# Patient Record
Sex: FEMALE | Race: WHITE | Employment: OTHER | URBAN - METROPOLITAN AREA
[De-identification: names, ages, dates, MRNs, and addresses within clinical notes are randomized per-mention and may not be internally consistent; named-entity substitution may affect disease eponyms.]

---

## 2018-04-25 ENCOUNTER — HOSPITAL ENCOUNTER (OUTPATIENT)
Dept: OTHER | Age: 63
Discharge: HOME | End: 2018-04-30
Attending: NURSE PRACTITIONER | Admitting: NURSE PRACTITIONER

## 2018-05-01 ENCOUNTER — HOSPITAL ENCOUNTER (OUTPATIENT)
Dept: PHYSICAL THERAPY | Age: 63
Discharge: HOME | End: 2018-05-01
Admitting: NURSE PRACTITIONER

## 2018-05-01 ENCOUNTER — HOSPITAL ENCOUNTER (OUTPATIENT)
Dept: OTHER | Age: 63
Discharge: HOME | End: 2018-05-31
Attending: NURSE PRACTITIONER | Admitting: NURSE PRACTITIONER

## 2018-05-03 ENCOUNTER — HOSPITAL ENCOUNTER (OUTPATIENT)
Dept: PHYSICAL THERAPY | Age: 63
Discharge: HOME | End: 2018-05-03
Admitting: NURSE PRACTITIONER

## 2018-05-07 ENCOUNTER — HOSPITAL ENCOUNTER (OUTPATIENT)
Dept: PHYSICAL THERAPY | Age: 63
Discharge: HOME | End: 2018-05-07
Admitting: NURSE PRACTITIONER

## 2018-05-09 ENCOUNTER — HOSPITAL ENCOUNTER (OUTPATIENT)
Dept: PHYSICAL THERAPY | Age: 63
Discharge: HOME | End: 2018-05-09
Admitting: NURSE PRACTITIONER

## 2018-05-16 ENCOUNTER — HOSPITAL ENCOUNTER (OUTPATIENT)
Dept: PHYSICAL THERAPY | Age: 63
Discharge: HOME | End: 2018-05-16
Admitting: NURSE PRACTITIONER

## 2018-05-18 ENCOUNTER — HOSPITAL ENCOUNTER (OUTPATIENT)
Dept: PHYSICAL THERAPY | Age: 63
Discharge: HOME | End: 2018-05-18
Admitting: NURSE PRACTITIONER

## 2018-05-23 ENCOUNTER — HOSPITAL ENCOUNTER (OUTPATIENT)
Dept: PHYSICAL THERAPY | Age: 63
Discharge: HOME | End: 2018-05-23
Admitting: NURSE PRACTITIONER

## 2018-06-01 ENCOUNTER — HOSPITAL ENCOUNTER (OUTPATIENT)
Dept: OTHER | Age: 63
Discharge: HOME | End: 2018-06-07
Attending: NURSE PRACTITIONER | Admitting: NURSE PRACTITIONER

## 2022-06-14 ENCOUNTER — INITIAL CONSULT (OUTPATIENT)
Dept: ONCOLOGY | Age: 67
End: 2022-06-14
Payer: MEDICARE

## 2022-06-14 ENCOUNTER — CLINICAL DOCUMENTATION (OUTPATIENT)
Dept: ONCOLOGY | Age: 67
End: 2022-06-14

## 2022-06-14 ENCOUNTER — HOSPITAL ENCOUNTER (OUTPATIENT)
Dept: INFUSION THERAPY | Age: 67
Discharge: HOME | End: 2022-06-14
Payer: MEDICARE

## 2022-06-14 VITALS
HEART RATE: 113 BPM | RESPIRATION RATE: 16 BRPM | HEIGHT: 64 IN | BODY MASS INDEX: 27.31 KG/M2 | TEMPERATURE: 98 F | WEIGHT: 160 LBS | SYSTOLIC BLOOD PRESSURE: 160 MMHG | DIASTOLIC BLOOD PRESSURE: 66 MMHG | OXYGEN SATURATION: 97 %

## 2022-06-14 DIAGNOSIS — N63.20 LEFT BREAST MASS: ICD-10-CM

## 2022-06-14 DIAGNOSIS — R16.0 LIVER MASS: Primary | ICD-10-CM

## 2022-06-14 DIAGNOSIS — R16.0 LIVER MASS: ICD-10-CM

## 2022-06-14 DIAGNOSIS — M89.9 LYTIC LESION OF BONE ON X-RAY: ICD-10-CM

## 2022-06-14 PROBLEM — M89.8X9 LYTIC LESION OF BONE ON X-RAY: Status: ACTIVE | Noted: 2022-06-14

## 2022-06-14 LAB
BASOPHILS ABSOLUTE: 0 K/CU MM
BASOPHILS RELATIVE PERCENT: 0.2 % (ref 0–1)
CEA: 54.5 NG/ML
DIFFERENTIAL TYPE: ABNORMAL
EOSINOPHILS ABSOLUTE: 0 K/CU MM
EOSINOPHILS RELATIVE PERCENT: 0.5 % (ref 0–3)
HCT VFR BLD CALC: 37.1 % (ref 37–47)
HEMOGLOBIN: 12.2 GM/DL (ref 12.5–16)
LACTATE DEHYDROGENASE: 419 IU/L (ref 120–246)
LYMPHOCYTES ABSOLUTE: 1.9 K/CU MM
LYMPHOCYTES RELATIVE PERCENT: 29.8 % (ref 24–44)
MCH RBC QN AUTO: 30 PG (ref 27–31)
MCHC RBC AUTO-ENTMCNC: 32.9 % (ref 32–36)
MCV RBC AUTO: 91.4 FL (ref 78–100)
MONOCYTES ABSOLUTE: 0.5 K/CU MM
MONOCYTES RELATIVE PERCENT: 7.2 % (ref 0–4)
PDW BLD-RTO: 13.1 % (ref 11.7–14.9)
PLATELET # BLD: 228 K/CU MM (ref 140–440)
PMV BLD AUTO: 8.7 FL (ref 7.5–11.1)
RBC # BLD: 4.06 M/CU MM (ref 4.2–5.4)
SEGMENTED NEUTROPHILS ABSOLUTE COUNT: 4 K/CU MM
SEGMENTED NEUTROPHILS RELATIVE PERCENT: 62.3 % (ref 36–66)
WBC # BLD: 6.4 K/CU MM (ref 4–10.5)

## 2022-06-14 PROCEDURE — 1123F ACP DISCUSS/DSCN MKR DOCD: CPT | Performed by: INTERNAL MEDICINE

## 2022-06-14 PROCEDURE — 85025 COMPLETE CBC W/AUTO DIFF WBC: CPT

## 2022-06-14 PROCEDURE — 36415 COLL VENOUS BLD VENIPUNCTURE: CPT

## 2022-06-14 PROCEDURE — 86301 IMMUNOASSAY TUMOR CA 19-9: CPT

## 2022-06-14 PROCEDURE — 99205 OFFICE O/P NEW HI 60 MIN: CPT | Performed by: INTERNAL MEDICINE

## 2022-06-14 PROCEDURE — 86320 SERUM IMMUNOELECTROPHORESIS: CPT

## 2022-06-14 PROCEDURE — 83615 LACTATE (LD) (LDH) ENZYME: CPT

## 2022-06-14 PROCEDURE — 84155 ASSAY OF PROTEIN SERUM: CPT

## 2022-06-14 PROCEDURE — 86300 IMMUNOASSAY TUMOR CA 15-3: CPT

## 2022-06-14 PROCEDURE — 82378 CARCINOEMBRYONIC ANTIGEN: CPT

## 2022-06-14 PROCEDURE — 86304 IMMUNOASSAY TUMOR CA 125: CPT

## 2022-06-14 PROCEDURE — 84165 PROTEIN E-PHORESIS SERUM: CPT

## 2022-06-14 PROCEDURE — 82105 ALPHA-FETOPROTEIN SERUM: CPT

## 2022-06-14 RX ORDER — PSEUDOEPHEDRINE HCL 30 MG
100 TABLET ORAL DAILY
COMMUNITY
Start: 2022-06-12 | End: 2022-06-22

## 2022-06-14 RX ORDER — SENNA PLUS 8.6 MG/1
1 TABLET ORAL 2 TIMES DAILY
Qty: 60 TABLET | Refills: 11 | Status: SHIPPED | OUTPATIENT
Start: 2022-06-14 | End: 2023-06-14

## 2022-06-14 RX ORDER — HYDROCODONE BITARTRATE AND ACETAMINOPHEN 10; 325 MG/1; MG/1
1 TABLET ORAL EVERY 4 HOURS PRN
Qty: 60 TABLET | Refills: 0 | Status: SHIPPED | OUTPATIENT
Start: 2022-06-14 | End: 2022-06-30 | Stop reason: SDUPTHER

## 2022-06-14 RX ORDER — ONDANSETRON 4 MG/1
4 TABLET, ORALLY DISINTEGRATING ORAL 4 TIMES DAILY PRN
Qty: 60 TABLET | Refills: 0 | Status: SHIPPED | OUTPATIENT
Start: 2022-06-14 | End: 2022-06-29

## 2022-06-14 RX ORDER — OXYCODONE HYDROCHLORIDE AND ACETAMINOPHEN 5; 325 MG/1; MG/1
TABLET ORAL
COMMUNITY
Start: 2022-06-12 | End: 2022-06-30 | Stop reason: ALTCHOICE

## 2022-06-14 ASSESSMENT — PATIENT HEALTH QUESTIONNAIRE - PHQ9
6. FEELING BAD ABOUT YOURSELF - OR THAT YOU ARE A FAILURE OR HAVE LET YOURSELF OR YOUR FAMILY DOWN: 0
5. POOR APPETITE OR OVEREATING: 3
3. TROUBLE FALLING OR STAYING ASLEEP: 3
2. FEELING DOWN, DEPRESSED OR HOPELESS: 0
SUM OF ALL RESPONSES TO PHQ QUESTIONS 1-9: 9
SUM OF ALL RESPONSES TO PHQ9 QUESTIONS 1 & 2: 0
SUM OF ALL RESPONSES TO PHQ QUESTIONS 1-9: 9
SUM OF ALL RESPONSES TO PHQ QUESTIONS 1-9: 9
10. IF YOU CHECKED OFF ANY PROBLEMS, HOW DIFFICULT HAVE THESE PROBLEMS MADE IT FOR YOU TO DO YOUR WORK, TAKE CARE OF THINGS AT HOME, OR GET ALONG WITH OTHER PEOPLE: 1
SUM OF ALL RESPONSES TO PHQ QUESTIONS 1-9: 9
7. TROUBLE CONCENTRATING ON THINGS, SUCH AS READING THE NEWSPAPER OR WATCHING TELEVISION: 0
4. FEELING TIRED OR HAVING LITTLE ENERGY: 3
9. THOUGHTS THAT YOU WOULD BE BETTER OFF DEAD, OR OF HURTING YOURSELF: 0
8. MOVING OR SPEAKING SO SLOWLY THAT OTHER PEOPLE COULD HAVE NOTICED. OR THE OPPOSITE, BEING SO FIGETY OR RESTLESS THAT YOU HAVE BEEN MOVING AROUND A LOT MORE THAN USUAL: 0
1. LITTLE INTEREST OR PLEASURE IN DOING THINGS: 0

## 2022-06-14 NOTE — PROGRESS NOTES
Patient Name:  Sammy Paul  Patient :  1955  Patient MRN:  [id number]     Primary Oncologist: Alem De Dios MD  Referring Provider: MAURICIO Dixon CNP     Date of Service: 2022      Reason for Consult: To evaluate the patient with liver lesion and multiple lytic bone lesions. Chief Complaint:    Chief Complaint   Patient presents with    New Patient     Patient's active problem list:       Liver mass       Lytic bone lesions       Left breast mass    HPI:   Sammy Paul is a 74year-old very pleasant female with medical history significant for osteoarthritis, referred to me on 22 for evaluation of her liver lesion and multiple lytic bone lesions. She initially presented to [facility] ER on 22 with severe lower back pain and constipation. Stated that she has been having back pain for about 4 - 6 weeks duration, which becomes severe pain started a week before presentation. CT scan of the abdomen and pelvis done on 2022 showed lesion (6.1 x 8.1 cm) in the hepatic segment 4, concerning for neoplasm. Further evaluation with MRI of the liver is recommended. Extensive lytic metastatic disease in lumbar and thoracic spine and pelvis with most dominant lesion seen at L5 vertebral body. Several small bowel loops segments in the left abdomen demonstrate mild wall thickening, nonspecific may indicate enteritis. Nonspecific partially visualized inflammatory stranding along the pericardium. 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 CT lumbar spine done on 22 showed extensive multifocal lytic lesions concerning for neoplastic/metastatic disease. Age indeterminant compression fractures at T12 and L4, with mild narrowing of the AP diameter of the canal at L4. She never had colonoscopy before. She had pap smear around . She didn't recall when was her last mammogram.     She denies weight loss. She hasn't been eating much for about 10 days since she has been having nausea. Since she is found to have multiple lytic bone lesions and liver lesion, she was referred to me for further evaluation. Past Medical History:     Significant for  1. Osteoarthritis    Past Surgery History:    Significant for   1. Left hip replacement    Social History:   She is a current smoker and she smokes 1 pack per day approximately since she was 24. She denies alcohol drinking or illicit drug abuse. Family History:    Significant for Alzheimer's disease in her parents. No family history of malignancy. No Known Allergies    Current Outpatient Medications on File Prior to Visit   Medication Sig Dispense Refill    docusate (COLACE, DULCOLAX) 100 MG CAPS Take 100 mg by mouth daily (Patient not taking: Reported on 6/14/2022)      oxyCODONE-acetaminophen (PERCOCET) 5-325 MG per tablet  (Patient not taking: Reported on 6/14/2022)       No current facility-administered medications on file prior to visit. Review of Systems:  Constitutional:  No weight loss, No fever, No chills, No night sweats. Energy level is okay. Eyes:  No diplopia, No transient or permanent loss of vision, No scotomata. ENT / Mouth:  No epistaxis, No dysphagia, No hoarseness, No oral ulcers, No gingival bleeding. No sore throat, No postnasal drip, No nasal drip, No mouth pain, No sinus pain, No tinnitus, Normal hearing. Cardiovascular:  No chest pain, No palpitations, No syncope, No upper extremity edema, No lower extremity edema, No calf discomfort. Respiratory:  No cough. No hemoptysis, No pleurisy, No wheezing, No dyspnea. Breast:  Left breast mass and pain +, No nipple discharge. Left breast was distorted by tumor and nipple was pushing close to lower crease area, Left axillary lymph node noted,  Gastrointestinal:  No abdominal pain, No abdominal cramping, No nausea, No vomiting, No constipation, No diarrhea, No hematochezia, No melena, No jaundice, No dyspepsia, No dysphagia.   Urinary:  No dysuria, No hematuria, No urinary incontinence. Gynecological:  No vaginal discharge, No suprapubic pain, No abnormal vaginal bleeding. Musculoskeletal:  No muscle pain, No swollen joints, No joint redness, No bone pain, No spine tenderness. Skin:  No rash, No nodules, No pruritus, No lesions. Neurologic:  No confusion, No seizures, No syncope, No tremor, No speech change, No headache, No hiccups, No abnormal gait, No sensory changes, No weakness. Psychiatric:  No depression, No anxiety, Concentration normal.  Endocrine:  No polyuria, No polydipsia, No hot flashes, No thyroid symptoms. Hematologic:  No epistaxis, No gingival bleeding, No petechiae, No ecchymosis. Lymphatic:  No lymphadenopathy, No lymphedema. Allergy / Immunologic:  No eczema, No frequent mucous infections, No frequent respiratory infections, No recurrent urticarial, No frequent skin infections. Vital Signs: There were no vitals taken for this visit. Physical Exam:  CONSTITUTIONAL: awake, alert, cooperative, no apparent distress   EYES: pupils equal, round and reactive to light, sclera clear, normal conjunctiva  ENT: Normocephalic, without obvious abnormality, atraumatic  NECK: supple, symmetrical, no jugular venous distension, no carotid bruits   HEMATOLOGIC/LYMPHATIC: no cervical, supraclavicular lymphadenopathy. Left axillary lymphadenopathy noted,   LUNGS: VBS, no wheezes, no increased work of breathing, no rhonchi, clear to auscultation, no crackles,    CARDIOVASCULAR: regular rate and rhythm, normal S1 and S2, no murmur noted  ABDOMEN: normal bowel sounds x 4, soft, non-distended, non-tender, no masses palpated, no hepatosplenomegaly   MUSCULOSKELETAL: full range of motion noted, tone is normal  NEUROLOGIC: awake, alert, oriented to name, place and time. Motor skills grossly intact. SKIN: appears intact, normal skin color, normal texture, normal turgor, no jaundice.    EXTREMITIES: no LE edema, no leg swelling, no cyanosis, no clubbing,  BREASTS: Left breast is distorted by tumor, nipple was displaced close to lower breast crease area, right breast nodularity noted,        Labs:  Hematology:  No results found for: WBC, RBC, HGB, HCT, MCV, MCH, MCHC, RDW, PLT, MPV, BANDSPCT, SEGSPCT, EOSRELPCT, BASOPCT, LYMPHOPCT, MONOPCT, BANDABS, SEGSABS, EOSABS, BASOSABS, LYMPHSABS, MONOSABS, DIFFTYPE, ANISOCYTOSIS, POLYCHROM, WBCMORP, PLTM  No results found for: ESR  Chemistry:  No results found for: NA, K, CL, CO2, BUN, CREATININE, GLUCOSE, CALCIUM, PROT, LABALBU, BILITOT, ALKPHOS, AST, ALT, LABGLOM, GFRAA, AGRATIO, GLOB, PHOS, MG, POCCA, POCGLU  No results found for: MMA, LDH, HOMOCYSTEINE  No components found for: LD  No results found for: TSHHS, T4FREE, FT3  Immunology:  No results found for: PROT, SPEP, ALBUMINELP, LABALPH, LABBETA, GAMGLOB  No results found for: Ardyth Esmer, LAMBDAUVOL, KLFLCR  No results found for: B2M  Coagulation Panel:  No results found for: PROTIME, INR, APTT, DDIMER  Anemia Panel:  No results found for: UMBIPAIR90, FOLATE  Tumor Markers:  No results found for: , CEA, , LABCA2, PSA     Observations:  No data recorded     Assessment   Left breast mass  Liver lesion and multiple bone lytic lesions - concerning for metastatic breast cancer    Plan:  Salinas Araya is a 74year-old very pleasant female who initially presented to [facility] ER on 6/12/22 with severe lower back pain and constipation. CT scan of the abdomen and pelvis done on 6/12/2022 showed lesion (6.1 x 8.1 cm) in the hepatic segment 4, concerning for neoplasm. Extensive lytic metastatic disease in lumbar and thoracic spine and pelvis with most dominant lesion seen at L5 vertebral body. CT lumbar spine done on 6/12/22 showed extensive multifocal lytic lesions concerning for neoplastic/metastatic disease. She never had colonoscopy before. She had pap smear around 2020.  She didn't recall when was her last mammogram.     Physical exam showed distorted left breast by tumor, nipple was displaced close to lower breast crease area. Right breast nodularity was also noted    I reviewed all her scans with her and findings on physical. Clinical findings are concerning for possible metastatic breast cancer. I will request diagnostic mammogram, followed by biopsy, CT chest, bone scan and MRI of abdomen as soon as possible. I will request all the tumor marker, SPEP and immunofixation today. Further management will be based on above findings Will plan to see her back on 6/22/22 and will have all her study done before that. Lower back pain - malignance related. Couldn't tolerate percocet. Will change it to norco 10/325 mg Q4 hourly prn. She was given dilaudid injection in ER with improvement. Narcotics related constipation - will start senokot to help with that. Nausea - will start zofran ODT today and will follow her symptoms. Dehydration - encouraged her to drink more water. If she still has nausea, will consider to give IV fluid therapy. I answered all her questions and concerns for today. I asked - to follow up with primary care physician on regular basis. I will continue to keep you updated on - progress. Thank you for allowing me to participate in the care of this very pleasant patient. Recent imaging and labs were reviewed and discussed with the patient.

## 2022-06-14 NOTE — PROGRESS NOTES
MA Rooming Questions  Patient: Romeo Romo  MRN: [id number]    Date: 6/14/2022        New patient        PHQ-9 Total Score: 9 (6/14/2022  8:40 AM)  Thoughts that you would be better off dead, or of hurting yourself in some way: 0 (6/14/2022  8:40 AM)       PHQ-9 Given to (if applicable):               PHQ-9 Score (if applicable):                     [] Positive     []  Negative              Does question #9 need addressed (if applicable)                     [] Yes    []  No               Peter Landau, ALETHA

## 2022-06-15 DIAGNOSIS — N63.20 LEFT BREAST MASS: Primary | ICD-10-CM

## 2022-06-15 LAB
CA 125: 377 U/ML
CA 19-9: 25 U/ML
MS ALPHA-FETOPROTEIN: 4 NG/ML (ref 0–9)

## 2022-06-16 ENCOUNTER — CLINICAL DOCUMENTATION (OUTPATIENT)
Dept: CASE MANAGEMENT | Age: 67
End: 2022-06-16

## 2022-06-16 LAB
ALBUMIN ELP: 3.7 GM/DL (ref 3.2–5.6)
ALBUMIN SERPL-MCNC: ABNORMAL G/DL
ALPHA-1-GLOBULIN: 0.4 GM/DL (ref 0.1–0.4)
ALPHA-1-GLOBULIN: ABNORMAL
ALPHA-2-GLOBULIN: 1.3 GM/DL (ref 0.4–1.2)
ALPHA-2-GLOBULIN: ABNORMAL
BETA GLOBULIN: 1.1 GM/DL (ref 0.5–1.3)
BETA GLOBULIN: ABNORMAL
CA 27.29: 110.5 U/ML
GAMMA GLOBULIN: 1.1 GM/DL (ref 0.5–1.6)
GAMMA: ABNORMAL
IMMUNOFIXATION REFLEX: ABNORMAL
PROTEIN ELECTROPHORESIS, SERUM: ABNORMAL
SPE/IFE INTERPRETATION: ABNORMAL
SPEP INTERPRETATION: ABNORMAL
SPEP INTERPRETATION: NORMAL
TOTAL PROTEIN: 7.6 GM/DL (ref 6.4–8.2)
TOTAL PROTEIN: ABNORMAL

## 2022-06-17 ENCOUNTER — HOSPITAL ENCOUNTER (OUTPATIENT)
Dept: ULTRASOUND IMAGING | Age: 67
Discharge: HOME | End: 2022-06-17
Payer: MEDICARE

## 2022-06-17 ENCOUNTER — HOSPITAL ENCOUNTER (OUTPATIENT)
Dept: WOMENS IMAGING | Age: 67
Discharge: HOME | End: 2022-06-17
Payer: MEDICARE

## 2022-06-17 DIAGNOSIS — N63.10 MASS OF RIGHT BREAST: ICD-10-CM

## 2022-06-17 DIAGNOSIS — N63.20 LEFT BREAST MASS: ICD-10-CM

## 2022-06-17 DIAGNOSIS — R92.8 ABNORMAL MAMMOGRAM: Primary | ICD-10-CM

## 2022-06-17 PROCEDURE — G0279 TOMOSYNTHESIS, MAMMO: HCPCS

## 2022-06-17 PROCEDURE — 76642 ULTRASOUND BREAST LIMITED: CPT

## 2022-06-17 PROCEDURE — 76641 ULTRASOUND BREAST COMPLETE: CPT

## 2022-06-17 NOTE — PROGRESS NOTES
Patient went for bilateral dx mammogram/US today. Results reveal the followin. Left breast mass, highly suspicious for malignancy.  Recommend  ultrasound-guided core biopsy. 2.  Right breast mass at 11 o'clock, 1.5 cm.  Mass is highly suspicious for  malignancy.  Recommend ultrasound-guided core biopsy. 3.  Left breast skin thickening.  If it would alter patient's management,  recommend punch biopsy to determine skin involvement. BIRADS:  5    Per Dr. Abilio Burden - orders placed for bilateral US-guided bxs - patient scheduled for next Wednesday, 22.

## 2022-06-20 ENCOUNTER — HOSPITAL ENCOUNTER (OUTPATIENT)
Dept: NUCLEAR MEDICINE | Age: 67
Discharge: HOME | End: 2022-06-20
Payer: MEDICARE

## 2022-06-20 ENCOUNTER — HOSPITAL ENCOUNTER (OUTPATIENT)
Dept: CT IMAGING | Age: 67
Discharge: HOME | End: 2022-06-20
Payer: MEDICARE

## 2022-06-20 DIAGNOSIS — R16.0 LIVER MASS: ICD-10-CM

## 2022-06-20 DIAGNOSIS — M89.9 LYTIC LESION OF BONE ON X-RAY: ICD-10-CM

## 2022-06-20 LAB
GFR AFRICAN AMERICAN: >60 ML/MIN/1.73M2
GFR NON-AFRICAN AMERICAN: >60 ML/MIN/1.73M2
POC CREATININE: 0.7 MG/DL (ref 0.6–1.1)

## 2022-06-20 PROCEDURE — 71260 CT THORAX DX C+: CPT

## 2022-06-20 PROCEDURE — 6360000004 HC RX CONTRAST MEDICATION: Performed by: INTERNAL MEDICINE

## 2022-06-20 PROCEDURE — 78306 BONE IMAGING WHOLE BODY: CPT

## 2022-06-20 PROCEDURE — 3430000000 HC RX DIAGNOSTIC RADIOPHARMACEUTICAL: Performed by: INTERNAL MEDICINE

## 2022-06-20 PROCEDURE — A9503 TC99M MEDRONATE: HCPCS | Performed by: INTERNAL MEDICINE

## 2022-06-20 RX ORDER — TC 99M MEDRONATE 20 MG/10ML
25 INJECTION, POWDER, LYOPHILIZED, FOR SOLUTION INTRAVENOUS
Status: COMPLETED | OUTPATIENT
Start: 2022-06-20 | End: 2022-06-20

## 2022-06-20 RX ADMIN — TC 99M MEDRONATE 25 MILLICURIE: 20 INJECTION, POWDER, LYOPHILIZED, FOR SOLUTION INTRAVENOUS at 09:30

## 2022-06-20 RX ADMIN — IOPAMIDOL 80 ML: 755 INJECTION, SOLUTION INTRAVENOUS at 10:18

## 2022-06-22 ENCOUNTER — HOSPITAL ENCOUNTER (OUTPATIENT)
Dept: WOMENS IMAGING | Age: 67
Discharge: HOME | End: 2022-06-22
Payer: MEDICARE

## 2022-06-22 ENCOUNTER — HOSPITAL ENCOUNTER (OUTPATIENT)
Dept: ULTRASOUND IMAGING | Age: 67
Discharge: HOME | End: 2022-06-22
Payer: MEDICARE

## 2022-06-22 DIAGNOSIS — R92.8 ABNORMAL MAMMOGRAM: ICD-10-CM

## 2022-06-22 PROCEDURE — 88342 IMHCHEM/IMCYTCHM 1ST ANTB: CPT | Performed by: PATHOLOGY

## 2022-06-22 PROCEDURE — 88305 TISSUE EXAM BY PATHOLOGIST: CPT | Performed by: PATHOLOGY

## 2022-06-22 PROCEDURE — 77066 DX MAMMO INCL CAD BI: CPT

## 2022-06-22 PROCEDURE — 2720000010 US BREAST BIOPSY W LOC DEVICE 1ST LESION LEFT

## 2022-06-22 PROCEDURE — 88341 IMHCHEM/IMCYTCHM EA ADD ANTB: CPT | Performed by: PATHOLOGY

## 2022-06-22 PROCEDURE — 88360 TUMOR IMMUNOHISTOCHEM/MANUAL: CPT | Performed by: PATHOLOGY

## 2022-06-22 PROCEDURE — 19083 BX BREAST 1ST LESION US IMAG: CPT

## 2022-06-25 ENCOUNTER — HOSPITAL ENCOUNTER (OUTPATIENT)
Dept: MRI IMAGING | Age: 67
Discharge: HOME | End: 2022-06-25
Payer: MEDICARE

## 2022-06-25 DIAGNOSIS — R16.0 LIVER MASS: ICD-10-CM

## 2022-06-25 DIAGNOSIS — M89.9 LYTIC LESION OF BONE ON X-RAY: ICD-10-CM

## 2022-06-25 PROCEDURE — A9577 INJ MULTIHANCE: HCPCS | Performed by: INTERNAL MEDICINE

## 2022-06-25 PROCEDURE — 6360000004 HC RX CONTRAST MEDICATION: Performed by: INTERNAL MEDICINE

## 2022-06-25 PROCEDURE — 74183 MRI ABD W/O CNTR FLWD CNTR: CPT

## 2022-06-25 RX ADMIN — GADOBENATE DIMEGLUMINE 15 ML: 529 INJECTION, SOLUTION INTRAVENOUS at 11:33

## 2022-06-30 ENCOUNTER — OFFICE VISIT (OUTPATIENT)
Dept: ONCOLOGY | Age: 67
End: 2022-06-30
Payer: MEDICARE

## 2022-06-30 ENCOUNTER — HOSPITAL ENCOUNTER (OUTPATIENT)
Dept: INFUSION THERAPY | Age: 67
Discharge: HOME | End: 2022-06-30

## 2022-06-30 VITALS
SYSTOLIC BLOOD PRESSURE: 133 MMHG | WEIGHT: 160.4 LBS | HEIGHT: 64 IN | DIASTOLIC BLOOD PRESSURE: 62 MMHG | OXYGEN SATURATION: 97 % | BODY MASS INDEX: 27.39 KG/M2 | TEMPERATURE: 98.2 F | HEART RATE: 97 BPM

## 2022-06-30 DIAGNOSIS — C79.51 BONE METASTASIS (HCC): ICD-10-CM

## 2022-06-30 DIAGNOSIS — C50.812 MALIGNANT NEOPLASM OF OVERLAPPING SITES OF BOTH BREASTS IN FEMALE, ESTROGEN RECEPTOR POSITIVE (HCC): Primary | ICD-10-CM

## 2022-06-30 DIAGNOSIS — M89.9 LYTIC LESION OF BONE ON X-RAY: ICD-10-CM

## 2022-06-30 DIAGNOSIS — Z17.0 MALIGNANT NEOPLASM OF OVERLAPPING SITES OF BOTH BREASTS IN FEMALE, ESTROGEN RECEPTOR POSITIVE (HCC): Primary | ICD-10-CM

## 2022-06-30 DIAGNOSIS — C50.811 MALIGNANT NEOPLASM OF OVERLAPPING SITES OF BOTH BREASTS IN FEMALE, ESTROGEN RECEPTOR POSITIVE (HCC): Primary | ICD-10-CM

## 2022-06-30 DIAGNOSIS — R16.0 LIVER MASS: ICD-10-CM

## 2022-06-30 PROCEDURE — 99215 OFFICE O/P EST HI 40 MIN: CPT | Performed by: INTERNAL MEDICINE

## 2022-06-30 PROCEDURE — 1123F ACP DISCUSS/DSCN MKR DOCD: CPT | Performed by: INTERNAL MEDICINE

## 2022-06-30 RX ORDER — FAMOTIDINE 10 MG/ML
20 INJECTION, SOLUTION INTRAVENOUS
Status: CANCELLED | OUTPATIENT
Start: 2022-07-20

## 2022-06-30 RX ORDER — ALBUTEROL SULFATE 90 UG/1
4 AEROSOL, METERED RESPIRATORY (INHALATION) PRN
Status: CANCELLED | OUTPATIENT
Start: 2022-07-20

## 2022-06-30 RX ORDER — DIPHENHYDRAMINE HYDROCHLORIDE 50 MG/ML
50 INJECTION INTRAMUSCULAR; INTRAVENOUS ONCE
Status: CANCELLED | OUTPATIENT
Start: 2022-07-27 | End: 2022-07-07

## 2022-06-30 RX ORDER — ACETAMINOPHEN 325 MG/1
650 TABLET ORAL
Status: CANCELLED | OUTPATIENT
Start: 2022-07-20

## 2022-06-30 RX ORDER — EPINEPHRINE 1 MG/ML
0.3 INJECTION, SOLUTION, CONCENTRATE INTRAVENOUS PRN
Status: CANCELLED | OUTPATIENT
Start: 2022-08-10

## 2022-06-30 RX ORDER — EPINEPHRINE 1 MG/ML
0.3 INJECTION, SOLUTION, CONCENTRATE INTRAVENOUS PRN
Status: CANCELLED | OUTPATIENT
Start: 2022-07-20

## 2022-06-30 RX ORDER — SODIUM CHLORIDE 9 MG/ML
5-40 INJECTION INTRAVENOUS PRN
Status: CANCELLED | OUTPATIENT
Start: 2022-07-20

## 2022-06-30 RX ORDER — DIPHENHYDRAMINE HYDROCHLORIDE 50 MG/ML
50 INJECTION INTRAMUSCULAR; INTRAVENOUS ONCE
Status: CANCELLED | OUTPATIENT
Start: 2022-07-20 | End: 2022-06-30

## 2022-06-30 RX ORDER — DIPHENHYDRAMINE HYDROCHLORIDE 50 MG/ML
50 INJECTION INTRAMUSCULAR; INTRAVENOUS
Status: CANCELLED | OUTPATIENT
Start: 2022-07-27

## 2022-06-30 RX ORDER — DIPHENHYDRAMINE HYDROCHLORIDE 50 MG/ML
50 INJECTION INTRAMUSCULAR; INTRAVENOUS ONCE
Status: CANCELLED | OUTPATIENT
Start: 2022-08-10 | End: 2022-07-14

## 2022-06-30 RX ORDER — SODIUM CHLORIDE 9 MG/ML
5-250 INJECTION, SOLUTION INTRAVENOUS PRN
Status: CANCELLED | OUTPATIENT
Start: 2022-07-20

## 2022-06-30 RX ORDER — ALBUTEROL SULFATE 90 UG/1
4 AEROSOL, METERED RESPIRATORY (INHALATION) PRN
Status: CANCELLED | OUTPATIENT
Start: 2022-08-10

## 2022-06-30 RX ORDER — SODIUM CHLORIDE 0.9 % (FLUSH) 0.9 %
5-40 SYRINGE (ML) INJECTION PRN
Status: CANCELLED | OUTPATIENT
Start: 2022-08-10

## 2022-06-30 RX ORDER — ONDANSETRON 2 MG/ML
8 INJECTION INTRAMUSCULAR; INTRAVENOUS
Status: CANCELLED | OUTPATIENT
Start: 2022-08-10

## 2022-06-30 RX ORDER — ACETAMINOPHEN 325 MG/1
650 TABLET ORAL
Status: CANCELLED | OUTPATIENT
Start: 2022-08-10

## 2022-06-30 RX ORDER — MEPERIDINE HYDROCHLORIDE 50 MG/ML
12.5 INJECTION INTRAMUSCULAR; INTRAVENOUS; SUBCUTANEOUS PRN
Status: CANCELLED | OUTPATIENT
Start: 2022-07-20

## 2022-06-30 RX ORDER — ONDANSETRON 2 MG/ML
8 INJECTION INTRAMUSCULAR; INTRAVENOUS
Status: CANCELLED | OUTPATIENT
Start: 2022-07-27

## 2022-06-30 RX ORDER — DIPHENHYDRAMINE HYDROCHLORIDE 50 MG/ML
50 INJECTION INTRAMUSCULAR; INTRAVENOUS
Status: CANCELLED | OUTPATIENT
Start: 2022-08-10

## 2022-06-30 RX ORDER — SODIUM CHLORIDE 9 MG/ML
5-40 INJECTION INTRAVENOUS PRN
Status: CANCELLED | OUTPATIENT
Start: 2022-08-10

## 2022-06-30 RX ORDER — SODIUM CHLORIDE 9 MG/ML
INJECTION, SOLUTION INTRAVENOUS CONTINUOUS
Status: CANCELLED | OUTPATIENT
Start: 2022-07-20

## 2022-06-30 RX ORDER — DIPHENHYDRAMINE HYDROCHLORIDE 50 MG/ML
50 INJECTION INTRAMUSCULAR; INTRAVENOUS
Status: CANCELLED | OUTPATIENT
Start: 2022-07-20

## 2022-06-30 RX ORDER — EPINEPHRINE 1 MG/ML
0.3 INJECTION, SOLUTION, CONCENTRATE INTRAVENOUS PRN
Status: CANCELLED | OUTPATIENT
Start: 2022-07-27

## 2022-06-30 RX ORDER — LETROZOLE 2.5 MG/1
2.5 TABLET, FILM COATED ORAL DAILY
COMMUNITY
End: 2022-09-21

## 2022-06-30 RX ORDER — SODIUM CHLORIDE 9 MG/ML
5-40 INJECTION INTRAVENOUS PRN
Status: CANCELLED | OUTPATIENT
Start: 2022-07-27

## 2022-06-30 RX ORDER — SODIUM CHLORIDE 9 MG/ML
INJECTION, SOLUTION INTRAVENOUS CONTINUOUS
Status: CANCELLED | OUTPATIENT
Start: 2022-08-10

## 2022-06-30 RX ORDER — MEPERIDINE HYDROCHLORIDE 50 MG/ML
12.5 INJECTION INTRAMUSCULAR; INTRAVENOUS; SUBCUTANEOUS PRN
Status: CANCELLED | OUTPATIENT
Start: 2022-08-10

## 2022-06-30 RX ORDER — SODIUM CHLORIDE 9 MG/ML
5-250 INJECTION, SOLUTION INTRAVENOUS PRN
Status: CANCELLED | OUTPATIENT
Start: 2022-07-27

## 2022-06-30 RX ORDER — ONDANSETRON 2 MG/ML
8 INJECTION INTRAMUSCULAR; INTRAVENOUS
Status: CANCELLED | OUTPATIENT
Start: 2022-07-20

## 2022-06-30 RX ORDER — SODIUM CHLORIDE 0.9 % (FLUSH) 0.9 %
5-40 SYRINGE (ML) INJECTION PRN
Status: CANCELLED | OUTPATIENT
Start: 2022-07-27

## 2022-06-30 RX ORDER — HEPARIN SODIUM (PORCINE) LOCK FLUSH IV SOLN 100 UNIT/ML 100 UNIT/ML
500 SOLUTION INTRAVENOUS PRN
Status: CANCELLED | OUTPATIENT
Start: 2022-07-20

## 2022-06-30 RX ORDER — SODIUM CHLORIDE 0.9 % (FLUSH) 0.9 %
5-40 SYRINGE (ML) INJECTION PRN
Status: CANCELLED | OUTPATIENT
Start: 2022-07-20

## 2022-06-30 RX ORDER — FAMOTIDINE 10 MG/ML
20 INJECTION, SOLUTION INTRAVENOUS
Status: CANCELLED | OUTPATIENT
Start: 2022-08-10

## 2022-06-30 RX ORDER — SODIUM CHLORIDE 9 MG/ML
INJECTION, SOLUTION INTRAVENOUS CONTINUOUS
Status: CANCELLED | OUTPATIENT
Start: 2022-07-27

## 2022-06-30 RX ORDER — FAMOTIDINE 10 MG/ML
20 INJECTION, SOLUTION INTRAVENOUS ONCE
Status: CANCELLED | OUTPATIENT
Start: 2022-08-10 | End: 2022-07-14

## 2022-06-30 RX ORDER — FAMOTIDINE 10 MG/ML
20 INJECTION, SOLUTION INTRAVENOUS ONCE
Status: CANCELLED | OUTPATIENT
Start: 2022-07-27 | End: 2022-07-07

## 2022-06-30 RX ORDER — FAMOTIDINE 10 MG/ML
20 INJECTION, SOLUTION INTRAVENOUS ONCE
Status: CANCELLED | OUTPATIENT
Start: 2022-07-20 | End: 2022-06-30

## 2022-06-30 RX ORDER — HEPARIN SODIUM (PORCINE) LOCK FLUSH IV SOLN 100 UNIT/ML 100 UNIT/ML
500 SOLUTION INTRAVENOUS PRN
Status: CANCELLED | OUTPATIENT
Start: 2022-08-10

## 2022-06-30 RX ORDER — MEPERIDINE HYDROCHLORIDE 50 MG/ML
12.5 INJECTION INTRAMUSCULAR; INTRAVENOUS; SUBCUTANEOUS PRN
Status: CANCELLED | OUTPATIENT
Start: 2022-07-27

## 2022-06-30 RX ORDER — HEPARIN SODIUM (PORCINE) LOCK FLUSH IV SOLN 100 UNIT/ML 100 UNIT/ML
500 SOLUTION INTRAVENOUS PRN
Status: CANCELLED | OUTPATIENT
Start: 2022-07-27

## 2022-06-30 RX ORDER — LORAZEPAM 0.5 MG/1
0.5 TABLET ORAL NIGHTLY PRN
Qty: 30 TABLET | Refills: 0 | Status: SHIPPED | OUTPATIENT
Start: 2022-06-30 | End: 2022-07-27 | Stop reason: SDUPTHER

## 2022-06-30 RX ORDER — ACETAMINOPHEN 325 MG/1
650 TABLET ORAL
Status: CANCELLED | OUTPATIENT
Start: 2022-07-27

## 2022-06-30 RX ORDER — ALBUTEROL SULFATE 90 UG/1
4 AEROSOL, METERED RESPIRATORY (INHALATION) PRN
Status: CANCELLED | OUTPATIENT
Start: 2022-07-27

## 2022-06-30 RX ORDER — FAMOTIDINE 10 MG/ML
20 INJECTION, SOLUTION INTRAVENOUS
Status: CANCELLED | OUTPATIENT
Start: 2022-07-27

## 2022-06-30 RX ORDER — SODIUM CHLORIDE 9 MG/ML
5-250 INJECTION, SOLUTION INTRAVENOUS PRN
Status: CANCELLED | OUTPATIENT
Start: 2022-08-10

## 2022-06-30 RX ORDER — HYDROCODONE BITARTRATE AND ACETAMINOPHEN 10; 325 MG/1; MG/1
1 TABLET ORAL EVERY 4 HOURS PRN
Qty: 90 TABLET | Refills: 0 | Status: ON HOLD
Start: 2022-06-30 | End: 2022-07-13 | Stop reason: HOSPADM

## 2022-06-30 NOTE — PROGRESS NOTES
MA Rooming Questions  Patient: Brooklynn Herrera  MRN: [id number]    Date: 6/30/2022        1. Do you have any new issues? yes - constant back pain and not being able to sleep          2. Do you need any refills on medications?    no    3. Have you had any imaging done since your last visit? yes - labs 6/14, lexi and us 6/17, ct chest and bone scan 6/20, jessica and breast biopsy 6/22, mri 6/25    4. Have you been hospitalized or seen in the emergency room since your last visit here?   no    5. Did the patient have a depression screening completed today?  No    No data recorded     PHQ-9 Given to (if applicable):               PHQ-9 Score (if applicable):                     [] Positive     []  Negative              Does question #9 need addressed (if applicable)                     [] Yes    []  No               Francine Tai CMA

## 2022-06-30 NOTE — PROGRESS NOTES
Patient Name:  Katherine Coronado  Patient :  1955  Patient MRN:  [id number]     Primary Oncologist: Laury Bowen MD  Referring Provider: MAURICIO Randle CNP     Date of Service: 2022      Chief Complaint:    Chief Complaint   Patient presents with    Follow-up     Patient's active problem list:       Liver mass       Lytic bone lesions       Left breast mass    HPI:   Katherine Coronado is a 74year-old very pleasant female with medical history significant for osteoarthritis, referred to me on 22 for evaluation of her liver lesion and multiple lytic bone lesions. She initially presented to [address] [place] [address] ER on 22 with severe lower back pain and constipation. Stated that she has been having back pain for about 4 - 6 weeks duration, which becomes severe pain started a week before presentation. CT scan of the abdomen and pelvis done on 2022 showed lesion (6.1 x 8.1 cm) in the hepatic segment 4, concerning for neoplasm. Further evaluation with MRI of the liver is recommended. Extensive lytic metastatic disease in lumbar and thoracic spine and pelvis with most dominant lesion seen at L5 vertebral body. Several small bowel loops segments in the left abdomen demonstrate mild wall thickening, nonspecific may indicate enteritis. Nonspecific partially visualized inflammatory stranding along the pericardium. 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 CT lumbar spine done on 22 showed extensive multifocal lytic lesions concerning for neoplastic/metastatic disease. Age indeterminant compression fractures at T12 and L4, with mild narrowing of the AP diameter of the canal at L4. She never had colonoscopy before. She had pap smear around . She didn't recall when was her last mammogram.     She denies weight loss. She hasn't been eating much for about 10 days since she has been having nausea.      Since she is found to have multiple lytic bone lesions and liver lesion, she was referred to me for further evaluation. Digital diagnostic bilateral mammogram and bilateral ultrasound done on 6/17/2022 showed left breast mass, highly suspicious for malignancy. Right breast mass at 11:00, 1.5 cm. Mass is highly suspicious for malignancy. Left breast skin thickening. CT chest on 6/20/2022 showed suspicious heterogeneous mass primarily centered in segment 4A of the liver measuring 8.8 cm. Left breast mass with left axillary and subpectoral metastatic disease, suspected bony metastases to thoracolumbar spine and sternum and possible left chest wall invasion. Bone scan on 6/20/2022 showed multifocal osseous metastatic disease. T12 compression fracture, concerning for pathologic fracture. MRI abdomen on 6/25/2022 showed biopsy-proven malignancy in the left breast with suspicion for inflammatory carcinoma given skin and trabecular thickening. There may be invasion of the underlying pectoralis musculature. At least 3 heavily meta stasis, the largest measuring up to 8.7 cm with the others 1 cm or less. Likely metastatic portal hepatic, retroperitoneal and right retrocrural lymph nodes. Trace left pleural effusion. Extensive skeletal metastatic disease. She underwent ultrasound-guided biopsy of the right breast mass at 11:00 and the pathology showed invasive carcinoma with ductal and lobular features. Estrogen receptor and progesterone receptor are positive. HER2 by IHC not overexpressed (score 1+). HER2 by FISH-pending. Left breast mass at 12:00 biopsy revealed grade 2 invasive lobular carcinoma. Estrogen receptor by IHC-positive [greater than 95%, average strong intensity], progesterone receptor-positive [approximately 80%, average moderate intensity], HER2 by IHC-not overexpressed [score 1+] and HER2 by FISH-pending. On June 30, 2022, she presented to me for follow-up. She was initially referred to me for evaluation of liver lesion and multiple bony lesion.     She was found to have left breast mass with a distorted left breast on physical examination. 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 Since she has significant visceral disease, I recommend her to initiate first-line chemotherapy with single agent paclitaxel. I reviewed with her all the potential side effects as well as benefits of first-line chemotherapy. After long discussion with her and her family, she is in agreement to start first-line chemotherapy. I will request Chemo-Port placement, chemo education and we will plan to start chemotherapy as soon as possible. Will also start zolendronic acid for metastatic bone disease. I will consider vertebroplasty of T12 vertebra fracture. We will request [employer] molecular panel to look for  mutation. I will also request genetic counseling and testing since she has bilateral metastatic breast cancer. Malignancy related pain - will continue with norco since it has been controlling her pain well. Anxiety and insomnia - will start ativan 0.5 mg nightly prn. She doesn't have any other significant symptoms at today visit. Past Medical History:     Significant for  1. Osteoarthritis    Past Surgery History:    Significant for   1. Left hip replacement    Social History:   She is a current smoker and she smokes 1 pack per day approximately since she was 24. She denies alcohol drinking or illicit drug abuse. Family History:    Significant for Alzheimer's disease in her parents. No family history of malignancy.     No Known Allergies    Current Outpatient Medications on File Prior to Visit   Medication Sig Dispense Refill    oxyCODONE-acetaminophen (PERCOCET) 5-325 MG per tablet  (Patient not taking: Reported on 6/14/2022)      senna (SENOKOT) 8.6 MG tablet Take 1 tablet by mouth 2 times daily 60 tablet 11     No current facility-administered medications on file prior to visit. Review of Systems:  Constitutional:  No weight loss, No fever, No chills, No night sweats. Energy level is okay. Eyes:  No diplopia, No transient or permanent loss of vision, No scotomata. ENT / Mouth:  No epistaxis, No dysphagia, No hoarseness, No oral ulcers, No gingival bleeding. No sore throat, No postnasal drip, No nasal drip, No mouth pain, No sinus pain, No tinnitus, Normal hearing. Cardiovascular:  No chest pain, No palpitations, No syncope, No upper extremity edema, No lower extremity edema, No calf discomfort. Respiratory:  No cough. No hemoptysis, No pleurisy, No wheezing, No dyspnea. Breast:  Left breast mass and pain +, No nipple discharge. Left breast was distorted by tumor and nipple was pushing close to lower crease area, Left axillary lymph node noted,  Gastrointestinal:  No abdominal pain, No abdominal cramping, No nausea, No vomiting, No constipation, No diarrhea, No hematochezia, No melena, No jaundice, No dyspepsia, No dysphagia. Urinary:  No dysuria, No hematuria, No urinary incontinence. Gynecological:  No vaginal discharge, No suprapubic pain, No abnormal vaginal bleeding. Musculoskeletal:  No muscle pain, No swollen joints, No joint redness, No bone pain, No spine tenderness. Skin:  No rash, No nodules, No pruritus, No lesions. Neurologic:  No confusion, No seizures, No syncope, No tremor, No speech change, No headache, No hiccups, No abnormal gait, No sensory changes, No weakness. Psychiatric:  No depression, No anxiety, Concentration normal.  Endocrine:  No polyuria, No polydipsia, No hot flashes, No thyroid symptoms. Hematologic:  No epistaxis, No gingival bleeding, No petechiae, No ecchymosis. Lymphatic:  No lymphadenopathy, No lymphedema.   Allergy / Immunologic:  No eczema, No frequent mucous infections, No frequent respiratory infections, No recurrent urticarial, No frequent skin infections. Vital Signs: /62 (Site: Left Upper Arm, Position: Sitting, Cuff Size: Large Adult)   Pulse 97   Temp 98.2 °F (36.8 °C) (Temporal)   Ht 5' 4\" (1.626 m)   Wt 160 lb 6.4 oz (72.8 kg)   SpO2 97%   BMI 27.53 kg/m²      Physical Exam:  CONSTITUTIONAL: awake, alert, cooperative, no apparent distress   EYES: pupils equal, round and reactive to light, sclera clear, normal conjunctiva  ENT: Normocephalic, without obvious abnormality, atraumatic  NECK: supple, symmetrical, no jugular venous distension, no carotid bruits   HEMATOLOGIC/LYMPHATIC: no cervical, supraclavicular lymphadenopathy. Left axillary lymphadenopathy noted,   LUNGS: VBS, no wheezes, no increased work of breathing, no rhonchi, clear to auscultation, no crackles,    CARDIOVASCULAR: regular rate and rhythm, normal S1 and S2, no murmur noted  ABDOMEN: normal bowel sounds x 4, soft, non-distended, non-tender, no masses palpated, no hepatosplenomegaly   MUSCULOSKELETAL: full range of motion noted, tone is normal  NEUROLOGIC: awake, alert, oriented to name, place and time. Motor skills grossly intact. SKIN: appears intact, normal skin color, normal texture, normal turgor, no jaundice.    EXTREMITIES: no LE edema, no leg swelling, no cyanosis, no clubbing,  BREASTS: Left breast is distorted by tumor, nipple was displaced close to lower breast crease area, right breast nodularity noted,        Labs:  Hematology:  Lab Results   Component Value Date    WBC 6.4 06/14/2022    RBC 4.06 (L) 06/14/2022    HGB 12.2 (L) 06/14/2022    HCT 37.1 06/14/2022    MCV 91.4 06/14/2022    MCH 30.0 06/14/2022    MCHC 32.9 06/14/2022    RDW 13.1 06/14/2022     06/14/2022    MPV 8.7 06/14/2022    SEGSPCT 62.3 06/14/2022    EOSRELPCT 0.5 06/14/2022    BASOPCT 0.2 06/14/2022    LYMPHOPCT 29.8 06/14/2022    MONOPCT 7.2 (H) 06/14/2022    SEGSABS 4.0 06/14/2022    EOSABS 0.0 06/14/2022    BASOSABS 0.0 06/14/2022    LYMPHSABS 1.9 06/14/2022    MONOSABS 0.5 06/14/2022    DIFFTYPE AUTOMATED DIFFERENTIAL 06/14/2022     No results found for: ESR  Chemistry:  Lab Results   Component Value Date    CREATININE 0.7 19/09/7144    PROT DUPLICATE ORDER 02/95/5731    PROT 7.6 66/01/6339    LABALBU DUPLICATE ORDER 41/77/4058    LABGLOM >60 06/20/2022    GFRAA >60 06/20/2022     Lab Results   Component Value Date     (H) 06/14/2022     No components found for: LD  No results found for: TSHHS, T4FREE, FT3  Immunology:  Lab Results   Component Value Date    PROT DUPLICATE ORDER 49/26/9965    PROT 7.6 06/14/2022    SPEP  06/14/2022     INTERPRETATION - No monoclonal proteins are detected. SAF    SPEP INTERPRETATION - Within normal limits. SAF 06/14/2022    ALBUMINELP 3.7 06/14/2022    LABALPH 0.4 06/14/2022    LABALPH 1.3 (H) 54/51/7916    LABALPH DUPLICATE ORDER 04/65/9959    LABALPH DUPLICATE ORDER 78/98/7414    LABBETA 1.1 72/96/4072    LABBETA DUPLICATE ORDER 36/73/8006    GAMGLOB 1.1 06/14/2022     No results found for: Zay Molina, [facility] / [facility]  No results found for: B2M  Coagulation Panel:  No results found for: PROTIME, INR, APTT, DDIMER  Anemia Panel:  No results found for: FGTAJGCW55, FOLATE  Tumor Markers:  Lab Results   Component Value Date     377 (H) 06/14/2022    CEA 54.5 06/14/2022     25 06/14/2022    LABCA2 110.5 (H) 06/14/2022        Observations:  No data recorded     Assessment   Left breast mass  Liver lesion and multiple bone lytic lesions - concerning for metastatic breast cancer    Plan:  Shabana Castro is a 74year-old very pleasant female who initially presented to [facility] ER on 6/12/22 with severe lower back pain and constipation. CT scan of the abdomen and pelvis done on 6/12/2022 showed lesion (6.1 x 8.1 cm) in the hepatic segment 4, concerning for neoplasm. Extensive lytic metastatic disease in lumbar and thoracic spine and pelvis with most dominant lesion seen at L5 vertebral body.       CT lumbar spine done on 6/12/22 showed extensive multifocal lytic lesions concerning for neoplastic/metastatic disease. She never had colonoscopy before. She had pap smear around 2020. She didn't recall when was her last mammogram.       Digital diagnostic bilateral mammogram and bilateral ultrasound done on 6/17/2022 showed left breast mass, highly suspicious for malignancy. Right breast mass at 11:00, 1.5 cm. Mass is highly suspicious for malignancy. Left breast skin thickening. CT chest on 6/20/2022 showed suspicious heterogeneous mass primarily centered in segment 4A of the liver measuring 8.8 cm. Left breast mass with left axillary and subpectoral metastatic disease, suspected bony metastases to thoracolumbar spine and sternum and possible left chest wall invasion. Bone scan on 6/20/2022 showed multifocal osseous metastatic disease. T12 compression fracture, concerning for pathologic fracture. MRI abdomen on 6/25/2022 showed biopsy-proven malignancy in the left breast with suspicion for inflammatory carcinoma given skin and trabecular thickening. There may be invasion of the underlying pectoralis musculature. At least 3 heavily meta stasis, the largest measuring up to 8.7 cm with the others 1 cm or less. Likely metastatic portal hepatic, retroperitoneal and right retrocrural lymph nodes. Trace left pleural effusion. Extensive skeletal metastatic disease. She underwent ultrasound-guided biopsy of the right breast mass at 11:00 and the pathology showed invasive carcinoma with ductal and lobular features. Estrogen receptor and progesterone receptor are positive. HER2 by IHC not overexpressed (score 1+). HER2 by FISH-pending. Left breast mass at 12:00 biopsy revealed grade 2 invasive lobular carcinoma.   Estrogen receptor by IHC-positive [greater than 95%, average strong intensity], progesterone receptor-positive [approximately 80%, average moderate intensity], HER2 by IHC-not overexpressed [score 1+] and HER2 by FISH-pending. On June 30, 2022, she presented to me for follow-up. She was initially referred to me for evaluation of liver lesion and multiple bony lesion. She was found to have left breast mass with a distorted left breast on physical examination. 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 Since she has significant visceral disease, I recommend her to initiate first-line chemotherapy with single agent paclitaxel. I reviewed with her all the potential side effects as well as benefits of first-line chemotherapy. After long discussion with her and her family, she is in agreement to start first-line chemotherapy. I will request Chemo-Port placement, chemo education and we will plan to start chemotherapy as soon as possible. Will also start zolendronic acid for metastatic bone disease. I will consider vertebroplasty of T12 vertebra fracture. We will request [employer] molecular panel to look for  mutation. I will also request genetic counseling and testing since she has bilateral metastatic breast cancer. Malignancy related pain - will continue with norco since it has been controlling her pain well. Anxiety and insomnia - will start ativan 0.5 mg nightly prn. I answered all her questions and concerns for today. Recent imaging and labs were reviewed and discussed with the patient.

## 2022-06-30 NOTE — PROGRESS NOTES
Patient here with  for test results with Dr. Abilio Burden.  Per Dr. Abilio Burden - appointment made with Dr. Aydee Serna for port placement for patient to start chemotherapy soon - consultation scheduled for next Wednesday, 7/6/22 at 1430. Still awaiting results of HER-2 by FISH. Patient's current regimen will be Taxol weekly x6 months with Zometa. Patient recently dx with bilateral breast cancer ([address] to left breast and invasive to right breast with ductal and lobular features - both strongly ER/NC positive) with mets to liver and bone. Per Dr. Abilio Burden, samples of Femara 2.5 mg given to patient to start taking this evening until she starts on systemic chemo treatments. Side effects reviewed and educational printout given to patient. Per Dr. Abilio Burden, referral placed for genetic counseling/testing scheduled for 7/15/22. Will also send pathology for CARIS testing once pathology is finalized.

## 2022-07-01 ENCOUNTER — CLINICAL DOCUMENTATION (OUTPATIENT)
Dept: CASE MANAGEMENT | Age: 67
End: 2022-07-01

## 2022-07-01 NOTE — PROGRESS NOTES
Per Dr. Lionel Mitchell, order and required medical records faxed to [facility] @ [phone] for further molecular testing to benefit and determine treatment planning for patient. Bilateral breasts pathology: Specimen #[id number] from [facility].

## 2022-07-06 ENCOUNTER — OFFICE VISIT (OUTPATIENT)
Dept: SURGERY | Age: 67
End: 2022-07-06
Payer: MEDICARE

## 2022-07-06 VITALS
SYSTOLIC BLOOD PRESSURE: 126 MMHG | HEIGHT: 64 IN | HEART RATE: 102 BPM | WEIGHT: 160.8 LBS | DIASTOLIC BLOOD PRESSURE: 74 MMHG | BODY MASS INDEX: 27.45 KG/M2 | OXYGEN SATURATION: 99 %

## 2022-07-06 DIAGNOSIS — C50.811 MALIGNANT NEOPLASM OF OVERLAPPING SITES OF BOTH BREASTS IN FEMALE, ESTROGEN RECEPTOR POSITIVE (HCC): Primary | ICD-10-CM

## 2022-07-06 DIAGNOSIS — C79.51 BONE METASTASIS (HCC): ICD-10-CM

## 2022-07-06 DIAGNOSIS — Z17.0 MALIGNANT NEOPLASM OF OVERLAPPING SITES OF BOTH BREASTS IN FEMALE, ESTROGEN RECEPTOR POSITIVE (HCC): Primary | ICD-10-CM

## 2022-07-06 DIAGNOSIS — C50.812 MALIGNANT NEOPLASM OF OVERLAPPING SITES OF BOTH BREASTS IN FEMALE, ESTROGEN RECEPTOR POSITIVE (HCC): Primary | ICD-10-CM

## 2022-07-06 PROCEDURE — 99203 OFFICE O/P NEW LOW 30 MIN: CPT | Performed by: SURGERY

## 2022-07-06 ASSESSMENT — PATIENT HEALTH QUESTIONNAIRE - PHQ9
SUM OF ALL RESPONSES TO PHQ QUESTIONS 1-9: 0
1. LITTLE INTEREST OR PLEASURE IN DOING THINGS: 0
SUM OF ALL RESPONSES TO PHQ QUESTIONS 1-9: 0
2. FEELING DOWN, DEPRESSED OR HOPELESS: 0
SUM OF ALL RESPONSES TO PHQ QUESTIONS 1-9: 0
SUM OF ALL RESPONSES TO PHQ QUESTIONS 1-9: 0
SUM OF ALL RESPONSES TO PHQ9 QUESTIONS 1 & 2: 0

## 2022-07-06 NOTE — PROGRESS NOTES
GENERAL SURGERY NOTE - Outpatient History & Physical and [address] [place] [address] Physicians    PATIENT: Marilee Becker 1955, 74 y.o., female   Date: 7/6/22 MRN: [id number]   Requesting Provider: Tiff Cameron MD History Obtained From:  patient, electronic medical record     Reason for Evaluation & Chief Complaint:    Chief Complaint   Patient presents with    New Patient     NP- Rosa Brantley Placement - Dr. Hector Matthew:    Jaison Peacock is a 74 y.o. female presenting with metastatic breast cancer for which she is planning on starting chemotherapy and needs mediport placement. She initially presented to the [facility] 6/14/22 with back pain and constipation. During her evaluation she was found to have multiple areas of bony metastasis as well as a liver lesion. The primary tumor was not known, but she was noted to have a large left breast mass with axillary and subpectoral metastatic disease. She had a mammogram/US 6/17/22 showing the left breast mass and a right breast mass 1.5cm in size. Biopsies showed:  Right breast mass: invasive carcinoma with ductal and lobular features  Left breast mass: invasive lobular carcinoma    She has seen Dr. Federico Obrien with Oncology and chemotherapy is planned for which a mediport is needed. Past Medical History:    No past medical history on file. Past Surgical History:    Past Surgical History:   Procedure Laterality Date    HIP SURGERY      US BREAST NEEDLE BIOPSY LEFT Left 6/22/2022    US BREAST NEEDLE BIOPSY LEFT 6/22/2022 Marti Viera MD [address] BREAST NEEDLE BIOPSY RIGHT Right 6/22/2022    US BREAST NEEDLE BIOPSY RIGHT 6/22/2022 Marti Viera MD [facility]       Current Medications:   Current Outpatient Medications   Medication Sig Dispense Refill    LORazepam (ATIVAN) 0.5 MG tablet Take 1 tablet by mouth nightly as needed for Anxiety for up to 30 days.  30 tablet 0    HYDROcodone-acetaminophen (NORCO)  MG per tablet Take 1 tablet by mouth every 4 hours as needed for Pain for up to 15 days. Intended supply: 30 days 90 tablet 0    letrozole (FEMARA) 2.5 MG tablet Take 2.5 mg by mouth daily Patient to take daily in evenings until she starts on systemic chemotherapy.  senna (SENOKOT) 8.6 MG tablet Take 1 tablet by mouth 2 times daily 60 tablet 11     No current facility-administered medications for this visit. Allergies:  Patient has no known allergies. Social History:   Social History     Socioeconomic History    Marital status:      Spouse name: None    Number of children: None    Years of education: None    Highest education level: None   Occupational History    None   Tobacco Use    Smoking status: Current Every Day Smoker     Packs/day: 1.00     Years: 47.00     Pack years: 47.00     Types: Cigarettes     Start date: 1975    Smokeless tobacco: Never Used   Substance and Sexual Activity    Alcohol use: Not Currently    Drug use: Never    Sexual activity: None   Other Topics Concern    None   Social History Narrative    None     Social Determinants of Health     Financial Resource Strain:     Difficulty of Paying Living Expenses: Not on file   Food Insecurity:     Worried About Running Out of Food in the Last Year: Not on file    Cristhian of Food in the Last Year: Not on file   Transportation Needs:     Lack of Transportation (Medical): Not on file    Lack of Transportation (Non-Medical):  Not on file   Physical Activity:     Days of Exercise per Week: Not on file    Minutes of Exercise per Session: Not on file   Stress:     Feeling of Stress : Not on file   Social Connections:     Frequency of Communication with Friends and Family: Not on file    Frequency of Social Gatherings with Friends and Family: Not on file    Attends Holiness Services: Not on file    Active Member of Clubs or Organizations: Not on file    Attends Club or Organization Meetings: Not on file    Marital Status: Not on file Intimate Partner Violence:     Fear of Current or Ex-Partner: Not on file    Emotionally Abused: Not on file    Physically Abused: Not on file    Sexually Abused: Not on file   Housing Stability:     Unable to Pay for Housing in the Last Year: Not on file    Number of Places Lived in the Last Year: Not on file    Unstable Housing in the Last Year: Not on file       Family History:   Family History   Problem Relation Age of Onset    Alzheimer's Disease Mother     Alzheimer's Disease Father     Breast Cancer Neg Hx     Ovarian Cancer Neg Hx        REVIEW OF SYSTEMS:    Review of Systems   Constitutional: Negative for chills and fever. HENT: Negative for congestion and sore throat. Eyes: Negative for discharge and redness. Respiratory: Negative for chest tightness and shortness of breath. Cardiovascular: Negative for chest pain and palpitations. Gastrointestinal: Negative for abdominal distention, abdominal pain, nausea and vomiting. Genitourinary: Negative for dysuria and flank pain. Musculoskeletal: Positive for arthralgias and back pain. Negative for myalgias. Skin: Negative for color change and rash. Neurological: Negative for dizziness and numbness. Psychiatric/Behavioral: Negative for confusion. The patient is nervous/anxious. I have reviewed the patient's information pertinent to this visit, including medical history, family history, social history and review of systems. PHYSICAL EXAM:    Vitals:    07/06/22 1536   BP: 126/74   Site: Left Upper Arm   Position: Sitting   Cuff Size: Medium Adult   Pulse: (!) 102   SpO2: 99%   Weight: 160 lb 12.8 oz (72.9 kg)   Height: 5' 4\" (1.626 m)     Physical Exam  Constitutional:       General: She is not in acute distress. Appearance: She is well-developed. She is not diaphoretic. HENT:      Head: Normocephalic and atraumatic.       Right Ear: External ear normal.      Left Ear: External ear normal.      Mouth/Throat: Mouth: Mucous membranes are moist.   Eyes:      General:         Right eye: No discharge. Left eye: No discharge. Neck:      Trachea: No tracheal deviation. Cardiovascular:      Rate and Rhythm: Normal rate and regular rhythm. Pulmonary:      Effort: Pulmonary effort is normal. No respiratory distress. Breath sounds: No wheezing. Chest:      Comments: Left breast mass with contraction of the left breast and tethering to the chest wall.   ~1.5cm mass at 11 o'clock right breast  Abdominal:      General: There is no distension. Palpations: Abdomen is soft. Tenderness: There is no abdominal tenderness. Musculoskeletal:         General: No tenderness or deformity. Cervical back: Neck supple. Skin:     General: Skin is warm and dry. Findings: No rash. Neurological:      General: No focal deficit present. Mental Status: She is alert. DATA:    Lab Results   Component Value Date    WBC 6.4 06/14/2022    HGB 12.2 (L) 06/14/2022    HCT 37.1 06/14/2022     06/14/2022    CREATININE 0.7 76/08/5714    PROT DUPLICATE ORDER 45/54/8655    PROT 7.6 06/14/2022       Imaging:   CT CHEST W CONTRAST    Result Date: 6/21/2022  EXAMINATION: CT OF THE CHEST WITH CONTRAST 6/20/2022 10:15 am TECHNIQUE: CT of the chest was performed with the administration of intravenous contrast. Multiplanar reformatted images are provided for review. Automated exposure control, iterative reconstruction, and/or weight based adjustment of the mA/kV was utilized to reduce the radiation dose to as low as reasonably achievable. COMPARISON: 06/20/2022, 06/17/2022 HISTORY: ORDERING SYSTEM PROVIDED HISTORY: Liver mass TECHNOLOGIST PROVIDED HISTORY: STAT Creatinine as needed:->Yes Reason for exam:->to look for primary tumor and to see extent of her disease.  Reason for Exam: to look for primary tumor and to see extent of her disease., Liver mass, Lytic lesion of bone on x-ray Additional signs and symptoms: 80ml Isovue 370 FINDINGS: Mediastinum: Heart size is normal.  No pericardial effusion. No lymphadenopathy. Lungs/pleura: Trace left pleural effusion. The lungs are clear. Upper Abdomen: Heterogeneous mass primarily centered in segment 4A of the liver measuring 8.8 x 5.1 cm. Benign cyst of segment 6 of the liver. Soft Tissues/Bones: Left breast measures 2 cm series image 166. There is trabecular and skin thickening of the left breast as well as enhancement left Nix major and Nix minor. At least 7 abnormal left subpectoral and axillary lymph nodes including example axillary node measuring 2.4 x 1.5 cm on series 5, image 95 and example subpectoral node measuring 1.8 x 1.7 cm on series 5, image 45. Multiple ill-defined lucencies throughout the thoracolumbar spine and manubrium/sternum. An example in L2 measures 1.9 cm on series 602, image 75. An example of the manubrium measures 2.4 cm on series 602, image 74. .     1. Suspicious heterogeneous mass primarily centered in segment 4A of the liver measuring 8.8 cm. Recommend further evaluation with liver protocol MRI with non hepatobiliary contrast material. 2. Left breast mass with left axillary and subpectoral metastases, suspected bony metastases to thoracolumbar spine and sternum, and possible left chest wall invasion. US BREAST COMPLETE LEFT    Result Date: 6/18/2022  EXAMINATION: DIAGNOSTIC DIGITAL BILATERAL BREASTS MAMMOGRAM WITH TOMOSYNTHESIS; TARGETED ULTRASOUND OF THE LEFT BREAST; TARGETED ULTRASOUND OF THE RIGHT BREAST, 6/17/2022 12:04 pm TECHNIQUE: Diagnostic mammography of the bilateral breasts was performed with tomosynthesis. 2D standard and 3D tomosynthesis combination imaging performed through both breasts. Computer aided detection was utilized in the interpretation of this exam.; Target ultrasound of the left breast was performed.; Targeted ultrasound of the right breast was performed.  Views: MLO and CC views of both breasts supplemented by exaggerated CC view of the left breast. COMPARISON: None available. This will serve as a baseline exam. HISTORY: ORDERING SYSTEM PROVIDED HISTORY: Left breast mass TECHNOLOGIST PROVIDED HISTORY: Reason for exam:->left breast mass Left breast palpable lump. FINDINGS: There are scattered areas of fibroglandular density. Right breast: Spiculated mass at 10 o'clock will further be assessed by ultrasound. Left breast: Left breast skin thickening. Skin marker at 3 o'clock marks the site of palpable abnormality. There is an underlying spiculated mass, extending beyond the posterior margin of the image. Skin thickening along the inferior aspect of the left breast. Right breast ultrasound: Spiculated hypoechoic 1.5 x 1.5 x 1.5 cm mass at 11 o'clock, 7 cm from the nipple corresponds to mass seen mammographically. No axillary adenopathy. Left breast ultrasound: Diffuse hypoechoic mass encompassing the majority of the breast, larger than the span of the transducer. This is in keeping with mass seen mammographically. No axillary adenopathy. 1. Left breast mass, highly suspicious for malignancy. Recommend ultrasound-guided core biopsy. 2. Right breast mass at 11 o'clock, 1.5 cm. Mass is highly suspicious for malignancy. Recommend ultrasound-guided core biopsy. 3. Left breast skin thickening. If it would alter patient's management, recommend punch biopsy to determine skin involvement. BIRADS: BIRADS - CATEGORY 5 Highly Suggestive for Malignancy. Biopsy should be considered at this time. OVERALL ASSESSMENT - HIGHLY SUGGESTIVE OF MALIGNANCY. A letter of notification will be sent to the patient regarding the results. My findings and recommendations were discussed with the patient at the time of service. A representative from the radiology department will be contacting your office and assisting the patient in getting appropriate follow-up.      NM BONE SCAN WHOLE BODY    Result Date: 6/20/2022  EXAMINATION: WHOLE BODY BONE SCAN  6/20/2022 TECHNIQUE: The patient was injected intravenously with 27.9 mCi of 99 mTc MDP and scintigraphy of the entire skeleton was performed approximately three hours later. COMPARISON: CT chest dated 06/20/2022 HISTORY: ORDERING SYSTEM PROVIDED HISTORY: Liver mass TECHNOLOGIST PROVIDED HISTORY: Reason for exam:->to determine the extent of her disease Reason for Exam: liver mass FINDINGS: Multifocal activity is seen within the thoracic and lumbar spine where mottled sclerotic and lucent appearance is demonstrated on recent CT. Activity is seen at T12 where height loss is demonstrated on CT. Asymmetric activity is seen at the left frontal skull. Intense activity is seen at the left humeral head and to a lesser degree at the right shoulder joint. Asymmetric activity is demonstrated at the right acetabulum and increased activity is seen at the right femoral head. Heterogeneous sternal activity is seen. Photopenia is seen related to left hip arthroplasty. Activity at bilateral shoulders, wrists, knees, right greater than left foot, likely degenerative. Physiologic activity is seen within the kidneys and urinary bladder. Multifocal osseous metastatic disease. T12 compression fracture, concerning for pathologic fracture. US BREAST LIMITED RIGHT    Result Date: 6/18/2022  EXAMINATION: DIAGNOSTIC DIGITAL BILATERAL BREASTS MAMMOGRAM WITH TOMOSYNTHESIS; TARGETED ULTRASOUND OF THE LEFT BREAST; TARGETED ULTRASOUND OF THE RIGHT BREAST, 6/17/2022 12:04 pm TECHNIQUE: Diagnostic mammography of the bilateral breasts was performed with tomosynthesis. 2D standard and 3D tomosynthesis combination imaging performed through both breasts. Computer aided detection was utilized in the interpretation of this exam.; Target ultrasound of the left breast was performed.; Targeted ultrasound of the right breast was performed.  Views: MLO and CC views of both breasts supplemented by exaggerated CC view of the left breast. COMPARISON: None available. This will serve as a baseline exam. HISTORY: ORDERING SYSTEM PROVIDED HISTORY: Left breast mass TECHNOLOGIST PROVIDED HISTORY: Reason for exam:->left breast mass Left breast palpable lump. FINDINGS: There are scattered areas of fibroglandular density. Right breast: Spiculated mass at 10 o'clock will further be assessed by ultrasound. Left breast: Left breast skin thickening. Skin marker at 3 o'clock marks the site of palpable abnormality. There is an underlying spiculated mass, extending beyond the posterior margin of the image. Skin thickening along the inferior aspect of the left breast. Right breast ultrasound: Spiculated hypoechoic 1.5 x 1.5 x 1.5 cm mass at 11 o'clock, 7 cm from the nipple corresponds to mass seen mammographically. No axillary adenopathy. Left breast ultrasound: Diffuse hypoechoic mass encompassing the majority of the breast, larger than the span of the transducer. This is in keeping with mass seen mammographically. No axillary adenopathy. 1. Left breast mass, highly suspicious for malignancy. Recommend ultrasound-guided core biopsy. 2. Right breast mass at 11 o'clock, 1.5 cm. Mass is highly suspicious for malignancy. Recommend ultrasound-guided core biopsy. 3. Left breast skin thickening. If it would alter patient's management, recommend punch biopsy to determine skin involvement. BIRADS: BIRADS - CATEGORY 5 Highly Suggestive for Malignancy. Biopsy should be considered at this time. OVERALL ASSESSMENT - HIGHLY SUGGESTIVE OF MALIGNANCY. A letter of notification will be sent to the patient regarding the results. My findings and recommendations were discussed with the patient at the time of service. A representative from the radiology department will be contacting your office and assisting the patient in getting appropriate follow-up.      [facility] ALFREDO DIGITAL DIAGNOSTIC BILATERAL    Result Date: 6/18/2022  EXAMINATION: DIAGNOSTIC DIGITAL BILATERAL BREASTS MAMMOGRAM WITH TOMOSYNTHESIS; TARGETED ULTRASOUND OF THE LEFT BREAST; TARGETED ULTRASOUND OF THE RIGHT BREAST, 6/17/2022 12:04 pm TECHNIQUE: Diagnostic mammography of the bilateral breasts was performed with tomosynthesis. 2D standard and 3D tomosynthesis combination imaging performed through both breasts. Computer aided detection was utilized in the interpretation of this exam.; Target ultrasound of the left breast was performed.; Targeted ultrasound of the right breast was performed. Views: MLO and CC views of both breasts supplemented by exaggerated CC view of the left breast. COMPARISON: None available. This will serve as a baseline exam. HISTORY: ORDERING SYSTEM PROVIDED HISTORY: Left breast mass TECHNOLOGIST PROVIDED HISTORY: Reason for exam:->left breast mass Left breast palpable lump. FINDINGS: There are scattered areas of fibroglandular density. Right breast: Spiculated mass at 10 o'clock will further be assessed by ultrasound. Left breast: Left breast skin thickening. Skin marker at 3 o'clock marks the site of palpable abnormality. There is an underlying spiculated mass, extending beyond the posterior margin of the image. Skin thickening along the inferior aspect of the left breast. Right breast ultrasound: Spiculated hypoechoic 1.5 x 1.5 x 1.5 cm mass at 11 o'clock, 7 cm from the nipple corresponds to mass seen mammographically. No axillary adenopathy. Left breast ultrasound: Diffuse hypoechoic mass encompassing the majority of the breast, larger than the span of the transducer. This is in keeping with mass seen mammographically. No axillary adenopathy. 1. Left breast mass, highly suspicious for malignancy. Recommend ultrasound-guided core biopsy. 2. Right breast mass at 11 o'clock, 1.5 cm. Mass is highly suspicious for malignancy. Recommend ultrasound-guided core biopsy. 3. Left breast skin thickening.   If it would alter patient's management, recommend punch biopsy to determine skin involvement. BIRADS: BIRADS - CATEGORY 5 Highly Suggestive for Malignancy. Biopsy should be considered at this time. OVERALL ASSESSMENT - HIGHLY SUGGESTIVE OF MALIGNANCY. A letter of notification will be sent to the patient regarding the results. My findings and recommendations were discussed with the patient at the time of service. A representative from the radiology department will be contacting your office and assisting the patient in getting appropriate follow-up. Pertinent laboratory and imaging studies were personally reviewed if available. IMPRESSION:    Perri Nayak is a 74 y.o. female with metastatic cancer suspected to be of breast primary, with bilateral breast cancer, biopsy showing invasive lobular carcinoma on the left, invasive carcinoma with lobular and ductal features on the right    1. Malignant neoplasm of overlapping sites of both breasts in female, estrogen receptor positive ([facility] [address]    2. Bone metastasis [facility])      Patient Active Problem List    Diagnosis Date Noted    Malignant neoplasm of overlapping sites of both breasts in female, estrogen receptor positive ([facility] [address] 06/30/2022    Bone metastasis ([facility] [address] 06/30/2022    Liver mass 06/14/2022    Lytic lesion of bone on x-ray 06/14/2022    Left breast mass 06/14/2022     PLAN:  · Discussed findings and options with Perri Nayak. She wishes to proceed with mediport placement  Planned procedure: mediport placement (will plan on right sided mediport due to larger tumor in the left breast, bilateral breast cancer)  The risks, benefits, potential complications and possible alternatives of the procedure were discussed in detail, including complications of but not limited to infection, bleeding, anesthesia-related complications, death, mediport malfunction, poor healing, pneumothorax, or need for additional interventions. All questions were answered.  The patient wished to proceed and consent was documented in the medical record. Informed consent: obtained  Anticipated status: Outpatient  Anticipated location:  [facility]   · Follow Up: Return for surgery. ·   No orders of the defined types were placed in this encounter. ·    ·   No orders of the defined types were placed in this encounter.   ·       Electronically signed by Geovany Ricks MD, 7/8/2022, 6:09 PM

## 2022-07-08 ENCOUNTER — TELEPHONE (OUTPATIENT)
Dept: SURGERY | Age: 67
End: 2022-07-08

## 2022-07-08 RX ORDER — SODIUM CHLORIDE, SODIUM LACTATE, POTASSIUM CHLORIDE, CALCIUM CHLORIDE 600; 310; 30; 20 MG/100ML; MG/100ML; MG/100ML; MG/100ML
INJECTION, SOLUTION INTRAVENOUS CONTINUOUS
Status: CANCELLED | OUTPATIENT
Start: 2022-07-08

## 2022-07-08 RX ORDER — SODIUM CHLORIDE 0.9 % (FLUSH) 0.9 %
5-40 SYRINGE (ML) INJECTION EVERY 12 HOURS SCHEDULED
Status: CANCELLED | OUTPATIENT
Start: 2022-07-08

## 2022-07-08 RX ORDER — SODIUM CHLORIDE 0.9 % (FLUSH) 0.9 %
5-40 SYRINGE (ML) INJECTION PRN
Status: CANCELLED | OUTPATIENT
Start: 2022-07-08

## 2022-07-08 RX ORDER — SODIUM CHLORIDE 9 MG/ML
INJECTION, SOLUTION INTRAVENOUS PRN
Status: CANCELLED | OUTPATIENT
Start: 2022-07-08

## 2022-07-08 ASSESSMENT — ENCOUNTER SYMPTOMS
ABDOMINAL PAIN: 0
EYE REDNESS: 0
ABDOMINAL DISTENTION: 0
SORE THROAT: 0
EYE DISCHARGE: 0
CHEST TIGHTNESS: 0
COLOR CHANGE: 0
NAUSEA: 0
SHORTNESS OF BREATH: 0
VOMITING: 0
BACK PAIN: 1

## 2022-07-08 NOTE — TELEPHONE ENCOUNTER
SPOKE TO  FOR Aileen Tolentino [address] ([address]) SCHEDULED @ [facility].  NOTIFIED OF DATES, TIMES AND LOCATION    PHONE ASSESSMENT   SURGERY - 7/13/22 @ 215  P/O - 7/19/22 @ 7863    NPO AFTER MIDNIGHT  HOLD BLOOD THINNERS

## 2022-07-11 NOTE — PROGRESS NOTES
Covid screen done - 7/11/22    Surgery is at [facility] on  7/13/22. You will be called on 7/12/22  with times. 1. Do not eat or drink anything after midnight - unless instructed by your doctor prior to surgery. This includes no water, chewing gum or mints. 2. Follow your directions as prescribed by the doctor for your procedure and medications. 3. Check with your Doctor regarding stopping vitamins, supplements, blood thinners (Plavix, Coumadin, Lovenox, Effient, Pradaxa, Xarelto, Fragmin or other blood thinners) and follow their instructions. Stop all supplements and herbals until after surgery. 4. Do not smoke, and do not drink any alcoholic beverages 24 hours prior to surgery. This includes NA Beer. 5. You may brush your teeth and gargle the morning of surgery. DO NOT SWALLOW WATER    6. You MUST make arrangements for a responsible adult to take you home after your surgery and be able to check on you every couple hours for the day. You will not be allowed to leave alone or drive yourself home. It is strongly suggested someone stay with you the first 24  hrs. Your surgery will be cancelled if you do not have a ride home. 7. Please wear simple, loose fitting clothing to the hospital.  Taras Luly not bring valuables (money, credit cards, checkbooks, etc.) Do not wear any makeup (including no eye makeup) or nail polish on your fingers or toes. 8. DO NOT wear any jewelry or piercings on day of surgery. All body piercing jewelry must be removed. 9. If you have dentures, they will be removed before going to the OR; we will provide you a container. If you wear contact lenses or glasses,  they will be removed; please bring a case for them. 10. If you  have a Living Will and Durable Power of  for Healthcare, please bring in a copy.               11. Please bring picture ID,  insurance card, paperwork from the doctors office    (H & P, Consent, & card for implantable devices). 12. Take a shower the  morning of your procedure, do not apply any lotion, oil or powder. It is recommended to use Hibiclens or CHG wash during pre-op shower/bath.              13. Wear a mask covering your nose & mouth when entering the hospital.

## 2022-07-12 ENCOUNTER — CLINICAL DOCUMENTATION (OUTPATIENT)
Dept: CASE MANAGEMENT | Age: 67
End: 2022-07-12

## 2022-07-12 ENCOUNTER — ANESTHESIA EVENT (OUTPATIENT)
Dept: OPERATING ROOM | Age: 67
End: 2022-07-12
Payer: MEDICARE

## 2022-07-12 NOTE — PROGRESS NOTES
Called and confirmed with patient surgery at [facility]  on 7/13/22 at 1400 with an arrival time of  1200 . Come into the Main entrance and check in. You can bring two people with you and wear a mask. Patient verbalized understanding.

## 2022-07-12 NOTE — ANESTHESIA PRE PROCEDURE
Department of Anesthesiology  Preprocedure Note       Name:  Shasha Fraire   Age:  74 y.o.  :  1955                                          MRN:  [id number]         Date:  2022      Surgeon: Favio Slaughter):  Brandon Bunn MD    Procedure: Procedure(s): MEDIPORT INSERTION    Medications prior to admission:   Prior to Admission medications    Medication Sig Start Date End Date Taking? Authorizing Provider   LORazepam (ATIVAN) 0.5 MG tablet Take 1 tablet by mouth nightly as needed for Anxiety for up to 30 days. 22  Olivia Brambila MD   HYDROcodone-acetaminophen (NORCO)  MG per tablet Take 1 tablet by mouth every 4 hours as needed for Pain for up to 15 days. Intended supply: 30 days 6/30/22 7/15/22  Olivia Brambila MD   letrozole [facility]) 2.5 MG tablet Take 2.5 mg by mouth daily Patient to take daily in evenings until she starts on systemic chemotherapy. Olivia Brambila MD   senna (SENOKOT) 8.6 MG tablet Take 1 tablet by mouth 2 times daily 22  Olivia Brambila MD       Current medications:    No current facility-administered medications for this encounter. Current Outpatient Medications   Medication Sig Dispense Refill    LORazepam (ATIVAN) 0.5 MG tablet Take 1 tablet by mouth nightly as needed for Anxiety for up to 30 days. 30 tablet 0    HYDROcodone-acetaminophen (NORCO)  MG per tablet Take 1 tablet by mouth every 4 hours as needed for Pain for up to 15 days. Intended supply: 30 days 90 tablet 0    letrozole (FEMARA) 2.5 MG tablet Take 2.5 mg by mouth daily Patient to take daily in evenings until she starts on systemic chemotherapy.       senna (SENOKOT) 8.6 MG tablet Take 1 tablet by mouth 2 times daily 60 tablet 11       Allergies:  No Known Allergies    Problem List:    Patient Active Problem List   Diagnosis Code    Liver mass R16.0    Lytic lesion of bone on x-ray M89.9    Left breast mass N63.20    Malignant neoplasm of overlapping sites of both breasts in female, estrogen [facility] C50.811, C50.812, Z17.0    Bone metastasis ([facility] C79.51       Past Medical History:        Diagnosis Date    Anxiety     Cancer ([facility] 07/2022    Malignant neoplasm of overlapping sites of both breasts, estrogen receptor positive.  Constipation        Past Surgical History:        Procedure Laterality Date    HIP SURGERY      MEDIPORT INSERTION, SINGLE  07/2022    US BREAST NEEDLE BIOPSY LEFT Left 6/22/2022     BREAST NEEDLE BIOPSY LEFT 6/22/2022 Wilmon Klinefelter, MD [address] BREAST NEEDLE BIOPSY RIGHT Right 6/22/2022    US BREAST NEEDLE BIOPSY RIGHT 6/22/2022 Wilmon Klinefelter, MD [facility]       Social History:    Social History     Tobacco Use    Smoking status: Current Every Day Smoker     Packs/day: 1.00     Years: 47.00     Pack years: 47.00     Types: Cigarettes     Start date: 1975    Smokeless tobacco: Never Used   Substance Use Topics    Alcohol use: Not Currently                                Ready to quit: Not Answered  Counseling given: Not Answered      Vital Signs (Current):   Vitals:    07/11/22 0858   Weight: 160 lb (72.6 kg)   Height: 5' 4\" (1.626 m)                                              BP Readings from Last 3 Encounters:   07/06/22 126/74   06/30/22 133/62   06/14/22 (!) 160/66       NPO Status:                                                                                 BMI:   Wt Readings from Last 3 Encounters:   07/06/22 160 lb 12.8 oz (72.9 kg)   06/30/22 160 lb 6.4 oz (72.8 kg)   06/14/22 160 lb (72.6 kg)     Body mass index is 27.46 kg/m².     CBC:   Lab Results   Component Value Date/Time    WBC 6.4 06/14/2022 09:24 AM    RBC 4.06 06/14/2022 09:24 AM    HGB 12.2 06/14/2022 09:24 AM    HCT 37.1 06/14/2022 09:24 AM    MCV 91.4 06/14/2022 09:24 AM    RDW 13.1 06/14/2022 09:24 AM     06/14/2022 09:24 AM       CMP:   Lab Results   Component Value Date/Time    CREATININE 0.7 06/20/2022 09:53 AM    GFRAA >60 06/20/2022 09:53 AM    LABGLOM >60 06/20/2022 11:32 AM    PROT DUPLICATE ORDER 97/97/3454 09:24 AM    PROT 7.6 06/14/2022 09:24 AM       POC Tests: No results for input(s): POCGLU, POCNA, POCK, POCCL, POCBUN, POCHEMO, POCHCT in the last 72 hours. Coags: No results found for: PROTIME, INR, APTT    HCG (If Applicable): No results found for: PREGTESTUR, PREGSERUM, HCG, HCGQUANT     ABGs: No results found for: PHART, PO2ART, UVC4HYO, IXZ1FTL, BEART, J4LEXFSI     Type & Screen (If Applicable):  No results found for: LABABO, LABRH    Drug/Infectious Status (If Applicable):  No results found for: HIV, HEPCAB    COVID-19 Screening (If Applicable): No results found for: COVID19        Anesthesia Evaluation  Patient summary reviewed  Airway: Mallampati: II  TM distance: >3 FB   Neck ROM: full  Mouth opening: > = 3 FB   Dental:          Pulmonary:normal exam                               Cardiovascular:             Beta Blocker:  Not on Beta Blocker         Neuro/Psych:               GI/Hepatic/Renal:             Endo/Other:    (+) malignancy/cancer. Abdominal:             Vascular: Other Findings:           Anesthesia Plan      MAC     ASA 3       Induction: intravenous. MIPS: Postoperative opioids intended. Anesthetic plan and risks discussed with patient. Plan discussed with CRNA.     Attending anesthesiologist reviewed and agrees with Pre Eval content                MAURICIO Villanueva - CRNA   7/12/2022

## 2022-07-13 ENCOUNTER — APPOINTMENT (OUTPATIENT)
Dept: GENERAL RADIOLOGY | Age: 67
End: 2022-07-13
Attending: SURGERY
Payer: MEDICARE

## 2022-07-13 ENCOUNTER — ANESTHESIA (OUTPATIENT)
Dept: OPERATING ROOM | Age: 67
End: 2022-07-13
Payer: MEDICARE

## 2022-07-13 ENCOUNTER — HOSPITAL ENCOUNTER (OUTPATIENT)
Age: 67
Setting detail: OUTPATIENT SURGERY
Discharge: HOME | End: 2022-07-13
Attending: SURGERY | Admitting: SURGERY
Payer: MEDICARE

## 2022-07-13 VITALS
BODY MASS INDEX: 27.31 KG/M2 | TEMPERATURE: 97.8 F | HEART RATE: 87 BPM | SYSTOLIC BLOOD PRESSURE: 130 MMHG | OXYGEN SATURATION: 99 % | HEIGHT: 64 IN | DIASTOLIC BLOOD PRESSURE: 68 MMHG | RESPIRATION RATE: 18 BRPM | WEIGHT: 160 LBS

## 2022-07-13 DIAGNOSIS — N63.20 LEFT BREAST MASS: Primary | ICD-10-CM

## 2022-07-13 PROCEDURE — 77001 FLUOROGUIDE FOR VEIN DEVICE: CPT | Performed by: SURGERY

## 2022-07-13 PROCEDURE — 7100000010 HC PHASE II RECOVERY - FIRST 15 MIN: Performed by: SURGERY

## 2022-07-13 PROCEDURE — 2709999900 HC NON-CHARGEABLE SUPPLY: Performed by: SURGERY

## 2022-07-13 PROCEDURE — 3700000000 HC ANESTHESIA ATTENDED CARE: Performed by: SURGERY

## 2022-07-13 PROCEDURE — 7100000011 HC PHASE II RECOVERY - ADDTL 15 MIN: Performed by: SURGERY

## 2022-07-13 PROCEDURE — 6360000002 HC RX W HCPCS: Performed by: SURGERY

## 2022-07-13 PROCEDURE — C1788 PORT, INDWELLING, IMP: HCPCS | Performed by: SURGERY

## 2022-07-13 PROCEDURE — 77001 FLUOROGUIDE FOR VEIN DEVICE: CPT | Performed by: NURSE PRACTITIONER

## 2022-07-13 PROCEDURE — 36561 INSERT TUNNELED CV CATH: CPT | Performed by: SURGERY

## 2022-07-13 PROCEDURE — 36561 INSERT TUNNELED CV CATH: CPT | Performed by: NURSE PRACTITIONER

## 2022-07-13 PROCEDURE — 3600000013 HC SURGERY LEVEL 3 ADDTL 15MIN: Performed by: SURGERY

## 2022-07-13 PROCEDURE — 2580000003 HC RX 258: Performed by: SURGERY

## 2022-07-13 PROCEDURE — 76000 FLUOROSCOPY <1 HR PHYS/QHP: CPT

## 2022-07-13 PROCEDURE — 3700000001 HC ADD 15 MINUTES (ANESTHESIA): Performed by: SURGERY

## 2022-07-13 PROCEDURE — 71045 X-RAY EXAM CHEST 1 VIEW: CPT

## 2022-07-13 PROCEDURE — 6360000002 HC RX W HCPCS

## 2022-07-13 PROCEDURE — 2500000003 HC RX 250 WO HCPCS: Performed by: SURGERY

## 2022-07-13 PROCEDURE — 3600000003 HC SURGERY LEVEL 3 BASE: Performed by: SURGERY

## 2022-07-13 PROCEDURE — 2500000003 HC RX 250 WO HCPCS

## 2022-07-13 DEVICE — PORT INFUS L55CM 0.016ML 0.4ML CATH OD2.2MM ID1.4MM INTRO: Type: IMPLANTABLE DEVICE | Site: CHEST | Status: FUNCTIONAL

## 2022-07-13 RX ORDER — MEPERIDINE HYDROCHLORIDE 25 MG/ML
12.5 INJECTION INTRAMUSCULAR; INTRAVENOUS; SUBCUTANEOUS ONCE
Status: CANCELLED | OUTPATIENT
Start: 2022-07-13 | End: 2022-07-13

## 2022-07-13 RX ORDER — LIDOCAINE HYDROCHLORIDE 10 MG/ML
INJECTION, SOLUTION EPIDURAL; INFILTRATION; INTRACAUDAL; PERINEURAL
Status: COMPLETED | OUTPATIENT
Start: 2022-07-13 | End: 2022-07-13

## 2022-07-13 RX ORDER — SODIUM CHLORIDE 9 MG/ML
INJECTION, SOLUTION INTRAVENOUS PRN
Status: DISCONTINUED | OUTPATIENT
Start: 2022-07-13 | End: 2022-07-13 | Stop reason: HOSPADM

## 2022-07-13 RX ORDER — SODIUM CHLORIDE 0.9 % (FLUSH) 0.9 %
5-40 SYRINGE (ML) INJECTION EVERY 12 HOURS SCHEDULED
Status: CANCELLED | OUTPATIENT
Start: 2022-07-13

## 2022-07-13 RX ORDER — SODIUM CHLORIDE 0.9 % (FLUSH) 0.9 %
5-40 SYRINGE (ML) INJECTION EVERY 12 HOURS SCHEDULED
Status: DISCONTINUED | OUTPATIENT
Start: 2022-07-13 | End: 2022-07-13 | Stop reason: HOSPADM

## 2022-07-13 RX ORDER — PROPOFOL 10 MG/ML
INJECTION, EMULSION INTRAVENOUS CONTINUOUS PRN
Status: DISCONTINUED | OUTPATIENT
Start: 2022-07-13 | End: 2022-07-13 | Stop reason: SDUPTHER

## 2022-07-13 RX ORDER — SODIUM CHLORIDE, SODIUM LACTATE, POTASSIUM CHLORIDE, CALCIUM CHLORIDE 600; 310; 30; 20 MG/100ML; MG/100ML; MG/100ML; MG/100ML
INJECTION, SOLUTION INTRAVENOUS CONTINUOUS
Status: DISCONTINUED | OUTPATIENT
Start: 2022-07-13 | End: 2022-07-13 | Stop reason: HOSPADM

## 2022-07-13 RX ORDER — ONDANSETRON 2 MG/ML
4 INJECTION INTRAMUSCULAR; INTRAVENOUS
Status: CANCELLED | OUTPATIENT
Start: 2022-07-13 | End: 2022-07-13

## 2022-07-13 RX ORDER — LORAZEPAM 2 MG/ML
0.5 INJECTION INTRAMUSCULAR
Status: CANCELLED | OUTPATIENT
Start: 2022-07-13 | End: 2022-07-13

## 2022-07-13 RX ORDER — HYDROCODONE BITARTRATE AND ACETAMINOPHEN 5; 325 MG/1; MG/1
1 TABLET ORAL EVERY 4 HOURS PRN
Qty: 12 TABLET | Refills: 0 | Status: SHIPPED | OUTPATIENT
Start: 2022-07-13 | End: 2022-07-22 | Stop reason: SDUPTHER

## 2022-07-13 RX ORDER — OXYCODONE HYDROCHLORIDE 5 MG/1
5 TABLET ORAL
Status: CANCELLED | OUTPATIENT
Start: 2022-07-13 | End: 2022-07-13

## 2022-07-13 RX ORDER — FENTANYL CITRATE 50 UG/ML
50 INJECTION, SOLUTION INTRAMUSCULAR; INTRAVENOUS EVERY 5 MIN PRN
Status: CANCELLED | OUTPATIENT
Start: 2022-07-13

## 2022-07-13 RX ORDER — SODIUM CHLORIDE 0.9 % (FLUSH) 0.9 %
5-40 SYRINGE (ML) INJECTION PRN
Status: CANCELLED | OUTPATIENT
Start: 2022-07-13

## 2022-07-13 RX ORDER — HYDRALAZINE HYDROCHLORIDE 20 MG/ML
10 INJECTION INTRAMUSCULAR; INTRAVENOUS
Status: CANCELLED | OUTPATIENT
Start: 2022-07-13

## 2022-07-13 RX ORDER — DIPHENHYDRAMINE HYDROCHLORIDE 50 MG/ML
12.5 INJECTION INTRAMUSCULAR; INTRAVENOUS
Status: CANCELLED | OUTPATIENT
Start: 2022-07-13 | End: 2022-07-13

## 2022-07-13 RX ORDER — SODIUM CHLORIDE, SODIUM LACTATE, POTASSIUM CHLORIDE, CALCIUM CHLORIDE 600; 310; 30; 20 MG/100ML; MG/100ML; MG/100ML; MG/100ML
INJECTION, SOLUTION INTRAVENOUS CONTINUOUS
Status: CANCELLED | OUTPATIENT
Start: 2022-07-13

## 2022-07-13 RX ORDER — SODIUM CHLORIDE 0.9 % (FLUSH) 0.9 %
5-40 SYRINGE (ML) INJECTION PRN
Status: DISCONTINUED | OUTPATIENT
Start: 2022-07-13 | End: 2022-07-13 | Stop reason: HOSPADM

## 2022-07-13 RX ORDER — PROPOFOL 10 MG/ML
INJECTION, EMULSION INTRAVENOUS PRN
Status: DISCONTINUED | OUTPATIENT
Start: 2022-07-13 | End: 2022-07-13 | Stop reason: SDUPTHER

## 2022-07-13 RX ORDER — SODIUM CHLORIDE 9 MG/ML
25 INJECTION, SOLUTION INTRAVENOUS PRN
Status: CANCELLED | OUTPATIENT
Start: 2022-07-13

## 2022-07-13 RX ORDER — PROCHLORPERAZINE EDISYLATE 5 MG/ML
5 INJECTION INTRAMUSCULAR; INTRAVENOUS
Status: CANCELLED | OUTPATIENT
Start: 2022-07-13 | End: 2022-07-13

## 2022-07-13 RX ORDER — IPRATROPIUM BROMIDE AND ALBUTEROL SULFATE 2.5; .5 MG/3ML; MG/3ML
1 SOLUTION RESPIRATORY (INHALATION)
Status: CANCELLED | OUTPATIENT
Start: 2022-07-13 | End: 2022-07-13

## 2022-07-13 RX ORDER — DEXTROSE MONOHYDRATE 50 MG/ML
100 INJECTION, SOLUTION INTRAVENOUS PRN
Status: CANCELLED | OUTPATIENT
Start: 2022-07-13

## 2022-07-13 RX ORDER — HEPARIN SODIUM (PORCINE) LOCK FLUSH IV SOLN 100 UNIT/ML 100 UNIT/ML
SOLUTION INTRAVENOUS
Status: COMPLETED | OUTPATIENT
Start: 2022-07-13 | End: 2022-07-13

## 2022-07-13 RX ORDER — LIDOCAINE HYDROCHLORIDE 20 MG/ML
INJECTION, SOLUTION EPIDURAL; INFILTRATION; INTRACAUDAL; PERINEURAL PRN
Status: DISCONTINUED | OUTPATIENT
Start: 2022-07-13 | End: 2022-07-13 | Stop reason: SDUPTHER

## 2022-07-13 RX ADMIN — LIDOCAINE HYDROCHLORIDE 60 MG: 20 INJECTION, SOLUTION EPIDURAL; INFILTRATION; INTRACAUDAL; PERINEURAL at 14:35

## 2022-07-13 RX ADMIN — SODIUM CHLORIDE, POTASSIUM CHLORIDE, SODIUM LACTATE AND CALCIUM CHLORIDE: 600; 310; 30; 20 INJECTION, SOLUTION INTRAVENOUS at 12:55

## 2022-07-13 RX ADMIN — CEFAZOLIN 2000 MG: 2 INJECTION, POWDER, FOR SOLUTION INTRAMUSCULAR; INTRAVENOUS at 14:25

## 2022-07-13 RX ADMIN — PROPOFOL 50 MCG/KG/MIN: 10 INJECTION, EMULSION INTRAVENOUS at 14:35

## 2022-07-13 RX ADMIN — CEFAZOLIN 2000 MG: 2 INJECTION, POWDER, FOR SOLUTION INTRAMUSCULAR; INTRAVENOUS at 14:30

## 2022-07-13 RX ADMIN — PROPOFOL 100 MG: 10 INJECTION, EMULSION INTRAVENOUS at 14:35

## 2022-07-13 ASSESSMENT — PAIN DESCRIPTION - DESCRIPTORS: DESCRIPTORS: ACHING

## 2022-07-13 ASSESSMENT — PAIN - FUNCTIONAL ASSESSMENT: PAIN_FUNCTIONAL_ASSESSMENT: 0-10

## 2022-07-13 NOTE — BRIEF OP NOTE
GENERAL SURGERY BRIEF OPERATIVE NOTE  [facility]) Physicians    PATIENT: Becky Hung 1955   MRN: [id number]    DATE: 7/13/2022    SURGEON: Esequiel Collado MD    CASE ID: [id number]     PROCEDURE(S) PERFORMED:      MEDIPORT INSERTION: 46799 (CPT®)    PREOPERATIVE DIAGNOSIS:  Malignant neoplasm of overlapping sites of both breasts in female, estrogen receptor positive ([facility] [C50.811, C50.812, Z17.0]  Estrogen receptor positive [Z17.0]    POSTOPERATIVE DIAGNOSIS:   same    INDICATIONS: Becky Hung is a 74 y.o. female presenting with metastatic breast cancer for which chemotherapy is planned and for which mediport insertion has been discussed. The risks, benefits, potential complications and possible alternatives of the procedure were discussed in detail, including complications of but not limited to infection, bleeding, anesthesia-related complications, death, injury to surrounding structures, pain, pneumothorax, mediport malfunction or infection, or need for additional interventions. All questions were answered. The patient wished to proceed and consent was documented in the medical record. FINDINGS:   Successful right subclavian mediport insertion    ANESTHESIA:   Monitored Local Anesthesia with Sedation  Anesthesiologist: Gi Poole DO  CRNA: MAURICIO Butler CRNA    STAFF:   Kus[employer] Scrub: Sonny Mojica RN  Scrub Person First: Ransom Halsted, RN     First Assistant: ARIELA Pennington   The use of a first assistant was necessary for the proper positioning, prepping, and draping of the patient, as well as the safe and expeditious execution of the case and closure of skin and subcutaneous tissues. ESTIMATED BLOOD LOSS: Minimal    SPECIMEN(S):   * No specimens in log *    DRAINS & IMPLANTS:  Implant Name Type Inv. Item Serial No.  Lot No. LRB No. Used Action   PORT INFUS L55CM 0.016ML 0.4ML CATH OD2.2MM ID1. 4MM INTRO - [id number]  PORT INFUS L55CM 0.016ML 0.4ML CATH OD2.2MM ID1. 4MM INTRO  [employer]- [id number] N/A 1 Implanted        COMPLICATIONS: none    DISPOSITION: PACU - hemodynamically stable.      CONDITION: stable     Electronically signed:   Shonda Oswald MD 7/13/2022 3:41 PM

## 2022-07-13 NOTE — H&P
History and Physical Update    I have seen and examined Laverne Becerril on 7/13/2022 and confirmed the planned procedure. There are no changes since the History and Physical note on 7/6/22. All questions have been answered and the patient wishes to proceed and consent was verified in the medical record.     Electronically signed: Gabrielle Cross MD 7/13/2022 12:51 PM  ___________________________________________

## 2022-07-13 NOTE — ANESTHESIA POSTPROCEDURE EVALUATION
Department of Anesthesiology  Postprocedure Note    Patient: Deacon Oneil  MRN: [id number]  YOB: 1955  Date of evaluation: 7/13/2022      Procedure Summary     Date: 07/13/22 Room / Location: [address] [place] [address] OR [address] [place] [address]    Anesthesia Start: 7834 Anesthesia Stop: 6867    Procedure: MEDIPORT INSERTION (N/A Chest) Diagnosis:       Malignant neoplasm of overlapping sites of both breasts in female, estrogen receptor positive ([facility]      Estrogen receptor positive      (Malignant neoplasm of overlapping sites of both breasts in female, estrogen receptor positive ([address] [C50.811, C50.812, Z17.0])      (Estrogen receptor positive [Z17.0])    Surgeons: Vida Mccord MD Responsible Provider: Komal Andres DO    Anesthesia Type: MAC ASA Status: 3          Anesthesia Type: No value filed.     Berta Phase I:      Berta Phase II: Berta Score: 10      Anesthesia Post Evaluation    Patient location during evaluation: bedside  Patient participation: complete - patient participated  Level of consciousness: awake  Pain score: 0  Airway patency: patent  Nausea & Vomiting: no vomiting and no nausea  Complications: no  Cardiovascular status: hemodynamically stable  Respiratory status: acceptable, airway suctioned, spontaneous ventilation and room air  Hydration status: stable

## 2022-07-13 NOTE — PROGRESS NOTES
1551 Pt. Brought back to unit, bedside report received from [facility]. Pt. Provided with beverage of choice and crackers. 1555 Pt. Taken to bathroom, tolerated well. Family at beside. 1600 Chest xray taken, awaiting results. Call light in reach. 1620 Warm blankets applied, VSS. 1700 DC instructions reviewed with pt and spouse, all parties express understanding. 1745 Pt. To DC home in private vehicle.

## 2022-07-13 NOTE — OP NOTE
[address] [facility] [facility]    PATIENT: Paola Zamora 1955   MRN: [id number]    DATE: 7/13/2022    SURGEON: Abigail Garcia MD    CASE ID: [id number]     PROCEDURE(S) PERFORMED:      MEDIPORT INSERTION: 09093 (CPT®)    PREOPERATIVE DIAGNOSIS:  Malignant neoplasm of overlapping sites of both breasts in female, estrogen receptor positive [facility] [C50.811, C50.812, Z17.0]  Estrogen receptor positive [Z17.0]    POSTOPERATIVE DIAGNOSIS:   same    INDICATIONS: Paola Zamora is a 74 y.o. female presenting with metastatic breast cancer for which chemotherapy is planned and for which mediport insertion has been discussed. The risks, benefits, potential complications and possible alternatives of the procedure were discussed in detail, including complications of but not limited to infection, bleeding, anesthesia-related complications, death, injury to surrounding structures, pain, pneumothorax, mediport malfunction or infection, or need for additional interventions. All questions were answered. The patient wished to proceed and consent was documented in the medical record. FINDINGS:   Successful right subclavian mediport insertion    ANESTHESIA:   Monitored Local Anesthesia with Sedation  Anesthesiologist: Karlee Esquivel DO  CRNA: MAURICIO Wilson CRNA    STAFF:   Kusum Scrub: Toyin Ngo RN  Scrub Person First: Andrez Vines RN     First Assistant: ARIELA Cody   The use of a first assistant was necessary for the proper positioning, prepping, and draping of the patient, as well as the safe and expeditious execution of the case and closure of skin and subcutaneous tissues. ESTIMATED BLOOD LOSS: Minimal    SPECIMEN(S):   * No specimens in log *    DRAINS & IMPLANTS:  Implant Name Type Inv. Item Serial No.  Lot No. LRB No. Used Action   PORT INFUS L55CM 0.016ML 0.4ML CATH OD2.2MM ID1. 4MM INTRO - [id number]  PORT INFUS L55CM 0.016ML 0.4ML CATH OD2. 2MM ID1. 4MM INTRO  [employer] [id number] N/A 1 Implanted        COMPLICATIONS: none    DISPOSITION: PACU - hemodynamically stable. CONDITION: stable     PROCEDURE IN DETAIL:  Prior to beginning the procedure, informed consent was obtained and consent was documented in the medical record. The patient was brought to the operating room and positioned supine on the operating table with a bump under the shoulders. Anesthesia was initiated and a time out was performed in which all were in agreement. Appropriate perioperative antibiotics were administered and the patient was prepped and draped in the usual sterile fashion. The patient was placed in trendelenburg position. The skin was anesthetized at the anticipated puncture and port sites. An introducer needle was inserted into the right subclavian vein on the 1st stick. Dark red, non-pulsatile blood was easily drawn back. A guidewire was placed through the lumen of the needle and advanced easily and without resistance. Fluoroscopy was used to confirm placement of the guidewire into the superior vena cava. The needle was removed and the guidewire secured. The port pocket site was then anesthetized with local anesthetic, a skin incision was made, and a pocket was created. The incision was made adjacent to the guidewire. A dilator/introducer sheath was inserted under fluoroscopic guidance into the vein over the guidewire using fluoroscopy. The guidewire and obturator were removed, leaving the sheath in place. A low profile CT injectable single lumen port was selected. The port catheter was inserted through the peel-away sheath and the sheath was removed. Fluoroscopy was used to position the tip of the catheter in the atrium/vena cava confluence appropriately. The catheter was cut to 17cm and attached to the port, and the port was seated into the port pocket and secured with 3-0 Prolene sutures to the underlying tissues.  The port was accessed using a Hairston needle and there was good return and flush of blood. The port pocket was closed with 3-0  Vicryl suture, and the skin was closed with running subcuticular 4-0 Vicryl. The procedure was concluded. The skin was washed and dried and sterile wound sealant applied. The patient tolerated the procedure well with no apparent complications and was transferred to PACU - hemodynamically stable. Counts were reported correct at the end of the case. A chest xray was ordered for PACU. I was present and primarily performed all critical portions of the case.        Electronically signed:   Viktoriya Joiner MD 7/13/2022 3:41 PM

## 2022-07-15 ENCOUNTER — OFFICE VISIT (OUTPATIENT)
Dept: ONCOLOGY | Age: 67
End: 2022-07-15
Payer: MEDICARE

## 2022-07-15 ENCOUNTER — HOSPITAL ENCOUNTER (OUTPATIENT)
Dept: INFUSION THERAPY | Age: 67
Discharge: HOME | End: 2022-07-15
Payer: MEDICARE

## 2022-07-15 VITALS
RESPIRATION RATE: 16 BRPM | BODY MASS INDEX: 27.31 KG/M2 | HEART RATE: 107 BPM | SYSTOLIC BLOOD PRESSURE: 129 MMHG | WEIGHT: 160 LBS | OXYGEN SATURATION: 99 % | HEIGHT: 64 IN | TEMPERATURE: 99 F | DIASTOLIC BLOOD PRESSURE: 61 MMHG

## 2022-07-15 DIAGNOSIS — Z17.0 MALIGNANT NEOPLASM OF OVERLAPPING SITES OF BOTH BREASTS IN FEMALE, ESTROGEN RECEPTOR POSITIVE (HCC): ICD-10-CM

## 2022-07-15 DIAGNOSIS — Z17.0 MALIGNANT NEOPLASM OF OVERLAPPING SITES OF BOTH BREASTS IN FEMALE, ESTROGEN RECEPTOR POSITIVE (HCC): Primary | ICD-10-CM

## 2022-07-15 DIAGNOSIS — Z71.83 ENCOUNTER FOR NONPROCREATIVE GENETIC COUNSELING: ICD-10-CM

## 2022-07-15 DIAGNOSIS — C50.811 MALIGNANT NEOPLASM OF OVERLAPPING SITES OF BOTH BREASTS IN FEMALE, ESTROGEN RECEPTOR POSITIVE (HCC): Primary | ICD-10-CM

## 2022-07-15 DIAGNOSIS — C50.811 MALIGNANT NEOPLASM OF OVERLAPPING SITES OF BOTH BREASTS IN FEMALE, ESTROGEN RECEPTOR POSITIVE (HCC): ICD-10-CM

## 2022-07-15 DIAGNOSIS — C79.51 BONE METASTASIS (HCC): ICD-10-CM

## 2022-07-15 DIAGNOSIS — C50.812 MALIGNANT NEOPLASM OF OVERLAPPING SITES OF BOTH BREASTS IN FEMALE, ESTROGEN RECEPTOR POSITIVE (HCC): Primary | ICD-10-CM

## 2022-07-15 DIAGNOSIS — C50.812 MALIGNANT NEOPLASM OF OVERLAPPING SITES OF BOTH BREASTS IN FEMALE, ESTROGEN RECEPTOR POSITIVE (HCC): ICD-10-CM

## 2022-07-15 DIAGNOSIS — R16.0 LIVER MASS: ICD-10-CM

## 2022-07-15 DIAGNOSIS — M89.9 LYTIC LESION OF BONE ON X-RAY: ICD-10-CM

## 2022-07-15 PROCEDURE — 99215 OFFICE O/P EST HI 40 MIN: CPT | Performed by: NURSE PRACTITIONER

## 2022-07-15 PROCEDURE — 1124F ACP DISCUSS-NO DSCNMKR DOCD: CPT | Performed by: NURSE PRACTITIONER

## 2022-07-15 PROCEDURE — 83735 ASSAY OF MAGNESIUM: CPT

## 2022-07-15 RX ORDER — TRAZODONE HYDROCHLORIDE 50 MG/1
50 TABLET ORAL NIGHTLY PRN
Qty: 30 TABLET | Refills: 0 | Status: SHIPPED | OUTPATIENT
Start: 2022-07-15 | End: 2022-08-10

## 2022-07-15 NOTE — PROGRESS NOTES
MA Rooming Questions  Patient: Austin Miller  MRN: [id number]    Date: 7/15/2022      Genetic consult        No data recorded     PHQ-9 Given to (if applicable):               PHQ-9 Score (if applicable):                     [] Positive     []  Negative              Does question #9 need addressed (if applicable)                     [] Yes    []  No               Melchor Melgar CMA

## 2022-07-15 NOTE — PROGRESS NOTES
relatives. Mother- no cancer  Maternal grandmother/grandfather- no cancer  Father- no cancer  Paternal grandmother ? Cervical?  Paternal grandfather no cancer  Brother- multiple myeloma at 79  Brother- renal cell at 77  One unaffected sibling  Three children, no cancer  One maternal aunt and one maternal uncle- no known cancer  One maternal cousin- no cancer  Paternal niece- breast cancer at 27, reportedly negative genetic testing      Risk Assessment:  [address]  (NCCN) criteria for genetic testing based on personal and family history. Overall, there is a medium risk of having a hereditary carncer syndrome. We discussed high risk syndromes such as HBOC, as well as other more moderate risk genetic mutations that could have contributed to the cancer in the family. Risk Model (if applicable):    Hereditary Breast and Ovarian Cancer  About 10-20% of breast and/or ovarian cancers are due to inherited mutations in cancer genes such as BRCA1 and BRCA2. Typical features of a family with HBOC include breast cancer younger than age 48, multiple generations affected, and bilateral or multiple cancers in the same person. For many gene mutations, there are national guidelines that have recommendations for medical management and/or preventative options. Pyle Syndrome  Pyle Syndrome is a genetic condition characterized by early onset colorectal cancer and endometrial cancer. It is associated with an elevated risk of other cancers such as gastric, ovarian, urinary tract, small rowena, brain and pancreatic cancer. Pyle syndrome is caused by a genetic change, or mutation, in one of five known genes:  MLH1, MSH2, EPCAM, MSH6, or PMS2. If a person has a mutation in one of these genes, they face increased cancer risks and also have a 50% chance of passing the mutation down to their children.   There are national guidelines that have recommendations for medical management and/or preventative options. Genetic Testing      Many laboratories are now using next generation sequencing to test a panel of cancer genes at the same time - this reduces the turnaround time and cost compared to the single gene approach to testing. In our discussion, we discussed three possible test results: positive, negative, and indeterminate, (variant of unknown significance). We addressed the 2008 federal legislation, MARIBEL, which protects individuals from discrimination in health insurance and employment. More information is available at www. GINAhelp.org. The pros and cons of panel testing were reviewed, including the fact that there are some genes that do not have well-defined cancer risks or recommendations. A psychological assessment was performed, and the patient understands how the results will be incorporated into medical management, as well as potential implications for family members. Consent: All elements of the informed consent were discussed with Humza Dawson and signed consent was obtained for the gene panel. Medical decision making was of high complexity, as it included a comprehensive discussion of all relevant options for genetic testing and implications for patient and family members. The patient was educated that the lab will verify insurance coverage before initiating testing and call if there is out-of-pocket for any reason. Patient was educated that results are expected to be available in approximately 3 weeks and will then be contacted. Plan:  The patient opted to pursue the 36 gene panel through [facility] today. Will await results and schedule a future appointment in approximately 1 month as needed. The patient was educated to continue routine follow-up with their healthcare providers.   Patient was educated that if they obtain any additional information regarding the family medical history, or if there are any changes to the reported personal or family health history, to please contact us for updated risk assessment. 60  minutes were spent with the patient, with over 50% of the time spent in face to face counseling and coordination of care.     Kvng Rosenthal, MAURICIO - STEVEN    Recipients:

## 2022-07-18 ENCOUNTER — CLINICAL DOCUMENTATION (OUTPATIENT)
Dept: CASE MANAGEMENT | Age: 67
End: 2022-07-18

## 2022-07-18 NOTE — PROGRESS NOTES
Medical Necessity for Molecular Testing signed per Dr. Priyank Colindres and faxed, along with recent office note, to [facility] @ [phone].

## 2022-07-19 ENCOUNTER — CLINICAL DOCUMENTATION (OUTPATIENT)
Dept: ONCOLOGY | Age: 67
End: 2022-07-19

## 2022-07-19 ENCOUNTER — HOSPITAL ENCOUNTER (OUTPATIENT)
Dept: INFUSION THERAPY | Age: 67
Discharge: HOME | End: 2022-07-19
Payer: MEDICARE

## 2022-07-19 ENCOUNTER — OFFICE VISIT (OUTPATIENT)
Dept: SURGERY | Age: 67
End: 2022-07-19

## 2022-07-19 ENCOUNTER — OFFICE VISIT (OUTPATIENT)
Dept: ONCOLOGY | Age: 67
End: 2022-07-19
Payer: MEDICARE

## 2022-07-19 VITALS
SYSTOLIC BLOOD PRESSURE: 128 MMHG | WEIGHT: 160.4 LBS | HEIGHT: 64 IN | HEART RATE: 88 BPM | OXYGEN SATURATION: 99 % | BODY MASS INDEX: 27.39 KG/M2 | DIASTOLIC BLOOD PRESSURE: 76 MMHG

## 2022-07-19 VITALS
TEMPERATURE: 98.2 F | SYSTOLIC BLOOD PRESSURE: 132 MMHG | BODY MASS INDEX: 27.49 KG/M2 | WEIGHT: 161 LBS | HEART RATE: 103 BPM | OXYGEN SATURATION: 100 % | DIASTOLIC BLOOD PRESSURE: 60 MMHG | HEIGHT: 64 IN

## 2022-07-19 DIAGNOSIS — Z71.89 ADVANCE DIRECTIVE DISCUSSED WITH PATIENT: ICD-10-CM

## 2022-07-19 DIAGNOSIS — Z71.9 ENCOUNTER FOR EDUCATION: ICD-10-CM

## 2022-07-19 DIAGNOSIS — C50.811 MALIGNANT NEOPLASM OF OVERLAPPING SITES OF BOTH BREASTS IN FEMALE, ESTROGEN RECEPTOR POSITIVE (HCC): ICD-10-CM

## 2022-07-19 DIAGNOSIS — T45.1X5A CHEMOTHERAPY INDUCED NAUSEA AND VOMITING: Primary | ICD-10-CM

## 2022-07-19 DIAGNOSIS — C79.51 BONE METASTASIS (HCC): Primary | ICD-10-CM

## 2022-07-19 DIAGNOSIS — C50.812 MALIGNANT NEOPLASM OF OVERLAPPING SITES OF BOTH BREASTS IN FEMALE, ESTROGEN RECEPTOR POSITIVE (HCC): Primary | ICD-10-CM

## 2022-07-19 DIAGNOSIS — C50.812 MALIGNANT NEOPLASM OF OVERLAPPING SITES OF BOTH BREASTS IN FEMALE, ESTROGEN RECEPTOR POSITIVE (HCC): ICD-10-CM

## 2022-07-19 DIAGNOSIS — C50.811 MALIGNANT NEOPLASM OF OVERLAPPING SITES OF BOTH BREASTS IN FEMALE, ESTROGEN RECEPTOR POSITIVE (HCC): Primary | ICD-10-CM

## 2022-07-19 DIAGNOSIS — Z17.0 MALIGNANT NEOPLASM OF OVERLAPPING SITES OF BOTH BREASTS IN FEMALE, ESTROGEN RECEPTOR POSITIVE (HCC): ICD-10-CM

## 2022-07-19 DIAGNOSIS — Z09 POSTOP CHECK: ICD-10-CM

## 2022-07-19 DIAGNOSIS — Z17.0 MALIGNANT NEOPLASM OF OVERLAPPING SITES OF BOTH BREASTS IN FEMALE, ESTROGEN RECEPTOR POSITIVE (HCC): Primary | ICD-10-CM

## 2022-07-19 DIAGNOSIS — R11.2 CHEMOTHERAPY INDUCED NAUSEA AND VOMITING: Primary | ICD-10-CM

## 2022-07-19 DIAGNOSIS — R16.0 LIVER MASS: ICD-10-CM

## 2022-07-19 LAB
ALBUMIN SERPL-MCNC: 4.6 GM/DL (ref 3.4–5)
ALP BLD-CCNC: 206 IU/L (ref 40–129)
ALT SERPL-CCNC: 33 U/L (ref 10–40)
ANION GAP SERPL CALCULATED.3IONS-SCNC: 11 MMOL/L (ref 4–16)
AST SERPL-CCNC: 32 IU/L (ref 15–37)
BASOPHILS ABSOLUTE: 0 K/CU MM
BASOPHILS RELATIVE PERCENT: 0.2 % (ref 0–1)
BILIRUB SERPL-MCNC: 0.5 MG/DL (ref 0–1)
BUN BLDV-MCNC: 18 MG/DL (ref 6–23)
CALCIUM SERPL-MCNC: 9 MG/DL (ref 8.3–10.6)
CHLORIDE BLD-SCNC: 106 MMOL/L (ref 99–110)
CO2: 25 MMOL/L (ref 21–32)
CREAT SERPL-MCNC: 0.6 MG/DL (ref 0.6–1.1)
DIFFERENTIAL TYPE: ABNORMAL
EOSINOPHILS ABSOLUTE: 0.1 K/CU MM
EOSINOPHILS RELATIVE PERCENT: 2 % (ref 0–3)
GFR AFRICAN AMERICAN: >60 ML/MIN/1.73M2
GFR NON-AFRICAN AMERICAN: >60 ML/MIN/1.73M2
GLUCOSE BLD-MCNC: 69 MG/DL (ref 70–99)
HCT VFR BLD CALC: 30.6 % (ref 37–47)
HEMOGLOBIN: 10 GM/DL (ref 12.5–16)
LYMPHOCYTES ABSOLUTE: 1.9 K/CU MM
LYMPHOCYTES RELATIVE PERCENT: 43.4 % (ref 24–44)
MAGNESIUM: 2.3 MG/DL (ref 1.8–2.4)
MCH RBC QN AUTO: 30.8 PG (ref 27–31)
MCHC RBC AUTO-ENTMCNC: 32.7 % (ref 32–36)
MCV RBC AUTO: 94.2 FL (ref 78–100)
MONOCYTES ABSOLUTE: 0.3 K/CU MM
MONOCYTES RELATIVE PERCENT: 7.7 % (ref 0–4)
PDW BLD-RTO: 14.9 % (ref 11.7–14.9)
PLATELET # BLD: 202 K/CU MM (ref 140–440)
PMV BLD AUTO: 8.1 FL (ref 7.5–11.1)
POTASSIUM SERPL-SCNC: 4.4 MMOL/L (ref 3.5–5.1)
RBC # BLD: 3.25 M/CU MM (ref 4.2–5.4)
SEGMENTED NEUTROPHILS ABSOLUTE COUNT: 2.1 K/CU MM
SEGMENTED NEUTROPHILS RELATIVE PERCENT: 46.7 % (ref 36–66)
SODIUM BLD-SCNC: 142 MMOL/L (ref 135–145)
TOTAL PROTEIN: 6.7 GM/DL (ref 6.4–8.2)
WBC # BLD: 4.4 K/CU MM (ref 4–10.5)

## 2022-07-19 PROCEDURE — APPNB15 APP NON BILLABLE TIME 0-15 MINS: Performed by: NURSE PRACTITIONER

## 2022-07-19 PROCEDURE — 99215 OFFICE O/P EST HI 40 MIN: CPT | Performed by: NURSE PRACTITIONER

## 2022-07-19 PROCEDURE — 99024 POSTOP FOLLOW-UP VISIT: CPT | Performed by: NURSE PRACTITIONER

## 2022-07-19 PROCEDURE — 1124F ACP DISCUSS-NO DSCNMKR DOCD: CPT | Performed by: NURSE PRACTITIONER

## 2022-07-19 PROCEDURE — 85025 COMPLETE CBC W/AUTO DIFF WBC: CPT

## 2022-07-19 PROCEDURE — 36415 COLL VENOUS BLD VENIPUNCTURE: CPT

## 2022-07-19 PROCEDURE — 80053 COMPREHEN METABOLIC PANEL: CPT

## 2022-07-19 PROCEDURE — 83735 ASSAY OF MAGNESIUM: CPT

## 2022-07-19 RX ORDER — DEXAMETHASONE 2 MG/1
TABLET ORAL
Qty: 28 TABLET | Refills: 0 | Status: SHIPPED | OUTPATIENT
Start: 2022-07-19

## 2022-07-19 RX ORDER — ONDANSETRON HYDROCHLORIDE 8 MG/1
8 TABLET, FILM COATED ORAL EVERY 8 HOURS PRN
Qty: 30 TABLET | Refills: 1 | Status: SHIPPED | OUTPATIENT
Start: 2022-07-19

## 2022-07-19 ASSESSMENT — PATIENT HEALTH QUESTIONNAIRE - PHQ9
1. LITTLE INTEREST OR PLEASURE IN DOING THINGS: 0
2. FEELING DOWN, DEPRESSED OR HOPELESS: 0
SUM OF ALL RESPONSES TO PHQ9 QUESTIONS 1 & 2: 0
SUM OF ALL RESPONSES TO PHQ QUESTIONS 1-9: 0

## 2022-07-19 NOTE — PROGRESS NOTES
[id number] [facility]    PATIENT: Lisa Dewey, 1955, 74 y.o., female    Date of surgery: 7/13/22    CHIEF COMPLAINT:     Chief Complaint   Patient presents with    Post-Op Check     1st PO Mediport Placement @ [facility] 7/13/22     History Obtained From:  patient, electronic medical record    HISTORY OF PRESENT ILLNESS:      Leann Tripp is a 74 y.o. female presenting postoperatively after mediport insertion. Since the procedure, she has been doing ok. Start treatment tomorrow    Eating a regular diet without difficulty. Bowel movement are Normal.    Pain is controlled without any medications. .     Wounds/Incisions: are healing well. No drainage or erythema. I have reviewed the patient's information pertinent to this visit, including medical history, family history, social history and review of systems. PHYSICAL EXAM:    Vitals:    07/19/22 1115   BP: 128/76   Site: Left Upper Arm   Position: Sitting   Cuff Size: Medium Adult   Pulse: 88   SpO2: 99%   Weight: 160 lb 6.4 oz (72.8 kg)   Height: 5' 4\" (1.626 m)       Object:  Vital signs and Nurse's note reviewed  Gen:  A&Ox3, NAD  CV: RRR, no m/h/t  Resp: LCTAB, no wheeze or rhonchi  Abd:  Soft, nttp, nd  Wounds: clean, dry and intact; no erythema induration or exudate  Ext:  Warm, no cyanosis or edema    Pertinent laboratory and imaging studies were personally reviewed if available. IMPRESSION:    Leann Tripp is a 74 y.o. female following-up postoperatively from mediport insertion. Visit Diagnoses:  No diagnosis found. Patient Active Problem List    Diagnosis Date Noted    Malignant neoplasm of overlapping sites of both breasts in female, estrogen receptor positive ([address] 06/30/2022    Bone metastasis ([address] 06/30/2022    Liver mass 06/14/2022    Lytic lesion of bone on x-ray 06/14/2022    Left breast mass 06/14/2022     PLAN:    Increase activity as tolerated.     Wound care instructions given to patient. Follow up as needed. Call the office right away for fever 100.4 or greater, shaking chills, pain that increases over time, persistent nausea or redness, warmth or pus from incision.     Electronically signed by MAURICIO Shahid CNP, 7/21/2022, 1:36 PM.

## 2022-07-19 NOTE — PROGRESS NOTES
Patient Name: Jerson Bishop    Patient : 1955     Patient MRN: [id number]       Date: 2022                                                                                                   CHEMOTHERAPY TREATMENT PLAN    Care Team  Medical Oncologist: Ramez Mejia MD  Surgeon:   Radiation Oncologist:   Primary Care Physician: MAURICIO Whitfield - CNP     Learning Needs Assessment  Before the treatment plan was discussed and the consent was signed, the care team assessed the patient's learning needs. This includes their ability to assume responsibility for managing their therapy. 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 I know that a cure is not possible.      Prognosis    (  ) Curable  (  x) Palliative    Plan Of Treatment    Intent:  (  ) Neoadjuvant   (  ) Adjuvant   ( x ) Control    Treatment Regimen  (including supportive meds)                             Frequency                                               Duration     Taxol 28 day cycle, three weeks on, one week off x 6  Zometa every four weeks    Expected Response to Treatment    (  ) This therapy could cure your disease  ( x ) This therapy has a good chance of helping you live 1 year or more compared to without it  (  ) This therapy has a good chance of helping you feel better or making you live months longer compared to without it     Quality Of Life    (  ) Expect that you will be able to continue all normal activities  ( x ) Expect that you will have some side effects but should be able to maintain normal activities  (  ) Expect that you will be unable to work but able to perform light duties at home  (  ) Expect that you will be able to perform light duties and will need assistance with self care    Treatment Benefits and Harms     1. Patient taught on all common and rare toxicities regarding their treatment, disease process and drug regimen. This includes the short and long-         term effects of therapy (including infertility risks when appropriate). This also includes drug-drug and drug-food interactions when appropriate. Patient was educated when to call [facility] with adverse effects and symptoms. 2.    Patient was taught safe handling and storage of medications in the home (when appropriate) and procedures for handling body sections and             waste in the home. 3.    Patient was taught on planned for missed doses and follow-up plans during treatment, including constantin of provider visits and labs. 4.    Patient signed consent on treatment and drug information sheets were given. Alternative To Recommended Treatment    (  ) Palliative or Hospice  (  ) Second Opinion  ( x ) Other    Patient Care Management     Advance Care Planning completed: (  x) Yes   (  ) No   Pain Care Plan entered (as applicable): (  x) Yes   (  ) No   Comments:  PHQ-9 completed (Follow-up plan as applicable): (  x) Yes   (  ) No   Comments:  Distress needs addressed with the patient:    ( x ) Yes   (  ) No   Distress areas needing addressed further:     Patient was educated on the expectations and their responsibility to schedule their follow-up appointments. The patient was also educated that if they refuse to show-up to their appointment or refuse to schedule a follow-up appointment that it is their responsibility to call [facility] in a timely manner to schedule their next appointment. The patient was educated on [facility] policy and that the patient is ultimately responsible for monitoring their appointments and adhering to the schedule.     ( x) Yes   (   ) No    Estimated Out of Pocket Costs discussed per the patient per financial navigator. Patient Consented and taught per Nurse Navigator. .  Today, time spent with the patient discussing the intent and schedule of their treatment. The patient was given a copy of their treatment summary. Questions and concerns were addressed with the patient. Time spent with the patient face to face today was 60 minutes, counseling and coordination of care dominated more than 50% of this patient encounter. [id number] [facility] today for chemo education regarding taxol. We discussed potential AE including, but not limited to: myelosuppression, infusion reaction, peripheral neuropathy, nausea, diarrhea mucositis, fatigue, nail changes, vomiting, myalgia, arthralgias, increased LFT's,  hair loss, etc. She is asked to call our office for temperature 100.4 or greater or with any questions or concerns. She is given dexamethasone per protocol. She is given zofran prn. Advance Directive: paperwork provided  Counseling-  declined  [place]- declined    Fear, nervousness, worry- emotional support provided, declines counseling  Sleep-chronically poor sleeping, tried trazadone without good result  Fatigue-recommend activity, hydration, nutrition and sleep  Constipation- senna, miralax, colace with pain meds  Pain- norco prn    /60 (Site: Left Upper Arm, Position: Sitting, Cuff Size: Medium Adult)   Pulse (!) 103   Temp 98.2 °F (36.8 °C) (Temporal)   Ht 5' 4\" (1.626 m)   Wt 161 lb (73 kg)   SpO2 100%   BMI 27.64 kg/m²       Physical Exam  Vitals reviewed. Constitutional:       Appearance: Normal appearance. HENT:      Head: Normocephalic. Mouth/Throat:      Mouth: Mucous membranes are moist.   Eyes:      Extraocular Movements: Extraocular movements intact. Conjunctiva/sclera: Conjunctivae normal.   Cardiovascular:      Rate and Rhythm: Normal rate. Heart sounds: Normal heart sounds.    Pulmonary:      Effort: Pulmonary effort is normal.   Abdominal: General: Bowel sounds are normal.      Palpations: Abdomen is soft. Musculoskeletal:         General: Normal range of motion. Skin:     General: Skin is warm. Capillary Refill: Capillary refill takes less than 2 seconds. Comments: Port wnl   Neurological:      General: No focal deficit present. Mental Status: She is alert.    Psychiatric:         Mood and Affect: Mood normal.          RTC as previously scheduled or prior with any concerns/unresolved issues

## 2022-07-19 NOTE — PROGRESS NOTES
Patient arrived with spouse for treatment planning and chemotherapy education. Discussed treatment plan, potential side effects, prevention, and symptom management. Reviewed chemotherapy education folder and drug monograph. Patient's regimen will be Taxol on days 1, 8 and 15 of a 28-day cycle x6 cycles. Patient signed chemotherapy consent for Taxol. Copy of consent given to patient. Reviewed chemotherapy schedule/calendar. Rx for Dexamethasone sent to pharmacy per Nanda Sandoval CNP. Patient given calendar and written instructions for these medications and how/when to take. Mediport to right upper chest intact without redness or drainage. Script for cranial prosthesis with list of local boutiques given to patient. Patient given on-call phone number for after office hours and weekends. CBC and CMP drawn today. Confirmed first chemotherapy appointment for Taxol for tomorrow, 7/20/22 at 0945.

## 2022-07-20 ENCOUNTER — HOSPITAL ENCOUNTER (OUTPATIENT)
Dept: INFUSION THERAPY | Age: 67
Discharge: HOME | End: 2022-07-20
Payer: MEDICARE

## 2022-07-20 VITALS
HEART RATE: 84 BPM | WEIGHT: 157.2 LBS | HEIGHT: 64 IN | TEMPERATURE: 97.5 F | OXYGEN SATURATION: 98 % | BODY MASS INDEX: 26.84 KG/M2 | DIASTOLIC BLOOD PRESSURE: 67 MMHG | SYSTOLIC BLOOD PRESSURE: 142 MMHG

## 2022-07-20 DIAGNOSIS — C50.812 MALIGNANT NEOPLASM OF OVERLAPPING SITES OF BOTH BREASTS IN FEMALE, ESTROGEN RECEPTOR POSITIVE (HCC): Primary | ICD-10-CM

## 2022-07-20 DIAGNOSIS — Z17.0 MALIGNANT NEOPLASM OF OVERLAPPING SITES OF BOTH BREASTS IN FEMALE, ESTROGEN RECEPTOR POSITIVE (HCC): Primary | ICD-10-CM

## 2022-07-20 DIAGNOSIS — C50.811 MALIGNANT NEOPLASM OF OVERLAPPING SITES OF BOTH BREASTS IN FEMALE, ESTROGEN RECEPTOR POSITIVE (HCC): Primary | ICD-10-CM

## 2022-07-20 DIAGNOSIS — C79.51 BONE METASTASIS (HCC): ICD-10-CM

## 2022-07-20 LAB
ALBUMIN SERPL-MCNC: 4.4 GM/DL (ref 3.4–5)
ALP BLD-CCNC: 195 IU/L (ref 40–128)
ALT SERPL-CCNC: 32 U/L (ref 10–40)
ANION GAP SERPL CALCULATED.3IONS-SCNC: 9 MMOL/L (ref 4–16)
AST SERPL-CCNC: 26 IU/L (ref 15–37)
BASOPHILS ABSOLUTE: 0 K/CU MM
BASOPHILS RELATIVE PERCENT: 0.2 % (ref 0–1)
BILIRUB SERPL-MCNC: 0.6 MG/DL (ref 0–1)
BUN BLDV-MCNC: 18 MG/DL (ref 6–23)
CALCIUM SERPL-MCNC: 9.4 MG/DL (ref 8.3–10.6)
CHLORIDE BLD-SCNC: 106 MMOL/L (ref 99–110)
CO2: 24 MMOL/L (ref 21–32)
CREAT SERPL-MCNC: 0.5 MG/DL (ref 0.6–1.1)
DIFFERENTIAL TYPE: ABNORMAL
EOSINOPHILS ABSOLUTE: 0 K/CU MM
EOSINOPHILS RELATIVE PERCENT: 0.2 % (ref 0–3)
GFR AFRICAN AMERICAN: >60 ML/MIN/1.73M2
GFR AFRICAN AMERICAN: >60 ML/MIN/1.73M2
GFR NON-AFRICAN AMERICAN: >60 ML/MIN/1.73M2
GFR NON-AFRICAN AMERICAN: >60 ML/MIN/1.73M2
GLUCOSE BLD-MCNC: 103 MG/DL (ref 70–99)
GLUCOSE BLD-MCNC: 105 MG/DL (ref 70–99)
HCT VFR BLD CALC: 29.4 % (ref 37–47)
HEMOGLOBIN: 9.6 GM/DL (ref 12.5–16)
LYMPHOCYTES ABSOLUTE: 1.2 K/CU MM
LYMPHOCYTES RELATIVE PERCENT: 23.1 % (ref 24–44)
MCH RBC QN AUTO: 30.3 PG (ref 27–31)
MCHC RBC AUTO-ENTMCNC: 32.7 % (ref 32–36)
MCV RBC AUTO: 92.7 FL (ref 78–100)
MONOCYTES ABSOLUTE: 0.2 K/CU MM
MONOCYTES RELATIVE PERCENT: 3.5 % (ref 0–4)
PDW BLD-RTO: 14.9 % (ref 11.7–14.9)
PLATELET # BLD: 216 K/CU MM (ref 140–440)
PMV BLD AUTO: 8.5 FL (ref 7.5–11.1)
POC BUN: 18 MG/DL (ref 8–26)
POC CALCIUM: 1.21 MMOL/L (ref 1.12–1.32)
POC CHLORIDE: 108 MMOL/L (ref 98–109)
POC CO2: 24 MMOL/L (ref 21–32)
POC CREATININE: 0.5 MG/DL (ref 0.6–1.1)
POTASSIUM SERPL-SCNC: 3.9 MMOL/L (ref 3.5–4.5)
POTASSIUM SERPL-SCNC: 4.1 MMOL/L (ref 3.5–5.1)
RBC # BLD: 3.17 M/CU MM (ref 4.2–5.4)
SEGMENTED NEUTROPHILS ABSOLUTE COUNT: 3.7 K/CU MM
SEGMENTED NEUTROPHILS RELATIVE PERCENT: 73 % (ref 36–66)
SODIUM BLD-SCNC: 139 MMOL/L (ref 135–145)
SODIUM BLD-SCNC: 144 MMOL/L (ref 138–146)
SOURCE, BLOOD GAS: ABNORMAL
TOTAL PROTEIN: 7.1 GM/DL (ref 6.4–8.2)
WBC # BLD: 5.1 K/CU MM (ref 4–10.5)

## 2022-07-20 PROCEDURE — 85025 COMPLETE CBC W/AUTO DIFF WBC: CPT

## 2022-07-20 PROCEDURE — 80053 COMPREHEN METABOLIC PANEL: CPT

## 2022-07-20 PROCEDURE — 96415 CHEMO IV INFUSION ADDL HR: CPT

## 2022-07-20 PROCEDURE — 96413 CHEMO IV INFUSION 1 HR: CPT

## 2022-07-20 PROCEDURE — 6360000002 HC RX W HCPCS: Performed by: INTERNAL MEDICINE

## 2022-07-20 PROCEDURE — 2580000003 HC RX 258: Performed by: INTERNAL MEDICINE

## 2022-07-20 PROCEDURE — 96375 TX/PRO/DX INJ NEW DRUG ADDON: CPT

## 2022-07-20 PROCEDURE — 2500000003 HC RX 250 WO HCPCS: Performed by: INTERNAL MEDICINE

## 2022-07-20 PROCEDURE — 96365 THER/PROPH/DIAG IV INF INIT: CPT

## 2022-07-20 RX ORDER — SODIUM CHLORIDE 9 MG/ML
5-250 INJECTION, SOLUTION INTRAVENOUS PRN
Status: DISCONTINUED | OUTPATIENT
Start: 2022-07-20 | End: 2022-07-21 | Stop reason: HOSPADM

## 2022-07-20 RX ORDER — DIPHENHYDRAMINE HYDROCHLORIDE 50 MG/ML
50 INJECTION INTRAMUSCULAR; INTRAVENOUS ONCE
Status: COMPLETED | OUTPATIENT
Start: 2022-07-20 | End: 2022-07-20

## 2022-07-20 RX ORDER — HEPARIN SODIUM (PORCINE) LOCK FLUSH IV SOLN 100 UNIT/ML 100 UNIT/ML
500 SOLUTION INTRAVENOUS PRN
Status: DISCONTINUED | OUTPATIENT
Start: 2022-07-20 | End: 2022-07-21 | Stop reason: HOSPADM

## 2022-07-20 RX ORDER — SODIUM CHLORIDE 0.9 % (FLUSH) 0.9 %
5-40 SYRINGE (ML) INJECTION PRN
Status: DISCONTINUED | OUTPATIENT
Start: 2022-07-20 | End: 2022-07-21 | Stop reason: HOSPADM

## 2022-07-20 RX ORDER — FAMOTIDINE 10 MG/ML
20 INJECTION, SOLUTION INTRAVENOUS ONCE
Status: COMPLETED | OUTPATIENT
Start: 2022-07-20 | End: 2022-07-20

## 2022-07-20 RX ADMIN — DIPHENHYDRAMINE HYDROCHLORIDE 50 MG: 50 INJECTION INTRAMUSCULAR; INTRAVENOUS at 11:16

## 2022-07-20 RX ADMIN — PACLITAXEL 144 MG: 6 INJECTION, SOLUTION, CONCENTRATE INTRAVENOUS at 12:28

## 2022-07-20 RX ADMIN — SODIUM CHLORIDE, PRESERVATIVE FREE 10 ML: 5 INJECTION INTRAVENOUS at 14:09

## 2022-07-20 RX ADMIN — DEXAMETHASONE SODIUM PHOSPHATE 10 MG: 10 INJECTION INTRAMUSCULAR; INTRAVENOUS at 11:22

## 2022-07-20 RX ADMIN — SODIUM CHLORIDE 20 ML/HR: 9 INJECTION, SOLUTION INTRAVENOUS at 11:20

## 2022-07-20 RX ADMIN — ZOLEDRONIC ACID 4 MG: 4 INJECTION, SOLUTION, CONCENTRATE INTRAVENOUS at 13:43

## 2022-07-20 RX ADMIN — HEPARIN 500 UNITS: 100 SYRINGE at 14:09

## 2022-07-20 RX ADMIN — FAMOTIDINE 20 MG: 10 INJECTION, SOLUTION INTRAVENOUS at 11:16

## 2022-07-22 DIAGNOSIS — N63.20 LEFT BREAST MASS: ICD-10-CM

## 2022-07-22 RX ORDER — HYDROCODONE BITARTRATE AND ACETAMINOPHEN 5; 325 MG/1; MG/1
1 TABLET ORAL EVERY 4 HOURS PRN
Qty: 12 TABLET | Refills: 0 | Status: SHIPPED | OUTPATIENT
Start: 2022-07-22 | End: 2022-07-27 | Stop reason: SDUPTHER

## 2022-07-27 ENCOUNTER — HOSPITAL ENCOUNTER (OUTPATIENT)
Dept: INFUSION THERAPY | Age: 67
Discharge: HOME | End: 2022-07-27
Payer: MEDICARE

## 2022-07-27 ENCOUNTER — OFFICE VISIT (OUTPATIENT)
Dept: ONCOLOGY | Age: 67
End: 2022-07-27
Payer: MEDICARE

## 2022-07-27 VITALS
WEIGHT: 153 LBS | TEMPERATURE: 98 F | OXYGEN SATURATION: 99 % | HEIGHT: 64 IN | BODY MASS INDEX: 26.12 KG/M2 | HEART RATE: 107 BPM | DIASTOLIC BLOOD PRESSURE: 60 MMHG | SYSTOLIC BLOOD PRESSURE: 129 MMHG

## 2022-07-27 VITALS
BODY MASS INDEX: 26.26 KG/M2 | TEMPERATURE: 98 F | OXYGEN SATURATION: 99 % | DIASTOLIC BLOOD PRESSURE: 60 MMHG | HEART RATE: 107 BPM | WEIGHT: 153 LBS | SYSTOLIC BLOOD PRESSURE: 129 MMHG

## 2022-07-27 DIAGNOSIS — Z17.0 MALIGNANT NEOPLASM OF OVERLAPPING SITES OF BOTH BREASTS IN FEMALE, ESTROGEN RECEPTOR POSITIVE (HCC): ICD-10-CM

## 2022-07-27 DIAGNOSIS — C50.811 MALIGNANT NEOPLASM OF OVERLAPPING SITES OF BOTH BREASTS IN FEMALE, ESTROGEN RECEPTOR POSITIVE (HCC): ICD-10-CM

## 2022-07-27 DIAGNOSIS — N63.20 LEFT BREAST MASS: ICD-10-CM

## 2022-07-27 DIAGNOSIS — C50.812 MALIGNANT NEOPLASM OF OVERLAPPING SITES OF BOTH BREASTS IN FEMALE, ESTROGEN RECEPTOR POSITIVE (HCC): Primary | ICD-10-CM

## 2022-07-27 DIAGNOSIS — C50.812 MALIGNANT NEOPLASM OF OVERLAPPING SITES OF BOTH BREASTS IN FEMALE, ESTROGEN RECEPTOR POSITIVE (HCC): ICD-10-CM

## 2022-07-27 DIAGNOSIS — Z17.0 MALIGNANT NEOPLASM OF OVERLAPPING SITES OF BOTH BREASTS IN FEMALE, ESTROGEN RECEPTOR POSITIVE (HCC): Primary | ICD-10-CM

## 2022-07-27 DIAGNOSIS — C50.811 MALIGNANT NEOPLASM OF OVERLAPPING SITES OF BOTH BREASTS IN FEMALE, ESTROGEN RECEPTOR POSITIVE (HCC): Primary | ICD-10-CM

## 2022-07-27 LAB
ALBUMIN SERPL-MCNC: 4.3 GM/DL (ref 3.4–5)
ALP BLD-CCNC: 166 IU/L (ref 40–128)
ALT SERPL-CCNC: 19 U/L (ref 10–40)
ANION GAP SERPL CALCULATED.3IONS-SCNC: 10 MMOL/L (ref 4–16)
AST SERPL-CCNC: 17 IU/L (ref 15–37)
BASOPHILS ABSOLUTE: 0 K/CU MM
BASOPHILS RELATIVE PERCENT: 0.7 % (ref 0–1)
BILIRUB SERPL-MCNC: 0.6 MG/DL (ref 0–1)
BUN BLDV-MCNC: 10 MG/DL (ref 6–23)
CALCIUM SERPL-MCNC: 8.3 MG/DL (ref 8.3–10.6)
CHLORIDE BLD-SCNC: 106 MMOL/L (ref 99–110)
CO2: 24 MMOL/L (ref 21–32)
CREAT SERPL-MCNC: 0.5 MG/DL (ref 0.6–1.1)
DIFFERENTIAL TYPE: ABNORMAL
EOSINOPHILS ABSOLUTE: 0.1 K/CU MM
EOSINOPHILS RELATIVE PERCENT: 3.5 % (ref 0–3)
GFR AFRICAN AMERICAN: >60 ML/MIN/1.73M2
GFR NON-AFRICAN AMERICAN: >60 ML/MIN/1.73M2
GLUCOSE BLD-MCNC: 93 MG/DL (ref 70–99)
HCT VFR BLD CALC: 29.9 % (ref 37–47)
HEMOGLOBIN: 9.5 GM/DL (ref 12.5–16)
LYMPHOCYTES ABSOLUTE: 1.4 K/CU MM
LYMPHOCYTES RELATIVE PERCENT: 49 % (ref 24–44)
MCH RBC QN AUTO: 30.1 PG (ref 27–31)
MCHC RBC AUTO-ENTMCNC: 31.8 % (ref 32–36)
MCV RBC AUTO: 94.6 FL (ref 78–100)
MONOCYTES ABSOLUTE: 0.2 K/CU MM
MONOCYTES RELATIVE PERCENT: 6.6 % (ref 0–4)
PDW BLD-RTO: ABNORMAL % (ref 11.7–14.9)
PLATELET # BLD: 247 K/CU MM (ref 140–440)
PMV BLD AUTO: 8.9 FL (ref 7.5–11.1)
POTASSIUM SERPL-SCNC: 4.5 MMOL/L (ref 3.5–5.1)
RBC # BLD: 3.16 M/CU MM (ref 4.2–5.4)
SEGMENTED NEUTROPHILS ABSOLUTE COUNT: 1.2 K/CU MM
SEGMENTED NEUTROPHILS RELATIVE PERCENT: 40.2 % (ref 36–66)
SODIUM BLD-SCNC: 140 MMOL/L (ref 135–145)
TOTAL PROTEIN: 6.9 GM/DL (ref 6.4–8.2)
WBC # BLD: 2.9 K/CU MM (ref 4–10.5)

## 2022-07-27 PROCEDURE — 96367 TX/PROPH/DG ADDL SEQ IV INF: CPT

## 2022-07-27 PROCEDURE — 6360000002 HC RX W HCPCS: Performed by: INTERNAL MEDICINE

## 2022-07-27 PROCEDURE — 85025 COMPLETE CBC W/AUTO DIFF WBC: CPT

## 2022-07-27 PROCEDURE — 2500000003 HC RX 250 WO HCPCS: Performed by: INTERNAL MEDICINE

## 2022-07-27 PROCEDURE — 96375 TX/PRO/DX INJ NEW DRUG ADDON: CPT

## 2022-07-27 PROCEDURE — 1124F ACP DISCUSS-NO DSCNMKR DOCD: CPT | Performed by: INTERNAL MEDICINE

## 2022-07-27 PROCEDURE — 96413 CHEMO IV INFUSION 1 HR: CPT

## 2022-07-27 PROCEDURE — 80053 COMPREHEN METABOLIC PANEL: CPT

## 2022-07-27 PROCEDURE — 99214 OFFICE O/P EST MOD 30 MIN: CPT | Performed by: INTERNAL MEDICINE

## 2022-07-27 PROCEDURE — 2580000003 HC RX 258: Performed by: INTERNAL MEDICINE

## 2022-07-27 RX ORDER — SODIUM CHLORIDE 9 MG/ML
5-250 INJECTION, SOLUTION INTRAVENOUS PRN
Status: DISCONTINUED | OUTPATIENT
Start: 2022-07-27 | End: 2022-07-28 | Stop reason: HOSPADM

## 2022-07-27 RX ORDER — HYDROCODONE BITARTRATE AND ACETAMINOPHEN 5; 325 MG/1; MG/1
1 TABLET ORAL EVERY 4 HOURS PRN
Qty: 60 TABLET | Refills: 0 | Status: SHIPPED | OUTPATIENT
Start: 2022-07-27 | End: 2022-08-11

## 2022-07-27 RX ORDER — DIPHENHYDRAMINE HYDROCHLORIDE 50 MG/ML
50 INJECTION INTRAMUSCULAR; INTRAVENOUS ONCE
Status: COMPLETED | OUTPATIENT
Start: 2022-07-27 | End: 2022-07-27

## 2022-07-27 RX ORDER — SODIUM CHLORIDE 0.9 % (FLUSH) 0.9 %
5-40 SYRINGE (ML) INJECTION PRN
Status: DISCONTINUED | OUTPATIENT
Start: 2022-07-27 | End: 2022-07-28 | Stop reason: HOSPADM

## 2022-07-27 RX ORDER — LORAZEPAM 0.5 MG/1
0.5 TABLET ORAL NIGHTLY PRN
Qty: 30 TABLET | Refills: 0 | Status: SHIPPED | OUTPATIENT
Start: 2022-07-27 | End: 2022-08-26

## 2022-07-27 RX ORDER — FAMOTIDINE 10 MG/ML
20 INJECTION, SOLUTION INTRAVENOUS ONCE
Status: COMPLETED | OUTPATIENT
Start: 2022-07-27 | End: 2022-07-27

## 2022-07-27 RX ORDER — HEPARIN SODIUM (PORCINE) LOCK FLUSH IV SOLN 100 UNIT/ML 100 UNIT/ML
500 SOLUTION INTRAVENOUS PRN
Status: DISCONTINUED | OUTPATIENT
Start: 2022-07-27 | End: 2022-07-28 | Stop reason: HOSPADM

## 2022-07-27 RX ADMIN — DIPHENHYDRAMINE HYDROCHLORIDE 50 MG: 50 INJECTION INTRAMUSCULAR; INTRAVENOUS at 10:39

## 2022-07-27 RX ADMIN — HEPARIN 500 UNITS: 100 SYRINGE at 12:40

## 2022-07-27 RX ADMIN — SODIUM CHLORIDE 20 ML/HR: 9 INJECTION, SOLUTION INTRAVENOUS at 10:38

## 2022-07-27 RX ADMIN — DEXAMETHASONE SODIUM PHOSPHATE 10 MG: 10 INJECTION INTRAMUSCULAR; INTRAVENOUS at 10:47

## 2022-07-27 RX ADMIN — PACLITAXEL 144 MG: 6 INJECTION, SOLUTION INTRAVENOUS at 11:20

## 2022-07-27 RX ADMIN — FAMOTIDINE 20 MG: 10 INJECTION, SOLUTION INTRAVENOUS at 10:38

## 2022-07-27 RX ADMIN — SODIUM CHLORIDE, PRESERVATIVE FREE 10 ML: 5 INJECTION INTRAVENOUS at 12:40

## 2022-07-27 ASSESSMENT — PAIN DESCRIPTION - LOCATION: LOCATION: BACK

## 2022-07-27 NOTE — PROGRESS NOTES
MA Rooming Questions  Patient: Vic Astudillo  MRN: [id number]    Date: 7/27/2022        1. Do you have any new issues?   no         2. Do you need any refills on medications? yes - hydrocoone 10/325 and lorazapam     3. Have you had any imaging done since your last visit?   no    4. Have you been hospitalized or seen in the emergency room since your last visit here?   no    5. Did the patient have a depression screening completed today?  No    No data recorded     PHQ-9 Given to (if applicable):               PHQ-9 Score (if applicable):                     [] Positive     []  Negative              Does question #9 need addressed (if applicable)                     [] Yes    []  No               Jono Cortez CMA

## 2022-07-27 NOTE — PROGRESS NOTES
Pt. Here for treatment. Mediport accessed without difficulty by [facility] RN, good blood return noted. Lab work drawn as ordered. Labs reviewed with Dr. Ryan Welsh, Dr. Ryan Welsh is okay with proceeding with treatment. Treatment administered without incident. Discharge AVS given. Patient's status assessed and documented appropriately. All labs and required results were also reviewed today. Treatment parameters have been reviewed. Today's treatment has been approved by the provider. Treatment orders and medication sequencing (when applicable) was verified by 2 registered nurses. The treatment plan was confirmed with the patient prior to administration, and the patient understands the need to report any treatment-related symptoms. Prior to administration, when applicable, the following 8 elements of medication administration were reviewed with 2nd Registered Nurse prior to dosing: drug name, drug dose, infusion volume when prepared in a syringe, rate of administration, expiration dates and/or times, appearance and integrity of drug(s), and rate of pump for infusion. The 5 rights of medication administration have been verified.

## 2022-07-27 NOTE — PROGRESS NOTES
Patient Name:  Elvia Collier  Patient :  1955  Patient MRN:  [id number]     Primary Oncologist: Hank Kan MD  Referring Provider: MAURICIO Mccarthy CNP     Date of Service: 2022      Chief Complaint:    Chief Complaint   Patient presents with    Follow-up       Patient's active problem list:       Liver mass       Lytic bone lesions       Left breast mass    HPI:   Rosio Staples is a 74year-old very pleasant female with medical history significant for osteoarthritis, referred to me on 22 for evaluation of her liver lesion and multiple lytic bone lesions. She initially presented to [facility] ER on 22 with severe lower back pain and constipation. Stated that she has been having back pain for about 4 - 6 weeks duration, which becomes severe pain started a week before presentation. CT scan of the abdomen and pelvis done on 2022 showed lesion (6.1 x 8.1 cm) in the hepatic segment 4, concerning for neoplasm. Further evaluation with MRI of the liver is recommended. Extensive lytic metastatic disease in lumbar and thoracic spine and pelvis with most dominant lesion seen at L5 vertebral body. Several small bowel loops segments in the left abdomen demonstrate mild wall thickening, nonspecific may indicate enteritis. Nonspecific partially visualized inflammatory stranding along the pericardium. 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 CT lumbar spine done on 22 showed extensive multifocal lytic lesions concerning for neoplastic/metastatic disease. Age indeterminant compression fractures at T12 and L4, with mild narrowing of the AP diameter of the canal at L4. She never had colonoscopy before. She had pap smear around . She didn't recall when was her last mammogram.     She denies weight loss. She hasn't been eating much for about 10 days since she has been having nausea.      Since she is found to have multiple lytic bone lesions and liver lesion, she was referred to me for further evaluation. Digital diagnostic bilateral mammogram and bilateral ultrasound done on 6/17/2022 showed left breast mass, highly suspicious for malignancy. Right breast mass at 11:00, 1.5 cm. Mass is highly suspicious for malignancy. Left breast skin thickening. CT chest on 6/20/2022 showed suspicious heterogeneous mass primarily centered in segment 4A of the liver measuring 8.8 cm. Left breast mass with left axillary and subpectoral metastatic disease, suspected bony metastases to thoracolumbar spine and sternum and possible left chest wall invasion. Bone scan on 6/20/2022 showed multifocal osseous metastatic disease. T12 compression fracture, concerning for pathologic fracture. MRI abdomen on 6/25/2022 showed biopsy-proven malignancy in the left breast with suspicion for inflammatory carcinoma given skin and trabecular thickening. There may be invasion of the underlying pectoralis musculature. At least 3 heavily meta stasis, the largest measuring up to 8.7 cm with the others 1 cm or less. Likely metastatic portal hepatic, retroperitoneal and right retrocrural lymph nodes. Trace left pleural effusion. Extensive skeletal metastatic disease. She underwent ultrasound-guided biopsy of the right breast mass at 11:00 and the pathology showed invasive carcinoma with ductal and lobular features. Estrogen receptor and progesterone receptor are positive. HER2 by IHC not overexpressed (score 1+). HER2 by FISH-pending. Left breast mass at 12:00 biopsy revealed grade 2 invasive lobular carcinoma. Estrogen receptor by IHC-positive [greater than 95%, average strong intensity], progesterone receptor-positive [approximately 80%, average moderate intensity], HER2 by IHC-not overexpressed [score 1+] and HER2 by FISH-pending. Since she has significant visceral disease, I recommend her to initiate first-line chemotherapy with single agent paclitaxel. It was started on 7/27/22.      On July 27, level is okay and her sleep is good. She doesn't have fever, chills, night sweats, cough, SOB, chest pain, hemoptysis or palpitations. Her bowels and bladder functions are normal. She denies nausea, vomiting, abdominal pain, diarrhea, constipation, dysuria, loss of appetite or weight loss. She doesn't have neuropathy and she denies bleeding or clotting issues. She has cancer related pain in her lower back. Has anxiety. No depression. The rest of the systems are unremarkable. Vital Signs: /60 (Site: Left Upper Arm, Position: Sitting, Cuff Size: Medium Adult)   Pulse (!) 107   Temp 98 °F (36.7 °C) (Temporal)   Ht 5' 4\" (1.626 m)   Wt 153 lb (69.4 kg)   SpO2 99%   BMI 26.26 kg/m²      Physical Exam:  CONSTITUTIONAL: awake, alert, cooperative, no apparent distress   EYES: pupils equal, round and reactive to light, sclera clear, normal conjunctiva  ENT: Normocephalic, without obvious abnormality, atraumatic  NECK: supple, symmetrical, no jugular venous distension, no carotid bruits   HEMATOLOGIC/LYMPHATIC: no cervical, supraclavicular lymphadenopathy. Left axillary lymphadenopathy noted,   LUNGS: VBS, no wheezes, no increased work of breathing, no rhonchi, clear to auscultation, no crackles,    CARDIOVASCULAR: regular rate and rhythm, normal S1 and S2, no murmur noted  ABDOMEN: normal bowel sounds x 4, soft, non-distended, non-tender, no masses palpated, no hepatosplenomegaly   MUSCULOSKELETAL: full range of motion noted, tone is normal  NEUROLOGIC: awake, alert, oriented to name, place and time. Motor skills grossly intact. SKIN: appears intact, normal skin color, normal texture, normal turgor, no jaundice.    EXTREMITIES: no LE edema, no clubbing, no leg swelling, no cyanosis,   BREASTS: Left breast is distorted by tumor, nipple was displaced close to lower breast crease area, right breast nodularity noted,        Labs:  Hematology:  Lab Results   Component Value Date    WBC 2.9 (L) 07/27/2022    RBC 3.16 (L) 07/27/2022    HGB 9.5 (L) 07/27/2022    HCT 29.9 (L) 07/27/2022    MCV 94.6 07/27/2022    MCH 30.1 07/27/2022    MCHC 31.8 (L) 07/27/2022    RDW ADD 07/27/2022     07/27/2022    MPV 8.9 07/27/2022    SEGSPCT 40.2 07/27/2022    EOSRELPCT 3.5 (H) 07/27/2022    BASOPCT 0.7 07/27/2022    LYMPHOPCT 49.0 (H) 07/27/2022    MONOPCT 6.6 (H) 07/27/2022    SEGSABS 1.2 07/27/2022    EOSABS 0.1 07/27/2022    BASOSABS 0.0 07/27/2022    LYMPHSABS 1.4 07/27/2022    MONOSABS 0.2 07/27/2022    DIFFTYPE AUTOMATED DIFFERENTIAL 07/27/2022     No results found for: ESR  Chemistry:  Lab Results   Component Value Date     07/27/2022    K 4.5 07/27/2022     07/27/2022    CO2 24 07/27/2022    BUN 10 07/27/2022    CREATININE 0.5 (L) 07/27/2022    GLUCOSE 93 07/27/2022    CALCIUM 8.3 07/27/2022    PROT 6.9 07/27/2022    LABALBU 4.3 07/27/2022    BILITOT 0.6 07/27/2022    ALKPHOS 166 (H) 07/27/2022    AST 17 07/27/2022    ALT 19 07/27/2022    LABGLOM >60 07/27/2022    GFRAA >60 07/27/2022    MG 2.3 07/19/2022    POCCA 1.21 07/20/2022    POCGLU 103 (H) 07/20/2022     Lab Results   Component Value Date     (H) 06/14/2022     No components found for: LD  No results found for: TSHHS, T4FREE, FT3  Immunology:  Lab Results   Component Value Date    PROT 6.9 07/27/2022    SPEP  06/14/2022     INTERPRETATION - No monoclonal proteins are detected. SAF    SPEP INTERPRETATION - Within normal limits.  SAF 06/14/2022    ALBUMINELP 3.7 06/14/2022    LABALPH 0.4 06/14/2022    LABALPH 1.3 (H) 85/75/3393    LABALPH DUPLICATE ORDER 35/34/4662    LABALPH DUPLICATE ORDER 84/20/5190    LABBETA 1.1 23/97/1096    LABBETA DUPLICATE ORDER 12/33/7604    GAMGLOB 1.1 06/14/2022     No results found for: Anthony Landeros, KLFLCR  No results found for: B2M  Coagulation Panel:  No results found for: PROTIME, INR, APTT, DDIMER  Anemia Panel:  No results found for: ORWETIIJ97, FOLATE  Tumor Markers:  Lab Results   Component Value Date  377 (H) 06/14/2022    CEA 54.5 06/14/2022     25 06/14/2022    LABCA2 110.5 (H) 06/14/2022        Observations:  No data recorded     Assessment   Left breast mass  Liver lesion and multiple bone lytic lesions - concerning for metastatic breast cancer    Plan:  Eliane Velasquez is a 74year-old very pleasant female who initially presented to [facility] ER on 6/12/22 with severe lower back pain and constipation. CT scan of the abdomen and pelvis done on 6/12/2022 showed lesion (6.1 x 8.1 cm) in the hepatic segment 4, concerning for neoplasm. Extensive lytic metastatic disease in lumbar and thoracic spine and pelvis with most dominant lesion seen at L5 vertebral body. CT lumbar spine done on 6/12/22 showed extensive multifocal lytic lesions concerning for neoplastic/metastatic disease. She never had colonoscopy before. She had pap smear around 2020. She didn't recall when was her last mammogram.       Digital diagnostic bilateral mammogram and bilateral ultrasound done on 6/17/2022 showed left breast mass, highly suspicious for malignancy. Right breast mass at 11:00, 1.5 cm. Mass is highly suspicious for malignancy. Left breast skin thickening. CT chest on 6/20/2022 showed suspicious heterogeneous mass primarily centered in segment 4A of the liver measuring 8.8 cm. Left breast mass with left axillary and subpectoral metastatic disease, suspected bony metastases to thoracolumbar spine and sternum and possible left chest wall invasion. Bone scan on 6/20/2022 showed multifocal osseous metastatic disease. T12 compression fracture, concerning for pathologic fracture. MRI abdomen on 6/25/2022 showed biopsy-proven malignancy in the left breast with suspicion for inflammatory carcinoma given skin and trabecular thickening. There may be invasion of the underlying pectoralis musculature. At least 3 heavily meta stasis, the largest measuring up to 8.7 cm with the others 1 cm or less.   Likely panel to look for  mutation. I will also request genetic counseling and testing since she has bilateral metastatic breast cancer. Malignancy related pain - will continue with norco since it has been controlling her pain well. Anxiety and insomnia - on ativan 0.5 mg nightly prn with improvement in her symptom. I answered all her questions and concerns for today. Recent imaging and labs were reviewed and discussed with the patient.

## 2022-08-03 ENCOUNTER — INITIAL CONSULT (OUTPATIENT)
Dept: ONCOLOGY | Age: 67
End: 2022-08-03
Payer: MEDICARE

## 2022-08-03 ENCOUNTER — TELEPHONE (OUTPATIENT)
Dept: ONCOLOGY | Age: 67
End: 2022-08-03

## 2022-08-03 ENCOUNTER — HOSPITAL ENCOUNTER (OUTPATIENT)
Dept: INFUSION THERAPY | Age: 67
Discharge: HOME | End: 2022-08-03
Payer: MEDICARE

## 2022-08-03 VITALS
WEIGHT: 154.4 LBS | TEMPERATURE: 97 F | HEART RATE: 94 BPM | DIASTOLIC BLOOD PRESSURE: 65 MMHG | HEIGHT: 64 IN | SYSTOLIC BLOOD PRESSURE: 141 MMHG | BODY MASS INDEX: 26.36 KG/M2 | RESPIRATION RATE: 16 BRPM | OXYGEN SATURATION: 94 %

## 2022-08-03 DIAGNOSIS — Z17.0 MALIGNANT NEOPLASM OF OVERLAPPING SITES OF BOTH BREASTS IN FEMALE, ESTROGEN RECEPTOR POSITIVE (HCC): Primary | ICD-10-CM

## 2022-08-03 DIAGNOSIS — R11.2 CHEMOTHERAPY INDUCED NAUSEA AND VOMITING: ICD-10-CM

## 2022-08-03 DIAGNOSIS — C50.812 MALIGNANT NEOPLASM OF OVERLAPPING SITES OF BOTH BREASTS IN FEMALE, ESTROGEN RECEPTOR POSITIVE (HCC): Primary | ICD-10-CM

## 2022-08-03 DIAGNOSIS — C79.51 BONE METASTASIS (HCC): ICD-10-CM

## 2022-08-03 DIAGNOSIS — C50.811 MALIGNANT NEOPLASM OF OVERLAPPING SITES OF BOTH BREASTS IN FEMALE, ESTROGEN RECEPTOR POSITIVE (HCC): Primary | ICD-10-CM

## 2022-08-03 DIAGNOSIS — T45.1X5A CHEMOTHERAPY INDUCED NAUSEA AND VOMITING: ICD-10-CM

## 2022-08-03 LAB
ALBUMIN SERPL-MCNC: 4.4 GM/DL (ref 3.4–5)
ALP BLD-CCNC: 149 IU/L (ref 40–129)
ALT SERPL-CCNC: 15 U/L (ref 10–40)
ANION GAP SERPL CALCULATED.3IONS-SCNC: 9 MMOL/L (ref 4–16)
AST SERPL-CCNC: 16 IU/L (ref 15–37)
BASOPHILS ABSOLUTE: 0 K/CU MM
BASOPHILS RELATIVE PERCENT: 0.6 % (ref 0–1)
BILIRUB SERPL-MCNC: 0.5 MG/DL (ref 0–1)
BUN BLDV-MCNC: 15 MG/DL (ref 6–23)
CALCIUM SERPL-MCNC: 8.3 MG/DL (ref 8.3–10.6)
CHLORIDE BLD-SCNC: 107 MMOL/L (ref 99–110)
CO2: 24 MMOL/L (ref 21–32)
CREAT SERPL-MCNC: 0.3 MG/DL (ref 0.6–1.1)
DIFFERENTIAL TYPE: ABNORMAL
EOSINOPHILS ABSOLUTE: 0.1 K/CU MM
EOSINOPHILS RELATIVE PERCENT: 1.9 % (ref 0–3)
GFR AFRICAN AMERICAN: >60 ML/MIN/1.73M2
GFR NON-AFRICAN AMERICAN: >60 ML/MIN/1.73M2
GLUCOSE BLD-MCNC: 87 MG/DL (ref 70–99)
HCT VFR BLD CALC: 28.3 % (ref 37–47)
HEMOGLOBIN: 9.1 GM/DL (ref 12.5–16)
LYMPHOCYTES ABSOLUTE: 2 K/CU MM
LYMPHOCYTES RELATIVE PERCENT: 63.4 % (ref 24–44)
MCH RBC QN AUTO: 30.5 PG (ref 27–31)
MCHC RBC AUTO-ENTMCNC: 32.2 % (ref 32–36)
MCV RBC AUTO: 95 FL (ref 78–100)
MONOCYTES ABSOLUTE: 0.2 K/CU MM
MONOCYTES RELATIVE PERCENT: 6.5 % (ref 0–4)
PDW BLD-RTO: 15.8 % (ref 11.7–14.9)
PLATELET # BLD: 269 K/CU MM (ref 140–440)
PMV BLD AUTO: 8.6 FL (ref 7.5–11.1)
POTASSIUM SERPL-SCNC: 4 MMOL/L (ref 3.5–5.1)
RBC # BLD: 2.98 M/CU MM (ref 4.2–5.4)
SEGMENTED NEUTROPHILS ABSOLUTE COUNT: 0.9 K/CU MM
SEGMENTED NEUTROPHILS RELATIVE PERCENT: 27.6 % (ref 36–66)
SODIUM BLD-SCNC: 140 MMOL/L (ref 135–145)
TOTAL PROTEIN: 6.6 GM/DL (ref 6.4–8.2)
WBC # BLD: 3.1 K/CU MM (ref 4–10.5)

## 2022-08-03 PROCEDURE — 85025 COMPLETE CBC W/AUTO DIFF WBC: CPT

## 2022-08-03 PROCEDURE — 2580000003 HC RX 258: Performed by: INTERNAL MEDICINE

## 2022-08-03 PROCEDURE — 80053 COMPREHEN METABOLIC PANEL: CPT

## 2022-08-03 PROCEDURE — 96523 IRRIG DRUG DELIVERY DEVICE: CPT

## 2022-08-03 PROCEDURE — 99402 PREV MED CNSL INDIV APPRX 30: CPT | Performed by: INTERNAL MEDICINE

## 2022-08-03 PROCEDURE — 6360000002 HC RX W HCPCS: Performed by: INTERNAL MEDICINE

## 2022-08-03 RX ORDER — SODIUM CHLORIDE 0.9 % (FLUSH) 0.9 %
5-40 SYRINGE (ML) INJECTION PRN
OUTPATIENT
Start: 2022-08-03

## 2022-08-03 RX ORDER — SODIUM CHLORIDE 0.9 % (FLUSH) 0.9 %
5-40 SYRINGE (ML) INJECTION PRN
Status: DISCONTINUED | OUTPATIENT
Start: 2022-08-03 | End: 2022-08-04 | Stop reason: HOSPADM

## 2022-08-03 RX ORDER — HEPARIN SODIUM (PORCINE) LOCK FLUSH IV SOLN 100 UNIT/ML 100 UNIT/ML
500 SOLUTION INTRAVENOUS PRN
Status: DISCONTINUED | OUTPATIENT
Start: 2022-08-03 | End: 2022-08-04 | Stop reason: HOSPADM

## 2022-08-03 RX ORDER — HEPARIN SODIUM (PORCINE) LOCK FLUSH IV SOLN 100 UNIT/ML 100 UNIT/ML
500 SOLUTION INTRAVENOUS PRN
OUTPATIENT
Start: 2022-08-03

## 2022-08-03 RX ORDER — SODIUM CHLORIDE 9 MG/ML
25 INJECTION, SOLUTION INTRAVENOUS PRN
OUTPATIENT
Start: 2022-08-03

## 2022-08-03 RX ADMIN — HEPARIN 500 UNITS: 100 SYRINGE at 10:17

## 2022-08-03 RX ADMIN — SODIUM CHLORIDE, PRESERVATIVE FREE 10 ML: 5 INJECTION INTRAVENOUS at 10:17

## 2022-08-03 ASSESSMENT — PATIENT HEALTH QUESTIONNAIRE - PHQ9
10. IF YOU CHECKED OFF ANY PROBLEMS, HOW DIFFICULT HAVE THESE PROBLEMS MADE IT FOR YOU TO DO YOUR WORK, TAKE CARE OF THINGS AT HOME, OR GET ALONG WITH OTHER PEOPLE: 0
9. THOUGHTS THAT YOU WOULD BE BETTER OFF DEAD, OR OF HURTING YOURSELF: 0
SUM OF ALL RESPONSES TO PHQ9 QUESTIONS 1 & 2: 0
SUM OF ALL RESPONSES TO PHQ QUESTIONS 1-9: 4
SUM OF ALL RESPONSES TO PHQ QUESTIONS 1-9: 4
3. TROUBLE FALLING OR STAYING ASLEEP: 3
8. MOVING OR SPEAKING SO SLOWLY THAT OTHER PEOPLE COULD HAVE NOTICED. OR THE OPPOSITE, BEING SO FIGETY OR RESTLESS THAT YOU HAVE BEEN MOVING AROUND A LOT MORE THAN USUAL: 0
6. FEELING BAD ABOUT YOURSELF - OR THAT YOU ARE A FAILURE OR HAVE LET YOURSELF OR YOUR FAMILY DOWN: 0
2. FEELING DOWN, DEPRESSED OR HOPELESS: 0
4. FEELING TIRED OR HAVING LITTLE ENERGY: 1
SUM OF ALL RESPONSES TO PHQ QUESTIONS 1-9: 4
7. TROUBLE CONCENTRATING ON THINGS, SUCH AS READING THE NEWSPAPER OR WATCHING TELEVISION: 0
1. LITTLE INTEREST OR PLEASURE IN DOING THINGS: 0
5. POOR APPETITE OR OVEREATING: 0
SUM OF ALL RESPONSES TO PHQ QUESTIONS 1-9: 4

## 2022-08-03 NOTE — TELEPHONE ENCOUNTER
Called to review Negative Genetic testing results through [facility]. Patient verbally understood. Copy of report to be scanned to chart and mailed a copy to patient  Encouraged to call with any questions or concerns.

## 2022-08-03 NOTE — PROGRESS NOTES
MA Rooming Questions  Patient: Vic Astudillo  MRN: [id number]    Date: 8/3/2022        1. Do you have any new issues? yes - Not sleeping         2. Do you need any refills on medications?    no    3. Have you had any imaging done since your last visit?   no    4. Have you been hospitalized or seen in the emergency room since your last visit here?   no    5. Did the patient have a depression screening completed today?  Yes    No data recorded     PHQ-9 Given to (if applicable):               PHQ-9 Score (if applicable):                     [] Positive     []  Negative              Does question #9 need addressed (if applicable)                     [] Yes    []  No               Vernell Evans CMA

## 2022-08-03 NOTE — PROGRESS NOTES
Palliative Care Initial Assessment    Medical Oncology History:    June 22 who was evaluated for severe back pain. CT abdomen and pelvis June 12, 2022 8 cm mass involving liver, extensive lytic metastatic lesions involving the lumbar and thoracic spine and pelvis. Mammogram June 17, 2022 right breast mass. CT chest June 20, 2022 size liver lesion breast mass also noted axillary and subpectoral metastatic disease. She also underwent bone scan and MRI of abdomen and finally biopsy of the right breast pathology revealing invasive ductal and lobular carcinoma, positive estrogen and progesterone receptors. Because of the visceral disease started on single agent paclitaxel on July 20, 2022  Other Medical Problems:   Basically healthy except for osteoarthritis and having her hip replacement about 2 years prior to this diagnosis     Personal History     A. Life Time:   Moved to [place] in early age from [place]. Went to school here including college at [employer] and after graduation came to [place]. 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  worked at [employer] retiring recently also. Between them to have her 3 daughters 1 living in [place] another 1 in [address] [id number] and third 1 in [place]. She is very close to her daughters and spends a lot of time with her daughters in [place] and [place] enjoying the grandchildren. Negative for history of smoking or ethanol intake or other usage. B. Family:   As above    Physical Symptoms:   Currently in general feeling fair pain is not a major issue anymore occasionally has to take half a tablet of Norco she also has tolerated first 2 injections of paclitaxel quite well. No significant nausea or vomiting. Major problem has been not been able to relax and been able to sleep more so on the days of use of dexamethasone.   Otherwise appetite is good and she has been able to do few of her day-to-day activities quite well. Psychological and Cognitive Symptoms:   Denies any issues in this regard  Illness Understanding and Care Preferences: Has very good understanding about the disease process and so far is very pleased with the care she has been receiving. Existential and Spiritual:   Not a regular Buddhism person. Tries to do good by other people  Social and Economic Resources:   Denies any issues in this regard  Care Coordination:   August 3, 2022 spent time with her, reviewing her personal and medical history. So far she has been tolerating at least early on treatments extremely well. Most of her symptoms are extremely well managed including pain. Major issue bothering her has not been able to relax and sleep at night and she has tried sleeping aids without much help and the question of cannabis was raised. I think that would be reasonable. We will try helping her in that regards. Otherwise at this stage as mentioned above most of her symptoms are well managed and I will be seeing her again in few months.   Time spent 30 minutes    Cleave MD [place]

## 2022-08-03 NOTE — PROGRESS NOTES
Patient arrived to treatment suite for blood draw, pre-medication, and chemotherapy infusion. Mediport on RT chest accessed. Good blood return noted, labs drawn and sent to lab for processing. ANC: 0.9, WBC 3.1. Discussed labs with Dr. Tk Carter who recommended holding Tx x 1 week. Pt verbalized understanding. Mediport flushed with 10 cc's normal saline, Heparin locked and de accessed. Pt.  Left treatment suite ambulatory.   Discharge instructions provided

## 2022-08-08 ENCOUNTER — CLINICAL DOCUMENTATION (OUTPATIENT)
Dept: ONCOLOGY | Age: 67
End: 2022-08-08

## 2022-08-10 ENCOUNTER — OFFICE VISIT (OUTPATIENT)
Dept: ONCOLOGY | Age: 67
End: 2022-08-10
Payer: MEDICARE

## 2022-08-10 ENCOUNTER — HOSPITAL ENCOUNTER (OUTPATIENT)
Dept: INFUSION THERAPY | Age: 67
Discharge: HOME | End: 2022-08-10
Payer: MEDICARE

## 2022-08-10 VITALS
SYSTOLIC BLOOD PRESSURE: 136 MMHG | WEIGHT: 155 LBS | DIASTOLIC BLOOD PRESSURE: 63 MMHG | BODY MASS INDEX: 26.46 KG/M2 | OXYGEN SATURATION: 99 % | HEART RATE: 87 BPM | HEIGHT: 64 IN | TEMPERATURE: 96.8 F

## 2022-08-10 VITALS
WEIGHT: 155 LBS | DIASTOLIC BLOOD PRESSURE: 63 MMHG | OXYGEN SATURATION: 99 % | HEART RATE: 87 BPM | HEIGHT: 64 IN | RESPIRATION RATE: 16 BRPM | TEMPERATURE: 96.8 F | BODY MASS INDEX: 26.46 KG/M2 | SYSTOLIC BLOOD PRESSURE: 136 MMHG

## 2022-08-10 DIAGNOSIS — C50.812 MALIGNANT NEOPLASM OF OVERLAPPING SITES OF BOTH BREASTS IN FEMALE, ESTROGEN RECEPTOR POSITIVE (HCC): Primary | ICD-10-CM

## 2022-08-10 DIAGNOSIS — C50.811 MALIGNANT NEOPLASM OF OVERLAPPING SITES OF BOTH BREASTS IN FEMALE, ESTROGEN RECEPTOR POSITIVE (HCC): Primary | ICD-10-CM

## 2022-08-10 DIAGNOSIS — Z17.0 MALIGNANT NEOPLASM OF OVERLAPPING SITES OF BOTH BREASTS IN FEMALE, ESTROGEN RECEPTOR POSITIVE (HCC): Primary | ICD-10-CM

## 2022-08-10 LAB
BASOPHILS ABSOLUTE: 0 K/CU MM
BASOPHILS RELATIVE PERCENT: 0.6 % (ref 0–1)
DIFFERENTIAL TYPE: ABNORMAL
EOSINOPHILS ABSOLUTE: 0.1 K/CU MM
EOSINOPHILS RELATIVE PERCENT: 1.5 % (ref 0–3)
HCT VFR BLD CALC: 28.6 % (ref 37–47)
HEMOGLOBIN: 9.1 GM/DL (ref 12.5–16)
LYMPHOCYTES ABSOLUTE: 1.5 K/CU MM
LYMPHOCYTES RELATIVE PERCENT: 45.4 % (ref 24–44)
MCH RBC QN AUTO: 30.7 PG (ref 27–31)
MCHC RBC AUTO-ENTMCNC: 31.8 % (ref 32–36)
MCV RBC AUTO: 96.6 FL (ref 78–100)
MONOCYTES ABSOLUTE: 0.3 K/CU MM
MONOCYTES RELATIVE PERCENT: 9 % (ref 0–4)
PDW BLD-RTO: 16.7 % (ref 11.7–14.9)
PLATELET # BLD: 266 K/CU MM (ref 140–440)
PMV BLD AUTO: 8.3 FL (ref 7.5–11.1)
RBC # BLD: 2.96 M/CU MM (ref 4.2–5.4)
SEGMENTED NEUTROPHILS ABSOLUTE COUNT: 1.4 K/CU MM
SEGMENTED NEUTROPHILS RELATIVE PERCENT: 43.5 % (ref 36–66)
WBC # BLD: 3.2 K/CU MM (ref 4–10.5)

## 2022-08-10 PROCEDURE — 96413 CHEMO IV INFUSION 1 HR: CPT

## 2022-08-10 PROCEDURE — 2580000003 HC RX 258: Performed by: INTERNAL MEDICINE

## 2022-08-10 PROCEDURE — 96367 TX/PROPH/DG ADDL SEQ IV INF: CPT

## 2022-08-10 PROCEDURE — 1124F ACP DISCUSS-NO DSCNMKR DOCD: CPT | Performed by: INTERNAL MEDICINE

## 2022-08-10 PROCEDURE — 36591 DRAW BLOOD OFF VENOUS DEVICE: CPT

## 2022-08-10 PROCEDURE — 99214 OFFICE O/P EST MOD 30 MIN: CPT | Performed by: INTERNAL MEDICINE

## 2022-08-10 PROCEDURE — 85025 COMPLETE CBC W/AUTO DIFF WBC: CPT

## 2022-08-10 PROCEDURE — 2500000003 HC RX 250 WO HCPCS: Performed by: INTERNAL MEDICINE

## 2022-08-10 PROCEDURE — 96375 TX/PRO/DX INJ NEW DRUG ADDON: CPT

## 2022-08-10 PROCEDURE — 6360000002 HC RX W HCPCS: Performed by: INTERNAL MEDICINE

## 2022-08-10 RX ORDER — SODIUM CHLORIDE 0.9 % (FLUSH) 0.9 %
5-40 SYRINGE (ML) INJECTION PRN
Status: DISCONTINUED | OUTPATIENT
Start: 2022-08-10 | End: 2022-08-11 | Stop reason: HOSPADM

## 2022-08-10 RX ORDER — TRAZODONE HYDROCHLORIDE 50 MG/1
TABLET ORAL
Qty: 30 TABLET | Refills: 0 | Status: SHIPPED | OUTPATIENT
Start: 2022-08-10 | End: 2022-09-21

## 2022-08-10 RX ORDER — DIPHENHYDRAMINE HYDROCHLORIDE 50 MG/ML
50 INJECTION INTRAMUSCULAR; INTRAVENOUS ONCE
Status: COMPLETED | OUTPATIENT
Start: 2022-08-10 | End: 2022-08-10

## 2022-08-10 RX ORDER — FAMOTIDINE 10 MG/ML
20 INJECTION, SOLUTION INTRAVENOUS ONCE
Status: COMPLETED | OUTPATIENT
Start: 2022-08-10 | End: 2022-08-10

## 2022-08-10 RX ORDER — SODIUM CHLORIDE 9 MG/ML
5-250 INJECTION, SOLUTION INTRAVENOUS PRN
Status: DISCONTINUED | OUTPATIENT
Start: 2022-08-10 | End: 2022-08-11 | Stop reason: HOSPADM

## 2022-08-10 RX ORDER — HEPARIN SODIUM (PORCINE) LOCK FLUSH IV SOLN 100 UNIT/ML 100 UNIT/ML
500 SOLUTION INTRAVENOUS PRN
Status: DISCONTINUED | OUTPATIENT
Start: 2022-08-10 | End: 2022-08-11 | Stop reason: HOSPADM

## 2022-08-10 RX ADMIN — HEPARIN 500 UNITS: 100 SYRINGE at 13:18

## 2022-08-10 RX ADMIN — FAMOTIDINE 20 MG: 10 INJECTION, SOLUTION INTRAVENOUS at 10:54

## 2022-08-10 RX ADMIN — PACLITAXEL 144 MG: 6 INJECTION, SOLUTION, CONCENTRATE INTRAVENOUS at 11:50

## 2022-08-10 RX ADMIN — SODIUM CHLORIDE 20 ML/HR: 9 INJECTION, SOLUTION INTRAVENOUS at 10:55

## 2022-08-10 RX ADMIN — DEXAMETHASONE SODIUM PHOSPHATE 10 MG: 10 INJECTION INTRAMUSCULAR; INTRAVENOUS at 10:55

## 2022-08-10 RX ADMIN — SODIUM CHLORIDE, PRESERVATIVE FREE 10 ML: 5 INJECTION INTRAVENOUS at 13:18

## 2022-08-10 RX ADMIN — DIPHENHYDRAMINE HYDROCHLORIDE 50 MG: 50 INJECTION INTRAMUSCULAR; INTRAVENOUS at 10:54

## 2022-08-10 NOTE — PROGRESS NOTES
MA Rooming Questions  Patient: Supriya Chowdhury  MRN: [id number]    Date: 8/10/2022        1. Do you have any new issues?   no         2. Do you need any refills on medications?    no    3. Have you had any imaging done since your last visit?   no    4. Have you been hospitalized or seen in the emergency room since your last visit here?   no    5. Did the patient have a depression screening completed today?  No    No data recorded     PHQ-9 Given to (if applicable):               PHQ-9 Score (if applicable):                     [] Positive     []  Negative              Does question #9 need addressed (if applicable)                     [] Yes    []  No               Mirza Teixeira CMA

## 2022-08-10 NOTE — PROGRESS NOTES
Patient arrived to treatment suite for blood draw, pre-medications and chemotherapy infusion. Right chest mediport accessed and blood drawn from site and sent to lab for processing. Patient states no questions or concerns for the doctor at this time, and is hopeful that she can have treatment this week (did not have last week d/t low ANC). Treatment approved and given. Patient tolerated well. Left treatment suite ambulatory. Discharge instructions provided. Patient's status assessed and documented appropriately. All labs and required results were also reviewed today. Treatment parameters have been reviewed. Today's treatment has been approved by the provider. Treatment orders and medication sequencing (when applicable) was verified by 2 registered nurses. The treatment plan was confirmed with the patient prior to administration, and the patient understands the need to report any treatment-related symptoms. Prior to administration, when applicable, the following 8 elements of medication administration were reviewed with 2nd Registered Nurse prior to dosing: drug name, drug dose, infusion volume when prepared in a syringe, rate of administration, expiration dates and/or times, appearance and integrity of drug(s), and rate of pump for infusion. The 5 rights of medication administration have been verified.

## 2022-08-24 ENCOUNTER — HOSPITAL ENCOUNTER (OUTPATIENT)
Dept: INFUSION THERAPY | Age: 67
Discharge: HOME | End: 2022-08-24
Payer: MEDICARE

## 2022-08-24 VITALS
BODY MASS INDEX: 26.36 KG/M2 | OXYGEN SATURATION: 98 % | SYSTOLIC BLOOD PRESSURE: 131 MMHG | WEIGHT: 154.4 LBS | HEIGHT: 64 IN | DIASTOLIC BLOOD PRESSURE: 78 MMHG | HEART RATE: 90 BPM | TEMPERATURE: 97 F

## 2022-08-24 DIAGNOSIS — Z17.0 MALIGNANT NEOPLASM OF OVERLAPPING SITES OF BOTH BREASTS IN FEMALE, ESTROGEN RECEPTOR POSITIVE (HCC): Primary | ICD-10-CM

## 2022-08-24 DIAGNOSIS — C50.812 MALIGNANT NEOPLASM OF OVERLAPPING SITES OF BOTH BREASTS IN FEMALE, ESTROGEN RECEPTOR POSITIVE (HCC): Primary | ICD-10-CM

## 2022-08-24 DIAGNOSIS — C50.811 MALIGNANT NEOPLASM OF OVERLAPPING SITES OF BOTH BREASTS IN FEMALE, ESTROGEN RECEPTOR POSITIVE (HCC): Primary | ICD-10-CM

## 2022-08-24 LAB
ALBUMIN SERPL-MCNC: 4.4 GM/DL (ref 3.4–5)
ALP BLD-CCNC: 159 IU/L (ref 40–128)
ALT SERPL-CCNC: 32 U/L (ref 10–40)
ANION GAP SERPL CALCULATED.3IONS-SCNC: 8 MMOL/L (ref 4–16)
AST SERPL-CCNC: 27 IU/L (ref 15–37)
BASOPHILS ABSOLUTE: 0 K/CU MM
BASOPHILS RELATIVE PERCENT: 0.5 % (ref 0–1)
BILIRUB SERPL-MCNC: 0.5 MG/DL (ref 0–1)
BUN BLDV-MCNC: 15 MG/DL (ref 6–23)
CALCIUM SERPL-MCNC: 8.7 MG/DL (ref 8.3–10.6)
CHLORIDE BLD-SCNC: 108 MMOL/L (ref 99–110)
CO2: 24 MMOL/L (ref 21–32)
CREAT SERPL-MCNC: 0.4 MG/DL (ref 0.6–1.1)
DIFFERENTIAL TYPE: ABNORMAL
EOSINOPHILS ABSOLUTE: 0.1 K/CU MM
EOSINOPHILS RELATIVE PERCENT: 2.4 % (ref 0–3)
GFR AFRICAN AMERICAN: >60 ML/MIN/1.73M2
GFR NON-AFRICAN AMERICAN: >60 ML/MIN/1.73M2
GLUCOSE BLD-MCNC: 82 MG/DL (ref 70–99)
HCT VFR BLD CALC: 29.6 % (ref 37–47)
HEMOGLOBIN: 9.4 GM/DL (ref 12.5–16)
LYMPHOCYTES ABSOLUTE: 1.6 K/CU MM
LYMPHOCYTES RELATIVE PERCENT: 38.4 % (ref 24–44)
MCH RBC QN AUTO: 30.7 PG (ref 27–31)
MCHC RBC AUTO-ENTMCNC: 31.8 % (ref 32–36)
MCV RBC AUTO: 96.7 FL (ref 78–100)
MONOCYTES ABSOLUTE: 0.4 K/CU MM
MONOCYTES RELATIVE PERCENT: 9 % (ref 0–4)
PDW BLD-RTO: 16.5 % (ref 11.7–14.9)
PLATELET # BLD: 261 K/CU MM (ref 140–440)
PMV BLD AUTO: 8.3 FL (ref 7.5–11.1)
POTASSIUM SERPL-SCNC: 4 MMOL/L (ref 3.5–5.1)
RBC # BLD: 3.06 M/CU MM (ref 4.2–5.4)
SEGMENTED NEUTROPHILS ABSOLUTE COUNT: 2.1 K/CU MM
SEGMENTED NEUTROPHILS RELATIVE PERCENT: 49.7 % (ref 36–66)
SODIUM BLD-SCNC: 140 MMOL/L (ref 135–145)
TOTAL PROTEIN: 6.3 GM/DL (ref 6.4–8.2)
WBC # BLD: 4.2 K/CU MM (ref 4–10.5)

## 2022-08-24 PROCEDURE — 96375 TX/PRO/DX INJ NEW DRUG ADDON: CPT

## 2022-08-24 PROCEDURE — 96413 CHEMO IV INFUSION 1 HR: CPT

## 2022-08-24 PROCEDURE — 85025 COMPLETE CBC W/AUTO DIFF WBC: CPT

## 2022-08-24 PROCEDURE — 2580000003 HC RX 258: Performed by: INTERNAL MEDICINE

## 2022-08-24 PROCEDURE — 6360000002 HC RX W HCPCS: Performed by: INTERNAL MEDICINE

## 2022-08-24 PROCEDURE — 2500000003 HC RX 250 WO HCPCS: Performed by: INTERNAL MEDICINE

## 2022-08-24 PROCEDURE — 80053 COMPREHEN METABOLIC PANEL: CPT

## 2022-08-24 RX ORDER — SODIUM CHLORIDE 9 MG/ML
5-40 INJECTION INTRAVENOUS PRN
Status: CANCELLED | OUTPATIENT
Start: 2022-09-21

## 2022-08-24 RX ORDER — DIPHENHYDRAMINE HYDROCHLORIDE 50 MG/ML
50 INJECTION INTRAMUSCULAR; INTRAVENOUS ONCE
Status: CANCELLED | OUTPATIENT
Start: 2022-09-28 | End: 2022-09-28

## 2022-08-24 RX ORDER — SODIUM CHLORIDE 9 MG/ML
5-250 INJECTION, SOLUTION INTRAVENOUS PRN
Status: DISCONTINUED | OUTPATIENT
Start: 2022-08-24 | End: 2022-08-25 | Stop reason: HOSPADM

## 2022-08-24 RX ORDER — SODIUM CHLORIDE 9 MG/ML
5-40 INJECTION INTRAVENOUS PRN
Status: CANCELLED | OUTPATIENT
Start: 2022-09-28

## 2022-08-24 RX ORDER — FAMOTIDINE 10 MG/ML
20 INJECTION, SOLUTION INTRAVENOUS
Status: CANCELLED | OUTPATIENT
Start: 2022-09-21

## 2022-08-24 RX ORDER — SODIUM CHLORIDE 0.9 % (FLUSH) 0.9 %
5-40 SYRINGE (ML) INJECTION PRN
Status: CANCELLED | OUTPATIENT
Start: 2022-08-31

## 2022-08-24 RX ORDER — ACETAMINOPHEN 325 MG/1
650 TABLET ORAL
Status: CANCELLED | OUTPATIENT
Start: 2022-08-31

## 2022-08-24 RX ORDER — SODIUM CHLORIDE 9 MG/ML
5-250 INJECTION, SOLUTION INTRAVENOUS PRN
Status: CANCELLED | OUTPATIENT
Start: 2022-09-07

## 2022-08-24 RX ORDER — ONDANSETRON 2 MG/ML
8 INJECTION INTRAMUSCULAR; INTRAVENOUS
Status: CANCELLED | OUTPATIENT
Start: 2022-08-24

## 2022-08-24 RX ORDER — DIPHENHYDRAMINE HYDROCHLORIDE 50 MG/ML
50 INJECTION INTRAMUSCULAR; INTRAVENOUS
Status: CANCELLED | OUTPATIENT
Start: 2022-10-05

## 2022-08-24 RX ORDER — DIPHENHYDRAMINE HYDROCHLORIDE 50 MG/ML
50 INJECTION INTRAMUSCULAR; INTRAVENOUS
Status: CANCELLED | OUTPATIENT
Start: 2022-08-31

## 2022-08-24 RX ORDER — ACETAMINOPHEN 325 MG/1
650 TABLET ORAL
Status: CANCELLED | OUTPATIENT
Start: 2022-09-28

## 2022-08-24 RX ORDER — SODIUM CHLORIDE 9 MG/ML
5-250 INJECTION, SOLUTION INTRAVENOUS PRN
Status: CANCELLED | OUTPATIENT
Start: 2022-10-05

## 2022-08-24 RX ORDER — FAMOTIDINE 10 MG/ML
20 INJECTION, SOLUTION INTRAVENOUS ONCE
Status: COMPLETED | OUTPATIENT
Start: 2022-08-24 | End: 2022-08-24

## 2022-08-24 RX ORDER — ONDANSETRON 2 MG/ML
8 INJECTION INTRAMUSCULAR; INTRAVENOUS
Status: CANCELLED | OUTPATIENT
Start: 2022-09-21

## 2022-08-24 RX ORDER — EPINEPHRINE 1 MG/ML
0.3 INJECTION, SOLUTION, CONCENTRATE INTRAVENOUS PRN
Status: CANCELLED | OUTPATIENT
Start: 2022-09-21

## 2022-08-24 RX ORDER — HEPARIN SODIUM (PORCINE) LOCK FLUSH IV SOLN 100 UNIT/ML 100 UNIT/ML
500 SOLUTION INTRAVENOUS PRN
Status: CANCELLED | OUTPATIENT
Start: 2022-09-07

## 2022-08-24 RX ORDER — EPINEPHRINE 1 MG/ML
0.3 INJECTION, SOLUTION, CONCENTRATE INTRAVENOUS PRN
Status: CANCELLED | OUTPATIENT
Start: 2022-08-31

## 2022-08-24 RX ORDER — DIPHENHYDRAMINE HYDROCHLORIDE 50 MG/ML
50 INJECTION INTRAMUSCULAR; INTRAVENOUS ONCE
Status: CANCELLED | OUTPATIENT
Start: 2022-10-05 | End: 2022-10-05

## 2022-08-24 RX ORDER — ALBUTEROL SULFATE 90 UG/1
4 AEROSOL, METERED RESPIRATORY (INHALATION) PRN
Status: CANCELLED | OUTPATIENT
Start: 2022-10-05

## 2022-08-24 RX ORDER — SODIUM CHLORIDE 9 MG/ML
INJECTION, SOLUTION INTRAVENOUS CONTINUOUS
Status: CANCELLED | OUTPATIENT
Start: 2022-09-28

## 2022-08-24 RX ORDER — FAMOTIDINE 10 MG/ML
20 INJECTION, SOLUTION INTRAVENOUS
Status: CANCELLED | OUTPATIENT
Start: 2022-08-31

## 2022-08-24 RX ORDER — ACETAMINOPHEN 325 MG/1
650 TABLET ORAL
Status: CANCELLED | OUTPATIENT
Start: 2022-08-24

## 2022-08-24 RX ORDER — DIPHENHYDRAMINE HYDROCHLORIDE 50 MG/ML
50 INJECTION INTRAMUSCULAR; INTRAVENOUS ONCE
Status: COMPLETED | OUTPATIENT
Start: 2022-08-24 | End: 2022-08-24

## 2022-08-24 RX ORDER — FAMOTIDINE 10 MG/ML
20 INJECTION, SOLUTION INTRAVENOUS ONCE
Status: CANCELLED | OUTPATIENT
Start: 2022-09-07 | End: 2022-09-07

## 2022-08-24 RX ORDER — SODIUM CHLORIDE 9 MG/ML
5-250 INJECTION, SOLUTION INTRAVENOUS PRN
Status: CANCELLED | OUTPATIENT
Start: 2022-09-21

## 2022-08-24 RX ORDER — MEPERIDINE HYDROCHLORIDE 50 MG/ML
12.5 INJECTION INTRAMUSCULAR; INTRAVENOUS; SUBCUTANEOUS PRN
Status: CANCELLED | OUTPATIENT
Start: 2022-09-07

## 2022-08-24 RX ORDER — HEPARIN SODIUM (PORCINE) LOCK FLUSH IV SOLN 100 UNIT/ML 100 UNIT/ML
500 SOLUTION INTRAVENOUS PRN
Status: CANCELLED | OUTPATIENT
Start: 2022-09-28

## 2022-08-24 RX ORDER — ONDANSETRON 2 MG/ML
8 INJECTION INTRAMUSCULAR; INTRAVENOUS
Status: CANCELLED | OUTPATIENT
Start: 2022-10-05

## 2022-08-24 RX ORDER — SODIUM CHLORIDE 9 MG/ML
5-250 INJECTION, SOLUTION INTRAVENOUS PRN
Status: CANCELLED | OUTPATIENT
Start: 2022-09-28

## 2022-08-24 RX ORDER — FAMOTIDINE 10 MG/ML
20 INJECTION, SOLUTION INTRAVENOUS ONCE
Status: CANCELLED | OUTPATIENT
Start: 2022-08-31 | End: 2022-08-31

## 2022-08-24 RX ORDER — ONDANSETRON 2 MG/ML
8 INJECTION INTRAMUSCULAR; INTRAVENOUS
Status: CANCELLED | OUTPATIENT
Start: 2022-09-28

## 2022-08-24 RX ORDER — MEPERIDINE HYDROCHLORIDE 50 MG/ML
12.5 INJECTION INTRAMUSCULAR; INTRAVENOUS; SUBCUTANEOUS PRN
Status: CANCELLED | OUTPATIENT
Start: 2022-10-05

## 2022-08-24 RX ORDER — HEPARIN SODIUM (PORCINE) LOCK FLUSH IV SOLN 100 UNIT/ML 100 UNIT/ML
500 SOLUTION INTRAVENOUS PRN
Status: CANCELLED | OUTPATIENT
Start: 2022-09-21

## 2022-08-24 RX ORDER — ALBUTEROL SULFATE 90 UG/1
4 AEROSOL, METERED RESPIRATORY (INHALATION) PRN
Status: CANCELLED | OUTPATIENT
Start: 2022-09-28

## 2022-08-24 RX ORDER — HEPARIN SODIUM (PORCINE) LOCK FLUSH IV SOLN 100 UNIT/ML 100 UNIT/ML
500 SOLUTION INTRAVENOUS PRN
Status: CANCELLED | OUTPATIENT
Start: 2022-08-31

## 2022-08-24 RX ORDER — FAMOTIDINE 10 MG/ML
20 INJECTION, SOLUTION INTRAVENOUS ONCE
Status: CANCELLED | OUTPATIENT
Start: 2022-09-28 | End: 2022-09-28

## 2022-08-24 RX ORDER — DIPHENHYDRAMINE HYDROCHLORIDE 50 MG/ML
50 INJECTION INTRAMUSCULAR; INTRAVENOUS ONCE
Status: CANCELLED | OUTPATIENT
Start: 2022-08-24 | End: 2022-08-24

## 2022-08-24 RX ORDER — EPINEPHRINE 1 MG/ML
0.3 INJECTION, SOLUTION, CONCENTRATE INTRAVENOUS PRN
Status: CANCELLED | OUTPATIENT
Start: 2022-09-07

## 2022-08-24 RX ORDER — EPINEPHRINE 1 MG/ML
0.3 INJECTION, SOLUTION, CONCENTRATE INTRAVENOUS PRN
Status: CANCELLED | OUTPATIENT
Start: 2022-10-05

## 2022-08-24 RX ORDER — DIPHENHYDRAMINE HYDROCHLORIDE 50 MG/ML
50 INJECTION INTRAMUSCULAR; INTRAVENOUS ONCE
Status: CANCELLED | OUTPATIENT
Start: 2022-09-21 | End: 2022-09-21

## 2022-08-24 RX ORDER — SODIUM CHLORIDE 0.9 % (FLUSH) 0.9 %
5-40 SYRINGE (ML) INJECTION PRN
Status: CANCELLED | OUTPATIENT
Start: 2022-08-24

## 2022-08-24 RX ORDER — MEPERIDINE HYDROCHLORIDE 50 MG/ML
12.5 INJECTION INTRAMUSCULAR; INTRAVENOUS; SUBCUTANEOUS PRN
Status: CANCELLED | OUTPATIENT
Start: 2022-08-31

## 2022-08-24 RX ORDER — DIPHENHYDRAMINE HYDROCHLORIDE 50 MG/ML
50 INJECTION INTRAMUSCULAR; INTRAVENOUS
Status: CANCELLED | OUTPATIENT
Start: 2022-09-28

## 2022-08-24 RX ORDER — SODIUM CHLORIDE 9 MG/ML
INJECTION, SOLUTION INTRAVENOUS CONTINUOUS
Status: CANCELLED | OUTPATIENT
Start: 2022-08-24

## 2022-08-24 RX ORDER — SODIUM CHLORIDE 0.9 % (FLUSH) 0.9 %
5-40 SYRINGE (ML) INJECTION PRN
Status: CANCELLED | OUTPATIENT
Start: 2022-09-28

## 2022-08-24 RX ORDER — DIPHENHYDRAMINE HYDROCHLORIDE 50 MG/ML
50 INJECTION INTRAMUSCULAR; INTRAVENOUS
Status: CANCELLED | OUTPATIENT
Start: 2022-08-24

## 2022-08-24 RX ORDER — ALBUTEROL SULFATE 90 UG/1
4 AEROSOL, METERED RESPIRATORY (INHALATION) PRN
Status: CANCELLED | OUTPATIENT
Start: 2022-08-24

## 2022-08-24 RX ORDER — MEPERIDINE HYDROCHLORIDE 50 MG/ML
12.5 INJECTION INTRAMUSCULAR; INTRAVENOUS; SUBCUTANEOUS PRN
Status: CANCELLED | OUTPATIENT
Start: 2022-09-28

## 2022-08-24 RX ORDER — SODIUM CHLORIDE 9 MG/ML
INJECTION, SOLUTION INTRAVENOUS CONTINUOUS
Status: CANCELLED | OUTPATIENT
Start: 2022-08-31

## 2022-08-24 RX ORDER — SODIUM CHLORIDE 9 MG/ML
5-250 INJECTION, SOLUTION INTRAVENOUS PRN
Status: CANCELLED | OUTPATIENT
Start: 2022-08-31

## 2022-08-24 RX ORDER — SODIUM CHLORIDE 0.9 % (FLUSH) 0.9 %
5-40 SYRINGE (ML) INJECTION PRN
Status: DISCONTINUED | OUTPATIENT
Start: 2022-08-24 | End: 2022-08-25 | Stop reason: HOSPADM

## 2022-08-24 RX ORDER — MEPERIDINE HYDROCHLORIDE 50 MG/ML
12.5 INJECTION INTRAMUSCULAR; INTRAVENOUS; SUBCUTANEOUS PRN
Status: CANCELLED | OUTPATIENT
Start: 2022-09-21

## 2022-08-24 RX ORDER — ONDANSETRON 2 MG/ML
8 INJECTION INTRAMUSCULAR; INTRAVENOUS
Status: CANCELLED | OUTPATIENT
Start: 2022-08-31

## 2022-08-24 RX ORDER — ALBUTEROL SULFATE 90 UG/1
4 AEROSOL, METERED RESPIRATORY (INHALATION) PRN
Status: CANCELLED | OUTPATIENT
Start: 2022-08-31

## 2022-08-24 RX ORDER — HEPARIN SODIUM (PORCINE) LOCK FLUSH IV SOLN 100 UNIT/ML 100 UNIT/ML
500 SOLUTION INTRAVENOUS PRN
Status: DISCONTINUED | OUTPATIENT
Start: 2022-08-24 | End: 2022-08-25 | Stop reason: HOSPADM

## 2022-08-24 RX ORDER — FAMOTIDINE 10 MG/ML
20 INJECTION, SOLUTION INTRAVENOUS
Status: CANCELLED | OUTPATIENT
Start: 2022-09-07

## 2022-08-24 RX ORDER — SODIUM CHLORIDE 0.9 % (FLUSH) 0.9 %
5-40 SYRINGE (ML) INJECTION PRN
Status: CANCELLED | OUTPATIENT
Start: 2022-09-21

## 2022-08-24 RX ORDER — SODIUM CHLORIDE 9 MG/ML
INJECTION, SOLUTION INTRAVENOUS CONTINUOUS
Status: CANCELLED | OUTPATIENT
Start: 2022-10-05

## 2022-08-24 RX ORDER — HEPARIN SODIUM (PORCINE) LOCK FLUSH IV SOLN 100 UNIT/ML 100 UNIT/ML
500 SOLUTION INTRAVENOUS PRN
Status: CANCELLED | OUTPATIENT
Start: 2022-10-05

## 2022-08-24 RX ORDER — FAMOTIDINE 10 MG/ML
20 INJECTION, SOLUTION INTRAVENOUS ONCE
Status: CANCELLED | OUTPATIENT
Start: 2022-08-24 | End: 2022-08-24

## 2022-08-24 RX ORDER — ALBUTEROL SULFATE 90 UG/1
4 AEROSOL, METERED RESPIRATORY (INHALATION) PRN
Status: CANCELLED | OUTPATIENT
Start: 2022-09-21

## 2022-08-24 RX ORDER — SODIUM CHLORIDE 0.9 % (FLUSH) 0.9 %
5-40 SYRINGE (ML) INJECTION PRN
Status: CANCELLED | OUTPATIENT
Start: 2022-10-05

## 2022-08-24 RX ORDER — SODIUM CHLORIDE 9 MG/ML
INJECTION, SOLUTION INTRAVENOUS CONTINUOUS
Status: CANCELLED | OUTPATIENT
Start: 2022-09-21

## 2022-08-24 RX ORDER — SODIUM CHLORIDE 9 MG/ML
5-40 INJECTION INTRAVENOUS PRN
Status: CANCELLED | OUTPATIENT
Start: 2022-08-31

## 2022-08-24 RX ORDER — ACETAMINOPHEN 325 MG/1
650 TABLET ORAL
Status: CANCELLED | OUTPATIENT
Start: 2022-10-05

## 2022-08-24 RX ORDER — DIPHENHYDRAMINE HYDROCHLORIDE 50 MG/ML
50 INJECTION INTRAMUSCULAR; INTRAVENOUS ONCE
Status: CANCELLED | OUTPATIENT
Start: 2022-08-31 | End: 2022-08-31

## 2022-08-24 RX ORDER — SODIUM CHLORIDE 9 MG/ML
5-40 INJECTION INTRAVENOUS PRN
Status: CANCELLED | OUTPATIENT
Start: 2022-09-07

## 2022-08-24 RX ORDER — DIPHENHYDRAMINE HYDROCHLORIDE 50 MG/ML
50 INJECTION INTRAMUSCULAR; INTRAVENOUS
Status: CANCELLED | OUTPATIENT
Start: 2022-09-21

## 2022-08-24 RX ORDER — HEPARIN SODIUM (PORCINE) LOCK FLUSH IV SOLN 100 UNIT/ML 100 UNIT/ML
500 SOLUTION INTRAVENOUS PRN
Status: CANCELLED | OUTPATIENT
Start: 2022-08-24

## 2022-08-24 RX ORDER — FAMOTIDINE 10 MG/ML
20 INJECTION, SOLUTION INTRAVENOUS ONCE
Status: CANCELLED | OUTPATIENT
Start: 2022-10-05 | End: 2022-10-05

## 2022-08-24 RX ORDER — ACETAMINOPHEN 325 MG/1
650 TABLET ORAL
Status: CANCELLED | OUTPATIENT
Start: 2022-09-07

## 2022-08-24 RX ORDER — MEPERIDINE HYDROCHLORIDE 50 MG/ML
12.5 INJECTION INTRAMUSCULAR; INTRAVENOUS; SUBCUTANEOUS PRN
Status: CANCELLED | OUTPATIENT
Start: 2022-08-24

## 2022-08-24 RX ORDER — SODIUM CHLORIDE 9 MG/ML
5-40 INJECTION INTRAVENOUS PRN
Status: CANCELLED | OUTPATIENT
Start: 2022-08-24

## 2022-08-24 RX ORDER — EPINEPHRINE 1 MG/ML
0.3 INJECTION, SOLUTION, CONCENTRATE INTRAVENOUS PRN
Status: CANCELLED | OUTPATIENT
Start: 2022-08-24

## 2022-08-24 RX ORDER — ONDANSETRON 2 MG/ML
8 INJECTION INTRAMUSCULAR; INTRAVENOUS
Status: CANCELLED | OUTPATIENT
Start: 2022-09-07

## 2022-08-24 RX ORDER — SODIUM CHLORIDE 0.9 % (FLUSH) 0.9 %
5-40 SYRINGE (ML) INJECTION PRN
Status: CANCELLED | OUTPATIENT
Start: 2022-09-07

## 2022-08-24 RX ORDER — FAMOTIDINE 10 MG/ML
20 INJECTION, SOLUTION INTRAVENOUS
Status: CANCELLED | OUTPATIENT
Start: 2022-08-24

## 2022-08-24 RX ORDER — SODIUM CHLORIDE 9 MG/ML
5-250 INJECTION, SOLUTION INTRAVENOUS PRN
Status: CANCELLED | OUTPATIENT
Start: 2022-08-24

## 2022-08-24 RX ORDER — DIPHENHYDRAMINE HYDROCHLORIDE 50 MG/ML
50 INJECTION INTRAMUSCULAR; INTRAVENOUS ONCE
Status: CANCELLED | OUTPATIENT
Start: 2022-09-07 | End: 2022-09-07

## 2022-08-24 RX ORDER — SODIUM CHLORIDE 9 MG/ML
INJECTION, SOLUTION INTRAVENOUS CONTINUOUS
Status: CANCELLED | OUTPATIENT
Start: 2022-09-07

## 2022-08-24 RX ORDER — ALBUTEROL SULFATE 90 UG/1
4 AEROSOL, METERED RESPIRATORY (INHALATION) PRN
Status: CANCELLED | OUTPATIENT
Start: 2022-09-07

## 2022-08-24 RX ORDER — FAMOTIDINE 10 MG/ML
20 INJECTION, SOLUTION INTRAVENOUS
Status: CANCELLED | OUTPATIENT
Start: 2022-10-05

## 2022-08-24 RX ORDER — FAMOTIDINE 10 MG/ML
20 INJECTION, SOLUTION INTRAVENOUS ONCE
Status: CANCELLED | OUTPATIENT
Start: 2022-09-21 | End: 2022-09-21

## 2022-08-24 RX ORDER — DIPHENHYDRAMINE HYDROCHLORIDE 50 MG/ML
50 INJECTION INTRAMUSCULAR; INTRAVENOUS
Status: CANCELLED | OUTPATIENT
Start: 2022-09-07

## 2022-08-24 RX ORDER — EPINEPHRINE 1 MG/ML
0.3 INJECTION, SOLUTION, CONCENTRATE INTRAVENOUS PRN
Status: CANCELLED | OUTPATIENT
Start: 2022-09-28

## 2022-08-24 RX ORDER — SODIUM CHLORIDE 9 MG/ML
5-40 INJECTION INTRAVENOUS PRN
Status: CANCELLED | OUTPATIENT
Start: 2022-10-05

## 2022-08-24 RX ORDER — ACETAMINOPHEN 325 MG/1
650 TABLET ORAL
Status: CANCELLED | OUTPATIENT
Start: 2022-09-21

## 2022-08-24 RX ORDER — FAMOTIDINE 10 MG/ML
20 INJECTION, SOLUTION INTRAVENOUS
Status: CANCELLED | OUTPATIENT
Start: 2022-09-28

## 2022-08-24 RX ADMIN — SODIUM CHLORIDE, PRESERVATIVE FREE 20 ML: 5 INJECTION INTRAVENOUS at 13:15

## 2022-08-24 RX ADMIN — PACLITAXEL 144 MG: 6 INJECTION, SOLUTION INTRAVENOUS at 12:04

## 2022-08-24 RX ADMIN — FAMOTIDINE 20 MG: 10 INJECTION, SOLUTION INTRAVENOUS at 11:25

## 2022-08-24 RX ADMIN — DEXAMETHASONE SODIUM PHOSPHATE 10 MG: 10 INJECTION INTRAMUSCULAR; INTRAVENOUS at 11:30

## 2022-08-24 RX ADMIN — DIPHENHYDRAMINE HYDROCHLORIDE 50 MG: 50 INJECTION INTRAMUSCULAR; INTRAVENOUS at 11:25

## 2022-08-24 RX ADMIN — SODIUM CHLORIDE 20 ML/HR: 9 INJECTION, SOLUTION INTRAVENOUS at 11:28

## 2022-08-24 RX ADMIN — HEPARIN 500 UNITS: 100 SYRINGE at 13:15

## 2022-08-24 NOTE — PROGRESS NOTES
Patient arrived to treatment suite for blood draw, pre-medication, and chemotherapy infusion. Mediport on RT chest accessed. Good blood return noted, labs drawn and sent to lab for processing. Treatment approved and administered as ordered. Patient tolerated well. Left treatment suite ambulatory. Discharge instructions provided. Patient's status assessed and documented appropriately. All labs and required results were also reviewed today. Treatment parameters have been reviewed. Today's treatment has been approved by the provider. Treatment orders and medication sequencing (when applicable) was verified by 2 registered nurses. The treatment plan was confirmed with the patient prior to administration, and the patient understands the need to report any treatment-related symptoms. Prior to administration, when applicable, the following 8 elements of medication administration were reviewed with 2nd Registered Nurse prior to dosing: drug name, drug dose, infusion volume when prepared in a syringe, rate of administration, expiration dates and/or times, appearance and integrity of drug(s), and rate of pump for infusion.   The 5 rights of medication administration have been verified

## 2022-08-25 DIAGNOSIS — N63.20 LEFT BREAST MASS: Primary | ICD-10-CM

## 2022-08-25 RX ORDER — HYDROCODONE BITARTRATE AND ACETAMINOPHEN 5; 325 MG/1; MG/1
1 TABLET ORAL EVERY 4 HOURS PRN
Qty: 60 TABLET | Refills: 0 | Status: SHIPPED | OUTPATIENT
Start: 2022-08-25 | End: 2022-09-09

## 2022-08-25 NOTE — TELEPHONE ENCOUNTER
Patient states she is out of pain medication and is in need of medication refill. Please send to pharmacy.

## 2022-08-31 ENCOUNTER — HOSPITAL ENCOUNTER (OUTPATIENT)
Dept: INFUSION THERAPY | Age: 67
Discharge: HOME | End: 2022-08-31
Payer: MEDICARE

## 2022-08-31 ENCOUNTER — OFFICE VISIT (OUTPATIENT)
Dept: ONCOLOGY | Age: 67
End: 2022-08-31
Payer: MEDICARE

## 2022-08-31 VITALS
BODY MASS INDEX: 26.5 KG/M2 | OXYGEN SATURATION: 99 % | WEIGHT: 155.2 LBS | SYSTOLIC BLOOD PRESSURE: 119 MMHG | HEIGHT: 64 IN | TEMPERATURE: 97.9 F | DIASTOLIC BLOOD PRESSURE: 79 MMHG | HEART RATE: 92 BPM

## 2022-08-31 DIAGNOSIS — Z17.0 MALIGNANT NEOPLASM OF OVERLAPPING SITES OF BOTH BREASTS IN FEMALE, ESTROGEN RECEPTOR POSITIVE (HCC): Primary | ICD-10-CM

## 2022-08-31 DIAGNOSIS — C50.811 MALIGNANT NEOPLASM OF OVERLAPPING SITES OF BOTH BREASTS IN FEMALE, ESTROGEN RECEPTOR POSITIVE (HCC): Primary | ICD-10-CM

## 2022-08-31 DIAGNOSIS — C50.812 MALIGNANT NEOPLASM OF OVERLAPPING SITES OF BOTH BREASTS IN FEMALE, ESTROGEN RECEPTOR POSITIVE (HCC): Primary | ICD-10-CM

## 2022-08-31 DIAGNOSIS — C79.51 BONE METASTASIS (HCC): ICD-10-CM

## 2022-08-31 LAB
ALBUMIN SERPL-MCNC: 4.3 GM/DL (ref 3.4–5)
ALP BLD-CCNC: 165 IU/L (ref 40–128)
ALT SERPL-CCNC: 42 U/L (ref 10–40)
ANION GAP SERPL CALCULATED.3IONS-SCNC: 8 MMOL/L (ref 4–16)
AST SERPL-CCNC: 31 IU/L (ref 15–37)
BASOPHILS ABSOLUTE: 0 K/CU MM
BASOPHILS RELATIVE PERCENT: 0.2 % (ref 0–1)
BILIRUB SERPL-MCNC: 0.4 MG/DL (ref 0–1)
BUN BLDV-MCNC: 14 MG/DL (ref 6–23)
CALCIUM SERPL-MCNC: 9 MG/DL (ref 8.3–10.6)
CHLORIDE BLD-SCNC: 106 MMOL/L (ref 99–110)
CO2: 25 MMOL/L (ref 21–32)
CREAT SERPL-MCNC: 0.3 MG/DL (ref 0.6–1.1)
DIFFERENTIAL TYPE: ABNORMAL
EOSINOPHILS ABSOLUTE: 0.2 K/CU MM
EOSINOPHILS RELATIVE PERCENT: 3.5 % (ref 0–3)
GFR AFRICAN AMERICAN: >60 ML/MIN/1.73M2
GFR AFRICAN AMERICAN: >60 ML/MIN/1.73M2
GFR NON-AFRICAN AMERICAN: >60 ML/MIN/1.73M2
GFR NON-AFRICAN AMERICAN: >60 ML/MIN/1.73M2
GLUCOSE BLD-MCNC: 91 MG/DL (ref 70–99)
GLUCOSE BLD-MCNC: 91 MG/DL (ref 70–99)
HCT VFR BLD CALC: 27.6 % (ref 37–47)
HEMOGLOBIN: 8.8 GM/DL (ref 12.5–16)
LYMPHOCYTES ABSOLUTE: 1.8 K/CU MM
LYMPHOCYTES RELATIVE PERCENT: 35.2 % (ref 24–44)
MCH RBC QN AUTO: 31.3 PG (ref 27–31)
MCHC RBC AUTO-ENTMCNC: 31.9 % (ref 32–36)
MCV RBC AUTO: 98.2 FL (ref 78–100)
MONOCYTES ABSOLUTE: 0.4 K/CU MM
MONOCYTES RELATIVE PERCENT: 8.4 % (ref 0–4)
PDW BLD-RTO: 16.5 % (ref 11.7–14.9)
PLATELET # BLD: 299 K/CU MM (ref 140–440)
PMV BLD AUTO: 9 FL (ref 7.5–11.1)
POC BUN: 12 MG/DL (ref 8–26)
POC CALCIUM: 1.2 MMOL/L (ref 1.12–1.32)
POC CHLORIDE: 108 MMOL/L (ref 98–109)
POC CO2: 24 MMOL/L (ref 21–32)
POC CREATININE: 0.5 MG/DL (ref 0.6–1.1)
POTASSIUM SERPL-SCNC: 3.9 MMOL/L (ref 3.5–4.5)
POTASSIUM SERPL-SCNC: 4.4 MMOL/L (ref 3.5–5.1)
RBC # BLD: 2.81 M/CU MM (ref 4.2–5.4)
SEGMENTED NEUTROPHILS ABSOLUTE COUNT: 2.7 K/CU MM
SEGMENTED NEUTROPHILS RELATIVE PERCENT: 52.7 % (ref 36–66)
SODIUM BLD-SCNC: 139 MMOL/L (ref 135–145)
SODIUM BLD-SCNC: 144 MMOL/L (ref 138–146)
SOURCE, BLOOD GAS: ABNORMAL
TOTAL PROTEIN: 6.1 GM/DL (ref 6.4–8.2)
WBC # BLD: 5.1 K/CU MM (ref 4–10.5)

## 2022-08-31 PROCEDURE — 6360000002 HC RX W HCPCS: Performed by: INTERNAL MEDICINE

## 2022-08-31 PROCEDURE — 1124F ACP DISCUSS-NO DSCNMKR DOCD: CPT | Performed by: INTERNAL MEDICINE

## 2022-08-31 PROCEDURE — 85025 COMPLETE CBC W/AUTO DIFF WBC: CPT

## 2022-08-31 PROCEDURE — 80053 COMPREHEN METABOLIC PANEL: CPT

## 2022-08-31 PROCEDURE — 99214 OFFICE O/P EST MOD 30 MIN: CPT | Performed by: INTERNAL MEDICINE

## 2022-08-31 PROCEDURE — 2580000003 HC RX 258: Performed by: INTERNAL MEDICINE

## 2022-08-31 PROCEDURE — 96367 TX/PROPH/DG ADDL SEQ IV INF: CPT

## 2022-08-31 PROCEDURE — 2500000003 HC RX 250 WO HCPCS: Performed by: INTERNAL MEDICINE

## 2022-08-31 PROCEDURE — 96375 TX/PRO/DX INJ NEW DRUG ADDON: CPT

## 2022-08-31 PROCEDURE — 96413 CHEMO IV INFUSION 1 HR: CPT

## 2022-08-31 PROCEDURE — 86300 IMMUNOASSAY TUMOR CA 15-3: CPT

## 2022-08-31 RX ORDER — SODIUM CHLORIDE 9 MG/ML
5-250 INJECTION, SOLUTION INTRAVENOUS PRN
Status: DISCONTINUED | OUTPATIENT
Start: 2022-08-31 | End: 2022-09-01 | Stop reason: HOSPADM

## 2022-08-31 RX ORDER — EPINEPHRINE 1 MG/ML
0.3 INJECTION, SOLUTION, CONCENTRATE INTRAVENOUS PRN
Status: CANCELLED | OUTPATIENT
Start: 2022-08-31

## 2022-08-31 RX ORDER — SODIUM CHLORIDE 9 MG/ML
5-250 INJECTION, SOLUTION INTRAVENOUS PRN
Status: CANCELLED | OUTPATIENT
Start: 2022-08-31

## 2022-08-31 RX ORDER — SODIUM CHLORIDE 0.9 % (FLUSH) 0.9 %
5-40 SYRINGE (ML) INJECTION PRN
Status: CANCELLED | OUTPATIENT
Start: 2022-08-31

## 2022-08-31 RX ORDER — FAMOTIDINE 10 MG/ML
20 INJECTION, SOLUTION INTRAVENOUS ONCE
Status: COMPLETED | OUTPATIENT
Start: 2022-08-31 | End: 2022-08-31

## 2022-08-31 RX ORDER — DIPHENHYDRAMINE HYDROCHLORIDE 50 MG/ML
50 INJECTION INTRAMUSCULAR; INTRAVENOUS
Status: CANCELLED | OUTPATIENT
Start: 2022-08-31

## 2022-08-31 RX ORDER — ACETAMINOPHEN 325 MG/1
650 TABLET ORAL
Status: CANCELLED | OUTPATIENT
Start: 2022-08-31

## 2022-08-31 RX ORDER — HEPARIN SODIUM (PORCINE) LOCK FLUSH IV SOLN 100 UNIT/ML 100 UNIT/ML
500 SOLUTION INTRAVENOUS PRN
Status: DISCONTINUED | OUTPATIENT
Start: 2022-08-31 | End: 2022-09-01 | Stop reason: HOSPADM

## 2022-08-31 RX ORDER — HEPARIN SODIUM (PORCINE) LOCK FLUSH IV SOLN 100 UNIT/ML 100 UNIT/ML
500 SOLUTION INTRAVENOUS PRN
Status: CANCELLED | OUTPATIENT
Start: 2022-08-31

## 2022-08-31 RX ORDER — SODIUM CHLORIDE 9 MG/ML
INJECTION, SOLUTION INTRAVENOUS CONTINUOUS
Status: CANCELLED | OUTPATIENT
Start: 2022-08-31

## 2022-08-31 RX ORDER — MEPERIDINE HYDROCHLORIDE 25 MG/ML
12.5 INJECTION INTRAMUSCULAR; INTRAVENOUS; SUBCUTANEOUS PRN
Status: CANCELLED | OUTPATIENT
Start: 2022-08-31

## 2022-08-31 RX ORDER — ONDANSETRON 2 MG/ML
8 INJECTION INTRAMUSCULAR; INTRAVENOUS
Status: CANCELLED | OUTPATIENT
Start: 2022-08-31

## 2022-08-31 RX ORDER — ALBUTEROL SULFATE 90 UG/1
4 AEROSOL, METERED RESPIRATORY (INHALATION) PRN
Status: CANCELLED | OUTPATIENT
Start: 2022-08-31

## 2022-08-31 RX ORDER — FAMOTIDINE 10 MG/ML
20 INJECTION, SOLUTION INTRAVENOUS
Status: CANCELLED | OUTPATIENT
Start: 2022-08-31

## 2022-08-31 RX ORDER — DIPHENHYDRAMINE HYDROCHLORIDE 50 MG/ML
50 INJECTION INTRAMUSCULAR; INTRAVENOUS ONCE
Status: COMPLETED | OUTPATIENT
Start: 2022-08-31 | End: 2022-08-31

## 2022-08-31 RX ORDER — SODIUM CHLORIDE 9 MG/ML
5-40 INJECTION INTRAVENOUS PRN
Status: CANCELLED | OUTPATIENT
Start: 2022-08-31

## 2022-08-31 RX ORDER — SODIUM CHLORIDE 0.9 % (FLUSH) 0.9 %
5-40 SYRINGE (ML) INJECTION PRN
Status: DISCONTINUED | OUTPATIENT
Start: 2022-08-31 | End: 2022-09-01 | Stop reason: HOSPADM

## 2022-08-31 RX ADMIN — FAMOTIDINE 20 MG: 10 INJECTION, SOLUTION INTRAVENOUS at 10:53

## 2022-08-31 RX ADMIN — HEPARIN 500 UNITS: 100 SYRINGE at 12:49

## 2022-08-31 RX ADMIN — DEXAMETHASONE SODIUM PHOSPHATE 10 MG: 10 INJECTION INTRAMUSCULAR; INTRAVENOUS at 10:54

## 2022-08-31 RX ADMIN — ZOLEDRONIC ACID 4 MG: 4 INJECTION, SOLUTION, CONCENTRATE INTRAVENOUS at 12:27

## 2022-08-31 RX ADMIN — SODIUM CHLORIDE 20 ML/HR: 9 INJECTION, SOLUTION INTRAVENOUS at 10:53

## 2022-08-31 RX ADMIN — DIPHENHYDRAMINE HYDROCHLORIDE 50 MG: 50 INJECTION INTRAMUSCULAR; INTRAVENOUS at 10:53

## 2022-08-31 RX ADMIN — SODIUM CHLORIDE, PRESERVATIVE FREE 10 ML: 5 INJECTION INTRAVENOUS at 12:49

## 2022-08-31 RX ADMIN — PACLITAXEL 144 MG: 6 INJECTION, SOLUTION INTRAVENOUS at 11:16

## 2022-08-31 NOTE — PROGRESS NOTES
Pt here for Taxol and Zometa infusion  after OV. Port accessed with blood return. Labs sent. Pt has no new complaints. Patient's status assessed and documented appropriately. All labs and required results were also reviewed today. Treatment parameters have been reviewed. Today's treatment has been approved by the provider. Treatment orders and medication sequencing (when applicable) was verified by 2 registered nurses. The treatment plan was confirmed with the patient prior to administration, and the patient understands the need to report any treatment-related symptoms. Prior to administration, when applicable, the following 8 elements of medication administration were reviewed with 2nd Registered Nurse prior to dosing: drug name, drug dose, infusion volume when prepared in a syringe, rate of administration, expiration dates and/or times, appearance and integrity of drug(s), and rate of pump for infusion. The 5 rights of medication administration have been verified. Pt tolerated tx with no complaints.  Left ambulatory discharge instructions given

## 2022-08-31 NOTE — PROGRESS NOTES
Patient Name:  Leonid Deluca  Patient :  1955  Patient MRN:  [id number]     Primary Oncologist: Kimberly Newman MD  Referring Provider: MAURICIO Camara CNP     Date of Service: 2022      Chief Complaint:    Chief Complaint   Patient presents with    Follow-up    Treatment     Patient's active problem list:       Liver mass       Lytic bone lesions       Left breast mass    HPI:   Manny Short is a 74year-old very pleasant female with medical history significant for osteoarthritis, initially referred to me on 22 for evaluation of her liver lesion and multiple lytic bone lesions. She initially presented to [facility] ER on 22 with severe lower back pain and constipation. Stated that she has been having back pain for about 4 - 6 weeks duration, which becomes severe pain started a week before presentation. CT scan of the abdomen and pelvis done on 2022 showed lesion (6.1 x 8.1 cm) in the hepatic segment 4, concerning for neoplasm. Further evaluation with MRI of the liver is recommended. Extensive lytic metastatic disease in lumbar and thoracic spine and pelvis with most dominant lesion seen at L5 vertebral body. Several small bowel loops segments in the left abdomen demonstrate mild wall thickening, nonspecific may indicate enteritis. Nonspecific partially visualized inflammatory stranding along the pericardium. 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 CT lumbar spine done on 22 showed extensive multifocal lytic lesions concerning for neoplastic/metastatic disease. Age indeterminant compression fractures at T12 and L4, with mild narrowing of the AP diameter of the canal at L4. She never had colonoscopy before. She had pap smear around . She didn't recall when was her last mammogram.     She denies weight loss. She hasn't been eating much for about 10 days since she has been having nausea.      Since she is found to have multiple lytic bone lesions and liver lesion, she was referred to me for further evaluation. Digital diagnostic bilateral mammogram and bilateral ultrasound done on 6/17/2022 showed left breast mass, highly suspicious for malignancy. Right breast mass at 11:00, 1.5 cm. Mass is highly suspicious for malignancy. Left breast skin thickening. CT chest on 6/20/2022 showed suspicious heterogeneous mass primarily centered in segment 4A of the liver measuring 8.8 cm. Left breast mass with left axillary and subpectoral metastatic disease, suspected bony metastases to thoracolumbar spine and sternum and possible left chest wall invasion. Bone scan on 6/20/2022 showed multifocal osseous metastatic disease. T12 compression fracture, concerning for pathologic fracture. MRI abdomen on 6/25/2022 showed biopsy-proven malignancy in the left breast with suspicion for inflammatory carcinoma given skin and trabecular thickening. There may be invasion of the underlying pectoralis musculature. At least 3 heavily meta stasis, the largest measuring up to 8.7 cm with the others 1 cm or less. Likely metastatic portal hepatic, retroperitoneal and right retrocrural lymph nodes. Trace left pleural effusion. Extensive skeletal metastatic disease. She underwent ultrasound-guided biopsy of the right breast mass at 11:00 and the pathology showed invasive carcinoma with ductal and lobular features. Estrogen receptor and progesterone receptor are positive. HER2 by IHC not overexpressed (score 1+). HER2 by FISH-negative. Left breast mass at 12:00 biopsy revealed grade 2 invasive lobular carcinoma. Estrogen receptor by IHC-positive [greater than 95%, average strong intensity], progesterone receptor-positive [approximately 80%, average moderate intensity], HER2 by IHC-not overexpressed [score 1+] and HER2 by FISH-negative.     CARIS panel did not yield any biomarker results with a benefit of clinic at the dense 70 chance to provide an association with a particular therapy. TMB was low. PD-L1 for [place] was not expressed and MSI was stable. Since she has significant visceral disease, I recommend her to initiate first-line chemotherapy with single agent paclitaxel. It was started on 7/27/22. On August 31, 2022, she presented to me for follow-up. She was initially referred to me for evaluation of liver lesion and multiple bony lesion. She was found to have left breast mass with a distorted left breast on physical examination. 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 Since she has significant visceral disease, I recommend her to initiate first-line chemotherapy with single agent paclitaxel. It was started on 7/20/22. She is in her second cycle of chemotherapy. She is tolerating current chemo quite well and she doesn't encounter any major side effects from it. Her left breast and axillary masses are getting smaller and softer. I believe she is achieving good response to chemo and I recommend her to continue with it for now. Bone metastasis - we started zolendronic acid since 7/20/22. She only has minimal symptom and will not do vertebroplasty at this time. Reviewed Caris molecular panel result with her. I requested genetic counseling and testing since she has bilateral metastatic breast cancer. Malignancy related pain - will continue with norco since it has been controlling her pain well. Anxiety and insomnia - on ativan 0.5 mg nightly prn with improvement in her symptom. She is requiring less ativan since starting medical marijuana. She doesn't have any other significant symptoms at today visit. Past Medical History:     Significant for  1. Osteoarthritis    Past Surgery History:    Significant for   1.   Left hip replacement    Social History:   She is a current smoker and she smokes 1 pack per day approximately since she was 24. She denies alcohol drinking or illicit drug abuse. Family History:    Significant for Alzheimer's disease in her parents. No family history of malignancy. No Known Allergies    Review of Systems: \"Per interval history; otherwise 10 point ROS is negative. \"  Her energy level is good and her sleep is fine. She doesn't have fever, chills, night sweats, cough, SOB, chest pain, hemoptysis or palpitations. Her bowels and bladder functions are normal. She doesn't have nausea, vomiting, abdominal pain, diarrhea, constipation, dysuria, loss of appetite or weight loss. She doesn't have neuropathy and she denies bleeding or clotting issues. She has cancer related mild pain in her lower back. Has anxiety. No depression. The rest of the systems are unremarkable. Vital Signs: /79 (Site: Left Upper Arm, Position: Sitting, Cuff Size: Medium Adult)   Pulse 92   Temp 97.9 °F (36.6 °C) (Temporal)   Ht 5' 4\" (1.626 m)   Wt 155 lb 3.2 oz (70.4 kg)   SpO2 99%   BMI 26.64 kg/m²      Physical Exam:  CONSTITUTIONAL: awake, alert, cooperative, no apparent distress   EYES: pupils equal, round and reactive to light, sclera clear, normal conjunctiva  ENT: Normocephalic, without obvious abnormality, atraumatic  NECK: supple, symmetrical, no jugular venous distension, no carotid bruits   HEMATOLOGIC/LYMPHATIC: no cervical, supraclavicular lymphadenopathy. Left axillary lymphadenopathy noted,   LUNGS: VBS, no wheezes, no increased work of breathing, no rhonchi, clear to auscultation, no crackles,    CARDIOVASCULAR: regular rate and rhythm, normal S1 and S2, no murmur noted  ABDOMEN: normal bowel sounds x 4, soft, non-distended, non-tender, no masses palpated, no hepatosplenomegaly   MUSCULOSKELETAL: full range of motion noted, tone is normal  NEUROLOGIC: awake, alert, oriented to name, place and time. Motor skills grossly intact.    SKIN: appears intact, normal skin color, normal texture, normal turgor, no jaundice. EXTREMITIES: no clubbing, no cyanosis, no leg swelling, no LE edema,   BREASTS: Left breast is distorted by tumor, nipple was displaced close to lower breast crease area, it is getting softer with chemo, right breast nodularity noted,        Labs:  Hematology:  Lab Results   Component Value Date    WBC 4.2 08/24/2022    RBC 3.06 (L) 08/24/2022    HGB 9.4 (L) 08/24/2022    HCT 29.6 (L) 08/24/2022    MCV 96.7 08/24/2022    MCH 30.7 08/24/2022    MCHC 31.8 (L) 08/24/2022    RDW 16.5 (H) 08/24/2022     08/24/2022    MPV 8.3 08/24/2022    SEGSPCT 49.7 08/24/2022    EOSRELPCT 2.4 08/24/2022    BASOPCT 0.5 08/24/2022    LYMPHOPCT 38.4 08/24/2022    MONOPCT 9.0 (H) 08/24/2022    SEGSABS 2.1 08/24/2022    EOSABS 0.1 08/24/2022    BASOSABS 0.0 08/24/2022    LYMPHSABS 1.6 08/24/2022    MONOSABS 0.4 08/24/2022    DIFFTYPE AUTOMATED DIFFERENTIAL 08/24/2022     No results found for: ESR  Chemistry:  Lab Results   Component Value Date     08/24/2022    K 4.0 08/24/2022     08/24/2022    CO2 24 08/24/2022    BUN 15 08/24/2022    CREATININE 0.4 (L) 08/24/2022    GLUCOSE 82 08/24/2022    CALCIUM 8.7 08/24/2022    PROT 6.3 (L) 08/24/2022    LABALBU 4.4 08/24/2022    BILITOT 0.5 08/24/2022    ALKPHOS 159 (H) 08/24/2022    AST 27 08/24/2022    ALT 32 08/24/2022    LABGLOM >60 08/24/2022    GFRAA >60 08/24/2022    MG 2.3 07/19/2022    POCCA 1.21 07/20/2022    POCGLU 103 (H) 07/20/2022     Lab Results   Component Value Date     (H) 06/14/2022     No components found for: LD  No results found for: TSHHS, T4FREE, FT3  Immunology:  Lab Results   Component Value Date    PROT 6.3 (L) 08/24/2022    SPEP  06/14/2022     INTERPRETATION - No monoclonal proteins are detected. SAF    SPEP INTERPRETATION - Within normal limits.  SAF 06/14/2022    ALBUMINELP 3.7 06/14/2022    LABALPH 0.4 06/14/2022    LABALPH 1.3 (H) 14/34/2756    LABALPH DUPLICATE ORDER 63/40/9230 LABALPH DUPLICATE ORDER 74/28/7777    LABBETA 1.1 10/70/6191    LABBETA DUPLICATE ORDER 56/54/3373    GAMGLOB 1.1 06/14/2022     No results found for: Darlyn Emil KLFMINAR  No results found for: B2M  Coagulation Panel:  No results found for: PROTIME, INR, APTT, DDIMER  Anemia Panel:  No results found for: ODIUWBVN21, FOLATE  Tumor Markers:  Lab Results   Component Value Date     377 (H) 06/14/2022    CEA 54.5 06/14/2022     25 06/14/2022    LABCA2 110.5 (H) 06/14/2022        Observations:  No data recorded     Assessment   Left breast mass  Liver lesion and multiple bone lytic lesions - concerning for metastatic breast cancer    Plan:  Effie Monreal is a 74year-old very pleasant female who initially presented to [facility] ER on 6/12/22 with severe lower back pain and constipation. CT scan of the abdomen and pelvis done on 6/12/2022 showed lesion (6.1 x 8.1 cm) in the hepatic segment 4, concerning for neoplasm. Extensive lytic metastatic disease in lumbar and thoracic spine and pelvis with most dominant lesion seen at L5 vertebral body. CT lumbar spine done on 6/12/22 showed extensive multifocal lytic lesions concerning for neoplastic/metastatic disease. She never had colonoscopy before. She had pap smear around 2020. She didn't recall when was her last mammogram.       Digital diagnostic bilateral mammogram and bilateral ultrasound done on 6/17/2022 showed left breast mass, highly suspicious for malignancy. Right breast mass at 11:00, 1.5 cm. Mass is highly suspicious for malignancy. Left breast skin thickening. CT chest on 6/20/2022 showed suspicious heterogeneous mass primarily centered in segment 4A of the liver measuring 8.8 cm. Left breast mass with left axillary and subpectoral metastatic disease, suspected bony metastases to thoracolumbar spine and sternum and possible left chest wall invasion. Bone scan on 6/20/2022 showed multifocal osseous metastatic disease.   T12 compression fracture, concerning for pathologic fracture. MRI abdomen on 6/25/2022 showed biopsy-proven malignancy in the left breast with suspicion for inflammatory carcinoma given skin and trabecular thickening. There may be invasion of the underlying pectoralis musculature. At least 3 heavily meta stasis, the largest measuring up to 8.7 cm with the others 1 cm or less. Likely metastatic portal hepatic, retroperitoneal and right retrocrural lymph nodes. Trace left pleural effusion. Extensive skeletal metastatic disease. She underwent ultrasound-guided biopsy of the right breast mass at 11:00 and the pathology showed invasive carcinoma with ductal and lobular features. Estrogen receptor and progesterone receptor are positive. HER2 by IHC not overexpressed (score 1+). HER2 by FISH-negative. Left breast mass at 12:00 biopsy revealed grade 2 invasive lobular carcinoma. Estrogen receptor by IHC-positive [greater than 95%, average strong intensity], progesterone receptor-positive [approximately 80%, average moderate intensity], HER2 by IHC-not overexpressed [score 1+] and HER2 by FISH-negative. CARIS panel did not yield any biomarker results with a benefit of clinic at the dense 70 chance to provide an association with a particular therapy. TMB was low. PD-L1 for [place] was not expressed and MSI was stable. Since she has significant visceral disease, I recommend her to initiate first-line chemotherapy with single agent paclitaxel. It was started on 7/27/22. On August 31, 2022, she presented to me for follow-up. She was initially referred to me for evaluation of liver lesion and multiple bony lesion. She was found to have left breast mass with a distorted left breast on physical examination.   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 Since she has significant visceral disease, I recommend her to initiate first-line chemotherapy with single agent paclitaxel. It was started on 7/20/22. She is in her second cycle of chemotherapy. She is tolerating current chemo quite well and she doesn't encounter any major side effects from it. Her left breast and axillary masses are getting smaller and softer. I believe she is achieving good response to chemo and I recommend her to continue with it for now. Bone metastasis - we started zolendronic acid since 7/20/22. She only has minimal symptom and will not do vertebroplasty at this time. Reviewed Caris molecular panel result with her. I requested genetic counseling and testing since she has bilateral metastatic breast cancer. Malignancy related pain - will continue with norco since it has been controlling her pain well. Anxiety and insomnia - on ativan 0.5 mg nightly prn with improvement in her symptom. She is requiring less ativan since starting medical marijuana. I answered all her questions and concerns for today. Recent imaging and labs were reviewed and discussed with the patient.

## 2022-09-03 LAB — CA 27.29: 45.8 U/ML

## 2022-09-07 ENCOUNTER — HOSPITAL ENCOUNTER (OUTPATIENT)
Dept: INFUSION THERAPY | Age: 67
Discharge: HOME | End: 2022-09-07
Payer: MEDICARE

## 2022-09-07 VITALS
DIASTOLIC BLOOD PRESSURE: 79 MMHG | WEIGHT: 152 LBS | OXYGEN SATURATION: 97 % | HEIGHT: 64 IN | TEMPERATURE: 97.2 F | HEART RATE: 84 BPM | BODY MASS INDEX: 25.95 KG/M2 | SYSTOLIC BLOOD PRESSURE: 129 MMHG

## 2022-09-07 DIAGNOSIS — C50.812 MALIGNANT NEOPLASM OF OVERLAPPING SITES OF BOTH BREASTS IN FEMALE, ESTROGEN RECEPTOR POSITIVE (HCC): Primary | ICD-10-CM

## 2022-09-07 DIAGNOSIS — C50.811 MALIGNANT NEOPLASM OF OVERLAPPING SITES OF BOTH BREASTS IN FEMALE, ESTROGEN RECEPTOR POSITIVE (HCC): Primary | ICD-10-CM

## 2022-09-07 DIAGNOSIS — Z17.0 MALIGNANT NEOPLASM OF OVERLAPPING SITES OF BOTH BREASTS IN FEMALE, ESTROGEN RECEPTOR POSITIVE (HCC): Primary | ICD-10-CM

## 2022-09-07 LAB
ALBUMIN SERPL-MCNC: 4.4 GM/DL (ref 3.4–5)
ALP BLD-CCNC: 122 IU/L (ref 40–129)
ALT SERPL-CCNC: 20 U/L (ref 10–40)
ANION GAP SERPL CALCULATED.3IONS-SCNC: 10 MMOL/L (ref 4–16)
AST SERPL-CCNC: 17 IU/L (ref 15–37)
BASOPHILS ABSOLUTE: 0 K/CU MM
BASOPHILS RELATIVE PERCENT: 0.5 % (ref 0–1)
BILIRUB SERPL-MCNC: 0.4 MG/DL (ref 0–1)
BUN BLDV-MCNC: 14 MG/DL (ref 6–23)
CALCIUM SERPL-MCNC: 8.5 MG/DL (ref 8.3–10.6)
CHLORIDE BLD-SCNC: 103 MMOL/L (ref 99–110)
CO2: 23 MMOL/L (ref 21–32)
CREAT SERPL-MCNC: 0.4 MG/DL (ref 0.6–1.1)
DIFFERENTIAL TYPE: ABNORMAL
EOSINOPHILS ABSOLUTE: 0.1 K/CU MM
EOSINOPHILS RELATIVE PERCENT: 2.8 % (ref 0–3)
GFR AFRICAN AMERICAN: >60 ML/MIN/1.73M2
GFR NON-AFRICAN AMERICAN: >60 ML/MIN/1.73M2
GLUCOSE BLD-MCNC: 83 MG/DL (ref 70–99)
HCT VFR BLD CALC: 28.8 % (ref 37–47)
HEMOGLOBIN: 9.2 GM/DL (ref 12.5–16)
LYMPHOCYTES ABSOLUTE: 1.9 K/CU MM
LYMPHOCYTES RELATIVE PERCENT: 43.5 % (ref 24–44)
MCH RBC QN AUTO: 31.5 PG (ref 27–31)
MCHC RBC AUTO-ENTMCNC: 31.9 % (ref 32–36)
MCV RBC AUTO: 98.6 FL (ref 78–100)
MONOCYTES ABSOLUTE: 0.3 K/CU MM
MONOCYTES RELATIVE PERCENT: 6.8 % (ref 0–4)
PDW BLD-RTO: 16.3 % (ref 11.7–14.9)
PLATELET # BLD: 327 K/CU MM (ref 140–440)
PMV BLD AUTO: 8.6 FL (ref 7.5–11.1)
POTASSIUM SERPL-SCNC: 4.2 MMOL/L (ref 3.5–5.1)
RBC # BLD: 2.92 M/CU MM (ref 4.2–5.4)
SEGMENTED NEUTROPHILS ABSOLUTE COUNT: 2 K/CU MM
SEGMENTED NEUTROPHILS RELATIVE PERCENT: 46.4 % (ref 36–66)
SODIUM BLD-SCNC: 136 MMOL/L (ref 135–145)
TOTAL PROTEIN: 6.3 GM/DL (ref 6.4–8.2)
WBC # BLD: 4.3 K/CU MM (ref 4–10.5)

## 2022-09-07 PROCEDURE — 80053 COMPREHEN METABOLIC PANEL: CPT

## 2022-09-07 PROCEDURE — 6360000002 HC RX W HCPCS: Performed by: INTERNAL MEDICINE

## 2022-09-07 PROCEDURE — 85025 COMPLETE CBC W/AUTO DIFF WBC: CPT

## 2022-09-07 PROCEDURE — 96375 TX/PRO/DX INJ NEW DRUG ADDON: CPT

## 2022-09-07 PROCEDURE — 96413 CHEMO IV INFUSION 1 HR: CPT

## 2022-09-07 PROCEDURE — 2580000003 HC RX 258: Performed by: INTERNAL MEDICINE

## 2022-09-07 PROCEDURE — 2500000003 HC RX 250 WO HCPCS: Performed by: INTERNAL MEDICINE

## 2022-09-07 PROCEDURE — 96367 TX/PROPH/DG ADDL SEQ IV INF: CPT

## 2022-09-07 RX ORDER — FAMOTIDINE 10 MG/ML
20 INJECTION, SOLUTION INTRAVENOUS ONCE
Status: COMPLETED | OUTPATIENT
Start: 2022-09-07 | End: 2022-09-07

## 2022-09-07 RX ORDER — SODIUM CHLORIDE 0.9 % (FLUSH) 0.9 %
5-40 SYRINGE (ML) INJECTION PRN
Status: DISCONTINUED | OUTPATIENT
Start: 2022-09-07 | End: 2022-09-08 | Stop reason: HOSPADM

## 2022-09-07 RX ORDER — DIPHENHYDRAMINE HYDROCHLORIDE 50 MG/ML
50 INJECTION INTRAMUSCULAR; INTRAVENOUS ONCE
Status: COMPLETED | OUTPATIENT
Start: 2022-09-07 | End: 2022-09-07

## 2022-09-07 RX ORDER — HEPARIN SODIUM (PORCINE) LOCK FLUSH IV SOLN 100 UNIT/ML 100 UNIT/ML
500 SOLUTION INTRAVENOUS PRN
Status: DISCONTINUED | OUTPATIENT
Start: 2022-09-07 | End: 2022-09-08 | Stop reason: HOSPADM

## 2022-09-07 RX ORDER — SODIUM CHLORIDE 9 MG/ML
5-250 INJECTION, SOLUTION INTRAVENOUS PRN
Status: DISCONTINUED | OUTPATIENT
Start: 2022-09-07 | End: 2022-09-08 | Stop reason: HOSPADM

## 2022-09-07 RX ADMIN — HEPARIN 500 UNITS: 100 SYRINGE at 12:23

## 2022-09-07 RX ADMIN — DIPHENHYDRAMINE HYDROCHLORIDE 50 MG: 50 INJECTION INTRAMUSCULAR; INTRAVENOUS at 10:41

## 2022-09-07 RX ADMIN — SODIUM CHLORIDE, PRESERVATIVE FREE 10 ML: 5 INJECTION INTRAVENOUS at 12:23

## 2022-09-07 RX ADMIN — FAMOTIDINE 20 MG: 10 INJECTION, SOLUTION INTRAVENOUS at 10:40

## 2022-09-07 RX ADMIN — DEXAMETHASONE SODIUM PHOSPHATE 10 MG: 10 INJECTION INTRAMUSCULAR; INTRAVENOUS at 10:42

## 2022-09-07 RX ADMIN — SODIUM CHLORIDE 20 ML/HR: 9 INJECTION, SOLUTION INTRAVENOUS at 10:40

## 2022-09-07 RX ADMIN — PACLITAXEL 144 MG: 6 INJECTION, SOLUTION INTRAVENOUS at 11:10

## 2022-09-07 NOTE — PROGRESS NOTES
Pt here for chemotherapy infusion. Port accessed blood return noted. Labs sent and reviewed. Pt has no complaints. Patient's status assessed and documented appropriately. All labs and required results were also reviewed today. Treatment parameters have been reviewed. Today's treatment has been approved by the provider. Treatment orders and medication sequencing (when applicable) was verified by 2 registered nurses. The treatment plan was confirmed with the patient prior to administration, and the patient understands the need to report any treatment-related symptoms. Prior to administration, when applicable, the following 8 elements of medication administration were reviewed with 2nd Registered Nurse prior to dosing: drug name, drug dose, infusion volume when prepared in a syringe, rate of administration, expiration dates and/or times, appearance and integrity of drug(s), and rate of pump for infusion. The 5 rights of medication administration have been verified. Pt tolerated tx with no complaints.  Left ambulatory discharge instructions given

## 2022-09-20 NOTE — PROGRESS NOTES
Patient Name:  Vic Astudillo  Patient :  1955  Patient MRN:  [id number]     Primary Oncologist: Sheron Lynn MD  Referring Provider: MAURICIO Bragg CNP     Date of Service: 2022      Chief Complaint:    Chief Complaint   Patient presents with    Follow-up       Patient's active problem list:       Liver mass       Lytic bone lesions       Left breast mass    HPI:   Jluis Bran is a 74year-old very pleasant female with medical history significant for osteoarthritis, initially referred to me on 22 for evaluation of her liver lesion and multiple lytic bone lesions. She initially presented to [facility] on 22 with severe lower back pain and constipation. Stated that she has been having back pain for about 4 - 6 weeks duration, which becomes severe pain started a week before presentation. CT scan of the abdomen and pelvis done on 2022 showed lesion (6.1 x 8.1 cm) in the hepatic segment 4, concerning for neoplasm. Further evaluation with MRI of the liver is recommended. Extensive lytic metastatic disease in lumbar and thoracic spine and pelvis with most dominant lesion seen at L5 vertebral body. Several small bowel loops segments in the left abdomen demonstrate mild wall thickening, nonspecific may indicate enteritis. Nonspecific partially visualized inflammatory stranding along the pericardium. 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 CT lumbar spine done on 22 showed extensive multifocal lytic lesions concerning for neoplastic/metastatic disease. Age indeterminant compression fractures at T12 and L4, with mild narrowing of the AP diameter of the canal at L4. She never had colonoscopy before. She had pap smear around . She didn't recall when was her last mammogram.     She denies weight loss. She hasn't been eating much for about 10 days since she has been having nausea.      Since she is found to have multiple lytic bone lesions and liver lesion, she was referred to me for further evaluation. Digital diagnostic bilateral mammogram and bilateral ultrasound done on 6/17/2022 showed left breast mass, highly suspicious for malignancy. Right breast mass at 11:00, 1.5 cm. Mass is highly suspicious for malignancy. Left breast skin thickening. CT chest on 6/20/2022 showed suspicious heterogeneous mass primarily centered in segment 4A of the liver measuring 8.8 cm. Left breast mass with left axillary and subpectoral metastatic disease, suspected bony metastases to thoracolumbar spine and sternum and possible left chest wall invasion. Bone scan on 6/20/2022 showed multifocal osseous metastatic disease. T12 compression fracture, concerning for pathologic fracture. MRI abdomen on 6/25/2022 showed biopsy-proven malignancy in the left breast with suspicion for inflammatory carcinoma given skin and trabecular thickening. There may be invasion of the underlying pectoralis musculature. At least 3 heavily meta stasis, the largest measuring up to 8.7 cm with the others 1 cm or less. Likely metastatic portal hepatic, retroperitoneal and right retrocrural lymph nodes. Trace left pleural effusion. Extensive skeletal metastatic disease. She underwent ultrasound-guided biopsy of the right breast mass at 11:00 and the pathology showed invasive carcinoma with ductal and lobular features. Estrogen receptor and progesterone receptor are positive. HER2 by IHC not overexpressed (score 1+). HER2 by FISH-negative. Left breast mass at 12:00 biopsy revealed grade 2 invasive lobular carcinoma. Estrogen receptor by IHC-positive [greater than 95%, average strong intensity], progesterone receptor-positive [approximately 80%, average moderate intensity], HER2 by IHC-not overexpressed [score 1+] and HER2 by FISH-negative. CARIS panel did not yield any biomarker results with a benefit of clinic at the dense 70 chance to provide an association with a particular therapy.  TMB was low. PD-L1 for Jamar Hart was not expressed and MSI was stable. Since she has significant visceral disease, I recommend her to initiate first-line chemotherapy with single agent paclitaxel. It was started on 7/27/22. On September 21, 2022, she presented to me for follow-up. She was initially referred to me for evaluation of liver lesion and multiple bony lesion. She was found to have left breast mass with a distorted left breast on physical examination. 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 Since she has significant visceral disease, I recommend her to initiate first-line chemotherapy with single agent paclitaxel. It was started on 7/20/22. She is s/p second cycle of chemotherapy. She is tolerating current chemo quite well and she doesn't encounter any major side effects from it. Her left breast and axillary masses are getting smaller and softer. I believe she is achieving good response to chemo and I recommend her to continue with it for now. I will plan to have repeat scans after 4th cycle. Bone metastasis - we started zolendronic acid since 7/20/22. She only has minimal symptom and will not do vertebroplasty at this time. Reviewed Caris molecular panel result with her. I requested genetic counseling and testing since she has bilateral metastatic breast cancer. Malignancy related pain - will continue with norco since it has been controlling her pain well. Anxiety and insomnia - on ativan 0.5 mg nightly prn with improvement in her symptom. She is requiring less ativan since starting medical marijuana. She doesn't have any other significant symptoms at today visit. Past Medical History:     Significant for  1. Osteoarthritis    Past Surgery History:    Significant for   1.   Left hip replacement    Social History:   She is a current smoker and she smokes 1 pack per day approximately since she was 24. She denies alcohol drinking or illicit drug abuse. Family History:    Significant for Alzheimer's disease in her parents. No family history of malignancy. No Known Allergies    Review of Systems: \"Per interval history; otherwise 10 point ROS is negative. \"  Her energy level is low and her sleep is fine. She doesn't have fever, chills, night sweats, cough, SOB, chest pain, hemoptysis or palpitations. Her bowels and bladder functions are normal. She doesn't have nausea, vomiting, abdominal pain, diarrhea, constipation, dysuria, loss of appetite or weight loss. She doesn't have neuropathy and she denies bleeding or clotting issues. She has cancer related mild pain in her lower back, but it is getting better. Has anxiety. No depression. The rest of the systems are unremarkable. Vital Signs: /65 (Site: Right Upper Arm, Position: Sitting, Cuff Size: Medium Adult)   Pulse 88   Temp 97.7 °F (36.5 °C) (Infrared)   Resp 18   Ht 5' 4\" (1.626 m)   Wt 153 lb (69.4 kg)   SpO2 98%   BMI 26.26 kg/m²      Physical Exam:  CONSTITUTIONAL: awake, alert, cooperative, no apparent distress   EYES: pupils equal, round and reactive to light, sclera clear, normal conjunctiva  ENT: Normocephalic, without obvious abnormality, atraumatic  NECK: supple, symmetrical, no jugular venous distension, no carotid bruits   HEMATOLOGIC/LYMPHATIC: no cervical, supraclavicular lymphadenopathy. Left axillary lymphadenopathy noted,   LUNGS: VBS, no wheezes, no increased work of breathing, no rhonchi, clear to auscultation, no crackles,    CARDIOVASCULAR: regular rate and rhythm, normal S1 and S2, no murmur noted  ABDOMEN: normal bowel sounds x 4, soft, non-distended, non-tender, no masses palpated, no hepatosplenomegaly   MUSCULOSKELETAL: full range of motion noted, tone is normal  NEUROLOGIC: awake, alert, oriented to name, place and time. Motor skills grossly intact. SKIN: appears intact, normal skin color, normal texture, normal turgor, no jaundice. EXTREMITIES: no clubbing, no LE edema, no cyanosis, no leg swelling,   BREASTS: Left breast is distorted by tumor, nipple was displaced close to lower breast crease area, it is getting softer with chemo, right breast nodularity noted,        Labs:  Hematology:  Lab Results   Component Value Date    WBC 4.3 09/07/2022    RBC 2.92 (L) 09/07/2022    HGB 9.2 (L) 09/07/2022    HCT 28.8 (L) 09/07/2022    MCV 98.6 09/07/2022    MCH 31.5 (H) 09/07/2022    MCHC 31.9 (L) 09/07/2022    RDW 16.3 (H) 09/07/2022     09/07/2022    MPV 8.6 09/07/2022    SEGSPCT 46.4 09/07/2022    EOSRELPCT 2.8 09/07/2022    BASOPCT 0.5 09/07/2022    LYMPHOPCT 43.5 09/07/2022    MONOPCT 6.8 (H) 09/07/2022    SEGSABS 2.0 09/07/2022    EOSABS 0.1 09/07/2022    BASOSABS 0.0 09/07/2022    LYMPHSABS 1.9 09/07/2022    MONOSABS 0.3 09/07/2022    DIFFTYPE AUTOMATED DIFFERENTIAL 09/07/2022     No results found for: ESR  Chemistry:  Lab Results   Component Value Date     09/07/2022    K 4.2 09/07/2022     09/07/2022    CO2 23 09/07/2022    BUN 14 09/07/2022    CREATININE 0.4 (L) 09/07/2022    GLUCOSE 83 09/07/2022    CALCIUM 8.5 09/07/2022    PROT 6.3 (L) 09/07/2022    LABALBU 4.4 09/07/2022    BILITOT 0.4 09/07/2022    ALKPHOS 122 09/07/2022    AST 17 09/07/2022    ALT 20 09/07/2022    LABGLOM >60 09/07/2022    GFRAA >60 09/07/2022    MG 2.3 07/19/2022    POCCA 1.20 08/31/2022    POCGLU 91 08/31/2022     Lab Results   Component Value Date     (H) 06/14/2022     No components found for: LD  No results found for: TSHHS, T4FREE, FT3  Immunology:  Lab Results   Component Value Date    PROT 6.3 (L) 09/07/2022    SPEP  06/14/2022     INTERPRETATION - No monoclonal proteins are detected. SAF    SPEP INTERPRETATION - Within normal limits.  SAF 06/14/2022    ALBUMINELP 3.7 06/14/2022    LABALPH 0.4 06/14/2022    LABALPH 1.3 (H) 57/99/2297    LABALPH DUPLICATE ORDER 81/44/4743    LABALPH DUPLICATE ORDER 62/66/7671    LABBETA 1.1 44/49/6958    LABBETA DUPLICATE ORDER 34/49/9267    GAMGLOB 1.1 06/14/2022     No results found for: Deangelo Harris, KLFLCR  No results found for: B2M  Coagulation Panel:  No results found for: PROTIME, INR, APTT, DDIMER  Anemia Panel:  No results found for: BPEWTFDG25, FOLATE  Tumor Markers:  Lab Results   Component Value Date     377 (H) 06/14/2022    CEA 54.5 06/14/2022     25 06/14/2022    LABCA2 45.8 (H) 08/31/2022        Observations:  No data recorded     Assessment   Left breast mass  Liver lesion and multiple bone lytic lesions - concerning for metastatic breast cancer    Plan:  Masood Cortez is a 74year-old very pleasant female who initially presented to [facility] ER on 6/12/22 with severe lower back pain and constipation. CT scan of the abdomen and pelvis done on 6/12/2022 showed lesion (6.1 x 8.1 cm) in the hepatic segment 4, concerning for neoplasm. Extensive lytic metastatic disease in lumbar and thoracic spine and pelvis with most dominant lesion seen at L5 vertebral body. CT lumbar spine done on 6/12/22 showed extensive multifocal lytic lesions concerning for neoplastic/metastatic disease. She never had colonoscopy before. She had pap smear around 2020. She didn't recall when was her last mammogram.       Digital diagnostic bilateral mammogram and bilateral ultrasound done on 6/17/2022 showed left breast mass, highly suspicious for malignancy. Right breast mass at 11:00, 1.5 cm. Mass is highly suspicious for malignancy. Left breast skin thickening. CT chest on 6/20/2022 showed suspicious heterogeneous mass primarily centered in segment 4A of the liver measuring 8.8 cm. Left breast mass with left axillary and subpectoral metastatic disease, suspected bony metastases to thoracolumbar spine and sternum and possible left chest wall invasion.     Bone scan on 6/20/2022 showed multifocal osseous metastatic disease. T12 compression fracture, concerning for pathologic fracture. MRI abdomen on 6/25/2022 showed biopsy-proven malignancy in the left breast with suspicion for inflammatory carcinoma given skin and trabecular thickening. There may be invasion of the underlying pectoralis musculature. At least 3 heavily meta stasis, the largest measuring up to 8.7 cm with the others 1 cm or less. Likely metastatic portal hepatic, retroperitoneal and right retrocrural lymph nodes. Trace left pleural effusion. Extensive skeletal metastatic disease. She underwent ultrasound-guided biopsy of the right breast mass at 11:00 and the pathology showed invasive carcinoma with ductal and lobular features. Estrogen receptor and progesterone receptor are positive. HER2 by IHC not overexpressed (score 1+). HER2 by FISH-negative. Left breast mass at 12:00 biopsy revealed grade 2 invasive lobular carcinoma. Estrogen receptor by IHC-positive [greater than 95%, average strong intensity], progesterone receptor-positive [approximately 80%, average moderate intensity], HER2 by IHC-not overexpressed [score 1+] and HER2 by FISH-negative. CARIS panel did not yield any biomarker results with a benefit of clinic at the dense 70 chance to provide an association with a particular therapy. TMB was low. PD-L1 for Elliott Castaneda was not expressed and MSI was stable. Since she has significant visceral disease, I recommend her to initiate first-line chemotherapy with single agent paclitaxel. It was started on 7/27/22. On September 21, 2022, she presented to me for follow-up. She was initially referred to me for evaluation of liver lesion and multiple bony lesion. She was found to have left breast mass with a distorted left breast on physical examination.   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 Since she has significant visceral disease, I recommend her to initiate first-line chemotherapy with single agent paclitaxel. It was started on 7/20/22. She is s/p second cycle of chemotherapy. She is tolerating current chemo quite well and she doesn't encounter any major side effects from it. Her left breast and axillary masses are getting smaller and softer. I believe she is achieving good response to chemo and I recommend her to continue with it for now. I will plan to have repeat scans after 4th cycle. Bone metastasis - we started zolendronic acid since 7/20/22. She only has minimal symptom and will not do vertebroplasty at this time. Reviewed Caris molecular panel result with her. I requested genetic counseling and testing since she has bilateral metastatic breast cancer. Malignancy related pain - will continue with norco since it has been controlling her pain well. Anxiety and insomnia - on ativan 0.5 mg nightly prn with improvement in her symptom. She is requiring less ativan since starting medical marijuana. I answered all her questions and concerns for today. Recent imaging and labs were reviewed and discussed with the patient.

## 2022-09-21 ENCOUNTER — OFFICE VISIT (OUTPATIENT)
Dept: ONCOLOGY | Age: 67
End: 2022-09-21
Payer: MEDICARE

## 2022-09-21 ENCOUNTER — HOSPITAL ENCOUNTER (OUTPATIENT)
Dept: INFUSION THERAPY | Age: 67
Discharge: HOME | End: 2022-09-21
Payer: MEDICARE

## 2022-09-21 VITALS
TEMPERATURE: 97.7 F | HEART RATE: 88 BPM | RESPIRATION RATE: 18 BRPM | WEIGHT: 153 LBS | BODY MASS INDEX: 26.12 KG/M2 | HEIGHT: 64 IN | OXYGEN SATURATION: 98 % | SYSTOLIC BLOOD PRESSURE: 134 MMHG | DIASTOLIC BLOOD PRESSURE: 65 MMHG

## 2022-09-21 DIAGNOSIS — Z17.0 MALIGNANT NEOPLASM OF OVERLAPPING SITES OF BOTH BREASTS IN FEMALE, ESTROGEN RECEPTOR POSITIVE (HCC): Primary | ICD-10-CM

## 2022-09-21 DIAGNOSIS — C50.811 MALIGNANT NEOPLASM OF OVERLAPPING SITES OF BOTH BREASTS IN FEMALE, ESTROGEN RECEPTOR POSITIVE (HCC): Primary | ICD-10-CM

## 2022-09-21 DIAGNOSIS — C50.812 MALIGNANT NEOPLASM OF OVERLAPPING SITES OF BOTH BREASTS IN FEMALE, ESTROGEN RECEPTOR POSITIVE (HCC): Primary | ICD-10-CM

## 2022-09-21 LAB
ALBUMIN SERPL-MCNC: 4.5 GM/DL (ref 3.4–5)
ALP BLD-CCNC: 126 IU/L (ref 40–128)
ALT SERPL-CCNC: 17 U/L (ref 10–40)
ANION GAP SERPL CALCULATED.3IONS-SCNC: 12 MMOL/L (ref 4–16)
AST SERPL-CCNC: 18 IU/L (ref 15–37)
BASOPHILS ABSOLUTE: 0 K/CU MM
BASOPHILS RELATIVE PERCENT: 0.4 % (ref 0–1)
BILIRUB SERPL-MCNC: 0.5 MG/DL (ref 0–1)
BUN BLDV-MCNC: 12 MG/DL (ref 6–23)
CALCIUM SERPL-MCNC: 8.9 MG/DL (ref 8.3–10.6)
CHLORIDE BLD-SCNC: 103 MMOL/L (ref 99–110)
CO2: 21 MMOL/L (ref 21–32)
CREAT SERPL-MCNC: 0.5 MG/DL (ref 0.6–1.1)
DIFFERENTIAL TYPE: ABNORMAL
EOSINOPHILS ABSOLUTE: 0.1 K/CU MM
EOSINOPHILS RELATIVE PERCENT: 1.7 % (ref 0–3)
GFR AFRICAN AMERICAN: >60 ML/MIN/1.73M2
GFR NON-AFRICAN AMERICAN: >60 ML/MIN/1.73M2
GLUCOSE BLD-MCNC: 85 MG/DL (ref 70–99)
HCT VFR BLD CALC: 30.3 % (ref 37–47)
HEMOGLOBIN: 9.6 GM/DL (ref 12.5–16)
LYMPHOCYTES ABSOLUTE: 1.7 K/CU MM
LYMPHOCYTES RELATIVE PERCENT: 35.6 % (ref 24–44)
MCH RBC QN AUTO: 31.1 PG (ref 27–31)
MCHC RBC AUTO-ENTMCNC: 31.7 % (ref 32–36)
MCV RBC AUTO: 98.1 FL (ref 78–100)
MONOCYTES ABSOLUTE: 0.4 K/CU MM
MONOCYTES RELATIVE PERCENT: 8.8 % (ref 0–4)
PDW BLD-RTO: 15.4 % (ref 11.7–14.9)
PLATELET # BLD: 318 K/CU MM (ref 140–440)
PMV BLD AUTO: 8.4 FL (ref 7.5–11.1)
POTASSIUM SERPL-SCNC: 4.2 MMOL/L (ref 3.5–5.1)
RBC # BLD: 3.09 M/CU MM (ref 4.2–5.4)
SEGMENTED NEUTROPHILS ABSOLUTE COUNT: 2.5 K/CU MM
SEGMENTED NEUTROPHILS RELATIVE PERCENT: 53.5 % (ref 36–66)
SODIUM BLD-SCNC: 136 MMOL/L (ref 135–145)
TOTAL PROTEIN: 6.2 GM/DL (ref 6.4–8.2)
WBC # BLD: 4.6 K/CU MM (ref 4–10.5)

## 2022-09-21 PROCEDURE — 2500000003 HC RX 250 WO HCPCS: Performed by: INTERNAL MEDICINE

## 2022-09-21 PROCEDURE — 1124F ACP DISCUSS-NO DSCNMKR DOCD: CPT | Performed by: INTERNAL MEDICINE

## 2022-09-21 PROCEDURE — 80053 COMPREHEN METABOLIC PANEL: CPT

## 2022-09-21 PROCEDURE — 99214 OFFICE O/P EST MOD 30 MIN: CPT | Performed by: INTERNAL MEDICINE

## 2022-09-21 PROCEDURE — 85025 COMPLETE CBC W/AUTO DIFF WBC: CPT

## 2022-09-21 PROCEDURE — 6360000002 HC RX W HCPCS: Performed by: INTERNAL MEDICINE

## 2022-09-21 PROCEDURE — 2580000003 HC RX 258: Performed by: INTERNAL MEDICINE

## 2022-09-21 PROCEDURE — 96375 TX/PRO/DX INJ NEW DRUG ADDON: CPT

## 2022-09-21 PROCEDURE — 96367 TX/PROPH/DG ADDL SEQ IV INF: CPT

## 2022-09-21 PROCEDURE — 96413 CHEMO IV INFUSION 1 HR: CPT

## 2022-09-21 RX ORDER — SODIUM CHLORIDE 9 MG/ML
INJECTION, SOLUTION INTRAVENOUS CONTINUOUS
Status: CANCELLED | OUTPATIENT
Start: 2022-11-02

## 2022-09-21 RX ORDER — ONDANSETRON 2 MG/ML
8 INJECTION INTRAMUSCULAR; INTRAVENOUS
Status: CANCELLED | OUTPATIENT
Start: 2022-10-26

## 2022-09-21 RX ORDER — HEPARIN SODIUM (PORCINE) LOCK FLUSH IV SOLN 100 UNIT/ML 100 UNIT/ML
500 SOLUTION INTRAVENOUS PRN
Status: CANCELLED | OUTPATIENT
Start: 2022-10-26

## 2022-09-21 RX ORDER — EPINEPHRINE 1 MG/ML
0.3 INJECTION, SOLUTION, CONCENTRATE INTRAVENOUS PRN
Status: CANCELLED | OUTPATIENT
Start: 2022-11-02

## 2022-09-21 RX ORDER — SODIUM CHLORIDE 9 MG/ML
INJECTION, SOLUTION INTRAVENOUS CONTINUOUS
Status: CANCELLED | OUTPATIENT
Start: 2022-10-19

## 2022-09-21 RX ORDER — DIPHENHYDRAMINE HYDROCHLORIDE 50 MG/ML
50 INJECTION INTRAMUSCULAR; INTRAVENOUS ONCE
Status: CANCELLED | OUTPATIENT
Start: 2022-10-26 | End: 2022-10-26

## 2022-09-21 RX ORDER — ACETAMINOPHEN 325 MG/1
650 TABLET ORAL
Status: CANCELLED | OUTPATIENT
Start: 2022-10-19

## 2022-09-21 RX ORDER — MEPERIDINE HYDROCHLORIDE 50 MG/ML
12.5 INJECTION INTRAMUSCULAR; INTRAVENOUS; SUBCUTANEOUS PRN
Status: CANCELLED | OUTPATIENT
Start: 2022-11-02

## 2022-09-21 RX ORDER — DIPHENHYDRAMINE HYDROCHLORIDE 50 MG/ML
50 INJECTION INTRAMUSCULAR; INTRAVENOUS ONCE
Status: COMPLETED | OUTPATIENT
Start: 2022-09-21 | End: 2022-09-21

## 2022-09-21 RX ORDER — SODIUM CHLORIDE 9 MG/ML
5-40 INJECTION INTRAVENOUS PRN
Status: CANCELLED | OUTPATIENT
Start: 2022-10-26

## 2022-09-21 RX ORDER — FAMOTIDINE 10 MG/ML
20 INJECTION, SOLUTION INTRAVENOUS ONCE
Status: COMPLETED | OUTPATIENT
Start: 2022-09-21 | End: 2022-09-21

## 2022-09-21 RX ORDER — SODIUM CHLORIDE 9 MG/ML
5-40 INJECTION INTRAVENOUS PRN
Status: CANCELLED | OUTPATIENT
Start: 2022-11-02

## 2022-09-21 RX ORDER — SODIUM CHLORIDE 9 MG/ML
5-250 INJECTION, SOLUTION INTRAVENOUS PRN
Status: CANCELLED | OUTPATIENT
Start: 2022-10-26

## 2022-09-21 RX ORDER — SODIUM CHLORIDE 9 MG/ML
5-250 INJECTION, SOLUTION INTRAVENOUS PRN
Status: CANCELLED | OUTPATIENT
Start: 2022-10-19

## 2022-09-21 RX ORDER — ALBUTEROL SULFATE 90 UG/1
4 AEROSOL, METERED RESPIRATORY (INHALATION) PRN
Status: CANCELLED | OUTPATIENT
Start: 2022-10-26

## 2022-09-21 RX ORDER — HEPARIN SODIUM (PORCINE) LOCK FLUSH IV SOLN 100 UNIT/ML 100 UNIT/ML
500 SOLUTION INTRAVENOUS PRN
Status: DISCONTINUED | OUTPATIENT
Start: 2022-09-21 | End: 2022-09-22 | Stop reason: HOSPADM

## 2022-09-21 RX ORDER — SODIUM CHLORIDE 0.9 % (FLUSH) 0.9 %
5-40 SYRINGE (ML) INJECTION PRN
Status: CANCELLED | OUTPATIENT
Start: 2022-10-26

## 2022-09-21 RX ORDER — FAMOTIDINE 10 MG/ML
20 INJECTION, SOLUTION INTRAVENOUS ONCE
Status: CANCELLED | OUTPATIENT
Start: 2022-10-19 | End: 2022-10-19

## 2022-09-21 RX ORDER — HEPARIN SODIUM (PORCINE) LOCK FLUSH IV SOLN 100 UNIT/ML 100 UNIT/ML
500 SOLUTION INTRAVENOUS PRN
Status: CANCELLED | OUTPATIENT
Start: 2022-10-19

## 2022-09-21 RX ORDER — EPINEPHRINE 1 MG/ML
0.3 INJECTION, SOLUTION, CONCENTRATE INTRAVENOUS PRN
Status: CANCELLED | OUTPATIENT
Start: 2022-10-19

## 2022-09-21 RX ORDER — DIPHENHYDRAMINE HYDROCHLORIDE 50 MG/ML
50 INJECTION INTRAMUSCULAR; INTRAVENOUS
Status: CANCELLED | OUTPATIENT
Start: 2022-10-19

## 2022-09-21 RX ORDER — FAMOTIDINE 10 MG/ML
20 INJECTION, SOLUTION INTRAVENOUS ONCE
Status: CANCELLED | OUTPATIENT
Start: 2022-11-02 | End: 2022-11-02

## 2022-09-21 RX ORDER — DIPHENHYDRAMINE HYDROCHLORIDE 50 MG/ML
50 INJECTION INTRAMUSCULAR; INTRAVENOUS ONCE
Status: CANCELLED | OUTPATIENT
Start: 2022-11-02 | End: 2022-11-02

## 2022-09-21 RX ORDER — FAMOTIDINE 10 MG/ML
20 INJECTION, SOLUTION INTRAVENOUS ONCE
Status: CANCELLED | OUTPATIENT
Start: 2022-10-26 | End: 2022-10-26

## 2022-09-21 RX ORDER — SODIUM CHLORIDE 0.9 % (FLUSH) 0.9 %
5-40 SYRINGE (ML) INJECTION PRN
Status: CANCELLED | OUTPATIENT
Start: 2022-11-02

## 2022-09-21 RX ORDER — FAMOTIDINE 10 MG/ML
20 INJECTION, SOLUTION INTRAVENOUS
Status: CANCELLED | OUTPATIENT
Start: 2022-10-26

## 2022-09-21 RX ORDER — HEPARIN SODIUM (PORCINE) LOCK FLUSH IV SOLN 100 UNIT/ML 100 UNIT/ML
500 SOLUTION INTRAVENOUS PRN
Status: CANCELLED | OUTPATIENT
Start: 2022-11-02

## 2022-09-21 RX ORDER — ONDANSETRON 2 MG/ML
8 INJECTION INTRAMUSCULAR; INTRAVENOUS
Status: CANCELLED | OUTPATIENT
Start: 2022-11-02

## 2022-09-21 RX ORDER — EPINEPHRINE 1 MG/ML
0.3 INJECTION, SOLUTION, CONCENTRATE INTRAVENOUS PRN
Status: CANCELLED | OUTPATIENT
Start: 2022-10-26

## 2022-09-21 RX ORDER — SODIUM CHLORIDE 0.9 % (FLUSH) 0.9 %
5-40 SYRINGE (ML) INJECTION PRN
Status: CANCELLED | OUTPATIENT
Start: 2022-10-19

## 2022-09-21 RX ORDER — DIPHENHYDRAMINE HYDROCHLORIDE 50 MG/ML
50 INJECTION INTRAMUSCULAR; INTRAVENOUS
Status: CANCELLED | OUTPATIENT
Start: 2022-11-02

## 2022-09-21 RX ORDER — ACETAMINOPHEN 325 MG/1
650 TABLET ORAL
Status: CANCELLED | OUTPATIENT
Start: 2022-11-02

## 2022-09-21 RX ORDER — SODIUM CHLORIDE 9 MG/ML
INJECTION, SOLUTION INTRAVENOUS CONTINUOUS
Status: CANCELLED | OUTPATIENT
Start: 2022-10-26

## 2022-09-21 RX ORDER — ALBUTEROL SULFATE 90 UG/1
4 AEROSOL, METERED RESPIRATORY (INHALATION) PRN
Status: CANCELLED | OUTPATIENT
Start: 2022-11-02

## 2022-09-21 RX ORDER — FAMOTIDINE 10 MG/ML
20 INJECTION, SOLUTION INTRAVENOUS
Status: CANCELLED | OUTPATIENT
Start: 2022-10-19

## 2022-09-21 RX ORDER — SODIUM CHLORIDE 9 MG/ML
5-250 INJECTION, SOLUTION INTRAVENOUS PRN
Status: CANCELLED | OUTPATIENT
Start: 2022-11-02

## 2022-09-21 RX ORDER — SODIUM CHLORIDE 9 MG/ML
5-250 INJECTION, SOLUTION INTRAVENOUS PRN
Status: DISCONTINUED | OUTPATIENT
Start: 2022-09-21 | End: 2022-09-22 | Stop reason: HOSPADM

## 2022-09-21 RX ORDER — DIPHENHYDRAMINE HYDROCHLORIDE 50 MG/ML
50 INJECTION INTRAMUSCULAR; INTRAVENOUS
Status: CANCELLED | OUTPATIENT
Start: 2022-10-26

## 2022-09-21 RX ORDER — MEPERIDINE HYDROCHLORIDE 50 MG/ML
12.5 INJECTION INTRAMUSCULAR; INTRAVENOUS; SUBCUTANEOUS PRN
Status: CANCELLED | OUTPATIENT
Start: 2022-10-26

## 2022-09-21 RX ORDER — ALBUTEROL SULFATE 90 UG/1
4 AEROSOL, METERED RESPIRATORY (INHALATION) PRN
Status: CANCELLED | OUTPATIENT
Start: 2022-10-19

## 2022-09-21 RX ORDER — ONDANSETRON 2 MG/ML
8 INJECTION INTRAMUSCULAR; INTRAVENOUS
Status: CANCELLED | OUTPATIENT
Start: 2022-10-19

## 2022-09-21 RX ORDER — MEPERIDINE HYDROCHLORIDE 50 MG/ML
12.5 INJECTION INTRAMUSCULAR; INTRAVENOUS; SUBCUTANEOUS PRN
Status: CANCELLED | OUTPATIENT
Start: 2022-10-19

## 2022-09-21 RX ORDER — FAMOTIDINE 10 MG/ML
20 INJECTION, SOLUTION INTRAVENOUS
Status: CANCELLED | OUTPATIENT
Start: 2022-11-02

## 2022-09-21 RX ORDER — SODIUM CHLORIDE 0.9 % (FLUSH) 0.9 %
5-40 SYRINGE (ML) INJECTION PRN
Status: DISCONTINUED | OUTPATIENT
Start: 2022-09-21 | End: 2022-09-22 | Stop reason: HOSPADM

## 2022-09-21 RX ORDER — ACETAMINOPHEN 325 MG/1
650 TABLET ORAL
Status: CANCELLED | OUTPATIENT
Start: 2022-10-26

## 2022-09-21 RX ORDER — DIPHENHYDRAMINE HYDROCHLORIDE 50 MG/ML
50 INJECTION INTRAMUSCULAR; INTRAVENOUS ONCE
Status: CANCELLED | OUTPATIENT
Start: 2022-10-19 | End: 2022-10-19

## 2022-09-21 RX ORDER — SODIUM CHLORIDE 9 MG/ML
5-40 INJECTION INTRAVENOUS PRN
Status: CANCELLED | OUTPATIENT
Start: 2022-10-19

## 2022-09-21 RX ADMIN — PACLITAXEL 144 MG: 6 INJECTION, SOLUTION INTRAVENOUS at 11:18

## 2022-09-21 RX ADMIN — HEPARIN 500 UNITS: 100 SYRINGE at 12:28

## 2022-09-21 RX ADMIN — DEXAMETHASONE SODIUM PHOSPHATE 10 MG: 10 INJECTION, SOLUTION INTRAMUSCULAR; INTRAVENOUS at 10:53

## 2022-09-21 RX ADMIN — SODIUM CHLORIDE, PRESERVATIVE FREE 10 ML: 5 INJECTION INTRAVENOUS at 12:28

## 2022-09-21 RX ADMIN — SODIUM CHLORIDE 20 ML/HR: 9 INJECTION, SOLUTION INTRAVENOUS at 10:51

## 2022-09-21 RX ADMIN — FAMOTIDINE 20 MG: 10 INJECTION, SOLUTION INTRAVENOUS at 10:51

## 2022-09-21 RX ADMIN — DIPHENHYDRAMINE HYDROCHLORIDE 50 MG: 50 INJECTION INTRAMUSCULAR; INTRAVENOUS at 10:51

## 2022-09-21 NOTE — PROGRESS NOTES
Pt here for chemotherapy after OV. Port accessed with blood return. Labs sent. Pt has no complaints. Tx released. Patient's status assessed and documented appropriately. All labs and required results were also reviewed today. Treatment parameters have been reviewed. Today's treatment has been approved by the provider. Treatment orders and medication sequencing (when applicable) was verified by 2 registered nurses. The treatment plan was confirmed with the patient prior to administration, and the patient understands the need to report any treatment-related symptoms. Prior to administration, when applicable, the following 8 elements of medication administration were reviewed with 2nd Registered Nurse prior to dosing: drug name, drug dose, infusion volume when prepared in a syringe, rate of administration, expiration dates and/or times, appearance and integrity of drug(s), and rate of pump for infusion. The 5 rights of medication administration have been verified. Pt tolerated tx with no complaints.   Left ambulatory discharge instructions given

## 2022-09-21 NOTE — PROGRESS NOTES
MA Rooming Questions  Patient: Misty Mclean  MRN: [id number]    Date: 9/21/2022        1. Do you have any new issues?   no         2. Do you need any refills on medications?    no    3. Have you had any imaging done since your last visit?   no    4. Have you been hospitalized or seen in the emergency room since your last visit here?   no    5. Did the patient have a depression screening completed today?  No    No data recorded     PHQ-9 Given to (if applicable):               PHQ-9 Score (if applicable):                     [] Positive     []  Negative              Does question #9 need addressed (if applicable)                     [] Yes    []  No               Jonna Moon MA

## 2022-09-26 DIAGNOSIS — C50.811 MALIGNANT NEOPLASM OF OVERLAPPING SITES OF BOTH BREASTS IN FEMALE, ESTROGEN RECEPTOR POSITIVE (HCC): Primary | ICD-10-CM

## 2022-09-26 DIAGNOSIS — Z17.0 MALIGNANT NEOPLASM OF OVERLAPPING SITES OF BOTH BREASTS IN FEMALE, ESTROGEN RECEPTOR POSITIVE (HCC): Primary | ICD-10-CM

## 2022-09-26 DIAGNOSIS — C79.51 BONE METASTASIS (HCC): ICD-10-CM

## 2022-09-26 DIAGNOSIS — C50.812 MALIGNANT NEOPLASM OF OVERLAPPING SITES OF BOTH BREASTS IN FEMALE, ESTROGEN RECEPTOR POSITIVE (HCC): Primary | ICD-10-CM

## 2022-09-26 RX ORDER — ONDANSETRON 2 MG/ML
8 INJECTION INTRAMUSCULAR; INTRAVENOUS
Status: CANCELLED | OUTPATIENT
Start: 2022-09-28

## 2022-09-26 RX ORDER — SODIUM CHLORIDE 9 MG/ML
5-250 INJECTION, SOLUTION INTRAVENOUS PRN
Status: CANCELLED | OUTPATIENT
Start: 2022-09-28

## 2022-09-26 RX ORDER — SODIUM CHLORIDE 0.9 % (FLUSH) 0.9 %
5-40 SYRINGE (ML) INJECTION PRN
Status: CANCELLED | OUTPATIENT
Start: 2022-09-28

## 2022-09-26 RX ORDER — DIPHENHYDRAMINE HYDROCHLORIDE 50 MG/ML
50 INJECTION INTRAMUSCULAR; INTRAVENOUS
Status: CANCELLED | OUTPATIENT
Start: 2022-09-28

## 2022-09-26 RX ORDER — ALBUTEROL SULFATE 90 UG/1
4 AEROSOL, METERED RESPIRATORY (INHALATION) PRN
Status: CANCELLED | OUTPATIENT
Start: 2022-09-28

## 2022-09-26 RX ORDER — EPINEPHRINE 1 MG/ML
0.3 INJECTION, SOLUTION, CONCENTRATE INTRAVENOUS PRN
Status: CANCELLED | OUTPATIENT
Start: 2022-09-28

## 2022-09-26 RX ORDER — SODIUM CHLORIDE 9 MG/ML
INJECTION, SOLUTION INTRAVENOUS CONTINUOUS
Status: CANCELLED | OUTPATIENT
Start: 2022-09-28

## 2022-09-26 RX ORDER — ACETAMINOPHEN 325 MG/1
650 TABLET ORAL
Status: CANCELLED | OUTPATIENT
Start: 2022-09-28

## 2022-09-26 RX ORDER — HEPARIN SODIUM (PORCINE) LOCK FLUSH IV SOLN 100 UNIT/ML 100 UNIT/ML
500 SOLUTION INTRAVENOUS PRN
Status: CANCELLED | OUTPATIENT
Start: 2022-09-28

## 2022-09-26 RX ORDER — SODIUM CHLORIDE 9 MG/ML
5-40 INJECTION INTRAVENOUS PRN
Status: CANCELLED | OUTPATIENT
Start: 2022-09-28

## 2022-09-26 RX ORDER — MEPERIDINE HYDROCHLORIDE 25 MG/ML
12.5 INJECTION INTRAMUSCULAR; INTRAVENOUS; SUBCUTANEOUS PRN
Status: CANCELLED | OUTPATIENT
Start: 2022-09-28

## 2022-09-28 ENCOUNTER — HOSPITAL ENCOUNTER (OUTPATIENT)
Dept: INFUSION THERAPY | Age: 67
Discharge: HOME | End: 2022-09-28
Payer: MEDICARE

## 2022-09-28 VITALS
SYSTOLIC BLOOD PRESSURE: 146 MMHG | BODY MASS INDEX: 26.6 KG/M2 | WEIGHT: 155.8 LBS | OXYGEN SATURATION: 93 % | HEIGHT: 64 IN | TEMPERATURE: 97.8 F | HEART RATE: 93 BPM | DIASTOLIC BLOOD PRESSURE: 88 MMHG

## 2022-09-28 DIAGNOSIS — Z17.0 MALIGNANT NEOPLASM OF OVERLAPPING SITES OF BOTH BREASTS IN FEMALE, ESTROGEN RECEPTOR POSITIVE (HCC): Primary | ICD-10-CM

## 2022-09-28 DIAGNOSIS — C50.811 MALIGNANT NEOPLASM OF OVERLAPPING SITES OF BOTH BREASTS IN FEMALE, ESTROGEN RECEPTOR POSITIVE (HCC): Primary | ICD-10-CM

## 2022-09-28 DIAGNOSIS — C50.812 MALIGNANT NEOPLASM OF OVERLAPPING SITES OF BOTH BREASTS IN FEMALE, ESTROGEN RECEPTOR POSITIVE (HCC): Primary | ICD-10-CM

## 2022-09-28 DIAGNOSIS — C79.51 BONE METASTASIS (HCC): ICD-10-CM

## 2022-09-28 LAB
BASOPHILS ABSOLUTE: 0 K/CU MM
BASOPHILS RELATIVE PERCENT: 0.4 % (ref 0–1)
DIFFERENTIAL TYPE: ABNORMAL
EOSINOPHILS ABSOLUTE: 0.2 K/CU MM
EOSINOPHILS RELATIVE PERCENT: 3.2 % (ref 0–3)
GFR AFRICAN AMERICAN: >60 ML/MIN/1.73M2
GFR NON-AFRICAN AMERICAN: >60 ML/MIN/1.73M2
GLUCOSE BLD-MCNC: 85 MG/DL (ref 70–99)
HCT VFR BLD CALC: 28.9 % (ref 37–47)
HEMOGLOBIN: 9.2 GM/DL (ref 12.5–16)
LYMPHOCYTES ABSOLUTE: 1.9 K/CU MM
LYMPHOCYTES RELATIVE PERCENT: 36.6 % (ref 24–44)
MCH RBC QN AUTO: 31.5 PG (ref 27–31)
MCHC RBC AUTO-ENTMCNC: 31.8 % (ref 32–36)
MCV RBC AUTO: 99 FL (ref 78–100)
MONOCYTES ABSOLUTE: 0.4 K/CU MM
MONOCYTES RELATIVE PERCENT: 7.2 % (ref 0–4)
PDW BLD-RTO: 15.2 % (ref 11.7–14.9)
PLATELET # BLD: 302 K/CU MM (ref 140–440)
PMV BLD AUTO: 8.7 FL (ref 7.5–11.1)
POC BUN: 14 MG/DL (ref 8–26)
POC CALCIUM: 1.28 MMOL/L (ref 1.12–1.32)
POC CHLORIDE: 109 MMOL/L (ref 98–109)
POC CO2: 25 MMOL/L (ref 21–32)
POC CREATININE: 0.5 MG/DL (ref 0.6–1.1)
POTASSIUM SERPL-SCNC: 3.8 MMOL/L (ref 3.5–4.5)
RBC # BLD: 2.92 M/CU MM (ref 4.2–5.4)
SEGMENTED NEUTROPHILS ABSOLUTE COUNT: 2.8 K/CU MM
SEGMENTED NEUTROPHILS RELATIVE PERCENT: 52.6 % (ref 36–66)
SODIUM BLD-SCNC: 143 MMOL/L (ref 138–146)
SOURCE, BLOOD GAS: ABNORMAL
WBC # BLD: 5.3 K/CU MM (ref 4–10.5)

## 2022-09-28 PROCEDURE — 2500000003 HC RX 250 WO HCPCS: Performed by: INTERNAL MEDICINE

## 2022-09-28 PROCEDURE — 6360000002 HC RX W HCPCS: Performed by: INTERNAL MEDICINE

## 2022-09-28 PROCEDURE — 2580000003 HC RX 258: Performed by: INTERNAL MEDICINE

## 2022-09-28 PROCEDURE — 96413 CHEMO IV INFUSION 1 HR: CPT

## 2022-09-28 PROCEDURE — 96375 TX/PRO/DX INJ NEW DRUG ADDON: CPT

## 2022-09-28 PROCEDURE — 96367 TX/PROPH/DG ADDL SEQ IV INF: CPT

## 2022-09-28 PROCEDURE — 85025 COMPLETE CBC W/AUTO DIFF WBC: CPT

## 2022-09-28 RX ORDER — DIPHENHYDRAMINE HYDROCHLORIDE 50 MG/ML
50 INJECTION INTRAMUSCULAR; INTRAVENOUS ONCE
Status: COMPLETED | OUTPATIENT
Start: 2022-09-28 | End: 2022-09-28

## 2022-09-28 RX ORDER — SODIUM CHLORIDE 0.9 % (FLUSH) 0.9 %
5-40 SYRINGE (ML) INJECTION PRN
Status: DISCONTINUED | OUTPATIENT
Start: 2022-09-28 | End: 2022-09-28 | Stop reason: SDUPTHER

## 2022-09-28 RX ORDER — SODIUM CHLORIDE 9 MG/ML
5-250 INJECTION, SOLUTION INTRAVENOUS PRN
Status: DISCONTINUED | OUTPATIENT
Start: 2022-09-28 | End: 2022-09-28 | Stop reason: SDUPTHER

## 2022-09-28 RX ORDER — SODIUM CHLORIDE 9 MG/ML
5-250 INJECTION, SOLUTION INTRAVENOUS PRN
Status: DISCONTINUED | OUTPATIENT
Start: 2022-09-28 | End: 2022-09-29 | Stop reason: HOSPADM

## 2022-09-28 RX ORDER — FAMOTIDINE 10 MG/ML
20 INJECTION, SOLUTION INTRAVENOUS ONCE
Status: COMPLETED | OUTPATIENT
Start: 2022-09-28 | End: 2022-09-28

## 2022-09-28 RX ORDER — HEPARIN SODIUM (PORCINE) LOCK FLUSH IV SOLN 100 UNIT/ML 100 UNIT/ML
500 SOLUTION INTRAVENOUS PRN
Status: DISCONTINUED | OUTPATIENT
Start: 2022-09-28 | End: 2022-09-28 | Stop reason: SDUPTHER

## 2022-09-28 RX ORDER — SODIUM CHLORIDE 0.9 % (FLUSH) 0.9 %
5-40 SYRINGE (ML) INJECTION PRN
Status: DISCONTINUED | OUTPATIENT
Start: 2022-09-28 | End: 2022-09-29 | Stop reason: HOSPADM

## 2022-09-28 RX ORDER — HEPARIN SODIUM (PORCINE) LOCK FLUSH IV SOLN 100 UNIT/ML 100 UNIT/ML
500 SOLUTION INTRAVENOUS PRN
Status: DISCONTINUED | OUTPATIENT
Start: 2022-09-28 | End: 2022-09-29 | Stop reason: HOSPADM

## 2022-09-28 RX ADMIN — DEXAMETHASONE SODIUM PHOSPHATE 10 MG: 10 INJECTION INTRAMUSCULAR; INTRAVENOUS at 11:58

## 2022-09-28 RX ADMIN — SODIUM CHLORIDE 20 ML/HR: 9 INJECTION, SOLUTION INTRAVENOUS at 11:47

## 2022-09-28 RX ADMIN — DIPHENHYDRAMINE HYDROCHLORIDE 50 MG: 50 INJECTION INTRAMUSCULAR; INTRAVENOUS at 11:54

## 2022-09-28 RX ADMIN — FAMOTIDINE 20 MG: 10 INJECTION, SOLUTION INTRAVENOUS at 11:50

## 2022-09-28 RX ADMIN — HEPARIN 500 UNITS: 100 SYRINGE at 13:55

## 2022-09-28 RX ADMIN — PACLITAXEL 144 MG: 6 INJECTION, SOLUTION INTRAVENOUS at 12:22

## 2022-09-28 RX ADMIN — ZOLEDRONIC ACID 4 MG: 4 INJECTION INTRAVENOUS at 13:32

## 2022-09-28 NOTE — PROGRESS NOTES
Ambulated to treatment suite for Taxol and Zometa treatment today. Right Mediport accessed per protocol. Positive blood return noted. Labs obtained. Flushed and locked with normal saline. Assessment complete. Denies concerns. Labs reviewed and resulted. Treatment plan approved and released. Treatment complete. Pt tolerated well. Mediport deaccessed per protocol. AVS provided. Discharge to [facility] in stable condition.   Lenin Davis RN

## 2022-09-29 DIAGNOSIS — C50.812 MALIGNANT NEOPLASM OF OVERLAPPING SITES OF BOTH BREASTS IN FEMALE, ESTROGEN RECEPTOR POSITIVE (HCC): Primary | ICD-10-CM

## 2022-09-29 DIAGNOSIS — C50.811 MALIGNANT NEOPLASM OF OVERLAPPING SITES OF BOTH BREASTS IN FEMALE, ESTROGEN RECEPTOR POSITIVE (HCC): Primary | ICD-10-CM

## 2022-09-29 DIAGNOSIS — Z17.0 MALIGNANT NEOPLASM OF OVERLAPPING SITES OF BOTH BREASTS IN FEMALE, ESTROGEN RECEPTOR POSITIVE (HCC): Primary | ICD-10-CM

## 2022-09-29 RX ORDER — HYDROCODONE BITARTRATE AND ACETAMINOPHEN 5; 325 MG/1; MG/1
1 TABLET ORAL EVERY 4 HOURS PRN
Qty: 90 TABLET | Refills: 0 | Status: SHIPPED | OUTPATIENT
Start: 2022-09-29 | End: 2022-10-14

## 2022-10-05 ENCOUNTER — HOSPITAL ENCOUNTER (OUTPATIENT)
Dept: INFUSION THERAPY | Age: 67
Discharge: HOME | End: 2022-10-05
Payer: MEDICARE

## 2022-10-05 VITALS
SYSTOLIC BLOOD PRESSURE: 147 MMHG | HEART RATE: 97 BPM | DIASTOLIC BLOOD PRESSURE: 82 MMHG | BODY MASS INDEX: 26.6 KG/M2 | HEIGHT: 64 IN | WEIGHT: 155.8 LBS | TEMPERATURE: 96.3 F | RESPIRATION RATE: 18 BRPM | OXYGEN SATURATION: 98 %

## 2022-10-05 DIAGNOSIS — C50.812 MALIGNANT NEOPLASM OF OVERLAPPING SITES OF BOTH BREASTS IN FEMALE, ESTROGEN RECEPTOR POSITIVE (HCC): Primary | ICD-10-CM

## 2022-10-05 DIAGNOSIS — Z17.0 MALIGNANT NEOPLASM OF OVERLAPPING SITES OF BOTH BREASTS IN FEMALE, ESTROGEN RECEPTOR POSITIVE (HCC): Primary | ICD-10-CM

## 2022-10-05 DIAGNOSIS — C50.811 MALIGNANT NEOPLASM OF OVERLAPPING SITES OF BOTH BREASTS IN FEMALE, ESTROGEN RECEPTOR POSITIVE (HCC): Primary | ICD-10-CM

## 2022-10-05 LAB
BASOPHILS ABSOLUTE: 0 K/CU MM
BASOPHILS RELATIVE PERCENT: 0.2 % (ref 0–1)
DIFFERENTIAL TYPE: ABNORMAL
EOSINOPHILS ABSOLUTE: 0.2 K/CU MM
EOSINOPHILS RELATIVE PERCENT: 3.5 % (ref 0–3)
HCT VFR BLD CALC: 29.6 % (ref 37–47)
HEMOGLOBIN: 9.3 GM/DL (ref 12.5–16)
LYMPHOCYTES ABSOLUTE: 1.7 K/CU MM
LYMPHOCYTES RELATIVE PERCENT: 34.8 % (ref 24–44)
MCH RBC QN AUTO: 30.9 PG (ref 27–31)
MCHC RBC AUTO-ENTMCNC: 31.4 % (ref 32–36)
MCV RBC AUTO: 98.3 FL (ref 78–100)
MONOCYTES ABSOLUTE: 0.4 K/CU MM
MONOCYTES RELATIVE PERCENT: 7.6 % (ref 0–4)
PDW BLD-RTO: 15.4 % (ref 11.7–14.9)
PLATELET # BLD: 294 K/CU MM (ref 140–440)
PMV BLD AUTO: 8.4 FL (ref 7.5–11.1)
RBC # BLD: 3.01 M/CU MM (ref 4.2–5.4)
SEGMENTED NEUTROPHILS ABSOLUTE COUNT: 2.6 K/CU MM
SEGMENTED NEUTROPHILS RELATIVE PERCENT: 53.9 % (ref 36–66)
WBC # BLD: 4.9 K/CU MM (ref 4–10.5)

## 2022-10-05 PROCEDURE — 2580000003 HC RX 258: Performed by: INTERNAL MEDICINE

## 2022-10-05 PROCEDURE — 96413 CHEMO IV INFUSION 1 HR: CPT

## 2022-10-05 PROCEDURE — 85025 COMPLETE CBC W/AUTO DIFF WBC: CPT

## 2022-10-05 PROCEDURE — 2500000003 HC RX 250 WO HCPCS: Performed by: INTERNAL MEDICINE

## 2022-10-05 PROCEDURE — 96375 TX/PRO/DX INJ NEW DRUG ADDON: CPT

## 2022-10-05 PROCEDURE — 96367 TX/PROPH/DG ADDL SEQ IV INF: CPT

## 2022-10-05 PROCEDURE — 6360000002 HC RX W HCPCS: Performed by: INTERNAL MEDICINE

## 2022-10-05 PROCEDURE — 36591 DRAW BLOOD OFF VENOUS DEVICE: CPT

## 2022-10-05 RX ORDER — FAMOTIDINE 10 MG/ML
20 INJECTION, SOLUTION INTRAVENOUS ONCE
Status: COMPLETED | OUTPATIENT
Start: 2022-10-05 | End: 2022-10-05

## 2022-10-05 RX ORDER — SODIUM CHLORIDE 0.9 % (FLUSH) 0.9 %
5-40 SYRINGE (ML) INJECTION PRN
Status: DISCONTINUED | OUTPATIENT
Start: 2022-10-05 | End: 2022-10-06 | Stop reason: HOSPADM

## 2022-10-05 RX ORDER — DIPHENHYDRAMINE HYDROCHLORIDE 50 MG/ML
50 INJECTION INTRAMUSCULAR; INTRAVENOUS ONCE
Status: COMPLETED | OUTPATIENT
Start: 2022-10-05 | End: 2022-10-05

## 2022-10-05 RX ORDER — HEPARIN SODIUM (PORCINE) LOCK FLUSH IV SOLN 100 UNIT/ML 100 UNIT/ML
500 SOLUTION INTRAVENOUS PRN
Status: DISCONTINUED | OUTPATIENT
Start: 2022-10-05 | End: 2022-10-06 | Stop reason: HOSPADM

## 2022-10-05 RX ORDER — SODIUM CHLORIDE 9 MG/ML
5-250 INJECTION, SOLUTION INTRAVENOUS PRN
Status: DISCONTINUED | OUTPATIENT
Start: 2022-10-05 | End: 2022-10-06 | Stop reason: HOSPADM

## 2022-10-05 RX ADMIN — DIPHENHYDRAMINE HYDROCHLORIDE 50 MG: 50 INJECTION INTRAMUSCULAR; INTRAVENOUS at 11:28

## 2022-10-05 RX ADMIN — SODIUM CHLORIDE, PRESERVATIVE FREE 10 ML: 5 INJECTION INTRAVENOUS at 12:54

## 2022-10-05 RX ADMIN — HEPARIN 500 UNITS: 100 SYRINGE at 12:54

## 2022-10-05 RX ADMIN — DEXAMETHASONE SODIUM PHOSPHATE 10 MG: 10 INJECTION INTRAMUSCULAR; INTRAVENOUS at 11:29

## 2022-10-05 RX ADMIN — SODIUM CHLORIDE 20 ML/HR: 9 INJECTION, SOLUTION INTRAVENOUS at 11:27

## 2022-10-05 RX ADMIN — FAMOTIDINE 20 MG: 10 INJECTION, SOLUTION INTRAVENOUS at 11:28

## 2022-10-05 RX ADMIN — PACLITAXEL 144 MG: 6 INJECTION, SOLUTION INTRAVENOUS at 11:46

## 2022-10-05 NOTE — PROGRESS NOTES
Patient arrived to treatment suite for blood draw, pre-medications and chemotherapy infusion. Right chest mediport accessed and blood drawn from site and sent to lab for processing. Patient states no questions or concerns for the doctor at this time. Treatment approved and given. Patient tolerated well. Left treatment suite ambulatory. Discharge instructions provided. Patient's status assessed and documented appropriately. All labs and required results were also reviewed today. Treatment parameters have been reviewed. Today's treatment has been approved by the provider. Treatment orders and medication sequencing (when applicable) was verified by 2 registered nurses. The treatment plan was confirmed with the patient prior to administration, and the patient understands the need to report any treatment-related symptoms. Prior to administration, when applicable, the following 8 elements of medication administration were reviewed with 2nd Registered Nurse prior to dosing: drug name, drug dose, infusion volume when prepared in a syringe, rate of administration, expiration dates and/or times, appearance and integrity of drug(s), and rate of pump for infusion. The 5 rights of medication administration have been verified.

## 2022-10-12 NOTE — PROGRESS NOTES
Patient Name:  Katina Carolina  Patient :  1955  Patient MRN:  [id number]     Primary Oncologist: Lucho Aldridge MD  Referring Provider: MAURICIO Crystal CNP     Date of Service: 10/19/2022      Chief Complaint:    Chief Complaint   Patient presents with    Follow-up    Chemotherapy     Patient's active problem list:       Liver mass       Lytic bone lesions       Left breast mass    HPI:   Candida Allen is a 74year-old very pleasant female with medical history significant for osteoarthritis, initially referred to me on 22 for evaluation of her liver lesion and multiple lytic bone lesions. She initially presented to [facility] ER on 22 with severe lower back pain and constipation. Stated that she has been having back pain for about 4 - 6 weeks duration, which becomes severe pain started a week before presentation. CT scan of the abdomen and pelvis done on 2022 showed lesion (6.1 x 8.1 cm) in the hepatic segment 4, concerning for neoplasm. Further evaluation with MRI of the liver is recommended. Extensive lytic metastatic disease in lumbar and thoracic spine and pelvis with most dominant lesion seen at L5 vertebral body. Several small bowel loops segments in the left abdomen demonstrate mild wall thickening, nonspecific may indicate enteritis. Nonspecific partially visualized inflammatory stranding along the pericardium. 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 CT lumbar spine done on 22 showed extensive multifocal lytic lesions concerning for neoplastic/metastatic disease. Age indeterminant compression fractures at T12 and L4, with mild narrowing of the AP diameter of the canal at L4. She never had colonoscopy before. She had pap smear around . She didn't recall when was her last mammogram.     She denies weight loss. She hasn't been eating much for about 10 days since she has been having nausea.      Since she is found to have multiple lytic bone lesions and liver lesion, she was referred to me for further evaluation. Digital diagnostic bilateral mammogram and bilateral ultrasound done on 6/17/2022 showed left breast mass, highly suspicious for malignancy. Right breast mass at 11:00, 1.5 cm. Mass is highly suspicious for malignancy. Left breast skin thickening. CT chest on 6/20/2022 showed suspicious heterogeneous mass primarily centered in segment 4A of the liver measuring 8.8 cm. Left breast mass with left axillary and subpectoral metastatic disease, suspected bony metastases to thoracolumbar spine and sternum and possible left chest wall invasion. Bone scan on 6/20/2022 showed multifocal osseous metastatic disease. T12 compression fracture, concerning for pathologic fracture. MRI abdomen on 6/25/2022 showed biopsy-proven malignancy in the left breast with suspicion for inflammatory carcinoma given skin and trabecular thickening. There may be invasion of the underlying pectoralis musculature. At least 3 heavily meta stasis, the largest measuring up to 8.7 cm with the others 1 cm or less. Likely metastatic portal hepatic, retroperitoneal and right retrocrural lymph nodes. Trace left pleural effusion. Extensive skeletal metastatic disease. She underwent ultrasound-guided biopsy of the right breast mass at 11:00 and the pathology showed invasive carcinoma with ductal and lobular features. Estrogen receptor and progesterone receptor are positive. HER2 by IHC not overexpressed (score 1+). HER2 by FISH-negative. Left breast mass at 12:00 biopsy revealed grade 2 invasive lobular carcinoma. Estrogen receptor by IHC-positive [greater than 95%, average strong intensity], progesterone receptor-positive [approximately 80%, average moderate intensity], HER2 by IHC-not overexpressed [score 1+] and HER2 by FISH-negative.     CARIS panel did not yield any biomarker results with a benefit of clinic at the dense 70 chance to provide an association with a particular therapy. TMB was low. PD-L1 for [place] was not expressed and MSI was stable. Since she has significant visceral disease, I recommend her to initiate first-line chemotherapy with single agent paclitaxel. It was started on 7/27/22. On October 19, 2022, she presented to me for follow-up. She was initially referred to me for evaluation of liver lesion and multiple bony lesion. She was found to have left breast mass with a distorted left breast on physical examination. 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 Since she has significant visceral disease, I recommend her to initiate first-line chemotherapy with single agent paclitaxel. It was started on 7/20/22. She is s/p third cycle of chemotherapy. She is tolerating current chemo quite well and she doesn't encounter any major side effects from it. Her left breast and axillary masses are getting smaller and softer. I believe she is achieving good response to chemo and I recommend her to continue with it for now. I will start her 4th cycle today. I will request CT scans and bone scan on 11/9/22 after 4th cycle to see her tumor response to chemo. Bone metastasis - we started zolendronic acid since 7/20/22. She only has minimal symptom and will not do vertebroplasty at this time. Reviewed [employer] molecular panel result with her. I requested genetic counseling and testing since she has bilateral metastatic breast cancer. Malignancy related pain - will continue with norco since it has been controlling her pain well. Anxiety and insomnia - on ativan 0.5 mg nightly prn with improvement in her symptom. She is requiring less ativan since starting medical marijuana. She doesn't have any other significant symptoms at today visit. Past Medical History:     Significant for  1. Osteoarthritis    Past Surgery History:    Significant for   1. Left hip replacement    Social History:   She is a current smoker and she smokes 1 pack per day approximately since she was 24. She denies alcohol drinking or illicit drug abuse. Family History:    Significant for Alzheimer's disease in her parents. No family history of malignancy. No Known Allergies    Review of Systems: \"Per interval history; otherwise 10 point ROS is negative. \"  Her energy level is pretty good and her sleep is fine. She doesn't have fever, chills, night sweats, cough, SOB, chest pain, hemoptysis or palpitations. Her bowels and bladder functions are normal. She denies nausea, vomiting, abdominal pain, diarrhea, constipation, dysuria, loss of appetite or weight loss. She doesn't have neuropathy and she denies bleeding or clotting issues. She has cancer related mild pain in her lower back, but it is getting better. Has anxiety. No depression. The rest of the systems are unremarkable. Vital Signs: BP (!) 147/90 (Site: Left Upper Arm, Position: Sitting, Cuff Size: Medium Adult)   Pulse 93   Temp (!) 96 °F (35.6 °C) (Temporal)   Resp 16   Ht 5' 4\" (1.626 m)   Wt 157 lb (71.2 kg)   SpO2 97%   BMI 26.95 kg/m²      Physical Exam:  CONSTITUTIONAL: awake, alert, cooperative, no apparent distress   EYES: pupils equal, round and reactive to light, sclera clear, normal conjunctiva  ENT: Normocephalic, without obvious abnormality, atraumatic  NECK: supple, symmetrical, no jugular venous distension, no carotid bruits   HEMATOLOGIC/LYMPHATIC: no cervical, supraclavicular lymphadenopathy.  Left axillary lymphadenopathy noted,   LUNGS: VBS, no wheezes, no increased work of breathing, no rhonchi, clear to auscultation, no crackles,    CARDIOVASCULAR: regular rate and rhythm, normal S1 and S2, no murmur noted  ABDOMEN: normal bowel sounds x 4, soft, non-distended, non-tender, no masses palpated, no hepatosplenomegaly   MUSCULOSKELETAL: full range of motion noted, tone is normal  NEUROLOGIC: awake, alert, oriented to name, place and time. Motor skills grossly intact. SKIN: appears intact, normal skin color, normal texture, normal turgor, no jaundice. EXTREMITIES: no LE edema, no cyanosis, no clubbing, no leg swelling,   BREASTS: Left breast is distorted by tumor, nipple was displaced close to lower breast crease area, it is getting softer with chemo, right breast nodularity noted,        Labs:  Hematology:  Lab Results   Component Value Date    WBC 4.9 10/05/2022    RBC 3.01 (L) 10/05/2022    HGB 9.3 (L) 10/05/2022    HCT 29.6 (L) 10/05/2022    MCV 98.3 10/05/2022    MCH 30.9 10/05/2022    MCHC 31.4 (L) 10/05/2022    RDW 15.4 (H) 10/05/2022     10/05/2022    MPV 8.4 10/05/2022    SEGSPCT 53.9 10/05/2022    EOSRELPCT 3.5 (H) 10/05/2022    BASOPCT 0.2 10/05/2022    LYMPHOPCT 34.8 10/05/2022    MONOPCT 7.6 (H) 10/05/2022    SEGSABS 2.6 10/05/2022    EOSABS 0.2 10/05/2022    BASOSABS 0.0 10/05/2022    LYMPHSABS 1.7 10/05/2022    MONOSABS 0.4 10/05/2022    DIFFTYPE AUTOMATED DIFFERENTIAL 10/05/2022     No results found for: ESR  Chemistry:  Lab Results   Component Value Date     09/28/2022    K 3.8 09/28/2022     09/21/2022    CO2 21 09/21/2022    BUN 12 09/21/2022    CREATININE 0.5 (L) 09/28/2022    GLUCOSE 85 09/21/2022    CALCIUM 8.9 09/21/2022    PROT 6.2 (L) 09/21/2022    LABALBU 4.5 09/21/2022    BILITOT 0.5 09/21/2022    ALKPHOS 126 09/21/2022    AST 18 09/21/2022    ALT 17 09/21/2022    LABGLOM >60 09/28/2022    GFRAA >60 09/28/2022    MG 2.3 07/19/2022    POCCA 1.28 09/28/2022    POCGLU 85 09/28/2022     Lab Results   Component Value Date     (H) 06/14/2022     No components found for: LD  No results found for: TSHHS, T4FREE, FT3  Immunology:  Lab Results   Component Value Date    PROT 6.2 (L) 09/21/2022    SPEP  06/14/2022     INTERPRETATION - No monoclonal proteins are detected.  SAF    SPEP INTERPRETATION - Within normal limits. SAF 06/14/2022    ALBUMINELP 3.7 06/14/2022    LABALPH 0.4 06/14/2022    LABALPH 1.3 (H) 54/81/6358    LABALPH DUPLICATE ORDER 00/33/4465    LABALPH DUPLICATE ORDER 59/90/5572    LABBETA 1.1 17/35/7858    LABBETA DUPLICATE ORDER 17/30/5762    GAMGLOB 1.1 06/14/2022     No results found for: Amira Medal, KLFLCR  No results found for: B2M  Coagulation Panel:  No results found for: PROTIME, INR, APTT, DDIMER  Anemia Panel:  No results found for: GQSOVWCP13, FOLATE  Tumor Markers:  Lab Results   Component Value Date     377 (H) 06/14/2022    CEA 54.5 06/14/2022     25 06/14/2022    LABCA2 45.8 (H) 08/31/2022        Observations:  No data recorded     Assessment   Left breast mass  Liver lesion and multiple bone lytic lesions - concerning for metastatic breast cancer    Plan:  Krystina uGzmán is a 74year-old very pleasant female who initially presented to [facility] ER on 6/12/22 with severe lower back pain and constipation. CT scan of the abdomen and pelvis done on 6/12/2022 showed lesion (6.1 x 8.1 cm) in the hepatic segment 4, concerning for neoplasm. Extensive lytic metastatic disease in lumbar and thoracic spine and pelvis with most dominant lesion seen at L5 vertebral body. CT lumbar spine done on 6/12/22 showed extensive multifocal lytic lesions concerning for neoplastic/metastatic disease. She never had colonoscopy before. She had pap smear around 2020. She didn't recall when was her last mammogram.       Digital diagnostic bilateral mammogram and bilateral ultrasound done on 6/17/2022 showed left breast mass, highly suspicious for malignancy. Right breast mass at 11:00, 1.5 cm. Mass is highly suspicious for malignancy. Left breast skin thickening. CT chest on 6/20/2022 showed suspicious heterogeneous mass primarily centered in segment 4A of the liver measuring 8.8 cm.   Left breast mass with left axillary and subpectoral metastatic disease, suspected bony metastases to thoracolumbar spine and sternum and possible left chest wall invasion. Bone scan on 6/20/2022 showed multifocal osseous metastatic disease. T12 compression fracture, concerning for pathologic fracture. MRI abdomen on 6/25/2022 showed biopsy-proven malignancy in the left breast with suspicion for inflammatory carcinoma given skin and trabecular thickening. There may be invasion of the underlying pectoralis musculature. At least 3 heavily meta stasis, the largest measuring up to 8.7 cm with the others 1 cm or less. Likely metastatic portal hepatic, retroperitoneal and right retrocrural lymph nodes. Trace left pleural effusion. Extensive skeletal metastatic disease. She underwent ultrasound-guided biopsy of the right breast mass at 11:00 and the pathology showed invasive carcinoma with ductal and lobular features. Estrogen receptor and progesterone receptor are positive. HER2 by IHC not overexpressed (score 1+). HER2 by FISH-negative. Left breast mass at 12:00 biopsy revealed grade 2 invasive lobular carcinoma. Estrogen receptor by IHC-positive [greater than 95%, average strong intensity], progesterone receptor-positive [approximately 80%, average moderate intensity], HER2 by IHC-not overexpressed [score 1+] and HER2 by FISH-negative. CARIS panel did not yield any biomarker results with a benefit of clinic at the dense 70 chance to provide an association with a particular therapy. TMB was low. PD-L1 for [place] was not expressed and MSI was stable. Since she has significant visceral disease, I recommend her to initiate first-line chemotherapy with single agent paclitaxel. It was started on 7/27/22. On October 19, 2022, she presented to me for follow-up. She was initially referred to me for evaluation of liver lesion and multiple bony lesion. She was found to have left breast mass with a distorted left breast on physical examination.   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 Since she has significant visceral disease, I recommend her to initiate first-line chemotherapy with single agent paclitaxel. It was started on 7/20/22. She is s/p third cycle of chemotherapy. She is tolerating current chemo quite well and she doesn't encounter any major side effects from it. Her left breast and axillary masses are getting smaller and softer. I believe she is achieving good response to chemo and I recommend her to continue with it for now. I will start her 4th cycle today. I will request CT scans and bone scan on 11/9/22 after 4th cycle to see her tumor response to chemo. Bone metastasis - we started zolendronic acid since 7/20/22. She only has minimal symptom and will not do vertebroplasty at this time. Reviewed [facility] molecular panel result with her. I requested genetic counseling and testing since she has bilateral metastatic breast cancer. Malignancy related pain - will continue with norco since it has been controlling her pain well. Anxiety and insomnia - on ativan 0.5 mg nightly prn with improvement in her symptom. She is requiring less ativan since starting medical marijuana. I answered all her questions and concerns for today. Recent imaging and labs were reviewed and discussed with the patient.

## 2022-10-19 ENCOUNTER — HOSPITAL ENCOUNTER (OUTPATIENT)
Dept: INFUSION THERAPY | Age: 67
Discharge: HOME | End: 2022-10-19
Payer: MEDICARE

## 2022-10-19 ENCOUNTER — OFFICE VISIT (OUTPATIENT)
Dept: ONCOLOGY | Age: 67
End: 2022-10-19
Payer: MEDICARE

## 2022-10-19 VITALS
RESPIRATION RATE: 16 BRPM | HEART RATE: 93 BPM | WEIGHT: 157 LBS | BODY MASS INDEX: 26.8 KG/M2 | TEMPERATURE: 96 F | SYSTOLIC BLOOD PRESSURE: 147 MMHG | DIASTOLIC BLOOD PRESSURE: 90 MMHG | HEIGHT: 64 IN | OXYGEN SATURATION: 97 %

## 2022-10-19 DIAGNOSIS — C50.812 MALIGNANT NEOPLASM OF OVERLAPPING SITES OF BOTH BREASTS IN FEMALE, ESTROGEN RECEPTOR POSITIVE (HCC): Primary | ICD-10-CM

## 2022-10-19 DIAGNOSIS — C50.811 MALIGNANT NEOPLASM OF OVERLAPPING SITES OF BOTH BREASTS IN FEMALE, ESTROGEN RECEPTOR POSITIVE (HCC): Primary | ICD-10-CM

## 2022-10-19 DIAGNOSIS — Z17.0 MALIGNANT NEOPLASM OF OVERLAPPING SITES OF BOTH BREASTS IN FEMALE, ESTROGEN RECEPTOR POSITIVE (HCC): Primary | ICD-10-CM

## 2022-10-19 DIAGNOSIS — C79.51 BONE METASTASIS (HCC): ICD-10-CM

## 2022-10-19 LAB
ALBUMIN SERPL-MCNC: 4.4 GM/DL (ref 3.4–5)
ALP BLD-CCNC: 91 IU/L (ref 40–128)
ALT SERPL-CCNC: 17 U/L (ref 10–40)
ANION GAP SERPL CALCULATED.3IONS-SCNC: 10 MMOL/L (ref 4–16)
AST SERPL-CCNC: 17 IU/L (ref 15–37)
BASOPHILS ABSOLUTE: 0 K/CU MM
BASOPHILS RELATIVE PERCENT: 0.3 % (ref 0–1)
BILIRUB SERPL-MCNC: 0.3 MG/DL (ref 0–1)
BUN BLDV-MCNC: 19 MG/DL (ref 6–23)
CALCIUM SERPL-MCNC: 9.4 MG/DL (ref 8.3–10.6)
CHLORIDE BLD-SCNC: 103 MMOL/L (ref 99–110)
CO2: 24 MMOL/L (ref 21–32)
CREAT SERPL-MCNC: 0.4 MG/DL (ref 0.6–1.1)
DIFFERENTIAL TYPE: ABNORMAL
EOSINOPHILS ABSOLUTE: 0.1 K/CU MM
EOSINOPHILS RELATIVE PERCENT: 1.7 % (ref 0–3)
GFR SERPL CREATININE-BSD FRML MDRD: >60 ML/MIN/1.73M2
GLUCOSE BLD-MCNC: 87 MG/DL (ref 70–99)
HCT VFR BLD CALC: 32.6 % (ref 37–47)
HEMOGLOBIN: 10.3 GM/DL (ref 12.5–16)
LYMPHOCYTES ABSOLUTE: 1.9 K/CU MM
LYMPHOCYTES RELATIVE PERCENT: 30.5 % (ref 24–44)
MCH RBC QN AUTO: 30.7 PG (ref 27–31)
MCHC RBC AUTO-ENTMCNC: 31.6 % (ref 32–36)
MCV RBC AUTO: 97.3 FL (ref 78–100)
MONOCYTES ABSOLUTE: 0.5 K/CU MM
MONOCYTES RELATIVE PERCENT: 7.8 % (ref 0–4)
PDW BLD-RTO: 14.9 % (ref 11.7–14.9)
PLATELET # BLD: 300 K/CU MM (ref 140–440)
PMV BLD AUTO: 8.4 FL (ref 7.5–11.1)
POTASSIUM SERPL-SCNC: 4.1 MMOL/L (ref 3.5–5.1)
RBC # BLD: 3.35 M/CU MM (ref 4.2–5.4)
SEGMENTED NEUTROPHILS ABSOLUTE COUNT: 3.8 K/CU MM
SEGMENTED NEUTROPHILS RELATIVE PERCENT: 59.7 % (ref 36–66)
SODIUM BLD-SCNC: 137 MMOL/L (ref 135–145)
TOTAL PROTEIN: 6.5 GM/DL (ref 6.4–8.2)
WBC # BLD: 6.3 K/CU MM (ref 4–10.5)

## 2022-10-19 PROCEDURE — 36591 DRAW BLOOD OFF VENOUS DEVICE: CPT

## 2022-10-19 PROCEDURE — 96367 TX/PROPH/DG ADDL SEQ IV INF: CPT

## 2022-10-19 PROCEDURE — 6360000002 HC RX W HCPCS: Performed by: INTERNAL MEDICINE

## 2022-10-19 PROCEDURE — 99214 OFFICE O/P EST MOD 30 MIN: CPT | Performed by: INTERNAL MEDICINE

## 2022-10-19 PROCEDURE — 2580000003 HC RX 258: Performed by: INTERNAL MEDICINE

## 2022-10-19 PROCEDURE — 1124F ACP DISCUSS-NO DSCNMKR DOCD: CPT | Performed by: INTERNAL MEDICINE

## 2022-10-19 PROCEDURE — 96413 CHEMO IV INFUSION 1 HR: CPT

## 2022-10-19 PROCEDURE — 2500000003 HC RX 250 WO HCPCS: Performed by: INTERNAL MEDICINE

## 2022-10-19 PROCEDURE — 80053 COMPREHEN METABOLIC PANEL: CPT

## 2022-10-19 PROCEDURE — 96375 TX/PRO/DX INJ NEW DRUG ADDON: CPT

## 2022-10-19 PROCEDURE — 85025 COMPLETE CBC W/AUTO DIFF WBC: CPT

## 2022-10-19 RX ORDER — SODIUM CHLORIDE 9 MG/ML
5-250 INJECTION, SOLUTION INTRAVENOUS PRN
Status: DISCONTINUED | OUTPATIENT
Start: 2022-10-19 | End: 2022-10-20 | Stop reason: HOSPADM

## 2022-10-19 RX ORDER — SODIUM CHLORIDE 0.9 % (FLUSH) 0.9 %
5-40 SYRINGE (ML) INJECTION PRN
Status: DISCONTINUED | OUTPATIENT
Start: 2022-10-19 | End: 2022-10-20 | Stop reason: HOSPADM

## 2022-10-19 RX ORDER — HEPARIN SODIUM (PORCINE) LOCK FLUSH IV SOLN 100 UNIT/ML 100 UNIT/ML
500 SOLUTION INTRAVENOUS PRN
Status: DISCONTINUED | OUTPATIENT
Start: 2022-10-19 | End: 2022-10-20 | Stop reason: HOSPADM

## 2022-10-19 RX ORDER — FAMOTIDINE 10 MG/ML
20 INJECTION, SOLUTION INTRAVENOUS ONCE
Status: COMPLETED | OUTPATIENT
Start: 2022-10-19 | End: 2022-10-19

## 2022-10-19 RX ORDER — DIPHENHYDRAMINE HYDROCHLORIDE 50 MG/ML
50 INJECTION INTRAMUSCULAR; INTRAVENOUS ONCE
Status: COMPLETED | OUTPATIENT
Start: 2022-10-19 | End: 2022-10-19

## 2022-10-19 RX ADMIN — DEXAMETHASONE SODIUM PHOSPHATE 10 MG: 10 INJECTION INTRAMUSCULAR; INTRAVENOUS at 11:41

## 2022-10-19 RX ADMIN — HEPARIN 500 UNITS: 100 SYRINGE at 13:15

## 2022-10-19 RX ADMIN — FAMOTIDINE 20 MG: 10 INJECTION, SOLUTION INTRAVENOUS at 11:39

## 2022-10-19 RX ADMIN — DIPHENHYDRAMINE HYDROCHLORIDE 50 MG: 50 INJECTION, SOLUTION INTRAMUSCULAR; INTRAVENOUS at 11:39

## 2022-10-19 RX ADMIN — SODIUM CHLORIDE, PRESERVATIVE FREE 10 ML: 5 INJECTION INTRAVENOUS at 13:15

## 2022-10-19 RX ADMIN — PACLITAXEL 144 MG: 6 INJECTION, SOLUTION, CONCENTRATE INTRAVENOUS at 12:08

## 2022-10-19 RX ADMIN — SODIUM CHLORIDE 20 ML/HR: 9 INJECTION, SOLUTION INTRAVENOUS at 11:40

## 2022-10-19 NOTE — PROGRESS NOTES
MA Rooming Questions  Patient: Isai Knutson  MRN: [id number]    Date: 10/19/2022        1. Do you have any new issues?   no         2. Do you need any refills on medications?    no    3. Have you had any imaging done since your last visit?   no    4. Have you been hospitalized or seen in the emergency room since your last visit here?   no    5. Did the patient have a depression screening completed today?  No    No data recorded     PHQ-9 Given to (if applicable):               PHQ-9 Score (if applicable):                     [] Positive     []  Negative              Does question #9 need addressed (if applicable)                     [] Yes    []  No               Sherryle Naval, CMA

## 2022-10-24 DIAGNOSIS — C50.812 MALIGNANT NEOPLASM OF OVERLAPPING SITES OF BOTH BREASTS IN FEMALE, ESTROGEN RECEPTOR POSITIVE (HCC): Primary | ICD-10-CM

## 2022-10-24 DIAGNOSIS — C79.51 BONE METASTASIS (HCC): ICD-10-CM

## 2022-10-24 DIAGNOSIS — C50.811 MALIGNANT NEOPLASM OF OVERLAPPING SITES OF BOTH BREASTS IN FEMALE, ESTROGEN RECEPTOR POSITIVE (HCC): Primary | ICD-10-CM

## 2022-10-24 DIAGNOSIS — Z17.0 MALIGNANT NEOPLASM OF OVERLAPPING SITES OF BOTH BREASTS IN FEMALE, ESTROGEN RECEPTOR POSITIVE (HCC): Primary | ICD-10-CM

## 2022-10-24 RX ORDER — ACETAMINOPHEN 325 MG/1
650 TABLET ORAL
Status: CANCELLED | OUTPATIENT
Start: 2022-11-02

## 2022-10-24 RX ORDER — DIPHENHYDRAMINE HYDROCHLORIDE 50 MG/ML
50 INJECTION INTRAMUSCULAR; INTRAVENOUS
Status: CANCELLED | OUTPATIENT
Start: 2022-11-02

## 2022-10-24 RX ORDER — HEPARIN SODIUM (PORCINE) LOCK FLUSH IV SOLN 100 UNIT/ML 100 UNIT/ML
500 SOLUTION INTRAVENOUS PRN
Status: CANCELLED | OUTPATIENT
Start: 2022-11-02

## 2022-10-24 RX ORDER — ONDANSETRON 2 MG/ML
8 INJECTION INTRAMUSCULAR; INTRAVENOUS
Status: CANCELLED | OUTPATIENT
Start: 2022-11-02

## 2022-10-24 RX ORDER — EPINEPHRINE 1 MG/ML
0.3 INJECTION, SOLUTION, CONCENTRATE INTRAVENOUS PRN
Status: CANCELLED | OUTPATIENT
Start: 2022-11-02

## 2022-10-24 RX ORDER — SODIUM CHLORIDE 9 MG/ML
INJECTION, SOLUTION INTRAVENOUS CONTINUOUS
Status: CANCELLED | OUTPATIENT
Start: 2022-11-02

## 2022-10-24 RX ORDER — ALBUTEROL SULFATE 90 UG/1
4 AEROSOL, METERED RESPIRATORY (INHALATION) PRN
Status: CANCELLED | OUTPATIENT
Start: 2022-11-02

## 2022-10-24 RX ORDER — SODIUM CHLORIDE 9 MG/ML
5-250 INJECTION, SOLUTION INTRAVENOUS PRN
Status: CANCELLED | OUTPATIENT
Start: 2022-11-02

## 2022-10-24 RX ORDER — SODIUM CHLORIDE 9 MG/ML
5-40 INJECTION INTRAVENOUS PRN
Status: CANCELLED | OUTPATIENT
Start: 2022-11-02

## 2022-10-24 RX ORDER — MEPERIDINE HYDROCHLORIDE 25 MG/ML
12.5 INJECTION INTRAMUSCULAR; INTRAVENOUS; SUBCUTANEOUS PRN
Status: CANCELLED | OUTPATIENT
Start: 2022-11-02

## 2022-10-24 RX ORDER — SODIUM CHLORIDE 0.9 % (FLUSH) 0.9 %
5-40 SYRINGE (ML) INJECTION PRN
Status: CANCELLED | OUTPATIENT
Start: 2022-11-02

## 2022-10-26 ENCOUNTER — HOSPITAL ENCOUNTER (OUTPATIENT)
Dept: INFUSION THERAPY | Age: 67
Discharge: HOME | End: 2022-10-26
Payer: MEDICARE

## 2022-10-26 VITALS
DIASTOLIC BLOOD PRESSURE: 62 MMHG | HEIGHT: 64 IN | BODY MASS INDEX: 27.42 KG/M2 | WEIGHT: 160.6 LBS | SYSTOLIC BLOOD PRESSURE: 131 MMHG | OXYGEN SATURATION: 98 % | HEART RATE: 86 BPM | TEMPERATURE: 97.7 F

## 2022-10-26 DIAGNOSIS — C50.812 MALIGNANT NEOPLASM OF OVERLAPPING SITES OF BOTH BREASTS IN FEMALE, ESTROGEN RECEPTOR POSITIVE (HCC): Primary | ICD-10-CM

## 2022-10-26 DIAGNOSIS — Z17.0 MALIGNANT NEOPLASM OF OVERLAPPING SITES OF BOTH BREASTS IN FEMALE, ESTROGEN RECEPTOR POSITIVE (HCC): Primary | ICD-10-CM

## 2022-10-26 DIAGNOSIS — C50.811 MALIGNANT NEOPLASM OF OVERLAPPING SITES OF BOTH BREASTS IN FEMALE, ESTROGEN RECEPTOR POSITIVE (HCC): Primary | ICD-10-CM

## 2022-10-26 LAB
ALBUMIN SERPL-MCNC: 4.1 GM/DL (ref 3.4–5)
ALP BLD-CCNC: 73 IU/L (ref 40–128)
ALT SERPL-CCNC: 14 U/L (ref 10–40)
ANION GAP SERPL CALCULATED.3IONS-SCNC: 8 MMOL/L (ref 4–16)
AST SERPL-CCNC: 15 IU/L (ref 15–37)
BASOPHILS ABSOLUTE: 0 K/CU MM
BASOPHILS RELATIVE PERCENT: 0.4 % (ref 0–1)
BILIRUB SERPL-MCNC: 0.3 MG/DL (ref 0–1)
BUN BLDV-MCNC: 13 MG/DL (ref 6–23)
CALCIUM SERPL-MCNC: 9.1 MG/DL (ref 8.3–10.6)
CHLORIDE BLD-SCNC: 107 MMOL/L (ref 99–110)
CO2: 26 MMOL/L (ref 21–32)
CREAT SERPL-MCNC: 0.4 MG/DL (ref 0.6–1.1)
DIFFERENTIAL TYPE: ABNORMAL
EOSINOPHILS ABSOLUTE: 0.1 K/CU MM
EOSINOPHILS RELATIVE PERCENT: 2.8 % (ref 0–3)
GFR SERPL CREATININE-BSD FRML MDRD: >60 ML/MIN/1.73M2
GLUCOSE BLD-MCNC: 86 MG/DL (ref 70–99)
HCT VFR BLD CALC: 32 % (ref 37–47)
HEMOGLOBIN: 10 GM/DL (ref 12.5–16)
LYMPHOCYTES ABSOLUTE: 1.9 K/CU MM
LYMPHOCYTES RELATIVE PERCENT: 37 % (ref 24–44)
MCH RBC QN AUTO: 30.4 PG (ref 27–31)
MCHC RBC AUTO-ENTMCNC: 31.3 % (ref 32–36)
MCV RBC AUTO: 97.3 FL (ref 78–100)
MONOCYTES ABSOLUTE: 0.4 K/CU MM
MONOCYTES RELATIVE PERCENT: 7.4 % (ref 0–4)
PDW BLD-RTO: 14.6 % (ref 11.7–14.9)
PLATELET # BLD: 290 K/CU MM (ref 140–440)
PMV BLD AUTO: 8.6 FL (ref 7.5–11.1)
POTASSIUM SERPL-SCNC: 4 MMOL/L (ref 3.5–5.1)
RBC # BLD: 3.29 M/CU MM (ref 4.2–5.4)
SEGMENTED NEUTROPHILS ABSOLUTE COUNT: 2.6 K/CU MM
SEGMENTED NEUTROPHILS RELATIVE PERCENT: 52.4 % (ref 36–66)
SODIUM BLD-SCNC: 141 MMOL/L (ref 135–145)
TOTAL PROTEIN: 6.2 GM/DL (ref 6.4–8.2)
WBC # BLD: 5 K/CU MM (ref 4–10.5)

## 2022-10-26 PROCEDURE — 96367 TX/PROPH/DG ADDL SEQ IV INF: CPT

## 2022-10-26 PROCEDURE — 2500000003 HC RX 250 WO HCPCS: Performed by: INTERNAL MEDICINE

## 2022-10-26 PROCEDURE — 2580000003 HC RX 258: Performed by: INTERNAL MEDICINE

## 2022-10-26 PROCEDURE — 96375 TX/PRO/DX INJ NEW DRUG ADDON: CPT

## 2022-10-26 PROCEDURE — 6360000002 HC RX W HCPCS: Performed by: INTERNAL MEDICINE

## 2022-10-26 PROCEDURE — 85025 COMPLETE CBC W/AUTO DIFF WBC: CPT

## 2022-10-26 PROCEDURE — 80053 COMPREHEN METABOLIC PANEL: CPT

## 2022-10-26 PROCEDURE — 96413 CHEMO IV INFUSION 1 HR: CPT

## 2022-10-26 RX ORDER — FAMOTIDINE 10 MG/ML
20 INJECTION, SOLUTION INTRAVENOUS ONCE
Status: COMPLETED | OUTPATIENT
Start: 2022-10-26 | End: 2022-10-26

## 2022-10-26 RX ORDER — SODIUM CHLORIDE 0.9 % (FLUSH) 0.9 %
5-40 SYRINGE (ML) INJECTION PRN
Status: DISCONTINUED | OUTPATIENT
Start: 2022-10-26 | End: 2022-10-27 | Stop reason: HOSPADM

## 2022-10-26 RX ORDER — HEPARIN SODIUM (PORCINE) LOCK FLUSH IV SOLN 100 UNIT/ML 100 UNIT/ML
500 SOLUTION INTRAVENOUS PRN
Status: DISCONTINUED | OUTPATIENT
Start: 2022-10-26 | End: 2022-10-27 | Stop reason: HOSPADM

## 2022-10-26 RX ORDER — SODIUM CHLORIDE 9 MG/ML
5-250 INJECTION, SOLUTION INTRAVENOUS PRN
Status: DISCONTINUED | OUTPATIENT
Start: 2022-10-26 | End: 2022-10-27 | Stop reason: HOSPADM

## 2022-10-26 RX ORDER — DIPHENHYDRAMINE HYDROCHLORIDE 50 MG/ML
50 INJECTION INTRAMUSCULAR; INTRAVENOUS ONCE
Status: COMPLETED | OUTPATIENT
Start: 2022-10-26 | End: 2022-10-26

## 2022-10-26 RX ADMIN — SODIUM CHLORIDE 20 ML/HR: 9 INJECTION, SOLUTION INTRAVENOUS at 12:18

## 2022-10-26 RX ADMIN — FAMOTIDINE 20 MG: 10 INJECTION, SOLUTION INTRAVENOUS at 12:18

## 2022-10-26 RX ADMIN — DEXAMETHASONE SODIUM PHOSPHATE 10 MG: 10 INJECTION INTRAMUSCULAR; INTRAVENOUS at 12:24

## 2022-10-26 RX ADMIN — HEPARIN 500 UNITS: 100 SYRINGE at 14:06

## 2022-10-26 RX ADMIN — PACLITAXEL 144 MG: 6 INJECTION, SOLUTION, CONCENTRATE INTRAVENOUS at 12:55

## 2022-10-26 RX ADMIN — DIPHENHYDRAMINE HYDROCHLORIDE 50 MG: 50 INJECTION, SOLUTION INTRAMUSCULAR; INTRAVENOUS at 12:18

## 2022-11-02 ENCOUNTER — HOSPITAL ENCOUNTER (OUTPATIENT)
Dept: INFUSION THERAPY | Age: 67
Discharge: HOME | End: 2022-11-02
Payer: MEDICARE

## 2022-11-02 VITALS
SYSTOLIC BLOOD PRESSURE: 132 MMHG | OXYGEN SATURATION: 99 % | HEIGHT: 64 IN | HEART RATE: 117 BPM | TEMPERATURE: 97.5 F | BODY MASS INDEX: 26.77 KG/M2 | DIASTOLIC BLOOD PRESSURE: 67 MMHG | WEIGHT: 156.8 LBS

## 2022-11-02 DIAGNOSIS — C50.811 MALIGNANT NEOPLASM OF OVERLAPPING SITES OF BOTH BREASTS IN FEMALE, ESTROGEN RECEPTOR POSITIVE (HCC): Primary | ICD-10-CM

## 2022-11-02 DIAGNOSIS — C79.51 BONE METASTASIS (HCC): ICD-10-CM

## 2022-11-02 DIAGNOSIS — Z17.0 MALIGNANT NEOPLASM OF OVERLAPPING SITES OF BOTH BREASTS IN FEMALE, ESTROGEN RECEPTOR POSITIVE (HCC): Primary | ICD-10-CM

## 2022-11-02 DIAGNOSIS — C50.812 MALIGNANT NEOPLASM OF OVERLAPPING SITES OF BOTH BREASTS IN FEMALE, ESTROGEN RECEPTOR POSITIVE (HCC): Primary | ICD-10-CM

## 2022-11-02 LAB
ALBUMIN SERPL-MCNC: 4.3 GM/DL (ref 3.4–5)
ALP BLD-CCNC: 79 IU/L (ref 40–128)
ALT SERPL-CCNC: 14 U/L (ref 10–40)
ANION GAP SERPL CALCULATED.3IONS-SCNC: 11 MMOL/L (ref 4–16)
AST SERPL-CCNC: 15 IU/L (ref 15–37)
BASOPHILS ABSOLUTE: 0 K/CU MM
BASOPHILS RELATIVE PERCENT: 0.2 % (ref 0–1)
BILIRUB SERPL-MCNC: 0.3 MG/DL (ref 0–1)
BUN BLDV-MCNC: 16 MG/DL (ref 6–23)
CALCIUM SERPL-MCNC: 9.5 MG/DL (ref 8.3–10.6)
CHLORIDE BLD-SCNC: 104 MMOL/L (ref 99–110)
CO2: 24 MMOL/L (ref 21–32)
CREAT SERPL-MCNC: 0.5 MG/DL (ref 0.6–1.1)
DIFFERENTIAL TYPE: ABNORMAL
EGFR, POC: >60 ML/MIN/1.73M2
EOSINOPHILS ABSOLUTE: 0.2 K/CU MM
EOSINOPHILS RELATIVE PERCENT: 3.3 % (ref 0–3)
GFR SERPL CREATININE-BSD FRML MDRD: >60 ML/MIN/1.73M2
GLUCOSE BLD-MCNC: 74 MG/DL (ref 70–99)
GLUCOSE BLD-MCNC: 76 MG/DL (ref 70–99)
HCT VFR BLD CALC: 34.4 % (ref 37–47)
HEMOGLOBIN: 10.7 GM/DL (ref 12.5–16)
LYMPHOCYTES ABSOLUTE: 1.9 K/CU MM
LYMPHOCYTES RELATIVE PERCENT: 39.3 % (ref 24–44)
MCH RBC QN AUTO: 30.2 PG (ref 27–31)
MCHC RBC AUTO-ENTMCNC: 31.1 % (ref 32–36)
MCV RBC AUTO: 97.2 FL (ref 78–100)
MONOCYTES ABSOLUTE: 0.4 K/CU MM
MONOCYTES RELATIVE PERCENT: 8.5 % (ref 0–4)
PDW BLD-RTO: 15 % (ref 11.7–14.9)
PLATELET # BLD: 282 K/CU MM (ref 140–440)
PMV BLD AUTO: 8.6 FL (ref 7.5–11.1)
POC BUN: 15 MG/DL (ref 8–26)
POC CALCIUM: 1.25 MMOL/L (ref 1.12–1.32)
POC CHLORIDE: 107 MMOL/L (ref 98–109)
POC CO2: 25 MMOL/L (ref 21–32)
POC CREATININE: 0.6 MG/DL (ref 0.6–1.1)
POTASSIUM SERPL-SCNC: 3.8 MMOL/L (ref 3.5–4.5)
POTASSIUM SERPL-SCNC: 4.1 MMOL/L (ref 3.5–5.1)
RBC # BLD: 3.54 M/CU MM (ref 4.2–5.4)
SEGMENTED NEUTROPHILS ABSOLUTE COUNT: 2.3 K/CU MM
SEGMENTED NEUTROPHILS RELATIVE PERCENT: 48.7 % (ref 36–66)
SODIUM BLD-SCNC: 139 MMOL/L (ref 135–145)
SODIUM BLD-SCNC: 144 MMOL/L (ref 138–146)
SOURCE, BLOOD GAS: NORMAL
TOTAL PROTEIN: 6.6 GM/DL (ref 6.4–8.2)
WBC # BLD: 4.8 K/CU MM (ref 4–10.5)

## 2022-11-02 PROCEDURE — 96367 TX/PROPH/DG ADDL SEQ IV INF: CPT

## 2022-11-02 PROCEDURE — 2580000003 HC RX 258: Performed by: INTERNAL MEDICINE

## 2022-11-02 PROCEDURE — 6360000002 HC RX W HCPCS: Performed by: INTERNAL MEDICINE

## 2022-11-02 PROCEDURE — 80053 COMPREHEN METABOLIC PANEL: CPT

## 2022-11-02 PROCEDURE — 96375 TX/PRO/DX INJ NEW DRUG ADDON: CPT

## 2022-11-02 PROCEDURE — 2500000003 HC RX 250 WO HCPCS: Performed by: INTERNAL MEDICINE

## 2022-11-02 PROCEDURE — 96413 CHEMO IV INFUSION 1 HR: CPT

## 2022-11-02 PROCEDURE — 85025 COMPLETE CBC W/AUTO DIFF WBC: CPT

## 2022-11-02 RX ORDER — FAMOTIDINE 10 MG/ML
20 INJECTION, SOLUTION INTRAVENOUS ONCE
Status: COMPLETED | OUTPATIENT
Start: 2022-11-02 | End: 2022-11-02

## 2022-11-02 RX ORDER — HEPARIN SODIUM (PORCINE) LOCK FLUSH IV SOLN 100 UNIT/ML 100 UNIT/ML
500 SOLUTION INTRAVENOUS PRN
Status: DISCONTINUED | OUTPATIENT
Start: 2022-11-02 | End: 2022-11-03 | Stop reason: HOSPADM

## 2022-11-02 RX ORDER — SODIUM CHLORIDE 9 MG/ML
5-250 INJECTION, SOLUTION INTRAVENOUS PRN
Status: DISCONTINUED | OUTPATIENT
Start: 2022-11-02 | End: 2022-11-03 | Stop reason: HOSPADM

## 2022-11-02 RX ORDER — DIPHENHYDRAMINE HYDROCHLORIDE 50 MG/ML
50 INJECTION INTRAMUSCULAR; INTRAVENOUS ONCE
Status: COMPLETED | OUTPATIENT
Start: 2022-11-02 | End: 2022-11-02

## 2022-11-02 RX ORDER — SODIUM CHLORIDE 0.9 % (FLUSH) 0.9 %
5-40 SYRINGE (ML) INJECTION PRN
Status: DISCONTINUED | OUTPATIENT
Start: 2022-11-02 | End: 2022-11-03 | Stop reason: HOSPADM

## 2022-11-02 RX ADMIN — HEPARIN 500 UNITS: 100 SYRINGE at 14:12

## 2022-11-02 RX ADMIN — PACLITAXEL 130 MG: 6 INJECTION, SOLUTION INTRAVENOUS at 12:27

## 2022-11-02 RX ADMIN — DIPHENHYDRAMINE HYDROCHLORIDE 50 MG: 50 INJECTION INTRAMUSCULAR; INTRAVENOUS at 11:24

## 2022-11-02 RX ADMIN — SODIUM CHLORIDE 20 ML/HR: 9 INJECTION, SOLUTION INTRAVENOUS at 11:26

## 2022-11-02 RX ADMIN — FAMOTIDINE 20 MG: 10 INJECTION, SOLUTION INTRAVENOUS at 11:24

## 2022-11-02 RX ADMIN — ZOLEDRONIC ACID 4 MG: 4 INJECTION, SOLUTION, CONCENTRATE INTRAVENOUS at 13:48

## 2022-11-02 RX ADMIN — DEXAMETHASONE SODIUM PHOSPHATE 10 MG: 4 INJECTION INTRA-ARTICULAR; INTRALESIONAL; INTRAMUSCULAR; INTRAVENOUS; SOFT TISSUE at 11:27

## 2022-11-02 NOTE — PROGRESS NOTES
Pt. Here for treatment. Pt. Reported that she has had occasional numbness & tingling in bilateral feet and ankles. Stated it is not constant,but comes and goes. Spoke with Yasmin HILLS regarding this and order received to decrease dose, [facility] pharmacist notified. Mediport accessed without difficulty, good blood return noted. Lab work drawn as ordered. Labs reviewed. Treatment administered without incident. Discharge AVS given. Patient's status assessed and documented appropriately. All labs and required results were also reviewed today. Treatment parameters have been reviewed. Today's treatment has been approved by the provider. Treatment orders and medication sequencing (when applicable) was verified by 2 registered nurses. The treatment plan was confirmed with the patient prior to administration, and the patient understands the need to report any treatment-related symptoms. Prior to administration, when applicable, the following 8 elements of medication administration were reviewed with 2nd Registered Nurse prior to dosing: drug name, drug dose, infusion volume when prepared in a syringe, rate of administration, expiration dates and/or times, appearance and integrity of drug(s), and rate of pump for infusion. The 5 rights of medication administration have been verified.

## 2022-11-04 ENCOUNTER — CLINICAL DOCUMENTATION (OUTPATIENT)
Dept: ONCOLOGY | Age: 67
End: 2022-11-04

## 2022-11-04 NOTE — PROGRESS NOTES
Patient called asking if she could get the COVID booster and flu vaccine. Per Dr. Rashad Cooper - okay to do so. Patient also states she is out of the 2 mg dexamethasone to take for two days after her treatments. Per Dr. Rashad Cooper - no longer needed. LVM with patient on above and instructed to call with any questions/concerns.

## 2022-11-07 ENCOUNTER — HOSPITAL ENCOUNTER (OUTPATIENT)
Dept: NUCLEAR MEDICINE | Age: 67
Discharge: HOME | End: 2022-11-07
Payer: MEDICARE

## 2022-11-07 DIAGNOSIS — C50.811 MALIGNANT NEOPLASM OF OVERLAPPING SITES OF BOTH BREASTS IN FEMALE, ESTROGEN RECEPTOR POSITIVE (HCC): ICD-10-CM

## 2022-11-07 DIAGNOSIS — Z17.0 MALIGNANT NEOPLASM OF OVERLAPPING SITES OF BOTH BREASTS IN FEMALE, ESTROGEN RECEPTOR POSITIVE (HCC): ICD-10-CM

## 2022-11-07 DIAGNOSIS — C50.812 MALIGNANT NEOPLASM OF OVERLAPPING SITES OF BOTH BREASTS IN FEMALE, ESTROGEN RECEPTOR POSITIVE (HCC): ICD-10-CM

## 2022-11-07 DIAGNOSIS — C79.51 BONE METASTASIS (HCC): ICD-10-CM

## 2022-11-07 PROCEDURE — 3430000000 HC RX DIAGNOSTIC RADIOPHARMACEUTICAL: Performed by: INTERNAL MEDICINE

## 2022-11-07 PROCEDURE — A9503 TC99M MEDRONATE: HCPCS | Performed by: INTERNAL MEDICINE

## 2022-11-07 PROCEDURE — 78306 BONE IMAGING WHOLE BODY: CPT | Performed by: INTERNAL MEDICINE

## 2022-11-07 RX ORDER — TC 99M MEDRONATE 20 MG/10ML
28.4 INJECTION, POWDER, LYOPHILIZED, FOR SOLUTION INTRAVENOUS
Status: COMPLETED | OUTPATIENT
Start: 2022-11-07 | End: 2022-11-07

## 2022-11-07 RX ADMIN — TC 99M MEDRONATE 28.4 MILLICURIE: 20 INJECTION, POWDER, LYOPHILIZED, FOR SOLUTION INTRAVENOUS at 10:00

## 2022-11-14 ENCOUNTER — HOSPITAL ENCOUNTER (OUTPATIENT)
Dept: CT IMAGING | Age: 67
Discharge: HOME | End: 2022-11-14
Payer: MEDICARE

## 2022-11-14 DIAGNOSIS — C79.51 BONE METASTASIS (HCC): ICD-10-CM

## 2022-11-14 DIAGNOSIS — C50.812 MALIGNANT NEOPLASM OF OVERLAPPING SITES OF BOTH BREASTS IN FEMALE, ESTROGEN RECEPTOR POSITIVE (HCC): ICD-10-CM

## 2022-11-14 DIAGNOSIS — C50.811 MALIGNANT NEOPLASM OF OVERLAPPING SITES OF BOTH BREASTS IN FEMALE, ESTROGEN RECEPTOR POSITIVE (HCC): ICD-10-CM

## 2022-11-14 DIAGNOSIS — Z17.0 MALIGNANT NEOPLASM OF OVERLAPPING SITES OF BOTH BREASTS IN FEMALE, ESTROGEN RECEPTOR POSITIVE (HCC): ICD-10-CM

## 2022-11-14 PROCEDURE — 71260 CT THORAX DX C+: CPT

## 2022-11-14 PROCEDURE — 6360000004 HC RX CONTRAST MEDICATION: Performed by: INTERNAL MEDICINE

## 2022-11-14 PROCEDURE — 74177 CT ABD & PELVIS W/CONTRAST: CPT

## 2022-11-14 PROCEDURE — A4641 RADIOPHARM DX AGENT NOC: HCPCS | Performed by: INTERNAL MEDICINE

## 2022-11-14 RX ADMIN — BARIUM SULFATE 900 ML: 21 SUSPENSION ORAL at 11:10

## 2022-11-14 RX ADMIN — IOPAMIDOL 80 ML: 755 INJECTION, SOLUTION INTRAVENOUS at 11:11

## 2022-11-15 ENCOUNTER — OFFICE VISIT (OUTPATIENT)
Dept: ONCOLOGY | Age: 67
End: 2022-11-15
Payer: MEDICARE

## 2022-11-15 ENCOUNTER — HOSPITAL ENCOUNTER (OUTPATIENT)
Dept: INFUSION THERAPY | Age: 67
Discharge: HOME | End: 2022-11-15
Payer: MEDICARE

## 2022-11-15 VITALS
OXYGEN SATURATION: 98 % | BODY MASS INDEX: 27.9 KG/M2 | WEIGHT: 163.4 LBS | TEMPERATURE: 97 F | SYSTOLIC BLOOD PRESSURE: 139 MMHG | HEART RATE: 96 BPM | DIASTOLIC BLOOD PRESSURE: 63 MMHG | HEIGHT: 64 IN

## 2022-11-15 DIAGNOSIS — C50.812 MALIGNANT NEOPLASM OF OVERLAPPING SITES OF BOTH BREASTS IN FEMALE, ESTROGEN RECEPTOR POSITIVE (HCC): Primary | ICD-10-CM

## 2022-11-15 DIAGNOSIS — C79.51 BONE METASTASIS (HCC): ICD-10-CM

## 2022-11-15 DIAGNOSIS — Z17.0 MALIGNANT NEOPLASM OF OVERLAPPING SITES OF BOTH BREASTS IN FEMALE, ESTROGEN RECEPTOR POSITIVE (HCC): Primary | ICD-10-CM

## 2022-11-15 DIAGNOSIS — C50.811 MALIGNANT NEOPLASM OF OVERLAPPING SITES OF BOTH BREASTS IN FEMALE, ESTROGEN RECEPTOR POSITIVE (HCC): Primary | ICD-10-CM

## 2022-11-15 PROCEDURE — 99211 OFF/OP EST MAY X REQ PHY/QHP: CPT

## 2022-11-15 PROCEDURE — 99401 PREV MED CNSL INDIV APPRX 15: CPT | Performed by: INTERNAL MEDICINE

## 2022-11-15 ASSESSMENT — PATIENT HEALTH QUESTIONNAIRE - PHQ9
SUM OF ALL RESPONSES TO PHQ QUESTIONS 1-9: 0
SUM OF ALL RESPONSES TO PHQ QUESTIONS 1-9: 0
2. FEELING DOWN, DEPRESSED OR HOPELESS: 0
SUM OF ALL RESPONSES TO PHQ QUESTIONS 1-9: 0
SUM OF ALL RESPONSES TO PHQ QUESTIONS 1-9: 0
1. LITTLE INTEREST OR PLEASURE IN DOING THINGS: 0
SUM OF ALL RESPONSES TO PHQ9 QUESTIONS 1 & 2: 0

## 2022-11-15 NOTE — PROGRESS NOTES
MA Rooming Questions  Patient: Jesús Borrero  MRN: [id number]    Date: 11/15/2022        1. Do you have any new issues?   no         2. Do you need any refills on medications?    no    3. Have you had any imaging done since your last visit?   no    4. Have you been hospitalized or seen in the emergency room since your last visit here?   no    5. Did the patient have a depression screening completed today?  Yes    PHQ-9 Total Score: 0 (11/15/2022 10:03 AM)       PHQ-9 Given to (if applicable):               PHQ-9 Score (if applicable):                     [] Positive     []  Negative              Does question #9 need addressed (if applicable)                     [] Yes    []  No               Gary Hernandez CMA

## 2022-11-15 NOTE — PROGRESS NOTES
Palliative Care Assessment    Medical Oncology History:    June 22 who was evaluated for severe back pain. CT abdomen and pelvis June 12, 2022 8 cm mass involving liver, extensive lytic metastatic lesions involving the lumbar and thoracic spine and pelvis. Mammogram June 17, 2022 right breast mass. CT chest June 20, 2022 size liver lesion breast mass also noted axillary and subpectoral metastatic disease. She also underwent bone scan and MRI of abdomen and finally biopsy of the right breast pathology revealing invasive ductal and lobular carcinoma, positive estrogen and progesterone receptors. Because of the visceral disease started on single agent paclitaxel on July 20, 2022. CT scan of the chest abdomen and pelvis done on November 14, 2022 revealed overall improvement. Other Medical Problems:   Basically healthy except for osteoarthritis and having her hip replacement about 2 years prior to this diagnosis     Personal History     A. Life Time:   Moved to [address] [place] [address] in early age from [place]. Went to school here including college at [employer] and after graduation came to [facility]. 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  worked at [employer] retiring recently also. Between them to have her 3 daughters 1 living in [facility] another 1 in [address] [address] and third 1 in [place]. She is very close to her daughters and spends a lot of time with her daughters in [facility] and [place] enjoying the grandchildren. Negative for history of smoking or ethanol intake or other usage. B. Family:   As above    Physical Symptoms:   November 15, 2022 she was doing better. She had noticed overall improvement in sense of wellbeing. Pain was not a major issue she would take maybe half a tablet of Norco rarely. Otherwise no nausea or vomiting no significant issues with neuropathy.   She did start taking half a gummy of cannabis mostly at night with significant improvement in relaxation and ability to sleep. She is eating well and gaining weight. She is enjoying her to daughters here who live close by as well as 4 grandchildren. Her  is quite supportive. All in all she felt that most of her symptoms are extremely well managed. Psychological and Cognitive Symptoms:   Denies any issues in this regard  Illness Understanding and Care Preferences: Has very good understanding about the disease process and so far is very pleased with the care she has been receiving. Existential and Spiritual:   Not a regular Jain person. Tries to do good by other people  Social and Economic Resources:   Denies any issues in this regard  Care Coordination:   August 3, 2022 spent time with her, reviewing her personal and medical history. So far she has been tolerating at least early on treatments extremely well. Most of her symptoms are extremely well managed including pain. Major issue bothering her has not been able to relax and sleep at night and she has tried sleeping aids without much help and the question of cannabis was raised. I think that would be reasonable. We will try helping her in that regards. Otherwise at this stage as mentioned above most of her symptoms are well managed and I will be seeing her again in few months. November 15, 2022 overall she was doing fairly decent most of her symptoms are fairly well managed just with the help of minimal amount of Norco though that she was going to cut down to taking maybe just plain Tylenol. With the help of cannabis she has been able to relax and sleep well. Appetite was good and she was gaining weight. Her disease apparently is responding favorably to the treatment to. She is continue to follow-up with her oncology team for the same. I did advise her to see me again in 6 months or so.   Time spent 15 minutes    Freddie Mason MD

## 2022-11-16 ENCOUNTER — OFFICE VISIT (OUTPATIENT)
Dept: SURGERY | Age: 67
End: 2022-11-16
Payer: MEDICARE

## 2022-11-16 ENCOUNTER — OFFICE VISIT (OUTPATIENT)
Dept: ONCOLOGY | Age: 67
End: 2022-11-16
Payer: MEDICARE

## 2022-11-16 ENCOUNTER — HOSPITAL ENCOUNTER (OUTPATIENT)
Dept: INFUSION THERAPY | Age: 67
Discharge: HOME | End: 2022-11-16
Payer: MEDICARE

## 2022-11-16 VITALS
HEIGHT: 64 IN | RESPIRATION RATE: 18 BRPM | OXYGEN SATURATION: 96 % | WEIGHT: 163 LBS | TEMPERATURE: 97.2 F | DIASTOLIC BLOOD PRESSURE: 83 MMHG | SYSTOLIC BLOOD PRESSURE: 139 MMHG | BODY MASS INDEX: 27.83 KG/M2 | HEART RATE: 98 BPM

## 2022-11-16 VITALS
SYSTOLIC BLOOD PRESSURE: 130 MMHG | HEART RATE: 101 BPM | WEIGHT: 163.2 LBS | OXYGEN SATURATION: 99 % | DIASTOLIC BLOOD PRESSURE: 70 MMHG | HEIGHT: 64 IN | BODY MASS INDEX: 27.86 KG/M2

## 2022-11-16 DIAGNOSIS — C50.811 MALIGNANT NEOPLASM OF OVERLAPPING SITES OF BOTH BREASTS IN FEMALE, ESTROGEN RECEPTOR POSITIVE (HCC): Primary | ICD-10-CM

## 2022-11-16 DIAGNOSIS — C50.812 MALIGNANT NEOPLASM OF OVERLAPPING SITES OF BOTH BREASTS IN FEMALE, ESTROGEN RECEPTOR POSITIVE (HCC): Primary | ICD-10-CM

## 2022-11-16 DIAGNOSIS — C79.51 BONE METASTASIS (HCC): ICD-10-CM

## 2022-11-16 DIAGNOSIS — Z17.0 MALIGNANT NEOPLASM OF OVERLAPPING SITES OF BOTH BREASTS IN FEMALE, ESTROGEN RECEPTOR POSITIVE (HCC): Primary | ICD-10-CM

## 2022-11-16 LAB
ALBUMIN SERPL-MCNC: 4.1 GM/DL (ref 3.4–5)
ALP BLD-CCNC: 71 IU/L (ref 40–128)
ALT SERPL-CCNC: 15 U/L (ref 10–40)
ANION GAP SERPL CALCULATED.3IONS-SCNC: 11 MMOL/L (ref 4–16)
AST SERPL-CCNC: 20 IU/L (ref 15–37)
BASOPHILS ABSOLUTE: 0 K/CU MM
BASOPHILS RELATIVE PERCENT: 0.5 % (ref 0–1)
BILIRUB SERPL-MCNC: 0.3 MG/DL (ref 0–1)
BUN BLDV-MCNC: 12 MG/DL (ref 6–23)
CALCIUM SERPL-MCNC: 9.1 MG/DL (ref 8.3–10.6)
CHLORIDE BLD-SCNC: 105 MMOL/L (ref 99–110)
CO2: 25 MMOL/L (ref 21–32)
CREAT SERPL-MCNC: 0.4 MG/DL (ref 0.6–1.1)
DIFFERENTIAL TYPE: ABNORMAL
EOSINOPHILS ABSOLUTE: 0.1 K/CU MM
EOSINOPHILS RELATIVE PERCENT: 2.1 % (ref 0–3)
GFR SERPL CREATININE-BSD FRML MDRD: >60 ML/MIN/1.73M2
GLUCOSE BLD-MCNC: 115 MG/DL (ref 70–99)
HCT VFR BLD CALC: 34.6 % (ref 37–47)
HEMOGLOBIN: 10.9 GM/DL (ref 12.5–16)
LYMPHOCYTES ABSOLUTE: 2.1 K/CU MM
LYMPHOCYTES RELATIVE PERCENT: 35.8 % (ref 24–44)
MCH RBC QN AUTO: 30.4 PG (ref 27–31)
MCHC RBC AUTO-ENTMCNC: 31.5 % (ref 32–36)
MCV RBC AUTO: 96.4 FL (ref 78–100)
MONOCYTES ABSOLUTE: 0.4 K/CU MM
MONOCYTES RELATIVE PERCENT: 6.9 % (ref 0–4)
PDW BLD-RTO: 14.9 % (ref 11.7–14.9)
PLATELET # BLD: 264 K/CU MM (ref 140–440)
PMV BLD AUTO: 8.6 FL (ref 7.5–11.1)
POTASSIUM SERPL-SCNC: 3.8 MMOL/L (ref 3.5–5.1)
RBC # BLD: 3.59 M/CU MM (ref 4.2–5.4)
SEGMENTED NEUTROPHILS ABSOLUTE COUNT: 3.2 K/CU MM
SEGMENTED NEUTROPHILS RELATIVE PERCENT: 54.7 % (ref 36–66)
SODIUM BLD-SCNC: 141 MMOL/L (ref 135–145)
TOTAL PROTEIN: 6.3 GM/DL (ref 6.4–8.2)
WBC # BLD: 5.8 K/CU MM (ref 4–10.5)

## 2022-11-16 PROCEDURE — 80053 COMPREHEN METABOLIC PANEL: CPT

## 2022-11-16 PROCEDURE — 85025 COMPLETE CBC W/AUTO DIFF WBC: CPT

## 2022-11-16 PROCEDURE — 96375 TX/PRO/DX INJ NEW DRUG ADDON: CPT

## 2022-11-16 PROCEDURE — 2580000003 HC RX 258: Performed by: INTERNAL MEDICINE

## 2022-11-16 PROCEDURE — 99212 OFFICE O/P EST SF 10 MIN: CPT | Performed by: NURSE PRACTITIONER

## 2022-11-16 PROCEDURE — 96367 TX/PROPH/DG ADDL SEQ IV INF: CPT

## 2022-11-16 PROCEDURE — 2500000003 HC RX 250 WO HCPCS: Performed by: INTERNAL MEDICINE

## 2022-11-16 PROCEDURE — 6360000002 HC RX W HCPCS: Performed by: INTERNAL MEDICINE

## 2022-11-16 PROCEDURE — 99214 OFFICE O/P EST MOD 30 MIN: CPT | Performed by: INTERNAL MEDICINE

## 2022-11-16 PROCEDURE — 96413 CHEMO IV INFUSION 1 HR: CPT

## 2022-11-16 PROCEDURE — 1124F ACP DISCUSS-NO DSCNMKR DOCD: CPT | Performed by: NURSE PRACTITIONER

## 2022-11-16 PROCEDURE — 1124F ACP DISCUSS-NO DSCNMKR DOCD: CPT | Performed by: INTERNAL MEDICINE

## 2022-11-16 RX ORDER — DIPHENHYDRAMINE HYDROCHLORIDE 50 MG/ML
50 INJECTION INTRAMUSCULAR; INTRAVENOUS ONCE
Status: CANCELLED | OUTPATIENT
Start: 2022-11-16 | End: 2022-11-16

## 2022-11-16 RX ORDER — ONDANSETRON 2 MG/ML
8 INJECTION INTRAMUSCULAR; INTRAVENOUS
Status: CANCELLED | OUTPATIENT
Start: 2022-11-30

## 2022-11-16 RX ORDER — FAMOTIDINE 10 MG/ML
20 INJECTION, SOLUTION INTRAVENOUS
Status: CANCELLED | OUTPATIENT
Start: 2022-11-30

## 2022-11-16 RX ORDER — MEPERIDINE HYDROCHLORIDE 50 MG/ML
12.5 INJECTION INTRAMUSCULAR; INTRAVENOUS; SUBCUTANEOUS PRN
Status: CANCELLED | OUTPATIENT
Start: 2022-11-16

## 2022-11-16 RX ORDER — ACETAMINOPHEN 325 MG/1
650 TABLET ORAL
Status: CANCELLED | OUTPATIENT
Start: 2022-11-16

## 2022-11-16 RX ORDER — SODIUM CHLORIDE 9 MG/ML
5-250 INJECTION, SOLUTION INTRAVENOUS PRN
Status: DISCONTINUED | OUTPATIENT
Start: 2022-11-16 | End: 2022-11-17 | Stop reason: HOSPADM

## 2022-11-16 RX ORDER — MEPERIDINE HYDROCHLORIDE 50 MG/ML
12.5 INJECTION INTRAMUSCULAR; INTRAVENOUS; SUBCUTANEOUS PRN
Status: CANCELLED | OUTPATIENT
Start: 2022-11-30

## 2022-11-16 RX ORDER — SODIUM CHLORIDE 9 MG/ML
5-40 INJECTION INTRAVENOUS PRN
Status: CANCELLED | OUTPATIENT
Start: 2022-11-16

## 2022-11-16 RX ORDER — SODIUM CHLORIDE 9 MG/ML
5-250 INJECTION, SOLUTION INTRAVENOUS PRN
Status: CANCELLED | OUTPATIENT
Start: 2022-11-16

## 2022-11-16 RX ORDER — SODIUM CHLORIDE 0.9 % (FLUSH) 0.9 %
5-40 SYRINGE (ML) INJECTION PRN
Status: DISCONTINUED | OUTPATIENT
Start: 2022-11-16 | End: 2022-11-17 | Stop reason: HOSPADM

## 2022-11-16 RX ORDER — SODIUM CHLORIDE 9 MG/ML
5-250 INJECTION, SOLUTION INTRAVENOUS PRN
Status: CANCELLED | OUTPATIENT
Start: 2022-11-30

## 2022-11-16 RX ORDER — DIPHENHYDRAMINE HYDROCHLORIDE 50 MG/ML
50 INJECTION INTRAMUSCULAR; INTRAVENOUS ONCE
Status: CANCELLED | OUTPATIENT
Start: 2022-11-30 | End: 2022-11-30

## 2022-11-16 RX ORDER — SODIUM CHLORIDE 0.9 % (FLUSH) 0.9 %
5-40 SYRINGE (ML) INJECTION PRN
Status: CANCELLED | OUTPATIENT
Start: 2022-11-23

## 2022-11-16 RX ORDER — FAMOTIDINE 10 MG/ML
20 INJECTION, SOLUTION INTRAVENOUS ONCE
Status: COMPLETED | OUTPATIENT
Start: 2022-11-16 | End: 2022-11-16

## 2022-11-16 RX ORDER — ONDANSETRON 2 MG/ML
8 INJECTION INTRAMUSCULAR; INTRAVENOUS
Status: CANCELLED | OUTPATIENT
Start: 2022-11-23

## 2022-11-16 RX ORDER — ONDANSETRON 2 MG/ML
8 INJECTION INTRAMUSCULAR; INTRAVENOUS
Status: CANCELLED | OUTPATIENT
Start: 2022-11-16

## 2022-11-16 RX ORDER — ALBUTEROL SULFATE 90 UG/1
4 AEROSOL, METERED RESPIRATORY (INHALATION) PRN
Status: CANCELLED | OUTPATIENT
Start: 2022-11-23

## 2022-11-16 RX ORDER — FAMOTIDINE 10 MG/ML
20 INJECTION, SOLUTION INTRAVENOUS ONCE
Status: CANCELLED | OUTPATIENT
Start: 2022-11-16 | End: 2022-11-16

## 2022-11-16 RX ORDER — ALBUTEROL SULFATE 90 UG/1
4 AEROSOL, METERED RESPIRATORY (INHALATION) PRN
Status: CANCELLED | OUTPATIENT
Start: 2022-11-30

## 2022-11-16 RX ORDER — MEPERIDINE HYDROCHLORIDE 50 MG/ML
12.5 INJECTION INTRAMUSCULAR; INTRAVENOUS; SUBCUTANEOUS PRN
Status: CANCELLED | OUTPATIENT
Start: 2022-11-23

## 2022-11-16 RX ORDER — DIPHENHYDRAMINE HYDROCHLORIDE 50 MG/ML
50 INJECTION INTRAMUSCULAR; INTRAVENOUS
Status: CANCELLED | OUTPATIENT
Start: 2022-11-16

## 2022-11-16 RX ORDER — FAMOTIDINE 10 MG/ML
20 INJECTION, SOLUTION INTRAVENOUS
Status: CANCELLED | OUTPATIENT
Start: 2022-11-23

## 2022-11-16 RX ORDER — SODIUM CHLORIDE 9 MG/ML
5-250 INJECTION, SOLUTION INTRAVENOUS PRN
Status: CANCELLED | OUTPATIENT
Start: 2022-11-23

## 2022-11-16 RX ORDER — SODIUM CHLORIDE 0.9 % (FLUSH) 0.9 %
5-40 SYRINGE (ML) INJECTION PRN
Status: CANCELLED | OUTPATIENT
Start: 2022-11-16

## 2022-11-16 RX ORDER — HEPARIN SODIUM (PORCINE) LOCK FLUSH IV SOLN 100 UNIT/ML 100 UNIT/ML
500 SOLUTION INTRAVENOUS PRN
Status: CANCELLED | OUTPATIENT
Start: 2022-11-16

## 2022-11-16 RX ORDER — DIPHENHYDRAMINE HYDROCHLORIDE 50 MG/ML
50 INJECTION INTRAMUSCULAR; INTRAVENOUS
Status: CANCELLED | OUTPATIENT
Start: 2022-11-23

## 2022-11-16 RX ORDER — FAMOTIDINE 10 MG/ML
20 INJECTION, SOLUTION INTRAVENOUS
Status: CANCELLED | OUTPATIENT
Start: 2022-11-16

## 2022-11-16 RX ORDER — EPINEPHRINE 1 MG/ML
0.3 INJECTION, SOLUTION, CONCENTRATE INTRAVENOUS PRN
Status: CANCELLED | OUTPATIENT
Start: 2022-11-23

## 2022-11-16 RX ORDER — FAMOTIDINE 10 MG/ML
20 INJECTION, SOLUTION INTRAVENOUS ONCE
Status: CANCELLED | OUTPATIENT
Start: 2022-11-23 | End: 2022-11-23

## 2022-11-16 RX ORDER — DIPHENHYDRAMINE HYDROCHLORIDE 50 MG/ML
50 INJECTION INTRAMUSCULAR; INTRAVENOUS ONCE
Status: COMPLETED | OUTPATIENT
Start: 2022-11-16 | End: 2022-11-16

## 2022-11-16 RX ORDER — EPINEPHRINE 1 MG/ML
0.3 INJECTION, SOLUTION, CONCENTRATE INTRAVENOUS PRN
Status: CANCELLED | OUTPATIENT
Start: 2022-11-16

## 2022-11-16 RX ORDER — SODIUM CHLORIDE 9 MG/ML
INJECTION, SOLUTION INTRAVENOUS CONTINUOUS
Status: CANCELLED | OUTPATIENT
Start: 2022-11-23

## 2022-11-16 RX ORDER — SODIUM CHLORIDE 9 MG/ML
5-40 INJECTION INTRAVENOUS PRN
Status: CANCELLED | OUTPATIENT
Start: 2022-11-23

## 2022-11-16 RX ORDER — DIPHENHYDRAMINE HYDROCHLORIDE 50 MG/ML
50 INJECTION INTRAMUSCULAR; INTRAVENOUS ONCE
Status: CANCELLED | OUTPATIENT
Start: 2022-11-23 | End: 2022-11-23

## 2022-11-16 RX ORDER — SODIUM CHLORIDE 9 MG/ML
5-40 INJECTION INTRAVENOUS PRN
Status: CANCELLED | OUTPATIENT
Start: 2022-11-30

## 2022-11-16 RX ORDER — ALBUTEROL SULFATE 90 UG/1
4 AEROSOL, METERED RESPIRATORY (INHALATION) PRN
Status: CANCELLED | OUTPATIENT
Start: 2022-11-16

## 2022-11-16 RX ORDER — HEPARIN SODIUM (PORCINE) LOCK FLUSH IV SOLN 100 UNIT/ML 100 UNIT/ML
500 SOLUTION INTRAVENOUS PRN
Status: DISCONTINUED | OUTPATIENT
Start: 2022-11-16 | End: 2022-11-17 | Stop reason: HOSPADM

## 2022-11-16 RX ORDER — ACETAMINOPHEN 325 MG/1
650 TABLET ORAL
Status: CANCELLED | OUTPATIENT
Start: 2022-11-30

## 2022-11-16 RX ORDER — DIPHENHYDRAMINE HYDROCHLORIDE 50 MG/ML
50 INJECTION INTRAMUSCULAR; INTRAVENOUS
Status: CANCELLED | OUTPATIENT
Start: 2022-11-30

## 2022-11-16 RX ORDER — ACETAMINOPHEN 325 MG/1
650 TABLET ORAL
Status: CANCELLED | OUTPATIENT
Start: 2022-11-23

## 2022-11-16 RX ORDER — HEPARIN SODIUM (PORCINE) LOCK FLUSH IV SOLN 100 UNIT/ML 100 UNIT/ML
500 SOLUTION INTRAVENOUS PRN
Status: CANCELLED | OUTPATIENT
Start: 2022-11-30

## 2022-11-16 RX ORDER — SODIUM CHLORIDE 9 MG/ML
INJECTION, SOLUTION INTRAVENOUS CONTINUOUS
Status: CANCELLED | OUTPATIENT
Start: 2022-11-30

## 2022-11-16 RX ORDER — SODIUM CHLORIDE 9 MG/ML
INJECTION, SOLUTION INTRAVENOUS CONTINUOUS
Status: CANCELLED | OUTPATIENT
Start: 2022-11-16

## 2022-11-16 RX ORDER — EPINEPHRINE 1 MG/ML
0.3 INJECTION, SOLUTION, CONCENTRATE INTRAVENOUS PRN
Status: CANCELLED | OUTPATIENT
Start: 2022-11-30

## 2022-11-16 RX ORDER — SODIUM CHLORIDE 0.9 % (FLUSH) 0.9 %
5-40 SYRINGE (ML) INJECTION PRN
Status: CANCELLED | OUTPATIENT
Start: 2022-11-30

## 2022-11-16 RX ORDER — FAMOTIDINE 10 MG/ML
20 INJECTION, SOLUTION INTRAVENOUS ONCE
Status: CANCELLED | OUTPATIENT
Start: 2022-11-30 | End: 2022-11-30

## 2022-11-16 RX ORDER — HEPARIN SODIUM (PORCINE) LOCK FLUSH IV SOLN 100 UNIT/ML 100 UNIT/ML
500 SOLUTION INTRAVENOUS PRN
Status: CANCELLED | OUTPATIENT
Start: 2022-11-23

## 2022-11-16 RX ADMIN — PACLITAXEL 130 MG: 6 INJECTION, SOLUTION INTRAVENOUS at 12:02

## 2022-11-16 RX ADMIN — HEPARIN 500 UNITS: 100 SYRINGE at 13:20

## 2022-11-16 RX ADMIN — DEXAMETHASONE SODIUM PHOSPHATE 10 MG: 10 INJECTION INTRAMUSCULAR; INTRAVENOUS at 11:33

## 2022-11-16 RX ADMIN — SODIUM CHLORIDE 20 ML/HR: 9 INJECTION, SOLUTION INTRAVENOUS at 11:25

## 2022-11-16 RX ADMIN — SODIUM CHLORIDE, PRESERVATIVE FREE 10 ML: 5 INJECTION INTRAVENOUS at 13:20

## 2022-11-16 RX ADMIN — FAMOTIDINE 20 MG: 10 INJECTION, SOLUTION INTRAVENOUS at 11:26

## 2022-11-16 RX ADMIN — DIPHENHYDRAMINE HYDROCHLORIDE 50 MG: 50 INJECTION INTRAMUSCULAR; INTRAVENOUS at 11:26

## 2022-11-16 ASSESSMENT — PATIENT HEALTH QUESTIONNAIRE - PHQ9
SUM OF ALL RESPONSES TO PHQ9 QUESTIONS 1 & 2: 0
SUM OF ALL RESPONSES TO PHQ QUESTIONS 1-9: 0
2. FEELING DOWN, DEPRESSED OR HOPELESS: 0
SUM OF ALL RESPONSES TO PHQ QUESTIONS 1-9: 0
1. LITTLE INTEREST OR PLEASURE IN DOING THINGS: 0
SUM OF ALL RESPONSES TO PHQ QUESTIONS 1-9: 0
SUM OF ALL RESPONSES TO PHQ QUESTIONS 1-9: 0

## 2022-11-16 ASSESSMENT — ENCOUNTER SYMPTOMS
COLOR CHANGE: 0
VOMITING: 0
ABDOMINAL PAIN: 0
SHORTNESS OF BREATH: 0
NAUSEA: 0
WHEEZING: 0

## 2022-11-16 NOTE — PROGRESS NOTES
Patient Name:  Yoko Hernández  Patient :  1955  Patient MRN:  [id number]     Primary Oncologist: Aurelia Hinton MD  Referring Provider: MAURICIO Velazquez CNP     Date of Service: 2022      Chief Complaint:    Chief Complaint   Patient presents with    Follow-up     Patient's active problem list:       Liver mass       Lytic bone lesions       Left breast mass    HPI:   Pancho Monahan is a 74year-old very pleasant female with medical history significant for osteoarthritis, initially referred to me on 22 for evaluation of her liver lesion and multiple lytic bone lesions. She initially presented to [facility] ER on 22 with severe lower back pain and constipation. Stated that she has been having back pain for about 4 - 6 weeks duration, which becomes severe pain started a week before presentation. CT scan of the abdomen and pelvis done on 2022 showed lesion (6.1 x 8.1 cm) in the hepatic segment 4, concerning for neoplasm. Further evaluation with MRI of the liver is recommended. Extensive lytic metastatic disease in lumbar and thoracic spine and pelvis with most dominant lesion seen at L5 vertebral body. Several small bowel loops segments in the left abdomen demonstrate mild wall thickening, nonspecific may indicate enteritis. Nonspecific partially visualized inflammatory stranding along the pericardium. 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 CT lumbar spine done on 22 showed extensive multifocal lytic lesions concerning for neoplastic/metastatic disease. Age indeterminant compression fractures at T12 and L4, with mild narrowing of the AP diameter of the canal at L4. She never had colonoscopy before. She had pap smear around . She didn't recall when was her last mammogram.     She denies weight loss. She hasn't been eating much for about 10 days since she has been having nausea.      Since she is found to have multiple lytic bone lesions and liver lesion, she was referred to me for further evaluation. Digital diagnostic bilateral mammogram and bilateral ultrasound done on 6/17/2022 showed left breast mass, highly suspicious for malignancy. Right breast mass at 11:00, 1.5 cm. Mass is highly suspicious for malignancy. Left breast skin thickening. CT chest on 6/20/2022 showed suspicious heterogeneous mass primarily centered in segment 4A of the liver measuring 8.8 cm. Left breast mass with left axillary and subpectoral metastatic disease, suspected bony metastases to thoracolumbar spine and sternum and possible left chest wall invasion. Bone scan on 6/20/2022 showed multifocal osseous metastatic disease. T12 compression fracture, concerning for pathologic fracture. MRI abdomen on 6/25/2022 showed biopsy-proven malignancy in the left breast with suspicion for inflammatory carcinoma given skin and trabecular thickening. There may be invasion of the underlying pectoralis musculature. At least 3 heavily meta stasis, the largest measuring up to 8.7 cm with the others 1 cm or less. Likely metastatic portal hepatic, retroperitoneal and right retrocrural lymph nodes. Trace left pleural effusion. Extensive skeletal metastatic disease. She underwent ultrasound-guided biopsy of the right breast mass at 11:00 and the pathology showed invasive carcinoma with ductal and lobular features. Estrogen receptor and progesterone receptor are positive. HER2 by IHC not overexpressed (score 1+). HER2 by FISH-negative. Left breast mass at 12:00 biopsy revealed grade 2 invasive lobular carcinoma. Estrogen receptor by IHC-positive [greater than 95%, average strong intensity], progesterone receptor-positive [approximately 80%, average moderate intensity], HER2 by IHC-not overexpressed [score 1+] and HER2 by FISH-negative. CARIS panel did not yield any biomarker results with a benefit of clinic at the dense 70 chance to provide an association with a particular therapy.  TMB was low. PD-L1 for [place] Fro[place] was not expressed and MSI was stable. Since she has significant visceral disease, I recommend her to initiate first-line chemotherapy with single agent paclitaxel. It was started on 7/27/22. CT scan of the chest, abdomen and pelvis done on 11/14/2022 showed overall improvement in skin thickening and edema within the left breast as well as left internal thoracic/mammary, axillary and subpectoral adenopathy. Interval decrease in size of dominant left hepatic metastasis. Small hepatic lesion seen on earlier MRI are not well seen on this exam.  No new focal hepatic lesion. There is also interval decrease in size of retroperitoneal lymph nodes. Multifocal osseous metastases are again identified in keeping with recent bone scan. There is overall progression of sclerosis when compared to prior exams throughout the visualized skeleton which may reflect posttreatment changes rather than progression. Focal areas of wall thickening in the distal esophagus extending along the gastric cardia as well as the gastric body. Recommend correlation with endoscopy. On November 16, 2022, she presented to me for follow-up. She was initially referred to me for evaluation of liver lesion and multiple bony lesion. She was found to have left breast mass with a distorted left breast on physical examination. 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 Since she has significant visceral disease, I recommend her to initiate first-line chemotherapy with single agent paclitaxel. It was started on 7/20/22. She is s/p fourth cycle of chemotherapy. She is tolerating current chemo quite well and she doesn't encounter any major side effects from it. Her left breast and axillary masses are getting smaller and softer.      On today visit, I reviewed with her findings on CT scans and bone scans done on November 2022. I looked at her scans myself. I believe she is achieving very good response to current chemotherapy. I recommend to give total of 6 cycles of chemotherapy followed by first-line endocrine therapy with Ibrance and letrozole. I will start her fifth cycle of chemotherapy today and will continue to follow her closely during chemotherapy. Bone metastasis - we started zolendronic acid since 7/20/22. She only has minimal symptom and will not do vertebroplasty at this time. Reviewed [employer] molecular panel result with her. I requested genetic counseling and testing since she has bilateral metastatic breast cancer. Malignancy related pain - will continue with norco since it has been controlling her pain well. Anxiety and insomnia - on ativan 0.5 mg nightly prn with improvement in her symptom. She is requiring less ativan since starting medical marijuana. She doesn't have any other significant symptoms at today visit. Past Medical History:     Significant for  1. Osteoarthritis    Past Surgery History:    Significant for   1. Left hip replacement    Social History:   She is a current smoker and she smokes 1 pack per day approximately since she was 24. She denies alcohol drinking or illicit drug abuse. Family History:    Significant for Alzheimer's disease in her parents. No family history of malignancy. No Known Allergies    Review of Systems: \"Per interval history; otherwise 10 point ROS is negative. \"  Her energy level is fine and her sleep is good. She doesn't have fever, chills, night sweats, cough, SOB, chest pain, hemoptysis or palpitations. Her bowels and bladder functions are normal. She denies nausea, vomiting, abdominal pain, diarrhea, constipation, dysuria, loss of appetite or weight loss. She doesn't have neuropathy and she denies bleeding or clotting issues.  She has cancer related mild pain in her lower back, but it is getting better. She barely needs narcotic pain med. Has anxiety. No depression. The rest of the systems are unremarkable. Vital Signs: /83 (Site: Right Upper Arm, Position: Sitting, Cuff Size: Large Adult)   Pulse 98   Temp 97.2 °F (36.2 °C) (Infrared)   Resp 18   Ht 5' 4\" (1.626 m)   Wt 163 lb (73.9 kg)   SpO2 96%   BMI 27.98 kg/m²      Physical Exam:  CONSTITUTIONAL: awake, alert, cooperative, no apparent distress   EYES: pupils equal, round and reactive to light, sclera clear, normal conjunctiva  ENT: Normocephalic, without obvious abnormality, atraumatic  NECK: supple, symmetrical, no jugular venous distension, no carotid bruits   HEMATOLOGIC/LYMPHATIC: no cervical, supraclavicular lymphadenopathy. Left axillary lymphadenopathy noted,   LUNGS: VBS, no wheezes, clear to auscultation, no crackles, no increased work of breathing, no rhonchi,    CARDIOVASCULAR: regular rate and rhythm, normal S1 and S2, no murmur noted  ABDOMEN: normal bowel sounds x 4, soft, non-distended, non-tender, no masses palpated, no hepatosplenomegaly   MUSCULOSKELETAL: full range of motion noted, tone is normal  NEUROLOGIC: awake, alert, oriented to name, place and time. Motor skills grossly intact. SKIN: appears intact, normal skin color, normal texture, normal turgor, no jaundice.    EXTREMITIES: no LE edema, no cyanosis, no clubbing, no leg swelling,   BREASTS: Left breast is distorted by tumor, nipple was displaced close to lower breast crease area, it is getting softer with chemo, right breast nodularity noted,        Labs:  Hematology:  Lab Results   Component Value Date    WBC 5.8 11/16/2022    RBC 3.59 (L) 11/16/2022    HGB 10.9 (L) 11/16/2022    HCT 34.6 (L) 11/16/2022    MCV 96.4 11/16/2022    MCH 30.4 11/16/2022    MCHC 31.5 (L) 11/16/2022    RDW 14.9 11/16/2022     11/16/2022    MPV 8.6 11/16/2022    SEGSPCT 54.7 11/16/2022    EOSRELPCT 2.1 11/16/2022    BASOPCT 0.5 11/16/2022    LYMPHOPCT 35.8 11/16/2022    MONOPCT 6.9 (H) 11/16/2022    SEGSABS 3.2 11/16/2022    EOSABS 0.1 11/16/2022    BASOSABS 0.0 11/16/2022    LYMPHSABS 2.1 11/16/2022    MONOSABS 0.4 11/16/2022    DIFFTYPE AUTOMATED DIFFERENTIAL 11/16/2022     No results found for: ESR  Chemistry:  Lab Results   Component Value Date     11/02/2022    K 3.8 11/02/2022     11/02/2022    CO2 24 11/02/2022    BUN 16 11/02/2022    CREATININE 0.6 11/02/2022    GLUCOSE 74 11/02/2022    CALCIUM 9.5 11/02/2022    PROT 6.6 11/02/2022    LABALBU 4.3 11/02/2022    BILITOT 0.3 11/02/2022    ALKPHOS 79 11/02/2022    AST 15 11/02/2022    ALT 14 11/02/2022    LABGLOM >60 11/02/2022    GFRAA >60 09/28/2022    MG 2.3 07/19/2022    POCCA 1.25 11/02/2022    POCGLU 76 11/02/2022     Lab Results   Component Value Date     (H) 06/14/2022     No components found for: LD  No results found for: TSHHS, T4FREE, FT3  Immunology:  Lab Results   Component Value Date    PROT 6.6 11/02/2022    SPEP  06/14/2022     INTERPRETATION - No monoclonal proteins are detected. SAF    SPEP INTERPRETATION - Within normal limits.  SAF 06/14/2022    ALBUMINELP 3.7 06/14/2022    LABALPH 0.4 06/14/2022    LABALPH 1.3 (H) 95/59/9530    LABALPH DUPLICATE ORDER 99/43/7622    LABALPH DUPLICATE ORDER 11/04/7938    LABBETA 1.1 77/79/4333    LABBETA DUPLICATE ORDER 91/34/7847    GAMGLOB 1.1 06/14/2022     No results found for: Ghazal Andersen, KLFLCR  No results found for: B2M  Coagulation Panel:  No results found for: PROTIME, INR, APTT, DDIMER  Anemia Panel:  No results found for: [id number], FOLATE  Tumor Markers:  Lab Results   Component Value Date     377 (H) 06/14/2022    CEA 54.5 06/14/2022     25 06/14/2022    LABCA2 45.8 (H) 08/31/2022        Observations:  PHQ-9 Total Score: 0 (11/16/2022  9:14 AM)     Assessment   Left breast mass  Liver lesion and multiple bone lytic lesions - concerning for metastatic breast cancer    Plan:  Mayank Chaudhary is a 74year-old very pleasant female who initially presented to [facility] ER on 6/12/22 with severe lower back pain and constipation. CT scan of the abdomen and pelvis done on 6/12/2022 showed lesion (6.1 x 8.1 cm) in the hepatic segment 4, concerning for neoplasm. Extensive lytic metastatic disease in lumbar and thoracic spine and pelvis with most dominant lesion seen at L5 vertebral body. CT lumbar spine done on 6/12/22 showed extensive multifocal lytic lesions concerning for neoplastic/metastatic disease. She never had colonoscopy before. She had pap smear around 2020. She didn't recall when was her last mammogram.       Digital diagnostic bilateral mammogram and bilateral ultrasound done on 6/17/2022 showed left breast mass, highly suspicious for malignancy. Right breast mass at 11:00, 1.5 cm. Mass is highly suspicious for malignancy. Left breast skin thickening. CT chest on 6/20/2022 showed suspicious heterogeneous mass primarily centered in segment 4A of the liver measuring 8.8 cm. Left breast mass with left axillary and subpectoral metastatic disease, suspected bony metastases to thoracolumbar spine and sternum and possible left chest wall invasion. Bone scan on 6/20/2022 showed multifocal osseous metastatic disease. T12 compression fracture, concerning for pathologic fracture. MRI abdomen on 6/25/2022 showed biopsy-proven malignancy in the left breast with suspicion for inflammatory carcinoma given skin and trabecular thickening. There may be invasion of the underlying pectoralis musculature. At least 3 heavily meta stasis, the largest measuring up to 8.7 cm with the others 1 cm or less. Likely metastatic portal hepatic, retroperitoneal and right retrocrural lymph nodes. Trace left pleural effusion. Extensive skeletal metastatic disease. She underwent ultrasound-guided biopsy of the right breast mass at 11:00 and the pathology showed invasive carcinoma with ductal and lobular features. Estrogen receptor and progesterone receptor are positive. HER2 by IHC not overexpressed (score 1+). HER2 by FISH-negative. Left breast mass at 12:00 biopsy revealed grade 2 invasive lobular carcinoma. Estrogen receptor by IHC-positive [greater than 95%, average strong intensity], progesterone receptor-positive [approximately 80%, average moderate intensity], HER2 by IHC-not overexpressed [score 1+] and HER2 by FISH-negative. CARIS panel did not yield any biomarker results with a benefit of clinic at the dense 70 chance to provide an association with a particular therapy. TMB was low. PD-L1 for [place] Faden was not expressed and MSI was stable. Since she has significant visceral disease, I recommend her to initiate first-line chemotherapy with single agent paclitaxel. It was started on 7/27/22. CT scan of the chest, abdomen and pelvis done on 11/14/2022 showed overall improvement in skin thickening and edema within the left breast as well as left internal thoracic/mammary, axillary and subpectoral adenopathy. Interval decrease in size of dominant left hepatic metastasis. Small hepatic lesion seen on earlier MRI are not well seen on this exam.  No new focal hepatic lesion. There is also interval decrease in size of retroperitoneal lymph nodes. Multifocal osseous metastases are again identified in keeping with recent bone scan. There is overall progression of sclerosis when compared to prior exams throughout the visualized skeleton which may reflect posttreatment changes rather than progression. Focal areas of wall thickening in the distal esophagus extending along the gastric cardia as well as the gastric body. Recommend correlation with endoscopy. On November 16, 2022, she presented to me for follow-up. She was initially referred to me for evaluation of liver lesion and multiple bony lesion. She was found to have left breast mass with a distorted left breast on physical examination. 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 Since she has significant visceral disease, I recommend her to initiate first-line chemotherapy with single agent paclitaxel. It was started on 7/20/22. She is s/p fourth cycle of chemotherapy. She is tolerating current chemo quite well and she doesn't encounter any major side effects from it. Her left breast and axillary masses are getting smaller and softer. On today visit, I reviewed with her findings on CT scans and bone scans done on November 2022. I looked at her scans myself. I believe she is achieving very good response to current chemotherapy. I recommend to give total of 6 cycles of chemotherapy followed by first-line endocrine therapy with Ibrance and letrozole. I will start her fifth cycle of chemotherapy today and will continue to follow her closely during chemotherapy. Bone metastasis - we started zolendronic acid since 7/20/22. She only has minimal symptom and will not do vertebroplasty at this time. Reviewed [employer] molecular panel result with her. I requested genetic counseling and testing since she has bilateral metastatic breast cancer. Malignancy related pain - will continue with norco since it has been controlling her pain well. Anxiety and insomnia - on ativan 0.5 mg nightly prn with improvement in her symptom. She is requiring less ativan since starting medical marijuana. I answered all her questions and concerns for today. Recent imaging and labs were reviewed and discussed with the patient.

## 2022-11-16 NOTE — PROGRESS NOTES
Patient Name:  Kaylene Berrios  Patient :  1955  Patient MRN:  [id number]     Primary Oncologist: Guillermo Richardson MD  Referring Provider: MAURICIO Oates CNP     Date of Service: 2022      Chief Complaint:    No chief complaint on file. Patient's active problem list:       Liver mass       Lytic bone lesions       Left breast mass    HPI:   Bharat Reis is a 74year-old very pleasant female with medical history significant for osteoarthritis, initially referred to me on 22 for evaluation of her liver lesion and multiple lytic bone lesions. She initially presented to [facility] ER on 22 with severe lower back pain and constipation. Stated that she has been having back pain for about 4 - 6 weeks duration, which becomes severe pain started a week before presentation. CT scan of the abdomen and pelvis done on 2022 showed lesion (6.1 x 8.1 cm) in the hepatic segment 4, concerning for neoplasm. Further evaluation with MRI of the liver is recommended. Extensive lytic metastatic disease in lumbar and thoracic spine and pelvis with most dominant lesion seen at L5 vertebral body. Several small bowel loops segments in the left abdomen demonstrate mild wall thickening, nonspecific may indicate enteritis. Nonspecific partially visualized inflammatory stranding along the pericardium. 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 CT lumbar spine done on 22 showed extensive multifocal lytic lesions concerning for neoplastic/metastatic disease. Age indeterminant compression fractures at T12 and L4, with mild narrowing of the AP diameter of the canal at L4. She never had colonoscopy before. She had pap smear around . She didn't recall when was her last mammogram.     She denies weight loss. She hasn't been eating much for about 10 days since she has been having nausea.      Since she is found to have multiple lytic bone lesions and liver lesion, she was referred to me for further evaluation. Digital diagnostic bilateral mammogram and bilateral ultrasound done on 6/17/2022 showed left breast mass, highly suspicious for malignancy. Right breast mass at 11:00, 1.5 cm. Mass is highly suspicious for malignancy. Left breast skin thickening. CT chest on 6/20/2022 showed suspicious heterogeneous mass primarily centered in segment 4A of the liver measuring 8.8 cm. Left breast mass with left axillary and subpectoral metastatic disease, suspected bony metastases to thoracolumbar spine and sternum and possible left chest wall invasion. Bone scan on 6/20/2022 showed multifocal osseous metastatic disease. T12 compression fracture, concerning for pathologic fracture. MRI abdomen on 6/25/2022 showed biopsy-proven malignancy in the left breast with suspicion for inflammatory carcinoma given skin and trabecular thickening. There may be invasion of the underlying pectoralis musculature. At least 3 heavily meta stasis, the largest measuring up to 8.7 cm with the others 1 cm or less. Likely metastatic portal hepatic, retroperitoneal and right retrocrural lymph nodes. Trace left pleural effusion. Extensive skeletal metastatic disease. She underwent ultrasound-guided biopsy of the right breast mass at 11:00 and the pathology showed invasive carcinoma with ductal and lobular features. Estrogen receptor and progesterone receptor are positive. HER2 by IHC not overexpressed (score 1+). HER2 by FISH-negative. Left breast mass at 12:00 biopsy revealed grade 2 invasive lobular carcinoma. Estrogen receptor by IHC-positive [greater than 95%, average strong intensity], progesterone receptor-positive [approximately 80%, average moderate intensity], HER2 by IHC-not overexpressed [score 1+] and HER2 by FISH-negative. CARIS panel did not yield any biomarker results with a benefit of clinic at the dense 70 chance to provide an association with a particular therapy. TMB was low.  PD-L1 for [place] was not expressed and MSI was stable. Since she has significant visceral disease, I recommend her to initiate first-line chemotherapy with single agent paclitaxel. It was started on 7/27/22. On October 19, 2022, she presented to me for follow-up. She was initially referred to me for evaluation of liver lesion and multiple bony lesion. She was found to have left breast mass with a distorted left breast on physical examination. 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 Since she has significant visceral disease, I recommend her to initiate first-line chemotherapy with single agent paclitaxel. It was started on 7/20/22. She is s/p third cycle of chemotherapy. She is tolerating current chemo quite well and she doesn't encounter any major side effects from it. Her left breast and axillary masses are getting smaller and softer. I believe she is achieving good response to chemo and I recommend her to continue with it for now. I will start her 4th cycle today. I will request CT scans and bone scan on 11/9/22 after 4th cycle to see her tumor response to chemo. Bone metastasis - we started zolendronic acid since 7/20/22. She only has minimal symptom and will not do vertebroplasty at this time. Reviewed Caris molecular panel result with her. I requested genetic counseling and testing since she has bilateral metastatic breast cancer. Malignancy related pain - will continue with norco since it has been controlling her pain well. Anxiety and insomnia - on ativan 0.5 mg nightly prn with improvement in her symptom. She is requiring less ativan since starting medical marijuana. She doesn't have any other significant symptoms at today visit. Past Medical History:     Significant for  1.   Osteoarthritis    Past Surgery History:    Significant for   1. Left hip replacement    Social History:   She is a current smoker and she smokes 1 pack per day approximately since she was 24. She denies alcohol drinking or illicit drug abuse. Family History:    Significant for Alzheimer's disease in her parents. No family history of malignancy. No Known Allergies    Review of Systems: \"Per interval history; otherwise 10 point ROS is negative. \"  Her energy level is pretty good and her sleep is fine. She doesn't have fever, chills, night sweats, cough, SOB, chest pain, hemoptysis or palpitations. Her bowels and bladder functions are normal. She denies nausea, vomiting, abdominal pain, diarrhea, constipation, dysuria, loss of appetite or weight loss. She doesn't have neuropathy and she denies bleeding or clotting issues. She has cancer related mild pain in her lower back, but it is getting better. Has anxiety. No depression. The rest of the systems are unremarkable. Vital Signs: There were no vitals taken for this visit. Physical Exam:  CONSTITUTIONAL: awake, alert, cooperative, no apparent distress   EYES: pupils equal, round and reactive to light, sclera clear, normal conjunctiva  ENT: Normocephalic, without obvious abnormality, atraumatic  NECK: supple, symmetrical, no jugular venous distension, no carotid bruits   HEMATOLOGIC/LYMPHATIC: no cervical, supraclavicular lymphadenopathy. Left axillary lymphadenopathy noted,   LUNGS: VBS, no wheezes, no increased work of breathing, no rhonchi, clear to auscultation, no crackles,    CARDIOVASCULAR: regular rate and rhythm, normal S1 and S2, no murmur noted  ABDOMEN: normal bowel sounds x 4, soft, non-distended, non-tender, no masses palpated, no hepatosplenomegaly   MUSCULOSKELETAL: full range of motion noted, tone is normal  NEUROLOGIC: awake, alert, oriented to name, place and time. Motor skills grossly intact.    SKIN: appears intact, normal skin color, normal texture, normal turgor, LABBETA DUPLICATE ORDER 31/03/8027    GAMGLOB 1.1 06/14/2022     No results found for: Jasonl Hook, KLFLCR  No results found for: B2M  Coagulation Panel:  No results found for: PROTIME, INR, APTT, DDIMER  Anemia Panel:  No results found for: CJCPRHZR49, FOLATE  Tumor Markers:  Lab Results   Component Value Date     377 (H) 06/14/2022    CEA 54.5 06/14/2022     25 06/14/2022    LABCA2 45.8 (H) 08/31/2022        Observations:  PHQ-9 Total Score: 0 (11/16/2022  9:14 AM)     Assessment   Left breast mass  Liver lesion and multiple bone lytic lesions - concerning for metastatic breast cancer    Plan:  Carrol Covarrubias is a 74year-old very pleasant female who initially presented to [facility] ER on 6/12/22 with severe lower back pain and constipation. CT scan of the abdomen and pelvis done on 6/12/2022 showed lesion (6.1 x 8.1 cm) in the hepatic segment 4, concerning for neoplasm. Extensive lytic metastatic disease in lumbar and thoracic spine and pelvis with most dominant lesion seen at L5 vertebral body. CT lumbar spine done on 6/12/22 showed extensive multifocal lytic lesions concerning for neoplastic/metastatic disease. She never had colonoscopy before. She had pap smear around 2020. She didn't recall when was her last mammogram.       Digital diagnostic bilateral mammogram and bilateral ultrasound done on 6/17/2022 showed left breast mass, highly suspicious for malignancy. Right breast mass at 11:00, 1.5 cm. Mass is highly suspicious for malignancy. Left breast skin thickening. CT chest on 6/20/2022 showed suspicious heterogeneous mass primarily centered in segment 4A of the liver measuring 8.8 cm. Left breast mass with left axillary and subpectoral metastatic disease, suspected bony metastases to thoracolumbar spine and sternum and possible left chest wall invasion. Bone scan on 6/20/2022 showed multifocal osseous metastatic disease.   T12 compression fracture, concerning for pathologic fracture. MRI abdomen on 6/25/2022 showed biopsy-proven malignancy in the left breast with suspicion for inflammatory carcinoma given skin and trabecular thickening. There may be invasion of the underlying pectoralis musculature. At least 3 heavily meta stasis, the largest measuring up to 8.7 cm with the others 1 cm or less. Likely metastatic portal hepatic, retroperitoneal and right retrocrural lymph nodes. Trace left pleural effusion. Extensive skeletal metastatic disease. She underwent ultrasound-guided biopsy of the right breast mass at 11:00 and the pathology showed invasive carcinoma with ductal and lobular features. Estrogen receptor and progesterone receptor are positive. HER2 by IHC not overexpressed (score 1+). HER2 by FISH-negative. Left breast mass at 12:00 biopsy revealed grade 2 invasive lobular carcinoma. Estrogen receptor by IHC-positive [greater than 95%, average strong intensity], progesterone receptor-positive [approximately 80%, average moderate intensity], HER2 by IHC-not overexpressed [score 1+] and HER2 by FISH-negative. CARIS panel did not yield any biomarker results with a benefit of clinic at the dense 70 chance to provide an association with a particular therapy. TMB was low. PD-L1 for [place] was not expressed and MSI was stable. Since she has significant visceral disease, I recommend her to initiate first-line chemotherapy with single agent paclitaxel. It was started on 7/27/22. On October 19, 2022, she presented to me for follow-up. She was initially referred to me for evaluation of liver lesion and multiple bony lesion. She was found to have left breast mass with a distorted left breast on physical examination. 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     Since she has significant visceral disease, I recommend her to initiate first-line chemotherapy with single agent paclitaxel. It was started on 7/20/22. She is s/p third cycle of chemotherapy. She is tolerating current chemo quite well and she doesn't encounter any major side effects from it. Her left breast and axillary masses are getting smaller and softer. I believe she is achieving good response to chemo and I recommend her to continue with it for now. I will start her 4th cycle today. I will request CT scans and bone scan on 11/9/22 after 4th cycle to see her tumor response to chemo. Bone metastasis - we started zolendronic acid since 7/20/22. She only has minimal symptom and will not do vertebroplasty at this time. Reviewed [employer] molecular panel result with her. I requested genetic counseling and testing since she has bilateral metastatic breast cancer. Malignancy related pain - will continue with norco since it has been controlling her pain well. Anxiety and insomnia - on ativan 0.5 mg nightly prn with improvement in her symptom. She is requiring less ativan since starting medical marijuana. I answered all her questions and concerns for today. Recent imaging and labs were reviewed and discussed with the patient.

## 2022-11-16 NOTE — PROGRESS NOTES
MA Rooming Questions  Patient: Elvia Locke  MRN: [id number]    Date: 11/16/2022        1. Do you have any new issues?   no         2. Do you need any refills on medications?    no    3. Have you had any imaging done since your last visit? yes - Bone Scan - 11/7 & CT 11/4    4. Have you been hospitalized or seen in the emergency room since your last visit here?   no    5. Did the patient have a depression screening completed today?  No    PHQ-9 Total Score: 0 (11/16/2022  9:14 AM)       PHQ-9 Given to (if applicable):               PHQ-9 Score (if applicable):                     [] Positive     []  Negative              Does question #9 need addressed (if applicable)                     [] Yes    []  No               Ra Montes MA

## 2022-11-16 NOTE — PROGRESS NOTES
General Surgery Progress Note  MD Lionel Grande CNP    HISTORY OF PRESENT ILLNESS:    Inocencia Aguila is a 74 y.o. female presenting with complaints of a lump near incision. She is s/p mediport insertion for chemotherapy      Past Medical History:   Diagnosis Date    Anxiety     Cancer ([address] 07/2022    Malignant neoplasm of overlapping sites of both breasts, estrogen receptor positive. Constipation      Past Surgical History:   Procedure Laterality Date    HIP SURGERY      MEDIPORT INSERTION, SINGLE  07/2022    PORT SURGERY N/A 7/13/2022    MEDIPORT INSERTION performed by Ellie Hyde MD at [facility] LEFT Left 6/22/2022    US BREAST NEEDLE BIOPSY LEFT 6/22/2022 Migdalia Ayala MD [id number] [email or web address] BREAST NEEDLE BIOPSY RIGHT Right 6/22/2022    US BREAST NEEDLE BIOPSY RIGHT 6/22/2022 Migdalia Ayala  [facility]       REVIEW OF SYSTEMS:    Review of Systems   Constitutional:  Negative for chills and fever. Respiratory:  Negative for shortness of breath and wheezing. Cardiovascular:  Negative for chest pain. Gastrointestinal:  Negative for abdominal pain, nausea and vomiting. Skin:  Negative for color change. Neurological:  Negative for syncope. PHYSICAL EXAM:    VITALS:  /70 (Site: Left Upper Arm, Position: Sitting, Cuff Size: Large Adult)   Pulse (!) 101   Ht 5' 4\" (1.626 m)   Wt 163 lb 3.2 oz (74 kg)   SpO2 99%   BMI 28.01 kg/m²     Physical Exam  Constitutional:       Appearance: She is well-developed. Eyes:      General: No scleral icterus. Conjunctiva/sclera: Conjunctivae normal.   Cardiovascular:      Rate and Rhythm: Normal rate and regular rhythm. Heart sounds: Normal heart sounds. No murmur heard. Pulmonary:      Effort: Pulmonary effort is normal. No respiratory distress. Breath sounds: Normal breath sounds. No wheezing.    Chest:           ASSESSMENT/ PLAN:  Inocencia Aguila is a 74 y.o. female with small scab on the medial aspect of incision    Discussed options and plan with Mayra Rivero. Patient presents with small scab on medial aspect of incision with potential suture under. Patient denies pain, no lump noted. Would recommend leaving it alone for now; should eventually absorb under incision and fall out. Advised if she starts having issue to come back in.     MAURICIO Irby - CNP, CNP

## 2022-11-18 NOTE — PROGRESS NOTES
Pt. Here for treatment and office visit. Pt. Denies any questions or concerns. Mediport accessed without difficulty, good blood return noted. Lab work drawn as ordered. Labs reviewed. Treatment administered without incident. Discharge AVS given. Patient's status assessed and documented appropriately. All labs and required results were also reviewed today. Treatment parameters have been reviewed. Today's treatment has been approved by the provider. Treatment orders and medication sequencing (when applicable) was verified by 2 registered nurses. The treatment plan was confirmed with the patient prior to administration, and the patient understands the need to report any treatment-related symptoms. Prior to administration, when applicable, the following 8 elements of medication administration were reviewed with 2nd Registered Nurse prior to dosing: drug name, drug dose, infusion volume when prepared in a syringe, rate of administration, expiration dates and/or times, appearance and integrity of drug(s), and rate of pump for infusion. The 5 rights of medication administration have been verified.

## 2022-11-23 ENCOUNTER — HOSPITAL ENCOUNTER (OUTPATIENT)
Dept: INFUSION THERAPY | Age: 67
Discharge: HOME | End: 2022-11-23
Payer: MEDICARE

## 2022-11-23 VITALS
BODY MASS INDEX: 27.79 KG/M2 | HEART RATE: 97 BPM | SYSTOLIC BLOOD PRESSURE: 135 MMHG | DIASTOLIC BLOOD PRESSURE: 83 MMHG | WEIGHT: 162.8 LBS | OXYGEN SATURATION: 94 % | HEIGHT: 64 IN | TEMPERATURE: 97.7 F

## 2022-11-23 DIAGNOSIS — C50.812 MALIGNANT NEOPLASM OF OVERLAPPING SITES OF BOTH BREASTS IN FEMALE, ESTROGEN RECEPTOR POSITIVE (HCC): Primary | ICD-10-CM

## 2022-11-23 DIAGNOSIS — C50.811 MALIGNANT NEOPLASM OF OVERLAPPING SITES OF BOTH BREASTS IN FEMALE, ESTROGEN RECEPTOR POSITIVE (HCC): Primary | ICD-10-CM

## 2022-11-23 DIAGNOSIS — Z17.0 MALIGNANT NEOPLASM OF OVERLAPPING SITES OF BOTH BREASTS IN FEMALE, ESTROGEN RECEPTOR POSITIVE (HCC): Primary | ICD-10-CM

## 2022-11-23 LAB
BASOPHILS ABSOLUTE: 0 K/CU MM
BASOPHILS RELATIVE PERCENT: 0.6 % (ref 0–1)
DIFFERENTIAL TYPE: ABNORMAL
EOSINOPHILS ABSOLUTE: 0.2 K/CU MM
EOSINOPHILS RELATIVE PERCENT: 3.3 % (ref 0–3)
HCT VFR BLD CALC: 33.7 % (ref 37–47)
HEMOGLOBIN: 10.6 GM/DL (ref 12.5–16)
LYMPHOCYTES ABSOLUTE: 1.9 K/CU MM
LYMPHOCYTES RELATIVE PERCENT: 36.9 % (ref 24–44)
MCH RBC QN AUTO: 30 PG (ref 27–31)
MCHC RBC AUTO-ENTMCNC: 31.5 % (ref 32–36)
MCV RBC AUTO: 95.5 FL (ref 78–100)
MONOCYTES ABSOLUTE: 0.4 K/CU MM
MONOCYTES RELATIVE PERCENT: 8.6 % (ref 0–4)
PDW BLD-RTO: 14.9 % (ref 11.7–14.9)
PLATELET # BLD: 251 K/CU MM (ref 140–440)
PMV BLD AUTO: 8.8 FL (ref 7.5–11.1)
RBC # BLD: 3.53 M/CU MM (ref 4.2–5.4)
SEGMENTED NEUTROPHILS ABSOLUTE COUNT: 2.6 K/CU MM
SEGMENTED NEUTROPHILS RELATIVE PERCENT: 50.6 % (ref 36–66)
WBC # BLD: 5.1 K/CU MM (ref 4–10.5)

## 2022-11-23 PROCEDURE — 96375 TX/PRO/DX INJ NEW DRUG ADDON: CPT

## 2022-11-23 PROCEDURE — 2500000003 HC RX 250 WO HCPCS: Performed by: INTERNAL MEDICINE

## 2022-11-23 PROCEDURE — 2580000003 HC RX 258: Performed by: INTERNAL MEDICINE

## 2022-11-23 PROCEDURE — 96413 CHEMO IV INFUSION 1 HR: CPT

## 2022-11-23 PROCEDURE — 96367 TX/PROPH/DG ADDL SEQ IV INF: CPT

## 2022-11-23 PROCEDURE — 6360000002 HC RX W HCPCS: Performed by: INTERNAL MEDICINE

## 2022-11-23 PROCEDURE — 85025 COMPLETE CBC W/AUTO DIFF WBC: CPT

## 2022-11-23 RX ORDER — DIPHENHYDRAMINE HYDROCHLORIDE 50 MG/ML
50 INJECTION INTRAMUSCULAR; INTRAVENOUS ONCE
Status: COMPLETED | OUTPATIENT
Start: 2022-11-23 | End: 2022-11-23

## 2022-11-23 RX ORDER — SODIUM CHLORIDE 0.9 % (FLUSH) 0.9 %
5-40 SYRINGE (ML) INJECTION PRN
Status: DISCONTINUED | OUTPATIENT
Start: 2022-11-23 | End: 2022-11-24 | Stop reason: HOSPADM

## 2022-11-23 RX ORDER — SODIUM CHLORIDE 9 MG/ML
5-250 INJECTION, SOLUTION INTRAVENOUS PRN
Status: DISCONTINUED | OUTPATIENT
Start: 2022-11-23 | End: 2022-11-24 | Stop reason: HOSPADM

## 2022-11-23 RX ORDER — FAMOTIDINE 10 MG/ML
20 INJECTION, SOLUTION INTRAVENOUS ONCE
Status: COMPLETED | OUTPATIENT
Start: 2022-11-23 | End: 2022-11-23

## 2022-11-23 RX ORDER — HEPARIN SODIUM (PORCINE) LOCK FLUSH IV SOLN 100 UNIT/ML 100 UNIT/ML
500 SOLUTION INTRAVENOUS PRN
Status: DISCONTINUED | OUTPATIENT
Start: 2022-11-23 | End: 2022-11-24 | Stop reason: HOSPADM

## 2022-11-23 RX ADMIN — DEXAMETHASONE SODIUM PHOSPHATE 10 MG: 10 INJECTION INTRAMUSCULAR; INTRAVENOUS at 11:49

## 2022-11-23 RX ADMIN — PACLITAXEL 130 MG: 6 INJECTION, SOLUTION INTRAVENOUS at 12:18

## 2022-11-23 RX ADMIN — FAMOTIDINE 20 MG: 10 INJECTION, SOLUTION INTRAVENOUS at 11:46

## 2022-11-23 RX ADMIN — SODIUM CHLORIDE 20 ML/HR: 9 INJECTION, SOLUTION INTRAVENOUS at 11:45

## 2022-11-23 RX ADMIN — HEPARIN 500 UNITS: 100 SYRINGE at 13:29

## 2022-11-23 RX ADMIN — Medication 10 ML: at 13:29

## 2022-11-23 RX ADMIN — DIPHENHYDRAMINE HYDROCHLORIDE 50 MG: 50 INJECTION INTRAMUSCULAR; INTRAVENOUS at 11:47

## 2022-11-23 NOTE — PROGRESS NOTES
Ambulated to treatment suite for Taxol infusion. Assessment complete. Denies new concerns today. Right Mediport accessed per protocol. Positive blood return noted. Labs obtained. Flushed and locked with normal saline. Labs resulted and reviewed. Treatment plan approved and released. Treatment complete. Right Mediport deaccessed per protocol. AVS provided. Discharge in stable condition.   Dylan Montejo RN

## 2022-11-28 DIAGNOSIS — C50.812 MALIGNANT NEOPLASM OF OVERLAPPING SITES OF BOTH BREASTS IN FEMALE, ESTROGEN RECEPTOR POSITIVE (HCC): Primary | ICD-10-CM

## 2022-11-28 DIAGNOSIS — C50.811 MALIGNANT NEOPLASM OF OVERLAPPING SITES OF BOTH BREASTS IN FEMALE, ESTROGEN RECEPTOR POSITIVE (HCC): Primary | ICD-10-CM

## 2022-11-28 DIAGNOSIS — Z17.0 MALIGNANT NEOPLASM OF OVERLAPPING SITES OF BOTH BREASTS IN FEMALE, ESTROGEN RECEPTOR POSITIVE (HCC): Primary | ICD-10-CM

## 2022-11-28 DIAGNOSIS — C79.51 BONE METASTASIS (HCC): ICD-10-CM

## 2022-11-28 RX ORDER — SODIUM CHLORIDE 9 MG/ML
5-250 INJECTION, SOLUTION INTRAVENOUS PRN
OUTPATIENT
Start: 2022-11-30

## 2022-11-28 RX ORDER — ALBUTEROL SULFATE 90 UG/1
4 AEROSOL, METERED RESPIRATORY (INHALATION) PRN
OUTPATIENT
Start: 2022-11-30

## 2022-11-28 RX ORDER — SODIUM CHLORIDE 0.9 % (FLUSH) 0.9 %
5-40 SYRINGE (ML) INJECTION PRN
OUTPATIENT
Start: 2022-11-30

## 2022-11-28 RX ORDER — HEPARIN SODIUM (PORCINE) LOCK FLUSH IV SOLN 100 UNIT/ML 100 UNIT/ML
500 SOLUTION INTRAVENOUS PRN
OUTPATIENT
Start: 2022-11-30

## 2022-11-28 RX ORDER — ACETAMINOPHEN 325 MG/1
650 TABLET ORAL
OUTPATIENT
Start: 2022-11-30

## 2022-11-28 RX ORDER — ONDANSETRON 2 MG/ML
8 INJECTION INTRAMUSCULAR; INTRAVENOUS
OUTPATIENT
Start: 2022-11-30

## 2022-11-28 RX ORDER — EPINEPHRINE 1 MG/ML
0.3 INJECTION, SOLUTION, CONCENTRATE INTRAVENOUS PRN
OUTPATIENT
Start: 2022-11-30

## 2022-11-28 RX ORDER — MEPERIDINE HYDROCHLORIDE 25 MG/ML
12.5 INJECTION INTRAMUSCULAR; INTRAVENOUS; SUBCUTANEOUS PRN
OUTPATIENT
Start: 2022-11-30

## 2022-11-28 RX ORDER — DIPHENHYDRAMINE HYDROCHLORIDE 50 MG/ML
50 INJECTION INTRAMUSCULAR; INTRAVENOUS
OUTPATIENT
Start: 2022-11-30

## 2022-11-28 RX ORDER — SODIUM CHLORIDE 9 MG/ML
5-40 INJECTION INTRAVENOUS PRN
OUTPATIENT
Start: 2022-11-30

## 2022-11-28 RX ORDER — SODIUM CHLORIDE 9 MG/ML
INJECTION, SOLUTION INTRAVENOUS CONTINUOUS
OUTPATIENT
Start: 2022-11-30

## 2022-11-30 ENCOUNTER — HOSPITAL ENCOUNTER (OUTPATIENT)
Dept: INFUSION THERAPY | Age: 67
Discharge: HOME | End: 2022-11-30
Payer: MEDICARE

## 2022-11-30 VITALS
WEIGHT: 162.8 LBS | DIASTOLIC BLOOD PRESSURE: 87 MMHG | HEIGHT: 64 IN | TEMPERATURE: 97.9 F | RESPIRATION RATE: 16 BRPM | SYSTOLIC BLOOD PRESSURE: 138 MMHG | HEART RATE: 92 BPM | OXYGEN SATURATION: 97 % | BODY MASS INDEX: 27.79 KG/M2

## 2022-11-30 DIAGNOSIS — C79.51 BONE METASTASIS (HCC): Primary | ICD-10-CM

## 2022-11-30 DIAGNOSIS — C50.811 MALIGNANT NEOPLASM OF OVERLAPPING SITES OF BOTH BREASTS IN FEMALE, ESTROGEN RECEPTOR POSITIVE (HCC): ICD-10-CM

## 2022-11-30 DIAGNOSIS — C50.812 MALIGNANT NEOPLASM OF OVERLAPPING SITES OF BOTH BREASTS IN FEMALE, ESTROGEN RECEPTOR POSITIVE (HCC): ICD-10-CM

## 2022-11-30 DIAGNOSIS — Z17.0 MALIGNANT NEOPLASM OF OVERLAPPING SITES OF BOTH BREASTS IN FEMALE, ESTROGEN RECEPTOR POSITIVE (HCC): ICD-10-CM

## 2022-11-30 LAB
ALBUMIN SERPL-MCNC: 4.2 GM/DL (ref 3.4–5)
ALP BLD-CCNC: 63 IU/L (ref 40–128)
ALT SERPL-CCNC: 18 U/L (ref 10–40)
ANION GAP SERPL CALCULATED.3IONS-SCNC: 10 MMOL/L (ref 4–16)
AST SERPL-CCNC: 20 IU/L (ref 15–37)
BASOPHILS ABSOLUTE: 0 K/CU MM
BASOPHILS RELATIVE PERCENT: 0.5 % (ref 0–1)
BILIRUB SERPL-MCNC: 0.5 MG/DL (ref 0–1)
BUN BLDV-MCNC: 12 MG/DL (ref 6–23)
CALCIUM SERPL-MCNC: 8.9 MG/DL (ref 8.3–10.6)
CHLORIDE BLD-SCNC: 106 MMOL/L (ref 99–110)
CO2: 23 MMOL/L (ref 21–32)
CREAT SERPL-MCNC: 0.4 MG/DL (ref 0.6–1.1)
DIFFERENTIAL TYPE: ABNORMAL
EGFR, POC: >60 ML/MIN/1.73M2
EOSINOPHILS ABSOLUTE: 0.1 K/CU MM
EOSINOPHILS RELATIVE PERCENT: 2.8 % (ref 0–3)
GFR SERPL CREATININE-BSD FRML MDRD: >60 ML/MIN/1.73M2
GLUCOSE BLD-MCNC: 81 MG/DL (ref 70–99)
GLUCOSE BLD-MCNC: 84 MG/DL (ref 70–99)
HCT VFR BLD CALC: 33.6 % (ref 37–47)
HEMOGLOBIN: 10.5 GM/DL (ref 12.5–16)
LYMPHOCYTES ABSOLUTE: 1.6 K/CU MM
LYMPHOCYTES RELATIVE PERCENT: 41.2 % (ref 24–44)
MCH RBC QN AUTO: 30 PG (ref 27–31)
MCHC RBC AUTO-ENTMCNC: 31.3 % (ref 32–36)
MCV RBC AUTO: 96 FL (ref 78–100)
MONOCYTES ABSOLUTE: 0.3 K/CU MM
MONOCYTES RELATIVE PERCENT: 6.8 % (ref 0–4)
PDW BLD-RTO: 15.2 % (ref 11.7–14.9)
PLATELET # BLD: 265 K/CU MM (ref 140–440)
PMV BLD AUTO: 8.9 FL (ref 7.5–11.1)
POC BUN: 11 MG/DL (ref 8–26)
POC CALCIUM: 1.17 MMOL/L (ref 1.12–1.32)
POC CHLORIDE: 112 MMOL/L (ref 98–109)
POC CO2: 22 MMOL/L (ref 21–32)
POC CREATININE: 0.6 MG/DL (ref 0.6–1.1)
POTASSIUM SERPL-SCNC: 3.8 MMOL/L (ref 3.5–4.5)
POTASSIUM SERPL-SCNC: 4 MMOL/L (ref 3.5–5.1)
RBC # BLD: 3.5 M/CU MM (ref 4.2–5.4)
SEGMENTED NEUTROPHILS ABSOLUTE COUNT: 1.9 K/CU MM
SEGMENTED NEUTROPHILS RELATIVE PERCENT: 48.7 % (ref 36–66)
SODIUM BLD-SCNC: 139 MMOL/L (ref 135–145)
SODIUM BLD-SCNC: 144 MMOL/L (ref 138–146)
SOURCE, BLOOD GAS: ABNORMAL
TOTAL PROTEIN: 6.4 GM/DL (ref 6.4–8.2)
WBC # BLD: 4 K/CU MM (ref 4–10.5)

## 2022-11-30 PROCEDURE — 85025 COMPLETE CBC W/AUTO DIFF WBC: CPT

## 2022-11-30 PROCEDURE — 2580000003 HC RX 258: Performed by: INTERNAL MEDICINE

## 2022-11-30 PROCEDURE — 80053 COMPREHEN METABOLIC PANEL: CPT

## 2022-11-30 PROCEDURE — 2500000003 HC RX 250 WO HCPCS: Performed by: INTERNAL MEDICINE

## 2022-11-30 PROCEDURE — 96375 TX/PRO/DX INJ NEW DRUG ADDON: CPT

## 2022-11-30 PROCEDURE — 96413 CHEMO IV INFUSION 1 HR: CPT

## 2022-11-30 PROCEDURE — 96367 TX/PROPH/DG ADDL SEQ IV INF: CPT

## 2022-11-30 PROCEDURE — 6360000002 HC RX W HCPCS: Performed by: INTERNAL MEDICINE

## 2022-11-30 RX ORDER — SODIUM CHLORIDE 9 MG/ML
5-250 INJECTION, SOLUTION INTRAVENOUS PRN
Status: DISCONTINUED | OUTPATIENT
Start: 2022-11-30 | End: 2022-12-01 | Stop reason: HOSPADM

## 2022-11-30 RX ORDER — SODIUM CHLORIDE 0.9 % (FLUSH) 0.9 %
5-40 SYRINGE (ML) INJECTION PRN
Status: DISCONTINUED | OUTPATIENT
Start: 2022-11-30 | End: 2022-12-01 | Stop reason: HOSPADM

## 2022-11-30 RX ORDER — HEPARIN SODIUM (PORCINE) LOCK FLUSH IV SOLN 100 UNIT/ML 100 UNIT/ML
500 SOLUTION INTRAVENOUS PRN
Status: DISCONTINUED | OUTPATIENT
Start: 2022-11-30 | End: 2022-12-01 | Stop reason: HOSPADM

## 2022-11-30 RX ORDER — DIPHENHYDRAMINE HYDROCHLORIDE 50 MG/ML
50 INJECTION INTRAMUSCULAR; INTRAVENOUS ONCE
Status: COMPLETED | OUTPATIENT
Start: 2022-11-30 | End: 2022-11-30

## 2022-11-30 RX ORDER — FAMOTIDINE 10 MG/ML
20 INJECTION, SOLUTION INTRAVENOUS ONCE
Status: COMPLETED | OUTPATIENT
Start: 2022-11-30 | End: 2022-11-30

## 2022-11-30 RX ADMIN — ZOLEDRONIC ACID 4 MG: 4 INJECTION, SOLUTION, CONCENTRATE INTRAVENOUS at 13:02

## 2022-11-30 RX ADMIN — DIPHENHYDRAMINE HYDROCHLORIDE 50 MG: 50 INJECTION INTRAMUSCULAR; INTRAVENOUS at 11:18

## 2022-11-30 RX ADMIN — SODIUM CHLORIDE 20 ML/HR: 9 INJECTION, SOLUTION INTRAVENOUS at 11:18

## 2022-11-30 RX ADMIN — DEXAMETHASONE SODIUM PHOSPHATE 10 MG: 10 INJECTION INTRAMUSCULAR; INTRAVENOUS at 11:19

## 2022-11-30 RX ADMIN — FAMOTIDINE 20 MG: 10 INJECTION, SOLUTION INTRAVENOUS at 11:18

## 2022-11-30 RX ADMIN — PACLITAXEL 130 MG: 6 INJECTION, SOLUTION INTRAVENOUS at 11:52

## 2022-11-30 RX ADMIN — Medication 10 ML: at 13:22

## 2022-11-30 RX ADMIN — HEPARIN 500 UNITS: 100 SYRINGE at 13:22

## 2022-11-30 NOTE — PROGRESS NOTES
Pt here for Taxol and Zometa infusion. Port accessed with blood return noted. Labs  sent. Pt has  no complaints. Pt states she has no numbness or tingling  to hands or feet. Pt states no jaw pain or problems with gums or teeth. Labs reviewed tx released. Pt tolerated tx with no complaints.  Left ambulatory discharge instructions given

## 2022-12-06 DIAGNOSIS — C50.811 MALIGNANT NEOPLASM OF OVERLAPPING SITES OF BOTH BREASTS IN FEMALE, ESTROGEN RECEPTOR POSITIVE (HCC): Primary | ICD-10-CM

## 2022-12-06 DIAGNOSIS — Z17.0 MALIGNANT NEOPLASM OF OVERLAPPING SITES OF BOTH BREASTS IN FEMALE, ESTROGEN RECEPTOR POSITIVE (HCC): Primary | ICD-10-CM

## 2022-12-06 DIAGNOSIS — C50.812 MALIGNANT NEOPLASM OF OVERLAPPING SITES OF BOTH BREASTS IN FEMALE, ESTROGEN RECEPTOR POSITIVE (HCC): Primary | ICD-10-CM

## 2022-12-14 ENCOUNTER — OFFICE VISIT (OUTPATIENT)
Dept: ONCOLOGY | Age: 67
End: 2022-12-14
Payer: MEDICARE

## 2022-12-14 ENCOUNTER — HOSPITAL ENCOUNTER (OUTPATIENT)
Dept: INFUSION THERAPY | Age: 67
Discharge: HOME | End: 2022-12-14
Payer: MEDICARE

## 2022-12-14 VITALS
WEIGHT: 166 LBS | RESPIRATION RATE: 16 BRPM | BODY MASS INDEX: 28.34 KG/M2 | OXYGEN SATURATION: 98 % | HEART RATE: 101 BPM | HEIGHT: 64 IN | TEMPERATURE: 97.6 F | DIASTOLIC BLOOD PRESSURE: 72 MMHG | SYSTOLIC BLOOD PRESSURE: 156 MMHG

## 2022-12-14 DIAGNOSIS — C50.811 MALIGNANT NEOPLASM OF OVERLAPPING SITES OF BOTH BREASTS IN FEMALE, ESTROGEN RECEPTOR POSITIVE (HCC): Primary | ICD-10-CM

## 2022-12-14 DIAGNOSIS — Z17.0 MALIGNANT NEOPLASM OF OVERLAPPING SITES OF BOTH BREASTS IN FEMALE, ESTROGEN RECEPTOR POSITIVE (HCC): Primary | ICD-10-CM

## 2022-12-14 DIAGNOSIS — C50.812 MALIGNANT NEOPLASM OF OVERLAPPING SITES OF BOTH BREASTS IN FEMALE, ESTROGEN RECEPTOR POSITIVE (HCC): Primary | ICD-10-CM

## 2022-12-14 LAB
BASOPHILS ABSOLUTE: 0 K/CU MM
BASOPHILS RELATIVE PERCENT: 0.4 % (ref 0–1)
DIFFERENTIAL TYPE: ABNORMAL
EOSINOPHILS ABSOLUTE: 0.1 K/CU MM
EOSINOPHILS RELATIVE PERCENT: 1.9 % (ref 0–3)
HCT VFR BLD CALC: 33.8 % (ref 37–47)
HEMOGLOBIN: 10.7 GM/DL (ref 12.5–16)
LYMPHOCYTES ABSOLUTE: 1.7 K/CU MM
LYMPHOCYTES RELATIVE PERCENT: 31.1 % (ref 24–44)
MCH RBC QN AUTO: 30 PG (ref 27–31)
MCHC RBC AUTO-ENTMCNC: 31.7 % (ref 32–36)
MCV RBC AUTO: 94.7 FL (ref 78–100)
MONOCYTES ABSOLUTE: 0.5 K/CU MM
MONOCYTES RELATIVE PERCENT: 8.6 % (ref 0–4)
PDW BLD-RTO: 15.3 % (ref 11.7–14.9)
PLATELET # BLD: 268 K/CU MM (ref 140–440)
PMV BLD AUTO: 8.4 FL (ref 7.5–11.1)
RBC # BLD: 3.57 M/CU MM (ref 4.2–5.4)
SEGMENTED NEUTROPHILS ABSOLUTE COUNT: 3.1 K/CU MM
SEGMENTED NEUTROPHILS RELATIVE PERCENT: 58 % (ref 36–66)
WBC # BLD: 5.3 K/CU MM (ref 4–10.5)

## 2022-12-14 PROCEDURE — 96367 TX/PROPH/DG ADDL SEQ IV INF: CPT

## 2022-12-14 PROCEDURE — 36591 DRAW BLOOD OFF VENOUS DEVICE: CPT

## 2022-12-14 PROCEDURE — 2580000003 HC RX 258: Performed by: NURSE PRACTITIONER

## 2022-12-14 PROCEDURE — 2500000003 HC RX 250 WO HCPCS: Performed by: NURSE PRACTITIONER

## 2022-12-14 PROCEDURE — 6360000002 HC RX W HCPCS: Performed by: NURSE PRACTITIONER

## 2022-12-14 PROCEDURE — 96375 TX/PRO/DX INJ NEW DRUG ADDON: CPT

## 2022-12-14 PROCEDURE — 99214 OFFICE O/P EST MOD 30 MIN: CPT | Performed by: INTERNAL MEDICINE

## 2022-12-14 PROCEDURE — 96413 CHEMO IV INFUSION 1 HR: CPT

## 2022-12-14 PROCEDURE — 85025 COMPLETE CBC W/AUTO DIFF WBC: CPT

## 2022-12-14 PROCEDURE — 1124F ACP DISCUSS-NO DSCNMKR DOCD: CPT | Performed by: INTERNAL MEDICINE

## 2022-12-14 RX ORDER — ONDANSETRON 2 MG/ML
8 INJECTION INTRAMUSCULAR; INTRAVENOUS
Status: CANCELLED | OUTPATIENT
Start: 2022-12-14

## 2022-12-14 RX ORDER — MEPERIDINE HYDROCHLORIDE 25 MG/ML
12.5 INJECTION INTRAMUSCULAR; INTRAVENOUS; SUBCUTANEOUS PRN
Status: CANCELLED | OUTPATIENT
Start: 2022-12-14

## 2022-12-14 RX ORDER — DIPHENHYDRAMINE HYDROCHLORIDE 50 MG/ML
50 INJECTION INTRAMUSCULAR; INTRAVENOUS ONCE
Status: COMPLETED | OUTPATIENT
Start: 2022-12-14 | End: 2022-12-14

## 2022-12-14 RX ORDER — SODIUM CHLORIDE 9 MG/ML
5-250 INJECTION, SOLUTION INTRAVENOUS PRN
Status: DISCONTINUED | OUTPATIENT
Start: 2022-12-14 | End: 2022-12-15 | Stop reason: HOSPADM

## 2022-12-14 RX ORDER — ACETAMINOPHEN 325 MG/1
650 TABLET ORAL
Status: CANCELLED | OUTPATIENT
Start: 2022-12-14

## 2022-12-14 RX ORDER — EPINEPHRINE 1 MG/ML
0.3 INJECTION, SOLUTION, CONCENTRATE INTRAVENOUS PRN
Status: CANCELLED | OUTPATIENT
Start: 2022-12-14

## 2022-12-14 RX ORDER — SODIUM CHLORIDE 9 MG/ML
5-250 INJECTION, SOLUTION INTRAVENOUS PRN
Status: CANCELLED | OUTPATIENT
Start: 2022-12-14

## 2022-12-14 RX ORDER — DIPHENHYDRAMINE HYDROCHLORIDE 50 MG/ML
50 INJECTION INTRAMUSCULAR; INTRAVENOUS
Status: CANCELLED | OUTPATIENT
Start: 2022-12-14

## 2022-12-14 RX ORDER — FAMOTIDINE 10 MG/ML
20 INJECTION, SOLUTION INTRAVENOUS ONCE
Status: COMPLETED | OUTPATIENT
Start: 2022-12-14 | End: 2022-12-14

## 2022-12-14 RX ORDER — HEPARIN SODIUM (PORCINE) LOCK FLUSH IV SOLN 100 UNIT/ML 100 UNIT/ML
500 SOLUTION INTRAVENOUS PRN
Status: DISCONTINUED | OUTPATIENT
Start: 2022-12-14 | End: 2022-12-15 | Stop reason: HOSPADM

## 2022-12-14 RX ORDER — SODIUM CHLORIDE 9 MG/ML
5-40 INJECTION INTRAVENOUS PRN
Status: CANCELLED | OUTPATIENT
Start: 2022-12-14

## 2022-12-14 RX ORDER — ALBUTEROL SULFATE 90 UG/1
4 AEROSOL, METERED RESPIRATORY (INHALATION) PRN
Status: CANCELLED | OUTPATIENT
Start: 2022-12-14

## 2022-12-14 RX ORDER — FAMOTIDINE 10 MG/ML
20 INJECTION, SOLUTION INTRAVENOUS
Status: CANCELLED | OUTPATIENT
Start: 2022-12-14

## 2022-12-14 RX ORDER — SODIUM CHLORIDE 0.9 % (FLUSH) 0.9 %
5-40 SYRINGE (ML) INJECTION PRN
Status: DISCONTINUED | OUTPATIENT
Start: 2022-12-14 | End: 2022-12-15 | Stop reason: HOSPADM

## 2022-12-14 RX ORDER — SODIUM CHLORIDE 9 MG/ML
INJECTION, SOLUTION INTRAVENOUS CONTINUOUS
Status: CANCELLED | OUTPATIENT
Start: 2022-12-14

## 2022-12-14 RX ADMIN — Medication 10 ML: at 12:32

## 2022-12-14 RX ADMIN — SODIUM CHLORIDE 20 ML/HR: 9 INJECTION, SOLUTION INTRAVENOUS at 11:02

## 2022-12-14 RX ADMIN — PACLITAXEL 130 MG: 6 INJECTION, SOLUTION INTRAVENOUS at 11:20

## 2022-12-14 RX ADMIN — HEPARIN 500 UNITS: 100 SYRINGE at 12:32

## 2022-12-14 RX ADMIN — DIPHENHYDRAMINE HYDROCHLORIDE 50 MG: 50 INJECTION INTRAMUSCULAR; INTRAVENOUS at 11:02

## 2022-12-14 RX ADMIN — DEXAMETHASONE SODIUM PHOSPHATE 10 MG: 10 INJECTION INTRAMUSCULAR; INTRAVENOUS at 11:02

## 2022-12-14 RX ADMIN — FAMOTIDINE 20 MG: 10 INJECTION, SOLUTION INTRAVENOUS at 11:02

## 2022-12-14 NOTE — PROGRESS NOTES
Patient Name:  Belinda Hartman  Patient :  1955  Patient MRN:  [id number]     Primary Oncologist: Shiraz Castro MD  Referring Provider: MAURICIO Zhong CNP     Date of Service: 2022      Chief Complaint:    Chief Complaint   Patient presents with    Follow-up     Patient's active problem list:       Liver mass       Lytic bone lesions       Left breast mass    HPI:   Ismael Donaldson is a 74year-old very pleasant female with medical history significant for osteoarthritis, initially referred to me on 22 for evaluation of her liver lesion and multiple lytic bone lesions. She initially presented to [facility] on 22 with severe lower back pain and constipation. Stated that she has been having back pain for about 4 - 6 weeks duration, which becomes severe pain started a week before presentation. CT scan of the abdomen and pelvis done on 2022 showed lesion (6.1 x 8.1 cm) in the hepatic segment 4, concerning for neoplasm. Further evaluation with MRI of the liver is recommended. Extensive lytic metastatic disease in lumbar and thoracic spine and pelvis with most dominant lesion seen at L5 vertebral body. Several small bowel loops segments in the left abdomen demonstrate mild wall thickening, nonspecific may indicate enteritis. Nonspecific partially visualized inflammatory stranding along the pericardium. 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 CT lumbar spine done on 22 showed extensive multifocal lytic lesions concerning for neoplastic/metastatic disease. Age indeterminant compression fractures at T12 and L4, with mild narrowing of the AP diameter of the canal at L4. She never had colonoscopy before. She had pap smear around . She didn't recall when was her last mammogram.     She denies weight loss. She hasn't been eating much for about 10 days since she has been having nausea.      Since she is found to have multiple lytic bone lesions and liver lesion, she was referred to me for further evaluation. Digital diagnostic bilateral mammogram and bilateral ultrasound done on 6/17/2022 showed left breast mass, highly suspicious for malignancy. Right breast mass at 11:00, 1.5 cm. Mass is highly suspicious for malignancy. Left breast skin thickening. CT chest on 6/20/2022 showed suspicious heterogeneous mass primarily centered in segment 4A of the liver measuring 8.8 cm. Left breast mass with left axillary and subpectoral metastatic disease, suspected bony metastases to thoracolumbar spine and sternum and possible left chest wall invasion. Bone scan on 6/20/2022 showed multifocal osseous metastatic disease. T12 compression fracture, concerning for pathologic fracture. MRI abdomen on 6/25/2022 showed biopsy-proven malignancy in the left breast with suspicion for inflammatory carcinoma given skin and trabecular thickening. There may be invasion of the underlying pectoralis musculature. At least 3 heavily meta stasis, the largest measuring up to 8.7 cm with the others 1 cm or less. Likely metastatic portal hepatic, retroperitoneal and right retrocrural lymph nodes. Trace left pleural effusion. Extensive skeletal metastatic disease. She underwent ultrasound-guided biopsy of the right breast mass at 11:00 and the pathology showed invasive carcinoma with ductal and lobular features. Estrogen receptor and progesterone receptor are positive. HER2 by IHC not overexpressed (score 1+). HER2 by FISH-negative. Left breast mass at 12:00 biopsy revealed grade 2 invasive lobular carcinoma. Estrogen receptor by IHC-positive [greater than 95%, average strong intensity], progesterone receptor-positive [approximately 80%, average moderate intensity], HER2 by IHC-not overexpressed [score 1+] and HER2 by FISH-negative. CARIS panel did not yield any biomarker results with a benefit of clinic at the dense 70 chance to provide an association with a particular therapy.  TMB was low. PD-L1 for Avani Hernandez was not expressed and MSI was stable. Since she has significant visceral disease, I recommend her to initiate first-line chemotherapy with single agent paclitaxel. It was started on 7/27/22. CT scan of the chest, abdomen and pelvis done on 11/14/2022 showed overall improvement in skin thickening and edema within the left breast as well as left internal thoracic/mammary, axillary and subpectoral adenopathy. Interval decrease in size of dominant left hepatic metastasis. Small hepatic lesion seen on earlier MRI are not well seen on this exam.  No new focal hepatic lesion. There is also interval decrease in size of retroperitoneal lymph nodes. Multifocal osseous metastases are again identified in keeping with recent bone scan. There is overall progression of sclerosis when compared to prior exams throughout the visualized skeleton which may reflect posttreatment changes rather than progression. Focal areas of wall thickening in the distal esophagus extending along the gastric cardia as well as the gastric body. Recommend correlation with endoscopy. On December 14, 2022, she presented to me for follow-up. She was initially referred to me for evaluation of liver lesion and multiple bony lesion. She was found to have left breast mass with a distorted left breast on physical examination. 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 Since she has significant visceral disease, I recommend her to initiate first-line chemotherapy with single agent paclitaxel. It was started on 7/20/22. She is s/p fourth cycle of chemotherapy. She is tolerating current chemo quite well and she doesn't encounter any major side effects from it. Her left breast and axillary masses are getting smaller and softer.      On today visit, I reviewed with her findings on CT scans and bone scans done on November 2022. I looked at her scans myself. I believe she is achieving very good response to current chemotherapy. I recommend to give total of 6 cycles of chemotherapy followed by first-line endocrine therapy with Ibrance and letrozole. I will start her sixth cycle of chemotherapy today and will continue to follow her closely during chemotherapy. Bone metastasis - we started zolendronic acid since 7/20/22. She only has minimal symptom and will not do vertebroplasty at this time. Reviewed Caris molecular panel result with her. I requested genetic counseling and testing since she has bilateral metastatic breast cancer. Malignancy related pain - will continue with norco since it has been controlling her pain well. Anxiety and insomnia - on ativan 0.5 mg nightly prn with improvement in her symptom. She is requiring less ativan since starting medical marijuana. She doesn't have any other significant symptoms at today visit. Past Medical History:     Significant for  1. Osteoarthritis    Past Surgery History:    Significant for   1. Left hip replacement    Social History:   She is a current smoker and she smokes 1 pack per day approximately since she was 24. She denies alcohol drinking or illicit drug abuse. Family History:    Significant for Alzheimer's disease in her parents. No family history of malignancy. No Known Allergies    Review of Systems: \"Per interval history; otherwise 10 point ROS is negative. \"  Her energy level is pretty good and her sleep is good. She doesn't have fever, chills, night sweats, cough, SOB, chest pain, hemoptysis or palpitations. Her bowels and bladder functions are normal. She denies nausea, vomiting, abdominal pain, diarrhea, constipation, dysuria, loss of appetite or weight loss. She doesn't have neuropathy and she denies bleeding or clotting issues.  She has cancer related mild pain in her lower back, but it is getting better. She barely needs narcotic pain med. Has anxiety. Denies depression. The rest of the systems are unremarkable. Vital Signs: BP (!) 156/72 (Site: Right Upper Arm, Position: Sitting, Cuff Size: Medium Adult)   Pulse (!) 101   Temp 97.6 °F (36.4 °C) (Temporal)   Resp 16   Ht 5' 4\" (1.626 m)   Wt 166 lb (75.3 kg)   SpO2 98%   BMI 28.49 kg/m²      Physical Exam:  CONSTITUTIONAL: awake, alert, cooperative, no apparent distress   EYES: pupils equal, round and reactive to light, sclera clear, normal conjunctiva  ENT: Normocephalic, without obvious abnormality, atraumatic  NECK: supple, symmetrical, no jugular venous distension, no carotid bruits   HEMATOLOGIC/LYMPHATIC: no cervical, supraclavicular lymphadenopathy. Left axillary lymphadenopathy noted,   LUNGS: VBS, no wheezes, no increased work of breathing, no rhonchi, clear to auscultation, no crackles,    CARDIOVASCULAR: regular rate and rhythm, normal S1 and S2, no murmur noted  ABDOMEN: normal bowel sounds x 4, soft, non-distended, non-tender, no masses palpated, no hepatosplenomegaly   MUSCULOSKELETAL: full range of motion noted, tone is normal  NEUROLOGIC: awake, alert, oriented to name, place and time. Motor skills grossly intact. SKIN: appears intact, normal skin color, normal texture, normal turgor, no jaundice.    EXTREMITIES: no LE edema, no cyanosis, no clubbing, no leg swelling,   BREASTS: Left breast is distorted by tumor, nipple was displaced close to lower breast crease area, it is getting softer with chemo, right breast nodularity noted,        Labs:  Hematology:  Lab Results   Component Value Date    WBC 5.3 12/14/2022    RBC 3.57 (L) 12/14/2022    HGB 10.7 (L) 12/14/2022    HCT 33.8 (L) 12/14/2022    MCV 94.7 12/14/2022    MCH 30.0 12/14/2022    MCHC 31.7 (L) 12/14/2022    RDW 15.3 (H) 12/14/2022     12/14/2022    MPV 8.4 12/14/2022    SEGSPCT 58.0 12/14/2022    EOSRELPCT 1.9 12/14/2022    BASOPCT 0.4 12/14/2022    LYMPHOPCT 31.1 12/14/2022    MONOPCT 8.6 (H) 12/14/2022    SEGSABS 3.1 12/14/2022    EOSABS 0.1 12/14/2022    BASOSABS 0.0 12/14/2022    LYMPHSABS 1.7 12/14/2022    MONOSABS 0.5 12/14/2022    DIFFTYPE AUTOMATED DIFFERENTIAL 12/14/2022     No results found for: ESR  Chemistry:  Lab Results   Component Value Date     11/30/2022    K 3.8 11/30/2022     11/30/2022    CO2 23 11/30/2022    BUN 12 11/30/2022    CREATININE 0.6 11/30/2022    GLUCOSE 81 11/30/2022    CALCIUM 8.9 11/30/2022    PROT 6.4 11/30/2022    LABALBU 4.2 11/30/2022    BILITOT 0.5 11/30/2022    ALKPHOS 63 11/30/2022    AST 20 11/30/2022    ALT 18 11/30/2022    LABGLOM >60 11/30/2022    GFRAA >60 09/28/2022    MG 2.3 07/19/2022    POCCA 1.17 11/30/2022    POCGLU 84 11/30/2022     Lab Results   Component Value Date     (H) 06/14/2022     No components found for: LD  No results found for: TSHHS, T4FREE, FT3  Immunology:  Lab Results   Component Value Date    PROT 6.4 11/30/2022    SPEP  06/14/2022     INTERPRETATION - No monoclonal proteins are detected. SAF    SPEP INTERPRETATION - Within normal limits.  SAF 06/14/2022    ALBUMINELP 3.7 06/14/2022    LABALPH 0.4 06/14/2022    LABALPH 1.3 (H) 53/97/8009    LABALPH DUPLICATE ORDER 05/33/9391    LABALPH DUPLICATE ORDER 96/28/9937    LABBETA 1.1 32/51/3599    LABBETA DUPLICATE ORDER 15/68/3083    GAMGLOB 1.1 06/14/2022     No results found for: Cruz Trevizo, KLFLCR  No results found for: B2M  Coagulation Panel:  No results found for: PROTIME, INR, APTT, DDIMER  Anemia Panel:  No results found for: NPGBNRJN62, FOLATE  Tumor Markers:  Lab Results   Component Value Date     377 (H) 06/14/2022    CEA 54.5 06/14/2022     25 06/14/2022    LABCA2 45.8 (H) 08/31/2022        Observations:  No data recorded     Assessment   Left breast mass  Liver lesion and multiple bone lytic lesions - concerning for metastatic breast cancer    Plan:  Gus Peng is a 74year-old very pleasant female who initially presented to [facility] ER on 6/12/22 with severe lower back pain and constipation. CT scan of the abdomen and pelvis done on 6/12/2022 showed lesion (6.1 x 8.1 cm) in the hepatic segment 4, concerning for neoplasm. Extensive lytic metastatic disease in lumbar and thoracic spine and pelvis with most dominant lesion seen at L5 vertebral body. CT lumbar spine done on 6/12/22 showed extensive multifocal lytic lesions concerning for neoplastic/metastatic disease. She never had colonoscopy before. She had pap smear around 2020. She didn't recall when was her last mammogram.       Digital diagnostic bilateral mammogram and bilateral ultrasound done on 6/17/2022 showed left breast mass, highly suspicious for malignancy. Right breast mass at 11:00, 1.5 cm. Mass is highly suspicious for malignancy. Left breast skin thickening. CT chest on 6/20/2022 showed suspicious heterogeneous mass primarily centered in segment 4A of the liver measuring 8.8 cm. Left breast mass with left axillary and subpectoral metastatic disease, suspected bony metastases to thoracolumbar spine and sternum and possible left chest wall invasion. Bone scan on 6/20/2022 showed multifocal osseous metastatic disease. T12 compression fracture, concerning for pathologic fracture. MRI abdomen on 6/25/2022 showed biopsy-proven malignancy in the left breast with suspicion for inflammatory carcinoma given skin and trabecular thickening. There may be invasion of the underlying pectoralis musculature. At least 3 heavily meta stasis, the largest measuring up to 8.7 cm with the others 1 cm or less. Likely metastatic portal hepatic, retroperitoneal and right retrocrural lymph nodes. Trace left pleural effusion. Extensive skeletal metastatic disease. She underwent ultrasound-guided biopsy of the right breast mass at 11:00 and the pathology showed invasive carcinoma with ductal and lobular features. Estrogen receptor and progesterone receptor are positive. HER2 by IHC not overexpressed (score 1+). HER2 by FISH-negative. Left breast mass at 12:00 biopsy revealed grade 2 invasive lobular carcinoma. Estrogen receptor by IHC-positive [greater than 95%, average strong intensity], progesterone receptor-positive [approximately 80%, average moderate intensity], HER2 by IHC-not overexpressed [score 1+] and HER2 by FISH-negative. CARIS panel did not yield any biomarker results with a benefit of clinic at the dense 70 chance to provide an association with a particular therapy. TMB was low. PD-L1 for Avani Hernandez was not expressed and MSI was stable. Since she has significant visceral disease, I recommend her to initiate first-line chemotherapy with single agent paclitaxel. It was started on 7/27/22. CT scan of the chest, abdomen and pelvis done on 11/14/2022 showed overall improvement in skin thickening and edema within the left breast as well as left internal thoracic/mammary, axillary and subpectoral adenopathy. Interval decrease in size of dominant left hepatic metastasis. Small hepatic lesion seen on earlier MRI are not well seen on this exam.  No new focal hepatic lesion. There is also interval decrease in size of retroperitoneal lymph nodes. Multifocal osseous metastases are again identified in keeping with recent bone scan. There is overall progression of sclerosis when compared to prior exams throughout the visualized skeleton which may reflect posttreatment changes rather than progression. Focal areas of wall thickening in the distal esophagus extending along the gastric cardia as well as the gastric body. Recommend correlation with endoscopy. On December 14, 2022, she presented to me for follow-up. She was initially referred to me for evaluation of liver lesion and multiple bony lesion. She was found to have left breast mass with a distorted left breast on physical examination. 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 Since she has significant visceral disease, I recommend her to initiate first-line chemotherapy with single agent paclitaxel. It was started on 7/20/22. She is s/p fourth cycle of chemotherapy. She is tolerating current chemo quite well and she doesn't encounter any major side effects from it. Her left breast and axillary masses are getting smaller and softer. On today visit, I reviewed with her findings on CT scans and bone scans done on November 2022. I looked at her scans myself. I believe she is achieving very good response to current chemotherapy. I recommend to give total of 6 cycles of chemotherapy followed by first-line endocrine therapy with Ibrance and letrozole. I will start her sixth cycle of chemotherapy today and will continue to follow her closely during chemotherapy. Bone metastasis - we started zolendronic acid since 7/20/22. She only has minimal symptom and will not do vertebroplasty at this time. Reviewed CarCytoSolv molecular panel result with her. I requested genetic counseling and testing since she has bilateral metastatic breast cancer. Malignancy related pain - will continue with norco since it has been controlling her pain well. Anxiety and insomnia - on ativan 0.5 mg nightly prn with improvement in her symptom. She is requiring less ativan since starting medical marijuana. I answered all her questions and concerns for today. Recent imaging and labs were reviewed and discussed with the patient.

## 2022-12-14 NOTE — PROGRESS NOTES
MA Rooming Questions  Patient: Sarah Beth Mcgrath  MRN: [id number]    Date: 12/14/2022        1. Do you have any new issues?   no         2. Do you need any refills on medications?    no    3. Have you had any imaging done since your last visit?   no    4. Have you been hospitalized or seen in the emergency room since your last visit here?   no    5. Did the patient have a depression screening completed today?  No    No data recorded     PHQ-9 Given to (if applicable):               PHQ-9 Score (if applicable):                     [] Positive     []  Negative              Does question #9 need addressed (if applicable)                     [] Yes    []  No               Tessy Soni CMA

## 2022-12-21 ENCOUNTER — HOSPITAL ENCOUNTER (OUTPATIENT)
Dept: INFUSION THERAPY | Age: 67
Discharge: HOME | End: 2022-12-21
Payer: MEDICARE

## 2022-12-21 VITALS
HEART RATE: 90 BPM | WEIGHT: 164.8 LBS | BODY MASS INDEX: 28.13 KG/M2 | DIASTOLIC BLOOD PRESSURE: 63 MMHG | HEIGHT: 64 IN | SYSTOLIC BLOOD PRESSURE: 135 MMHG | OXYGEN SATURATION: 98 %

## 2022-12-21 DIAGNOSIS — C50.811 MALIGNANT NEOPLASM OF OVERLAPPING SITES OF BOTH BREASTS IN FEMALE, ESTROGEN RECEPTOR POSITIVE (HCC): Primary | ICD-10-CM

## 2022-12-21 DIAGNOSIS — C79.51 BONE METASTASIS (HCC): ICD-10-CM

## 2022-12-21 DIAGNOSIS — Z17.0 MALIGNANT NEOPLASM OF OVERLAPPING SITES OF BOTH BREASTS IN FEMALE, ESTROGEN RECEPTOR POSITIVE (HCC): Primary | ICD-10-CM

## 2022-12-21 DIAGNOSIS — C50.812 MALIGNANT NEOPLASM OF OVERLAPPING SITES OF BOTH BREASTS IN FEMALE, ESTROGEN RECEPTOR POSITIVE (HCC): Primary | ICD-10-CM

## 2022-12-21 LAB
ALBUMIN SERPL-MCNC: 4.3 GM/DL (ref 3.4–5)
ALP BLD-CCNC: 57 IU/L (ref 40–128)
ALT SERPL-CCNC: 13 U/L (ref 10–40)
ANION GAP SERPL CALCULATED.3IONS-SCNC: 11 MMOL/L (ref 4–16)
AST SERPL-CCNC: 19 IU/L (ref 15–37)
BASOPHILS ABSOLUTE: 0 K/CU MM
BASOPHILS RELATIVE PERCENT: 0.2 % (ref 0–1)
BILIRUB SERPL-MCNC: 0.3 MG/DL (ref 0–1)
BUN BLDV-MCNC: 14 MG/DL (ref 6–23)
CALCIUM SERPL-MCNC: 9.2 MG/DL (ref 8.3–10.6)
CHLORIDE BLD-SCNC: 105 MMOL/L (ref 99–110)
CO2: 25 MMOL/L (ref 21–32)
CREAT SERPL-MCNC: 0.5 MG/DL (ref 0.6–1.1)
DIFFERENTIAL TYPE: ABNORMAL
EOSINOPHILS ABSOLUTE: 0.1 K/CU MM
EOSINOPHILS RELATIVE PERCENT: 1.7 % (ref 0–3)
GFR SERPL CREATININE-BSD FRML MDRD: >60 ML/MIN/1.73M2
GLUCOSE BLD-MCNC: 86 MG/DL (ref 70–99)
HCT VFR BLD CALC: 32.3 % (ref 37–47)
HEMOGLOBIN: 10.3 GM/DL (ref 12.5–16)
LYMPHOCYTES ABSOLUTE: 1.9 K/CU MM
LYMPHOCYTES RELATIVE PERCENT: 37.6 % (ref 24–44)
MCH RBC QN AUTO: 30.2 PG (ref 27–31)
MCHC RBC AUTO-ENTMCNC: 31.9 % (ref 32–36)
MCV RBC AUTO: 94.7 FL (ref 78–100)
MONOCYTES ABSOLUTE: 0.4 K/CU MM
MONOCYTES RELATIVE PERCENT: 7.4 % (ref 0–4)
PDW BLD-RTO: 15.4 % (ref 11.7–14.9)
PLATELET # BLD: 277 K/CU MM (ref 140–440)
PMV BLD AUTO: 8.5 FL (ref 7.5–11.1)
POTASSIUM SERPL-SCNC: 4.3 MMOL/L (ref 3.5–5.1)
RBC # BLD: 3.41 M/CU MM (ref 4.2–5.4)
SEGMENTED NEUTROPHILS ABSOLUTE COUNT: 2.7 K/CU MM
SEGMENTED NEUTROPHILS RELATIVE PERCENT: 53.1 % (ref 36–66)
SODIUM BLD-SCNC: 141 MMOL/L (ref 135–145)
TOTAL PROTEIN: 6.1 GM/DL (ref 6.4–8.2)
WBC # BLD: 5.2 K/CU MM (ref 4–10.5)

## 2022-12-21 PROCEDURE — 2500000003 HC RX 250 WO HCPCS: Performed by: PHYSICIAN ASSISTANT

## 2022-12-21 PROCEDURE — 2580000003 HC RX 258: Performed by: PHYSICIAN ASSISTANT

## 2022-12-21 PROCEDURE — 85025 COMPLETE CBC W/AUTO DIFF WBC: CPT

## 2022-12-21 PROCEDURE — 6360000002 HC RX W HCPCS: Performed by: PHYSICIAN ASSISTANT

## 2022-12-21 PROCEDURE — 96375 TX/PRO/DX INJ NEW DRUG ADDON: CPT

## 2022-12-21 PROCEDURE — 96413 CHEMO IV INFUSION 1 HR: CPT

## 2022-12-21 PROCEDURE — 80053 COMPREHEN METABOLIC PANEL: CPT

## 2022-12-21 PROCEDURE — 96367 TX/PROPH/DG ADDL SEQ IV INF: CPT

## 2022-12-21 RX ORDER — DIPHENHYDRAMINE HYDROCHLORIDE 50 MG/ML
50 INJECTION INTRAMUSCULAR; INTRAVENOUS
Status: CANCELLED | OUTPATIENT
Start: 2022-12-21

## 2022-12-21 RX ORDER — SODIUM CHLORIDE 9 MG/ML
5-40 INJECTION INTRAVENOUS PRN
Status: CANCELLED | OUTPATIENT
Start: 2022-12-21

## 2022-12-21 RX ORDER — SODIUM CHLORIDE 0.9 % (FLUSH) 0.9 %
5-40 SYRINGE (ML) INJECTION PRN
Status: DISCONTINUED | OUTPATIENT
Start: 2022-12-21 | End: 2022-12-22 | Stop reason: HOSPADM

## 2022-12-21 RX ORDER — FAMOTIDINE 10 MG/ML
20 INJECTION, SOLUTION INTRAVENOUS
Status: CANCELLED | OUTPATIENT
Start: 2022-12-21

## 2022-12-21 RX ORDER — PALBOCICLIB 125 MG/1
125 TABLET, FILM COATED ORAL DAILY
Qty: 21 TABLET | Refills: 5 | Status: ACTIVE | OUTPATIENT
Start: 2022-12-21

## 2022-12-21 RX ORDER — ONDANSETRON 2 MG/ML
8 INJECTION INTRAMUSCULAR; INTRAVENOUS
Status: CANCELLED | OUTPATIENT
Start: 2022-12-21

## 2022-12-21 RX ORDER — EPINEPHRINE 1 MG/ML
0.3 INJECTION, SOLUTION, CONCENTRATE INTRAVENOUS PRN
Status: CANCELLED | OUTPATIENT
Start: 2022-12-21

## 2022-12-21 RX ORDER — ALBUTEROL SULFATE 90 UG/1
4 AEROSOL, METERED RESPIRATORY (INHALATION) PRN
Status: CANCELLED | OUTPATIENT
Start: 2022-12-21

## 2022-12-21 RX ORDER — SODIUM CHLORIDE 9 MG/ML
INJECTION, SOLUTION INTRAVENOUS CONTINUOUS
Status: CANCELLED | OUTPATIENT
Start: 2022-12-21

## 2022-12-21 RX ORDER — DIPHENHYDRAMINE HYDROCHLORIDE 50 MG/ML
50 INJECTION INTRAMUSCULAR; INTRAVENOUS ONCE
Status: COMPLETED | OUTPATIENT
Start: 2022-12-21 | End: 2022-12-21

## 2022-12-21 RX ORDER — SODIUM CHLORIDE 9 MG/ML
5-250 INJECTION, SOLUTION INTRAVENOUS PRN
Status: DISCONTINUED | OUTPATIENT
Start: 2022-12-21 | End: 2022-12-22 | Stop reason: HOSPADM

## 2022-12-21 RX ORDER — MEPERIDINE HYDROCHLORIDE 25 MG/ML
12.5 INJECTION INTRAMUSCULAR; INTRAVENOUS; SUBCUTANEOUS PRN
Status: CANCELLED | OUTPATIENT
Start: 2022-12-21

## 2022-12-21 RX ORDER — LETROZOLE 2.5 MG/1
2.5 TABLET, FILM COATED ORAL DAILY
Qty: 30 TABLET | Refills: 5 | Status: SHIPPED | OUTPATIENT
Start: 2022-12-21

## 2022-12-21 RX ORDER — SODIUM CHLORIDE 9 MG/ML
5-250 INJECTION, SOLUTION INTRAVENOUS PRN
Status: CANCELLED | OUTPATIENT
Start: 2022-12-21

## 2022-12-21 RX ORDER — FAMOTIDINE 10 MG/ML
20 INJECTION, SOLUTION INTRAVENOUS ONCE
Status: COMPLETED | OUTPATIENT
Start: 2022-12-21 | End: 2022-12-21

## 2022-12-21 RX ORDER — ACETAMINOPHEN 325 MG/1
650 TABLET ORAL
Status: CANCELLED | OUTPATIENT
Start: 2022-12-21

## 2022-12-21 RX ORDER — HEPARIN SODIUM (PORCINE) LOCK FLUSH IV SOLN 100 UNIT/ML 100 UNIT/ML
500 SOLUTION INTRAVENOUS PRN
Status: DISCONTINUED | OUTPATIENT
Start: 2022-12-21 | End: 2022-12-22 | Stop reason: HOSPADM

## 2022-12-21 RX ADMIN — FAMOTIDINE 20 MG: 10 INJECTION, SOLUTION INTRAVENOUS at 11:56

## 2022-12-21 RX ADMIN — SODIUM CHLORIDE, PRESERVATIVE FREE 10 ML: 5 INJECTION INTRAVENOUS at 13:37

## 2022-12-21 RX ADMIN — DEXAMETHASONE SODIUM PHOSPHATE 10 MG: 10 INJECTION INTRAMUSCULAR; INTRAVENOUS at 12:00

## 2022-12-21 RX ADMIN — SODIUM CHLORIDE 20 ML/HR: 9 INJECTION, SOLUTION INTRAVENOUS at 11:55

## 2022-12-21 RX ADMIN — HEPARIN 500 UNITS: 100 SYRINGE at 13:37

## 2022-12-21 RX ADMIN — DIPHENHYDRAMINE HYDROCHLORIDE 50 MG: 50 INJECTION INTRAMUSCULAR; INTRAVENOUS at 11:56

## 2022-12-21 RX ADMIN — PACLITAXEL 130 MG: 6 INJECTION, SOLUTION INTRAVENOUS at 12:26

## 2022-12-21 NOTE — PROGRESS NOTES
Per Dr. Vesta Hernandez - patient's last Taxol treatment will be next Wednesday, 12/28/22 and patient will start oral Ibrance with AI soon after. Per Dr. Vesta Hernandez - Ibrance 125 mg one tab daily (21 days on and 1 week off) and Letrozole 2.5 mg one tab daily e-scripted to pharmacy. Nurse navigator referral placed and patient will be scheduled for education in near future. Call placed to patient regarding above - LVM. Direct contact information given to call with any questions/concerns.

## 2022-12-22 RX ORDER — FAMOTIDINE 10 MG/ML
20 INJECTION, SOLUTION INTRAVENOUS
OUTPATIENT
Start: 2022-12-28

## 2022-12-22 RX ORDER — SODIUM CHLORIDE 9 MG/ML
5-250 INJECTION, SOLUTION INTRAVENOUS PRN
OUTPATIENT
Start: 2022-12-28

## 2022-12-22 RX ORDER — SODIUM CHLORIDE 9 MG/ML
INJECTION, SOLUTION INTRAVENOUS CONTINUOUS
OUTPATIENT
Start: 2022-12-28

## 2022-12-22 RX ORDER — EPINEPHRINE 1 MG/ML
0.3 INJECTION, SOLUTION, CONCENTRATE INTRAVENOUS PRN
OUTPATIENT
Start: 2022-12-28

## 2022-12-22 RX ORDER — DIPHENHYDRAMINE HYDROCHLORIDE 50 MG/ML
50 INJECTION INTRAMUSCULAR; INTRAVENOUS
OUTPATIENT
Start: 2022-12-28

## 2022-12-22 RX ORDER — ALBUTEROL SULFATE 90 UG/1
4 AEROSOL, METERED RESPIRATORY (INHALATION) PRN
OUTPATIENT
Start: 2022-12-28

## 2022-12-22 RX ORDER — ONDANSETRON 2 MG/ML
8 INJECTION INTRAMUSCULAR; INTRAVENOUS
OUTPATIENT
Start: 2022-12-28

## 2022-12-22 RX ORDER — MEPERIDINE HYDROCHLORIDE 25 MG/ML
12.5 INJECTION INTRAMUSCULAR; INTRAVENOUS; SUBCUTANEOUS PRN
OUTPATIENT
Start: 2022-12-28

## 2022-12-22 RX ORDER — SODIUM CHLORIDE 9 MG/ML
5-40 INJECTION INTRAVENOUS PRN
OUTPATIENT
Start: 2022-12-28

## 2022-12-22 RX ORDER — ACETAMINOPHEN 325 MG/1
650 TABLET ORAL
OUTPATIENT
Start: 2022-12-28

## 2022-12-22 RX ORDER — SODIUM CHLORIDE 0.9 % (FLUSH) 0.9 %
5-40 SYRINGE (ML) INJECTION PRN
OUTPATIENT
Start: 2022-12-28

## 2022-12-22 RX ORDER — HEPARIN SODIUM (PORCINE) LOCK FLUSH IV SOLN 100 UNIT/ML 100 UNIT/ML
500 SOLUTION INTRAVENOUS PRN
OUTPATIENT
Start: 2022-12-28

## 2022-12-23 ENCOUNTER — TELEPHONE (OUTPATIENT)
Dept: ONCOLOGY | Age: 67
End: 2022-12-23

## 2022-12-26 NOTE — PROGRESS NOTES
Patient Name:  Soheila Perez  Patient :  1955  Patient MRN:  [id number]     Primary Oncologist: Emanuel Lay MD  Referring Provider: MAURICIO Galloway CNP     Date of Service: 2023      Chief Complaint:    Chief Complaint   Patient presents with    Follow-up     Patient's active problem list:       Liver mass       Lytic bone lesions       Left breast mass    HPI:   Tena Armando is a 74year-old very pleasant female with medical history significant for osteoarthritis, initially referred to me on 22 for evaluation of her liver lesion and multiple lytic bone lesions. She initially presented to [facility] ER on 22 with severe lower back pain and constipation. Stated that she has been having back pain for about 4 - 6 weeks duration, which becomes severe pain started a week before presentation. CT scan of the abdomen and pelvis done on 2022 showed lesion (6.1 x 8.1 cm) in the hepatic segment 4, concerning for neoplasm. Further evaluation with MRI of the liver is recommended. Extensive lytic metastatic disease in lumbar and thoracic spine and pelvis with most dominant lesion seen at L5 vertebral body. Several small bowel loops segments in the left abdomen demonstrate mild wall thickening, nonspecific may indicate enteritis. Nonspecific partially visualized inflammatory stranding along the pericardium. 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 CT lumbar spine done on 22 showed extensive multifocal lytic lesions concerning for neoplastic/metastatic disease. Age indeterminant compression fractures at T12 and L4, with mild narrowing of the AP diameter of the canal at L4. She never had colonoscopy before. She had pap smear around . She didn't recall when was her last mammogram.     She denies weight loss. She hasn't been eating much for about 10 days since she has been having nausea.      Since she is found to have multiple lytic bone lesions and liver lesion, she was referred to me for further evaluation. Digital diagnostic bilateral mammogram and bilateral ultrasound done on 6/17/2022 showed left breast mass, highly suspicious for malignancy. Right breast mass at 11:00, 1.5 cm. Mass is highly suspicious for malignancy. Left breast skin thickening. CT chest on 6/20/2022 showed suspicious heterogeneous mass primarily centered in segment 4A of the liver measuring 8.8 cm. Left breast mass with left axillary and subpectoral metastatic disease, suspected bony metastases to thoracolumbar spine and sternum and possible left chest wall invasion. Bone scan on 6/20/2022 showed multifocal osseous metastatic disease. T12 compression fracture, concerning for pathologic fracture. MRI abdomen on 6/25/2022 showed biopsy-proven malignancy in the left breast with suspicion for inflammatory carcinoma given skin and trabecular thickening. There may be invasion of the underlying pectoralis musculature. At least 3 heavily meta stasis, the largest measuring up to 8.7 cm with the others 1 cm or less. Likely metastatic portal hepatic, retroperitoneal and right retrocrural lymph nodes. Trace left pleural effusion. Extensive skeletal metastatic disease. She underwent ultrasound-guided biopsy of the right breast mass at 11:00 and the pathology showed invasive carcinoma with ductal and lobular features. Estrogen receptor and progesterone receptor are positive. HER2 by IHC not overexpressed (score 1+). HER2 by FISH-negative. Left breast mass at 12:00 biopsy revealed grade 2 invasive lobular carcinoma. Estrogen receptor by IHC-positive [greater than 95%, average strong intensity], progesterone receptor-positive [approximately 80%, average moderate intensity], HER2 by IHC-not overexpressed [score 1+] and HER2 by FISH-negative. CARIS panel did not yield any biomarker results with a benefit of clinic at the dense 70 chance to provide an association with a particular therapy. TMB was low. PD-L1 for Oscar Bess was not expressed and MSI was stable. Since she has significant visceral disease, I recommend her to initiate first-line chemotherapy with single agent paclitaxel. It was started on 7/27/22 and she completed her sixth cycle on 12/28/22. CT scan of the chest, abdomen and pelvis done on 11/14/2022 showed overall improvement in skin thickening and edema within the left breast as well as left internal thoracic/mammary, axillary and subpectoral adenopathy. Interval decrease in size of dominant left hepatic metastasis. Small hepatic lesion seen on earlier MRI are not well seen on this exam.  No new focal hepatic lesion. There is also interval decrease in size of retroperitoneal lymph nodes. Multifocal osseous metastases are again identified in keeping with recent bone scan. There is overall progression of sclerosis when compared to prior exams throughout the visualized skeleton which may reflect posttreatment changes rather than progression. Focal areas of wall thickening in the distal esophagus extending along the gastric cardia as well as the gastric body. Recommend correlation with endoscopy. First line endocrine therapy with ibrance and letrozole was started on 1/5/2023. On January 5, 2023, she presented to me for follow-up. She was initially referred to me for evaluation of liver lesion and multiple bone lesions. She was found to have left breast mass with a distorted left breast on physical examination. 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 Since she has significant visceral disease, I recommend her to initiate first-line chemotherapy with single agent paclitaxel. It was started on 7/20/22. She completed her sixth cycle on 12/28/2022.      CT scans and bone scans done on November 2022, after 4th cycle of chemo showed very good response to chemotherapy. Since she completed total of 6 cycles of chemotherapy, I recommend to start first-line endocrine therapy with Ibrance and letrozole. It was started today, 1/5/2023. I will see her back in 2 weeks and will check interim labs at that time. Bone metastasis - we started zolendronic acid since 7/20/22. She only has minimal symptom and will not do vertebroplasty at this time. Reviewed [employer] molecular panel result with her. I requested genetic counseling and testing since she has bilateral metastatic breast cancer. Malignancy related pain - will continue with norco since it has been controlling her pain well. Anxiety and insomnia - on ativan 0.5 mg nightly prn with improvement in her symptom. She is requiring less ativan since starting medical marijuana. She doesn't have any other significant symptoms at today visit. Past Medical History:     Significant for  1. Osteoarthritis    Past Surgery History:    Significant for   1. Left hip replacement    Social History:   She is a current smoker and she smokes 1 pack per day approximately since she was 24. She denies alcohol drinking or illicit drug abuse. Family History:    Significant for Alzheimer's disease in her parents. No family history of malignancy. No Known Allergies    Review of Systems: \"Per interval history; otherwise 10 point ROS is negative. \"  Her energy level is good and her sleep is fine. She doesn't have fever, chills, night sweats, cough, SOB, chest pain, hemoptysis or palpitations. Her bowels and bladder functions are normal. She denies nausea, vomiting, abdominal pain, diarrhea, constipation, dysuria, loss of appetite or weight loss. She denies neuropathy and she doesn't have bleeding or clotting issues. She has cancer related mild pain in her lower back, but it is getting better. She barely needs narcotic pain med. Has anxiety. No depression.  The rest of the systems are unremarkable. Vital Signs: BP (!) 149/71 (Site: Right Upper Arm, Position: Sitting, Cuff Size: Medium Adult)   Pulse (!) 103   Temp 97.7 °F (36.5 °C) (Infrared)   Ht 5' 4\" (1.626 m)   Wt 165 lb (74.8 kg)   SpO2 99%   BMI 28.32 kg/m²      Physical Exam:  CONSTITUTIONAL: awake, alert, cooperative, no apparent distress   EYES: pupils equal, round and reactive to light, sclera clear, normal conjunctiva  ENT: Normocephalic, without obvious abnormality, atraumatic  NECK: supple, symmetrical, no jugular venous distension, no carotid bruits   HEMATOLOGIC/LYMPHATIC: no cervical, supraclavicular lymphadenopathy. Left axillary lymphadenopathy noted,   LUNGS: VBS, no wheezes, clear to auscultation, no crackles, no increased work of breathing, no rhonchi,   CARDIOVASCULAR: regular rate and rhythm, normal S1 and S2, no murmur noted  ABDOMEN: normal bowel sounds x 4, soft, non-distended, non-tender, no masses palpated, no hepatosplenomegaly   MUSCULOSKELETAL: full range of motion noted, tone is normal  NEUROLOGIC: awake, alert, oriented to name, place and time. Motor skills grossly intact. SKIN: appears intact, normal skin color, normal texture, normal turgor, no jaundice.    EXTREMITIES: no cyanosis, no clubbing, no LE edema, no leg swelling,   BREASTS: Left breast is distorted by tumor, nipple was displaced close to lower breast crease area, it is getting softer with chemo, right breast nodularity noted,        Labs:  Hematology:  Lab Results   Component Value Date    WBC 3.9 (L) 12/28/2022    RBC 3.44 (L) 12/28/2022    HGB 10.3 (L) 12/28/2022    HCT 32.3 (L) 12/28/2022    MCV 93.9 12/28/2022    MCH 29.9 12/28/2022    MCHC 31.9 (L) 12/28/2022    RDW 15.9 (H) 12/28/2022     12/28/2022    MPV 8.3 12/28/2022    SEGSPCT 50.6 12/28/2022    EOSRELPCT 1.8 12/28/2022    BASOPCT 0.3 12/28/2022    LYMPHOPCT 40.4 12/28/2022    MONOPCT 6.9 (H) 12/28/2022    SEGSABS 2.0 12/28/2022    EOSABS 0.1 12/28/2022    BASOSABS 0.0 12/28/2022    LYMPHSABS 1.6 12/28/2022    MONOSABS 0.3 12/28/2022    DIFFTYPE AUTOMATED DIFFERENTIAL 12/28/2022     No results found for: ESR  Chemistry:  Lab Results   Component Value Date     12/28/2022    K 3.8 12/28/2022     12/28/2022    CO2 26 12/28/2022    BUN 16 12/28/2022    CREATININE 0.6 12/28/2022    GLUCOSE 84 12/28/2022    CALCIUM 9.2 12/28/2022    PROT 6.3 (L) 12/28/2022    LABALBU 4.1 12/28/2022    BILITOT 0.5 12/28/2022    ALKPHOS 63 12/28/2022    AST 19 12/28/2022    ALT 14 12/28/2022    LABGLOM >60 12/28/2022    GFRAA >60 09/28/2022    MG 2.3 07/19/2022    POCCA 1.15 12/28/2022    POCGLU 83 12/28/2022     Lab Results   Component Value Date     (H) 06/14/2022     No components found for: LD  No results found for: TSHHS, T4FREE, FT3  Immunology:  Lab Results   Component Value Date    PROT 6.3 (L) 12/28/2022    SPEP  06/14/2022     INTERPRETATION - No monoclonal proteins are detected. SAF    SPEP INTERPRETATION - Within normal limits. SAF 06/14/2022    ALBUMINELP 3.7 06/14/2022    LABALPH 0.4 06/14/2022    LABALPH 1.3 (H) 89/41/1242    LABALPH DUPLICATE ORDER 42/27/4756    LABALPH DUPLICATE ORDER 76/82/3283    LABBETA 1.1 61/84/7720    LABBETA DUPLICATE ORDER 36/82/2531    GAMGLOB 1.1 06/14/2022     No results found for: Eulene Yue, KLFLCR  No results found for: B2M  Coagulation Panel:  No results found for: PROTIME, INR, APTT, DDIMER  Anemia Panel:  No results found for: ZBLGEYNP78, FOLATE  Tumor Markers:  Lab Results   Component Value Date     377 (H) 06/14/2022    CEA 54.5 06/14/2022     25 06/14/2022    LABCA2 45.8 (H) 08/31/2022        Observations:  No data recorded     Assessment   Left breast mass  Liver lesion and multiple bone lytic lesions - concerning for metastatic breast cancer    Plan:  Amberly Verma is a 74year-old very pleasant female who initially presented to [facility] ER on 6/12/22 with severe lower back pain and constipation.      CT scan of the abdomen and pelvis done on 6/12/2022 showed lesion (6.1 x 8.1 cm) in the hepatic segment 4, concerning for neoplasm. Extensive lytic metastatic disease in lumbar and thoracic spine and pelvis with most dominant lesion seen at L5 vertebral body. CT lumbar spine done on 6/12/22 showed extensive multifocal lytic lesions concerning for neoplastic/metastatic disease. She never had colonoscopy before. She had pap smear around 2020. She didn't recall when was her last mammogram.       Digital diagnostic bilateral mammogram and bilateral ultrasound done on 6/17/2022 showed left breast mass, highly suspicious for malignancy. Right breast mass at 11:00, 1.5 cm. Mass is highly suspicious for malignancy. Left breast skin thickening. CT chest on 6/20/2022 showed suspicious heterogeneous mass primarily centered in segment 4A of the liver measuring 8.8 cm. Left breast mass with left axillary and subpectoral metastatic disease, suspected bony metastases to thoracolumbar spine and sternum and possible left chest wall invasion. Bone scan on 6/20/2022 showed multifocal osseous metastatic disease. T12 compression fracture, concerning for pathologic fracture. MRI abdomen on 6/25/2022 showed biopsy-proven malignancy in the left breast with suspicion for inflammatory carcinoma given skin and trabecular thickening. There may be invasion of the underlying pectoralis musculature. At least 3 heavily meta stasis, the largest measuring up to 8.7 cm with the others 1 cm or less. Likely metastatic portal hepatic, retroperitoneal and right retrocrural lymph nodes. Trace left pleural effusion. Extensive skeletal metastatic disease. She underwent ultrasound-guided biopsy of the right breast mass at 11:00 and the pathology showed invasive carcinoma with ductal and lobular features. Estrogen receptor and progesterone receptor are positive. HER2 by IHC not overexpressed (score 1+). HER2 by FISH-negative.     Left breast mass at 12:00 biopsy revealed grade 2 invasive lobular carcinoma. Estrogen receptor by IHC-positive [greater than 95%, average strong intensity], progesterone receptor-positive [approximately 80%, average moderate intensity], HER2 by IHC-not overexpressed [score 1+] and HER2 by FISH-negative. CARIS panel did not yield any biomarker results with a benefit of clinic at the dense 70 chance to provide an association with a particular therapy. TMB was low. PD-L1 for Cristina Ansari was not expressed and MSI was stable. Since she has significant visceral disease, I recommend her to initiate first-line chemotherapy with single agent paclitaxel. It was started on 7/27/22. CT scan of the chest, abdomen and pelvis done on 11/14/2022 showed overall improvement in skin thickening and edema within the left breast as well as left internal thoracic/mammary, axillary and subpectoral adenopathy. Interval decrease in size of dominant left hepatic metastasis. Small hepatic lesion seen on earlier MRI are not well seen on this exam.  No new focal hepatic lesion. There is also interval decrease in size of retroperitoneal lymph nodes. Multifocal osseous metastases are again identified in keeping with recent bone scan. There is overall progression of sclerosis when compared to prior exams throughout the visualized skeleton which may reflect posttreatment changes rather than progression. Focal areas of wall thickening in the distal esophagus extending along the gastric cardia as well as the gastric body. Recommend correlation with endoscopy. First line endocrine therapy with ibrance and letrozole was started on 1/5/2023. On January 5, 2023, she presented to me for follow-up. She was initially referred to me for evaluation of liver lesion and multiple bone lesions. She was found to have left breast mass with a distorted left breast on physical examination.   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 Since she has significant visceral disease, I recommend her to initiate first-line chemotherapy with single agent paclitaxel. It was started on 7/20/22. She completed her sixth cycle on 12/28/2022. CT scans and bone scans done on November 2022, after 4th cycle of chemo showed very good response to chemotherapy. Since she completed total of 6 cycles of chemotherapy, I recommend to start first-line endocrine therapy with Ibrance and letrozole. It was started today, 1/5/2023. I will see her back in 2 weeks and will check interim labs at that time. Bone metastasis - we started zolendronic acid since 7/20/22. She only has minimal symptom and will not do vertebroplasty at this time. Reviewed [employer] molecular panel result with her. I requested genetic counseling and testing since she has bilateral metastatic breast cancer. Malignancy related pain - will continue with norco since it has been controlling her pain well. Anxiety and insomnia - on ativan 0.5 mg nightly prn with improvement in her symptom. She is requiring less ativan since starting medical marijuana. I answered all her questions and concerns for today. Recent imaging and labs were reviewed and discussed with the patient.

## 2022-12-28 ENCOUNTER — HOSPITAL ENCOUNTER (OUTPATIENT)
Dept: INFUSION THERAPY | Age: 67
Discharge: HOME | End: 2022-12-28
Payer: MEDICARE

## 2022-12-28 VITALS
WEIGHT: 165.4 LBS | OXYGEN SATURATION: 97 % | SYSTOLIC BLOOD PRESSURE: 157 MMHG | HEART RATE: 90 BPM | DIASTOLIC BLOOD PRESSURE: 70 MMHG | BODY MASS INDEX: 28.24 KG/M2 | HEIGHT: 64 IN

## 2022-12-28 DIAGNOSIS — C79.51 BONE METASTASIS (HCC): ICD-10-CM

## 2022-12-28 DIAGNOSIS — Z17.0 MALIGNANT NEOPLASM OF OVERLAPPING SITES OF BOTH BREASTS IN FEMALE, ESTROGEN RECEPTOR POSITIVE (HCC): Primary | ICD-10-CM

## 2022-12-28 DIAGNOSIS — C50.812 MALIGNANT NEOPLASM OF OVERLAPPING SITES OF BOTH BREASTS IN FEMALE, ESTROGEN RECEPTOR POSITIVE (HCC): Primary | ICD-10-CM

## 2022-12-28 DIAGNOSIS — C50.811 MALIGNANT NEOPLASM OF OVERLAPPING SITES OF BOTH BREASTS IN FEMALE, ESTROGEN RECEPTOR POSITIVE (HCC): Primary | ICD-10-CM

## 2022-12-28 LAB
ALBUMIN SERPL-MCNC: 4.1 GM/DL (ref 3.4–5)
ALP BLD-CCNC: 63 IU/L (ref 40–128)
ALT SERPL-CCNC: 14 U/L (ref 10–40)
ANION GAP SERPL CALCULATED.3IONS-SCNC: 9 MMOL/L (ref 4–16)
AST SERPL-CCNC: 19 IU/L (ref 15–37)
BASOPHILS ABSOLUTE: 0 K/CU MM
BASOPHILS RELATIVE PERCENT: 0.3 % (ref 0–1)
BILIRUB SERPL-MCNC: 0.5 MG/DL (ref 0–1)
BUN BLDV-MCNC: 16 MG/DL (ref 6–23)
CALCIUM SERPL-MCNC: 9.2 MG/DL (ref 8.3–10.6)
CHLORIDE BLD-SCNC: 105 MMOL/L (ref 99–110)
CO2: 26 MMOL/L (ref 21–32)
CREAT SERPL-MCNC: 0.4 MG/DL (ref 0.6–1.1)
DIFFERENTIAL TYPE: ABNORMAL
EGFR, POC: >60 ML/MIN/1.73M2
EOSINOPHILS ABSOLUTE: 0.1 K/CU MM
EOSINOPHILS RELATIVE PERCENT: 1.8 % (ref 0–3)
GFR SERPL CREATININE-BSD FRML MDRD: >60 ML/MIN/1.73M2
GLUCOSE BLD-MCNC: 83 MG/DL (ref 70–99)
GLUCOSE BLD-MCNC: 84 MG/DL (ref 70–99)
HCT VFR BLD CALC: 32.3 % (ref 37–47)
HEMOGLOBIN: 10.3 GM/DL (ref 12.5–16)
LYMPHOCYTES ABSOLUTE: 1.6 K/CU MM
LYMPHOCYTES RELATIVE PERCENT: 40.4 % (ref 24–44)
MCH RBC QN AUTO: 29.9 PG (ref 27–31)
MCHC RBC AUTO-ENTMCNC: 31.9 % (ref 32–36)
MCV RBC AUTO: 93.9 FL (ref 78–100)
MONOCYTES ABSOLUTE: 0.3 K/CU MM
MONOCYTES RELATIVE PERCENT: 6.9 % (ref 0–4)
PDW BLD-RTO: 15.9 % (ref 11.7–14.9)
PLATELET # BLD: 254 K/CU MM (ref 140–440)
PMV BLD AUTO: 8.3 FL (ref 7.5–11.1)
POC BUN: 16 MG/DL (ref 8–26)
POC CALCIUM: 1.15 MMOL/L (ref 1.12–1.32)
POC CHLORIDE: 108 MMOL/L (ref 98–109)
POC CO2: 22 MMOL/L (ref 21–32)
POC CREATININE: 0.6 MG/DL (ref 0.6–1.1)
POTASSIUM SERPL-SCNC: 3.8 MMOL/L (ref 3.5–4.5)
POTASSIUM SERPL-SCNC: 4 MMOL/L (ref 3.5–5.1)
RBC # BLD: 3.44 M/CU MM (ref 4.2–5.4)
SEGMENTED NEUTROPHILS ABSOLUTE COUNT: 2 K/CU MM
SEGMENTED NEUTROPHILS RELATIVE PERCENT: 50.6 % (ref 36–66)
SODIUM BLD-SCNC: 140 MMOL/L (ref 135–145)
SODIUM BLD-SCNC: 142 MMOL/L (ref 138–146)
SOURCE, BLOOD GAS: NORMAL
TOTAL PROTEIN: 6.3 GM/DL (ref 6.4–8.2)
WBC # BLD: 3.9 K/CU MM (ref 4–10.5)

## 2022-12-28 PROCEDURE — 6360000002 HC RX W HCPCS: Performed by: NURSE PRACTITIONER

## 2022-12-28 PROCEDURE — 2500000003 HC RX 250 WO HCPCS: Performed by: NURSE PRACTITIONER

## 2022-12-28 PROCEDURE — 85025 COMPLETE CBC W/AUTO DIFF WBC: CPT

## 2022-12-28 PROCEDURE — 2580000003 HC RX 258: Performed by: INTERNAL MEDICINE

## 2022-12-28 PROCEDURE — 96367 TX/PROPH/DG ADDL SEQ IV INF: CPT

## 2022-12-28 PROCEDURE — 2580000003 HC RX 258: Performed by: NURSE PRACTITIONER

## 2022-12-28 PROCEDURE — 96413 CHEMO IV INFUSION 1 HR: CPT

## 2022-12-28 PROCEDURE — 80053 COMPREHEN METABOLIC PANEL: CPT

## 2022-12-28 PROCEDURE — 6360000002 HC RX W HCPCS: Performed by: INTERNAL MEDICINE

## 2022-12-28 PROCEDURE — 96375 TX/PRO/DX INJ NEW DRUG ADDON: CPT

## 2022-12-28 RX ORDER — DIPHENHYDRAMINE HYDROCHLORIDE 50 MG/ML
50 INJECTION INTRAMUSCULAR; INTRAVENOUS ONCE
Status: COMPLETED | OUTPATIENT
Start: 2022-12-28 | End: 2022-12-28

## 2022-12-28 RX ORDER — SODIUM CHLORIDE 9 MG/ML
INJECTION, SOLUTION INTRAVENOUS CONTINUOUS
Status: CANCELLED | OUTPATIENT
Start: 2022-12-28

## 2022-12-28 RX ORDER — SODIUM CHLORIDE 0.9 % (FLUSH) 0.9 %
5-40 SYRINGE (ML) INJECTION PRN
Status: DISCONTINUED | OUTPATIENT
Start: 2022-12-28 | End: 2022-12-29 | Stop reason: HOSPADM

## 2022-12-28 RX ORDER — SODIUM CHLORIDE 9 MG/ML
5-40 INJECTION INTRAVENOUS PRN
Status: CANCELLED | OUTPATIENT
Start: 2022-12-28

## 2022-12-28 RX ORDER — FAMOTIDINE 10 MG/ML
20 INJECTION, SOLUTION INTRAVENOUS
Status: CANCELLED | OUTPATIENT
Start: 2022-12-28

## 2022-12-28 RX ORDER — ONDANSETRON 2 MG/ML
8 INJECTION INTRAMUSCULAR; INTRAVENOUS
Status: CANCELLED | OUTPATIENT
Start: 2022-12-28

## 2022-12-28 RX ORDER — ACETAMINOPHEN 325 MG/1
650 TABLET ORAL
Status: CANCELLED | OUTPATIENT
Start: 2022-12-28

## 2022-12-28 RX ORDER — EPINEPHRINE 1 MG/ML
0.3 INJECTION, SOLUTION, CONCENTRATE INTRAVENOUS PRN
Status: CANCELLED | OUTPATIENT
Start: 2022-12-28

## 2022-12-28 RX ORDER — HEPARIN SODIUM (PORCINE) LOCK FLUSH IV SOLN 100 UNIT/ML 100 UNIT/ML
500 SOLUTION INTRAVENOUS PRN
Status: DISCONTINUED | OUTPATIENT
Start: 2022-12-28 | End: 2022-12-29 | Stop reason: HOSPADM

## 2022-12-28 RX ORDER — DIPHENHYDRAMINE HYDROCHLORIDE 50 MG/ML
50 INJECTION INTRAMUSCULAR; INTRAVENOUS
Status: CANCELLED | OUTPATIENT
Start: 2022-12-28

## 2022-12-28 RX ORDER — MEPERIDINE HYDROCHLORIDE 25 MG/ML
12.5 INJECTION INTRAMUSCULAR; INTRAVENOUS; SUBCUTANEOUS PRN
Status: CANCELLED | OUTPATIENT
Start: 2022-12-28

## 2022-12-28 RX ORDER — FAMOTIDINE 10 MG/ML
20 INJECTION, SOLUTION INTRAVENOUS ONCE
Status: COMPLETED | OUTPATIENT
Start: 2022-12-28 | End: 2022-12-28

## 2022-12-28 RX ORDER — SODIUM CHLORIDE 9 MG/ML
5-250 INJECTION, SOLUTION INTRAVENOUS PRN
Status: DISCONTINUED | OUTPATIENT
Start: 2022-12-28 | End: 2022-12-29 | Stop reason: HOSPADM

## 2022-12-28 RX ORDER — SODIUM CHLORIDE 9 MG/ML
5-250 INJECTION, SOLUTION INTRAVENOUS PRN
Status: CANCELLED | OUTPATIENT
Start: 2022-12-28

## 2022-12-28 RX ORDER — ALBUTEROL SULFATE 90 UG/1
4 AEROSOL, METERED RESPIRATORY (INHALATION) PRN
Status: CANCELLED | OUTPATIENT
Start: 2022-12-28

## 2022-12-28 RX ADMIN — HEPARIN 500 UNITS: 100 SYRINGE at 13:53

## 2022-12-28 RX ADMIN — DEXAMETHASONE SODIUM PHOSPHATE 10 MG: 10 INJECTION INTRAMUSCULAR; INTRAVENOUS at 11:38

## 2022-12-28 RX ADMIN — SODIUM CHLORIDE, PRESERVATIVE FREE 10 ML: 5 INJECTION INTRAVENOUS at 13:52

## 2022-12-28 RX ADMIN — FAMOTIDINE 20 MG: 10 INJECTION, SOLUTION INTRAVENOUS at 11:35

## 2022-12-28 RX ADMIN — DIPHENHYDRAMINE HYDROCHLORIDE 50 MG: 50 INJECTION INTRAMUSCULAR; INTRAVENOUS at 11:36

## 2022-12-28 RX ADMIN — ZOLEDRONIC ACID 4 MG: 4 INJECTION, SOLUTION, CONCENTRATE INTRAVENOUS at 13:35

## 2022-12-28 RX ADMIN — PACLITAXEL 130 MG: 6 INJECTION, SOLUTION INTRAVENOUS at 12:27

## 2022-12-28 RX ADMIN — SODIUM CHLORIDE 20 ML/HR: 9 INJECTION, SOLUTION INTRAVENOUS at 11:35

## 2022-12-28 NOTE — PROGRESS NOTES
Pt. Here for treatment and office visit. Pt. Denies any questions or concerns. Mediport accessed without difficulty, good blood return noted. Lab work drawn as ordered. Labs reviewed. Treatment administered without incident. Discharge AVS given. Pt. Schedule for port flush in 3 months, message sent to Dr. Jennifer Ortega regarding no more cycles for zometa. Patient's status assessed and documented appropriately. All labs and required results were also reviewed today. Treatment parameters have been reviewed. Today's treatment has been approved by the provider. Treatment orders and medication sequencing (when applicable) was verified by 2 registered nurses. The treatment plan was confirmed with the patient prior to administration, and the patient understands the need to report any treatment-related symptoms. Prior to administration, when applicable, the following 8 elements of medication administration were reviewed with 2nd Registered Nurse prior to dosing: drug name, drug dose, infusion volume when prepared in a syringe, rate of administration, expiration dates and/or times, appearance and integrity of drug(s), and rate of pump for infusion. The 5 rights of medication administration have been verified.

## 2023-01-05 ENCOUNTER — OFFICE VISIT (OUTPATIENT)
Dept: ONCOLOGY | Age: 68
End: 2023-01-05
Payer: MEDICARE

## 2023-01-05 ENCOUNTER — NURSE ONLY (OUTPATIENT)
Dept: ONCOLOGY | Age: 68
End: 2023-01-05

## 2023-01-05 ENCOUNTER — HOSPITAL ENCOUNTER (OUTPATIENT)
Dept: INFUSION THERAPY | Age: 68
Discharge: HOME | End: 2023-01-05
Payer: MEDICARE

## 2023-01-05 VITALS
TEMPERATURE: 97.7 F | OXYGEN SATURATION: 99 % | WEIGHT: 165 LBS | DIASTOLIC BLOOD PRESSURE: 71 MMHG | BODY MASS INDEX: 28.17 KG/M2 | HEIGHT: 64 IN | HEART RATE: 103 BPM | SYSTOLIC BLOOD PRESSURE: 149 MMHG

## 2023-01-05 DIAGNOSIS — C50.811 MALIGNANT NEOPLASM OF OVERLAPPING SITES OF BOTH BREASTS IN FEMALE, ESTROGEN RECEPTOR POSITIVE (HCC): Primary | ICD-10-CM

## 2023-01-05 DIAGNOSIS — Z17.0 MALIGNANT NEOPLASM OF OVERLAPPING SITES OF BOTH BREASTS IN FEMALE, ESTROGEN RECEPTOR POSITIVE (HCC): Primary | ICD-10-CM

## 2023-01-05 DIAGNOSIS — C50.812 MALIGNANT NEOPLASM OF OVERLAPPING SITES OF BOTH BREASTS IN FEMALE, ESTROGEN RECEPTOR POSITIVE (HCC): Primary | ICD-10-CM

## 2023-01-05 DIAGNOSIS — C79.51 BONE METASTASIS (HCC): ICD-10-CM

## 2023-01-05 PROCEDURE — 99214 OFFICE O/P EST MOD 30 MIN: CPT | Performed by: INTERNAL MEDICINE

## 2023-01-05 PROCEDURE — 99211 OFF/OP EST MAY X REQ PHY/QHP: CPT

## 2023-01-05 PROCEDURE — 1124F ACP DISCUSS-NO DSCNMKR DOCD: CPT | Performed by: INTERNAL MEDICINE

## 2023-01-05 NOTE — PROGRESS NOTES
MA Rooming Questions  Patient: Humza Dawson  MRN: [id number]    Date: 1/5/2023        1. Do you have any new issues?   no         2. Do you need any refills on medications?    no    3. Have you had any imaging done since your last visit?   no    4. Have you been hospitalized or seen in the emergency room since your last visit here?   no    5. Did the patient have a depression screening completed today?  No    No data recorded     PHQ-9 Given to (if applicable):               PHQ-9 Score (if applicable):                     [] Positive     []  Negative              Does question #9 need addressed (if applicable)                     [] Yes    []  No               Annemarie Beach CMA

## 2023-01-05 NOTE — PROGRESS NOTES
Patient here for Dr. Annemarie Glover office visit and treatment planning and chemotherapy education. Discussed treatment plan, potential side effects, prevention, and symptom management. Reviewed chemotherapy education folder and drug monograph. Patient's regimen will be Ibrance 125 mg on for 21 days and off one week along with Letrozole 2.5 mg one tab daily. Patient provided samples of Ibrance to get started on therapy. Patient signed chemotherapy consent for Ibrance and Letrozole. Copy of consent given to patient. Reviewed chemotherapy schedule/calendar. Patient states she has Zofran at home to take PRN. Patient given calendar and written instructions for these medications and how/when to take. Patient given on-call phone number for after office hours and weekends. CBC and CMP drawn on 12/28/22 prior to starting treatment today. Patient will also come in on 1/19/23 for port lab draw and Dr. Annemarie Glover office visit. Direct contact information given for patient to call with any questions/concerns.

## 2023-01-08 NOTE — PROGRESS NOTES
Patient Name:  Janeen Lance  Patient :  1955  Patient MRN:  [id number]     Primary Oncologist: Akin Moya MD  Referring Provider: MAURICIO Jorgensen CNP     Date of Service: 2023      Chief Complaint:    Chief Complaint   Patient presents with    Follow-up     Patient's active problem list:       Liver mass       Lytic bone lesions       Left breast mass    HPI:   Adán Cantu is a 74year-old very pleasant female with medical history significant for osteoarthritis, initially referred to me on 22 for evaluation of her liver lesion and multiple lytic bone lesions. She initially presented to [facility] ER on 22 with severe lower back pain and constipation. Stated that she has been having back pain for about 4 - 6 weeks duration, which becomes severe pain started a week before presentation. CT scan of the abdomen and pelvis done on 2022 showed lesion (6.1 x 8.1 cm) in the hepatic segment 4, concerning for neoplasm. Further evaluation with MRI of the liver is recommended. Extensive lytic metastatic disease in lumbar and thoracic spine and pelvis with most dominant lesion seen at L5 vertebral body. Several small bowel loops segments in the left abdomen demonstrate mild wall thickening, nonspecific may indicate enteritis. Nonspecific partially visualized inflammatory stranding along the pericardium. 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 CT lumbar spine done on 22 showed extensive multifocal lytic lesions concerning for neoplastic/metastatic disease. Age indeterminant compression fractures at T12 and L4, with mild narrowing of the AP diameter of the canal at L4. She never had colonoscopy before. She had pap smear around . She didn't recall when was her last mammogram.     She denies weight loss. She hasn't been eating much for about 10 days since she has been having nausea.      Since she is found to have multiple lytic bone lesions and liver lesion, she was referred to me for further evaluation. Digital diagnostic bilateral mammogram and bilateral ultrasound done on 6/17/2022 showed left breast mass, highly suspicious for malignancy. Right breast mass at 11:00, 1.5 cm. Mass is highly suspicious for malignancy. Left breast skin thickening. CT chest on 6/20/2022 showed suspicious heterogeneous mass primarily centered in segment 4A of the liver measuring 8.8 cm. Left breast mass with left axillary and subpectoral metastatic disease, suspected bony metastases to thoracolumbar spine and sternum and possible left chest wall invasion. Bone scan on 6/20/2022 showed multifocal osseous metastatic disease. T12 compression fracture, concerning for pathologic fracture. MRI abdomen on 6/25/2022 showed biopsy-proven malignancy in the left breast with suspicion for inflammatory carcinoma given skin and trabecular thickening. There may be invasion of the underlying pectoralis musculature. At least 3 heavily meta stasis, the largest measuring up to 8.7 cm with the others 1 cm or less. Likely metastatic portal hepatic, retroperitoneal and right retrocrural lymph nodes. Trace left pleural effusion. Extensive skeletal metastatic disease. She underwent ultrasound-guided biopsy of the right breast mass at 11:00 and the pathology showed invasive carcinoma with ductal and lobular features. Estrogen receptor and progesterone receptor are positive. HER2 by IHC not overexpressed (score 1+). HER2 by FISH-negative. Left breast mass at 12:00 biopsy revealed grade 2 invasive lobular carcinoma. Estrogen receptor by IHC-positive [greater than 95%, average strong intensity], progesterone receptor-positive [approximately 80%, average moderate intensity], HER2 by IHC-not overexpressed [score 1+] and HER2 by FISH-negative. CARIS panel did not yield any biomarker results with a benefit of clinic at the dense 70 chance to provide an association with a particular therapy. TMB was low. PD-L1 for Jimmy Rueda was not expressed and MSI was stable. Since she has significant visceral disease, I recommend her to initiate first-line chemotherapy with single agent paclitaxel. It was started on 7/27/22 and she completed her sixth cycle on 12/28/22. CT scan of the chest, abdomen and pelvis done on 11/14/2022 showed overall improvement in skin thickening and edema within the left breast as well as left internal thoracic/mammary, axillary and subpectoral adenopathy. Interval decrease in size of dominant left hepatic metastasis. Small hepatic lesion seen on earlier MRI are not well seen on this exam.  No new focal hepatic lesion. There is also interval decrease in size of retroperitoneal lymph nodes. Multifocal osseous metastases are again identified in keeping with recent bone scan. There is overall progression of sclerosis when compared to prior exams throughout the visualized skeleton which may reflect posttreatment changes rather than progression. Focal areas of wall thickening in the distal esophagus extending along the gastric cardia as well as the gastric body. Recommend correlation with endoscopy. First line endocrine therapy with ibrance and letrozole was started on 1/5/2023. On January 19, 2023, she presented to me for follow-up. She was initially referred to me for evaluation of liver lesion and multiple bone lesions. She was found to have left breast mass with a distorted left breast on physical examination. 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 Since she has significant visceral disease, I recommend her to initiate first-line chemotherapy with single agent paclitaxel. It was started on 7/20/22. She completed her sixth cycle on 12/28/2022.      CT scans and bone scans done on November 2022, after 4th cycle of chemo showed very good response to chemotherapy. Since she completed total of 6 cycles of chemotherapy, I recommend to start first-line endocrine therapy with Ibrance and letrozole. It was started today, 1/5/2023. She is tolerating ibrance and letrozole well. Will check interin labs today. She developed shingles in her right upper thigh and lower back area. Will treat with acyclovir 400 mg TID for 10 days. Otherwise, I will see her back in 3 weeks and will check labs at that time. Bone metastasis - we started zolendronic acid since 7/20/22. She only has minimal symptom and will not do vertebroplasty at this time. Reviewed Caris molecular panel result with her. I requested genetic counseling and testing since she has bilateral metastatic breast cancer. Malignancy related pain - will continue with norco since it has been controlling her pain well. Anxiety and insomnia - on ativan 0.5 mg nightly prn with improvement in her symptom. She is requiring less ativan since starting medical marijuana. She doesn't have any other significant symptoms at today visit. Past Medical History:     Significant for  1. Osteoarthritis    Past Surgery History:    Significant for   1. Left hip replacement    Social History:   She is a current smoker and she smokes 1 pack per day approximately since she was 24. She denies alcohol drinking or illicit drug abuse. Family History:    Significant for Alzheimer's disease in her parents. No family history of malignancy. No Known Allergies    Review of Systems: \"Per interval history; otherwise 10 point ROS is negative. \"  Her energy level is pretty good and her sleep is fine. She doesn't have fever, chills, night sweats, cough, SOB, chest pain, hemoptysis or palpitations. Her bowels and bladder functions are normal. She denies nausea, vomiting, abdominal pain, diarrhea, constipation, dysuria, loss of appetite or weight loss.  She denies neuropathy and she doesn't have bleeding or clotting issues. She has cancer related mild pain in her lower back, but it is getting better. Discomfort at shingles site. She barely needs narcotic pain med. Has anxiety. No depression. The rest of the systems are unremarkable. Vital Signs: BP (!) 171/104 (Site: Left Wrist, Position: Sitting, Cuff Size: Medium Adult)   Pulse (!) 102   Temp (!) 96.6 °F (35.9 °C) (Infrared)   Resp 16   Ht 5' 4\" (1.626 m)   SpO2 96%   BMI 28.32 kg/m²      Physical Exam:  CONSTITUTIONAL: awake, alert, cooperative, no apparent distress   EYES: pupils equal, round and reactive to light, sclera clear, normal conjunctiva  ENT: Normocephalic, without obvious abnormality, atraumatic  NECK: supple, symmetrical, no jugular venous distension, no carotid bruits   HEMATOLOGIC/LYMPHATIC: no cervical, supraclavicular lymphadenopathy. Left axillary lymphadenopathy noted,   LUNGS: VBS, no wheezes, clear to auscultation, no crackles, no increased work of breathing, no rhonchi,   CARDIOVASCULAR: regular rate and rhythm, normal S1 and S2, no murmur noted  ABDOMEN: normal bowel sounds x 4, soft, non-distended, non-tender, no masses palpated, no hepatosplenomegaly   MUSCULOSKELETAL: full range of motion noted, tone is normal  NEUROLOGIC: awake, alert, oriented to name, place and time. Motor skills grossly intact.    SKIN: shingles noted in right upper thigh and lower back area  EXTREMITIES: no cyanosis, no clubbing, no LE edema, no leg swelling,   BREASTS: Left breast is distorted by tumor, nipple was displaced close to lower breast crease area, it is getting softer with chemo, right breast nodularity noted,        Labs:  Hematology:  Lab Results   Component Value Date    WBC 2.1 (L) 01/19/2023    RBC 3.31 (L) 01/19/2023    HGB 10.0 (L) 01/19/2023    HCT 31.3 (L) 01/19/2023    MCV 94.6 01/19/2023    MCH 30.2 01/19/2023    MCHC 31.9 (L) 01/19/2023    RDW 16.1 (H) 01/19/2023     01/19/2023    MPV 8.3 01/19/2023 SEGSPCT 39.2 01/19/2023    EOSRELPCT 1.0 01/19/2023    BASOPCT 0.5 01/19/2023    LYMPHOPCT 53.1 (H) 01/19/2023    MONOPCT 6.2 (H) 01/19/2023    SEGSABS 0.8 01/19/2023    EOSABS 0.0 01/19/2023    BASOSABS 0.0 01/19/2023    LYMPHSABS 1.1 01/19/2023    MONOSABS 0.1 01/19/2023    DIFFTYPE AUTOMATED DIFFERENTIAL 01/19/2023     No results found for: ESR  Chemistry:  Lab Results   Component Value Date     12/28/2022    K 3.8 12/28/2022     12/28/2022    CO2 26 12/28/2022    BUN 16 12/28/2022    CREATININE 0.6 12/28/2022    GLUCOSE 84 12/28/2022    CALCIUM 9.2 12/28/2022    PROT 6.3 (L) 12/28/2022    LABALBU 4.1 12/28/2022    BILITOT 0.5 12/28/2022    ALKPHOS 63 12/28/2022    AST 19 12/28/2022    ALT 14 12/28/2022    LABGLOM >60 12/28/2022    GFRAA >60 09/28/2022    MG 2.3 07/19/2022    POCCA 1.15 12/28/2022    POCGLU 83 12/28/2022     Lab Results   Component Value Date     (H) 06/14/2022     No components found for: LD  No results found for: TSHHS, T4FREE, FT3  Immunology:  Lab Results   Component Value Date    PROT 6.3 (L) 12/28/2022    SPEP  06/14/2022     INTERPRETATION - No monoclonal proteins are detected. SAF    SPEP INTERPRETATION - Within normal limits.  SAF 06/14/2022    ALBUMINELP 3.7 06/14/2022    LABALPH 0.4 06/14/2022    LABALPH 1.3 (H) 99/72/4804    LABALPH DUPLICATE ORDER 10/63/5978    LABALPH DUPLICATE ORDER 00/22/8664    LABBETA 1.1 34/97/2865    LABBETA DUPLICATE ORDER 11/54/0945    GAMGLOB 1.1 06/14/2022     No results found for: Nevilleianne Herd, KLFLCR  No results found for: B2M  Coagulation Panel:  No results found for: PROTIME, INR, APTT, DDIMER  Anemia Panel:  No results found for: JMRWBESG96, FOLATE  Tumor Markers:  Lab Results   Component Value Date     377 (H) 06/14/2022    CEA 54.5 06/14/2022     25 06/14/2022    LABCA2 45.8 (H) 08/31/2022        Observations:  No data recorded     Assessment   Left breast mass  Liver lesion and multiple bone lytic lesions - concerning for metastatic breast cancer    Plan:  Lois Delacruz is a 74year-old very pleasant female who initially presented to [facility] ER on 6/12/22 with severe lower back pain and constipation. CT scan of the abdomen and pelvis done on 6/12/2022 showed lesion (6.1 x 8.1 cm) in the hepatic segment 4, concerning for neoplasm. Extensive lytic metastatic disease in lumbar and thoracic spine and pelvis with most dominant lesion seen at L5 vertebral body. CT lumbar spine done on 6/12/22 showed extensive multifocal lytic lesions concerning for neoplastic/metastatic disease. She never had colonoscopy before. She had pap smear around 2020. She didn't recall when was her last mammogram.       Digital diagnostic bilateral mammogram and bilateral ultrasound done on 6/17/2022 showed left breast mass, highly suspicious for malignancy. Right breast mass at 11:00, 1.5 cm. Mass is highly suspicious for malignancy. Left breast skin thickening. CT chest on 6/20/2022 showed suspicious heterogeneous mass primarily centered in segment 4A of the liver measuring 8.8 cm. Left breast mass with left axillary and subpectoral metastatic disease, suspected bony metastases to thoracolumbar spine and sternum and possible left chest wall invasion. Bone scan on 6/20/2022 showed multifocal osseous metastatic disease. T12 compression fracture, concerning for pathologic fracture. MRI abdomen on 6/25/2022 showed biopsy-proven malignancy in the left breast with suspicion for inflammatory carcinoma given skin and trabecular thickening. There may be invasion of the underlying pectoralis musculature. At least 3 heavily meta stasis, the largest measuring up to 8.7 cm with the others 1 cm or less. Likely metastatic portal hepatic, retroperitoneal and right retrocrural lymph nodes. Trace left pleural effusion. Extensive skeletal metastatic disease.     She underwent ultrasound-guided biopsy of the right breast mass at 11:00 and the pathology showed invasive carcinoma with ductal and lobular features. Estrogen receptor and progesterone receptor are positive. HER2 by IHC not overexpressed (score 1+). HER2 by FISH-negative. Left breast mass at 12:00 biopsy revealed grade 2 invasive lobular carcinoma. Estrogen receptor by IHC-positive [greater than 95%, average strong intensity], progesterone receptor-positive [approximately 80%, average moderate intensity], HER2 by IHC-not overexpressed [score 1+] and HER2 by FISH-negative. CARIS panel did not yield any biomarker results with a benefit of clinic at the dense 70 chance to provide an association with a particular therapy. TMB was low. PD-L1 for Luis Skates was not expressed and MSI was stable. Since she has significant visceral disease, I recommend her to initiate first-line chemotherapy with single agent paclitaxel. It was started on 7/27/22. CT scan of the chest, abdomen and pelvis done on 11/14/2022 showed overall improvement in skin thickening and edema within the left breast as well as left internal thoracic/mammary, axillary and subpectoral adenopathy. Interval decrease in size of dominant left hepatic metastasis. Small hepatic lesion seen on earlier MRI are not well seen on this exam.  No new focal hepatic lesion. There is also interval decrease in size of retroperitoneal lymph nodes. Multifocal osseous metastases are again identified in keeping with recent bone scan. There is overall progression of sclerosis when compared to prior exams throughout the visualized skeleton which may reflect posttreatment changes rather than progression. Focal areas of wall thickening in the distal esophagus extending along the gastric cardia as well as the gastric body. Recommend correlation with endoscopy. First line endocrine therapy with ibrance and letrozole was started on 1/5/2023. On January 19, 2023, she presented to me for follow-up.   She was initially referred to me for evaluation of liver lesion and multiple bone lesions. She was found to have left breast mass with a distorted left breast on physical examination. 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 Since she has significant visceral disease, I recommend her to initiate first-line chemotherapy with single agent paclitaxel. It was started on 7/20/22. She completed her sixth cycle on 12/28/2022. CT scans and bone scans done on November 2022, after 4th cycle of chemo showed very good response to chemotherapy. Since she completed total of 6 cycles of chemotherapy, I recommend to start first-line endocrine therapy with Ibrance and letrozole. It was started today, 1/5/2023. She is tolerating ibrance and letrozole well. Will check interin labs today. She developed shingles in her right upper thigh and lower back area. Will treat with acyclovir 400 mg TID for 10 days. Otherwise, I will see her back in 3 weeks and will check labs at that time. Bone metastasis - we started zolendronic acid since 7/20/22. She only has minimal symptom and will not do vertebroplasty at this time. Reviewed Caris molecular panel result with her. I requested genetic counseling and testing since she has bilateral metastatic breast cancer. Malignancy related pain - will continue with norco since it has been controlling her pain well. Anxiety and insomnia - on ativan 0.5 mg nightly prn with improvement in her symptom. She is requiring less ativan since starting medical marijuana. I answered all her questions and concerns for today. Recent imaging and labs were reviewed and discussed with the patient.

## 2023-01-19 ENCOUNTER — HOSPITAL ENCOUNTER (OUTPATIENT)
Dept: INFUSION THERAPY | Age: 68
Discharge: HOME | End: 2023-01-19
Payer: MEDICARE

## 2023-01-19 ENCOUNTER — OFFICE VISIT (OUTPATIENT)
Dept: ONCOLOGY | Age: 68
End: 2023-01-19
Payer: MEDICARE

## 2023-01-19 VITALS
TEMPERATURE: 96.6 F | SYSTOLIC BLOOD PRESSURE: 171 MMHG | DIASTOLIC BLOOD PRESSURE: 104 MMHG | OXYGEN SATURATION: 96 % | HEART RATE: 102 BPM | RESPIRATION RATE: 16 BRPM | BODY MASS INDEX: 28.32 KG/M2 | HEIGHT: 64 IN

## 2023-01-19 DIAGNOSIS — C50.811 MALIGNANT NEOPLASM OF OVERLAPPING SITES OF BOTH BREASTS IN FEMALE, ESTROGEN RECEPTOR POSITIVE (HCC): Primary | ICD-10-CM

## 2023-01-19 DIAGNOSIS — C79.51 BONE METASTASIS (HCC): ICD-10-CM

## 2023-01-19 DIAGNOSIS — Z17.0 MALIGNANT NEOPLASM OF OVERLAPPING SITES OF BOTH BREASTS IN FEMALE, ESTROGEN RECEPTOR POSITIVE (HCC): Primary | ICD-10-CM

## 2023-01-19 DIAGNOSIS — C50.812 MALIGNANT NEOPLASM OF OVERLAPPING SITES OF BOTH BREASTS IN FEMALE, ESTROGEN RECEPTOR POSITIVE (HCC): Primary | ICD-10-CM

## 2023-01-19 LAB
ALBUMIN SERPL-MCNC: 4.5 GM/DL (ref 3.4–5)
ALP BLD-CCNC: 55 IU/L (ref 40–128)
ALT SERPL-CCNC: 7 U/L (ref 10–40)
ANION GAP SERPL CALCULATED.3IONS-SCNC: 11 MMOL/L (ref 4–16)
AST SERPL-CCNC: 13 IU/L (ref 15–37)
BASOPHILS ABSOLUTE: 0 K/CU MM
BASOPHILS RELATIVE PERCENT: 0.5 % (ref 0–1)
BILIRUB SERPL-MCNC: 0.9 MG/DL (ref 0–1)
BUN BLDV-MCNC: 13 MG/DL (ref 6–23)
CALCIUM SERPL-MCNC: 9.2 MG/DL (ref 8.3–10.6)
CHLORIDE BLD-SCNC: 106 MMOL/L (ref 99–110)
CO2: 25 MMOL/L (ref 21–32)
CREAT SERPL-MCNC: 0.5 MG/DL (ref 0.6–1.1)
DIFFERENTIAL TYPE: ABNORMAL
EOSINOPHILS ABSOLUTE: 0 K/CU MM
EOSINOPHILS RELATIVE PERCENT: 1 % (ref 0–3)
GFR SERPL CREATININE-BSD FRML MDRD: >60 ML/MIN/1.73M2
GLUCOSE BLD-MCNC: 86 MG/DL (ref 70–99)
HCT VFR BLD CALC: 31.3 % (ref 37–47)
HEMOGLOBIN: 10 GM/DL (ref 12.5–16)
LYMPHOCYTES ABSOLUTE: 1.1 K/CU MM
LYMPHOCYTES RELATIVE PERCENT: 53.1 % (ref 24–44)
MCH RBC QN AUTO: 30.2 PG (ref 27–31)
MCHC RBC AUTO-ENTMCNC: 31.9 % (ref 32–36)
MCV RBC AUTO: 94.6 FL (ref 78–100)
MONOCYTES ABSOLUTE: 0.1 K/CU MM
MONOCYTES RELATIVE PERCENT: 6.2 % (ref 0–4)
PDW BLD-RTO: 16.1 % (ref 11.7–14.9)
PLATELET # BLD: 167 K/CU MM (ref 140–440)
PMV BLD AUTO: 8.3 FL (ref 7.5–11.1)
POTASSIUM SERPL-SCNC: 3.6 MMOL/L (ref 3.5–5.1)
RBC # BLD: 3.31 M/CU MM (ref 4.2–5.4)
SEGMENTED NEUTROPHILS ABSOLUTE COUNT: 0.8 K/CU MM
SEGMENTED NEUTROPHILS RELATIVE PERCENT: 39.2 % (ref 36–66)
SODIUM BLD-SCNC: 142 MMOL/L (ref 135–145)
TOTAL PROTEIN: 6.6 GM/DL (ref 6.4–8.2)
WBC # BLD: 2.1 K/CU MM (ref 4–10.5)

## 2023-01-19 PROCEDURE — 6360000002 HC RX W HCPCS

## 2023-01-19 PROCEDURE — 99211 OFF/OP EST MAY X REQ PHY/QHP: CPT

## 2023-01-19 PROCEDURE — 85025 COMPLETE CBC W/AUTO DIFF WBC: CPT

## 2023-01-19 PROCEDURE — 80053 COMPREHEN METABOLIC PANEL: CPT

## 2023-01-19 PROCEDURE — 86300 IMMUNOASSAY TUMOR CA 15-3: CPT

## 2023-01-19 PROCEDURE — 2580000003 HC RX 258: Performed by: INTERNAL MEDICINE

## 2023-01-19 PROCEDURE — 99214 OFFICE O/P EST MOD 30 MIN: CPT | Performed by: INTERNAL MEDICINE

## 2023-01-19 PROCEDURE — 1124F ACP DISCUSS-NO DSCNMKR DOCD: CPT | Performed by: INTERNAL MEDICINE

## 2023-01-19 RX ORDER — SODIUM CHLORIDE 0.9 % (FLUSH) 0.9 %
5-40 SYRINGE (ML) INJECTION PRN
Status: DISCONTINUED | OUTPATIENT
Start: 2023-01-19 | End: 2023-01-20 | Stop reason: HOSPADM

## 2023-01-19 RX ORDER — SODIUM CHLORIDE 9 MG/ML
25 INJECTION, SOLUTION INTRAVENOUS PRN
OUTPATIENT
Start: 2023-01-19

## 2023-01-19 RX ORDER — SODIUM CHLORIDE 0.9 % (FLUSH) 0.9 %
5-40 SYRINGE (ML) INJECTION PRN
OUTPATIENT
Start: 2023-01-19

## 2023-01-19 RX ORDER — HEPARIN SODIUM (PORCINE) LOCK FLUSH IV SOLN 100 UNIT/ML 100 UNIT/ML
500 SOLUTION INTRAVENOUS PRN
Status: DISCONTINUED | OUTPATIENT
Start: 2023-01-19 | End: 2023-01-20 | Stop reason: HOSPADM

## 2023-01-19 RX ORDER — ACYCLOVIR 400 MG/1
400 TABLET ORAL 3 TIMES DAILY
Qty: 30 TABLET | Refills: 0 | Status: SHIPPED | OUTPATIENT
Start: 2023-01-19 | End: 2023-01-29

## 2023-01-19 RX ORDER — HEPARIN SODIUM (PORCINE) LOCK FLUSH IV SOLN 100 UNIT/ML 100 UNIT/ML
500 SOLUTION INTRAVENOUS PRN
OUTPATIENT
Start: 2023-01-19

## 2023-01-19 RX ORDER — HEPARIN SODIUM (PORCINE) LOCK FLUSH IV SOLN 100 UNIT/ML 100 UNIT/ML
SOLUTION INTRAVENOUS
Status: DISCONTINUED
Start: 2023-01-19 | End: 2023-01-20 | Stop reason: HOSPADM

## 2023-01-19 RX ADMIN — HEPARIN SODIUM (PORCINE) LOCK FLUSH IV SOLN 100 UNIT/ML 500 UNITS: 100 SOLUTION at 13:55

## 2023-01-19 RX ADMIN — SODIUM CHLORIDE, PRESERVATIVE FREE 10 ML: 5 INJECTION INTRAVENOUS at 13:55

## 2023-01-19 NOTE — PROGRESS NOTES
Ambulated to treatment suite from  with Dr. Shari García.  Right chest Mediport accessed per protocol. Positive blood return noted. Labs obtained. Flushed with normal saline and heparin per protocol prior to deaccess.

## 2023-01-19 NOTE — PROGRESS NOTES
MA Rooming Questions  Patient: Anson Martin  MRN: [id number]    Date: 1/19/2023        1. Do you have any new issues? yes - Pt c/o burning rash on lower back and butt cheek x 2 days. 2. Do you need any refills on medications?    no    3. Have you had any imaging done since your last visit?   no    4. Have you been hospitalized or seen in the emergency room since your last visit here?   no    5. Did the patient have a depression screening completed today?  No    No data recorded     PHQ-9 Given to (if applicable):               PHQ-9 Score (if applicable):                     [] Positive     []  Negative              Does question #9 need addressed (if applicable)                     [] Yes    []  No               Sridevi Estevez MA

## 2023-01-21 LAB — CA 27.29: 42.6 U/ML

## 2023-01-23 DIAGNOSIS — C79.51 BONE METASTASIS (HCC): ICD-10-CM

## 2023-01-23 DIAGNOSIS — C50.811 MALIGNANT NEOPLASM OF OVERLAPPING SITES OF BOTH BREASTS IN FEMALE, ESTROGEN RECEPTOR POSITIVE (HCC): Primary | ICD-10-CM

## 2023-01-23 DIAGNOSIS — C50.812 MALIGNANT NEOPLASM OF OVERLAPPING SITES OF BOTH BREASTS IN FEMALE, ESTROGEN RECEPTOR POSITIVE (HCC): Primary | ICD-10-CM

## 2023-01-23 DIAGNOSIS — Z17.0 MALIGNANT NEOPLASM OF OVERLAPPING SITES OF BOTH BREASTS IN FEMALE, ESTROGEN RECEPTOR POSITIVE (HCC): Primary | ICD-10-CM

## 2023-01-23 RX ORDER — SODIUM CHLORIDE 9 MG/ML
5-250 INJECTION, SOLUTION INTRAVENOUS PRN
Status: CANCELLED | OUTPATIENT
Start: 2023-01-25

## 2023-01-23 RX ORDER — DIPHENHYDRAMINE HYDROCHLORIDE 50 MG/ML
50 INJECTION INTRAMUSCULAR; INTRAVENOUS
Status: CANCELLED | OUTPATIENT
Start: 2023-01-25

## 2023-01-23 RX ORDER — HEPARIN SODIUM (PORCINE) LOCK FLUSH IV SOLN 100 UNIT/ML 100 UNIT/ML
500 SOLUTION INTRAVENOUS PRN
Status: CANCELLED | OUTPATIENT
Start: 2023-01-25

## 2023-01-23 RX ORDER — MEPERIDINE HYDROCHLORIDE 25 MG/ML
12.5 INJECTION INTRAMUSCULAR; INTRAVENOUS; SUBCUTANEOUS PRN
Status: CANCELLED | OUTPATIENT
Start: 2023-01-25

## 2023-01-23 RX ORDER — EPINEPHRINE 1 MG/ML
0.3 INJECTION, SOLUTION, CONCENTRATE INTRAVENOUS PRN
Status: CANCELLED | OUTPATIENT
Start: 2023-01-25

## 2023-01-23 RX ORDER — SODIUM CHLORIDE 9 MG/ML
INJECTION, SOLUTION INTRAVENOUS CONTINUOUS
Status: CANCELLED | OUTPATIENT
Start: 2023-01-25

## 2023-01-23 RX ORDER — SODIUM CHLORIDE 0.9 % (FLUSH) 0.9 %
5-40 SYRINGE (ML) INJECTION PRN
Status: CANCELLED | OUTPATIENT
Start: 2023-01-25

## 2023-01-23 RX ORDER — SODIUM CHLORIDE 9 MG/ML
5-40 INJECTION INTRAVENOUS PRN
Status: CANCELLED | OUTPATIENT
Start: 2023-01-25

## 2023-01-23 RX ORDER — ACETAMINOPHEN 325 MG/1
650 TABLET ORAL
Status: CANCELLED | OUTPATIENT
Start: 2023-01-25

## 2023-01-23 RX ORDER — ALBUTEROL SULFATE 90 UG/1
4 AEROSOL, METERED RESPIRATORY (INHALATION) PRN
Status: CANCELLED | OUTPATIENT
Start: 2023-01-25

## 2023-01-23 RX ORDER — ONDANSETRON 2 MG/ML
8 INJECTION INTRAMUSCULAR; INTRAVENOUS
Status: CANCELLED | OUTPATIENT
Start: 2023-01-25

## 2023-01-25 ENCOUNTER — HOSPITAL ENCOUNTER (OUTPATIENT)
Dept: INFUSION THERAPY | Age: 68
Discharge: HOME | End: 2023-01-25
Payer: MEDICARE

## 2023-01-25 VITALS
SYSTOLIC BLOOD PRESSURE: 130 MMHG | TEMPERATURE: 96.4 F | DIASTOLIC BLOOD PRESSURE: 59 MMHG | OXYGEN SATURATION: 98 % | HEIGHT: 64 IN | HEART RATE: 92 BPM | BODY MASS INDEX: 27.59 KG/M2 | WEIGHT: 161.6 LBS

## 2023-01-25 DIAGNOSIS — C50.812 MALIGNANT NEOPLASM OF OVERLAPPING SITES OF BOTH BREASTS IN FEMALE, ESTROGEN RECEPTOR POSITIVE (HCC): Primary | ICD-10-CM

## 2023-01-25 DIAGNOSIS — C50.811 MALIGNANT NEOPLASM OF OVERLAPPING SITES OF BOTH BREASTS IN FEMALE, ESTROGEN RECEPTOR POSITIVE (HCC): Primary | ICD-10-CM

## 2023-01-25 DIAGNOSIS — C79.51 BONE METASTASIS (HCC): ICD-10-CM

## 2023-01-25 DIAGNOSIS — Z17.0 MALIGNANT NEOPLASM OF OVERLAPPING SITES OF BOTH BREASTS IN FEMALE, ESTROGEN RECEPTOR POSITIVE (HCC): Primary | ICD-10-CM

## 2023-01-25 LAB
ALBUMIN SERPL-MCNC: 4.4 GM/DL (ref 3.4–5)
ALP BLD-CCNC: 55 IU/L (ref 40–128)
ALT SERPL-CCNC: 8 U/L (ref 10–40)
ANION GAP SERPL CALCULATED.3IONS-SCNC: 7 MMOL/L (ref 4–16)
AST SERPL-CCNC: 13 IU/L (ref 15–37)
BILIRUB SERPL-MCNC: 0.5 MG/DL (ref 0–1)
BUN BLDV-MCNC: 14 MG/DL (ref 6–23)
CALCIUM SERPL-MCNC: 9.1 MG/DL (ref 8.3–10.6)
CHLORIDE BLD-SCNC: 104 MMOL/L (ref 99–110)
CO2: 27 MMOL/L (ref 21–32)
CREAT SERPL-MCNC: 0.5 MG/DL (ref 0.6–1.1)
EGFR, POC: >60 ML/MIN/1.73M2
GFR SERPL CREATININE-BSD FRML MDRD: >60 ML/MIN/1.73M2
GLUCOSE BLD-MCNC: 87 MG/DL (ref 70–99)
GLUCOSE BLD-MCNC: 89 MG/DL (ref 70–99)
POC BUN: 14 MG/DL (ref 8–26)
POC CALCIUM: 1.19 MMOL/L (ref 1.12–1.32)
POC CHLORIDE: 106 MMOL/L (ref 98–109)
POC CO2: 24 MMOL/L (ref 21–32)
POC CREATININE: 0.7 MG/DL (ref 0.6–1.1)
POTASSIUM SERPL-SCNC: 3.6 MMOL/L (ref 3.5–4.5)
POTASSIUM SERPL-SCNC: 3.6 MMOL/L (ref 3.5–5.1)
SODIUM BLD-SCNC: 138 MMOL/L (ref 135–145)
SODIUM BLD-SCNC: 143 MMOL/L (ref 138–146)
SOURCE, BLOOD GAS: NORMAL
TOTAL PROTEIN: 6.6 GM/DL (ref 6.4–8.2)

## 2023-01-25 PROCEDURE — 2580000003 HC RX 258: Performed by: INTERNAL MEDICINE

## 2023-01-25 PROCEDURE — 6360000002 HC RX W HCPCS: Performed by: INTERNAL MEDICINE

## 2023-01-25 PROCEDURE — 80053 COMPREHEN METABOLIC PANEL: CPT

## 2023-01-25 PROCEDURE — 96365 THER/PROPH/DIAG IV INF INIT: CPT

## 2023-01-25 RX ORDER — HEPARIN SODIUM (PORCINE) LOCK FLUSH IV SOLN 100 UNIT/ML 100 UNIT/ML
500 SOLUTION INTRAVENOUS PRN
Status: DISCONTINUED | OUTPATIENT
Start: 2023-01-25 | End: 2023-01-26 | Stop reason: HOSPADM

## 2023-01-25 RX ORDER — SODIUM CHLORIDE 9 MG/ML
5-40 INJECTION INTRAVENOUS PRN
Status: DISCONTINUED | OUTPATIENT
Start: 2023-01-25 | End: 2023-01-26 | Stop reason: HOSPADM

## 2023-01-25 RX ORDER — SODIUM CHLORIDE 9 MG/ML
5-250 INJECTION, SOLUTION INTRAVENOUS PRN
Status: DISCONTINUED | OUTPATIENT
Start: 2023-01-25 | End: 2023-01-26 | Stop reason: HOSPADM

## 2023-01-25 RX ADMIN — SODIUM CHLORIDE, PRESERVATIVE FREE 10 ML: 5 INJECTION INTRAVENOUS at 11:47

## 2023-01-25 RX ADMIN — SODIUM CHLORIDE 20 ML/HR: 9 INJECTION, SOLUTION INTRAVENOUS at 11:28

## 2023-01-25 RX ADMIN — HEPARIN 500 UNITS: 100 SYRINGE at 11:48

## 2023-01-25 RX ADMIN — ZOLEDRONIC ACID 4 MG: 4 INJECTION, SOLUTION, CONCENTRATE INTRAVENOUS at 11:28

## 2023-01-25 NOTE — PROGRESS NOTES
Ambulated to infusion area, here today for Zometa infusion. No concerns at this time. Right chest mediport accessed, positive blood return noted. Labs drawn. Labs within defined limits. Zometa administered as ordered. Call light within reach. Tolerated infusion without incident. Discharge instructions provided.  RTC 02/16 for OV with Dr. Valentin Garcia.

## 2023-01-30 DIAGNOSIS — Z17.0 MALIGNANT NEOPLASM OF OVERLAPPING SITES OF BOTH BREASTS IN FEMALE, ESTROGEN RECEPTOR POSITIVE (HCC): ICD-10-CM

## 2023-01-30 DIAGNOSIS — C79.51 BONE METASTASIS: ICD-10-CM

## 2023-01-30 DIAGNOSIS — C50.812 MALIGNANT NEOPLASM OF OVERLAPPING SITES OF BOTH BREASTS IN FEMALE, ESTROGEN RECEPTOR POSITIVE (HCC): ICD-10-CM

## 2023-01-30 DIAGNOSIS — C50.811 MALIGNANT NEOPLASM OF OVERLAPPING SITES OF BOTH BREASTS IN FEMALE, ESTROGEN RECEPTOR POSITIVE (HCC): ICD-10-CM

## 2023-01-30 RX ORDER — LETROZOLE 2.5 MG/1
TABLET, FILM COATED ORAL
Qty: 30 TABLET | Refills: 5 | Status: SHIPPED | OUTPATIENT
Start: 2023-01-30

## 2023-01-30 NOTE — TELEPHONE ENCOUNTER
Per Dr. Boris Reid - refills of patient's Letrozole 2.5 mg one tab daily #30 with 5 refills e-scripted to [facility]. Patient has an office visit on 2/16/23.

## 2023-02-12 NOTE — PROGRESS NOTES
Patient Name:  Alm Boast  Patient :  1955  Patient MRN:  [id number]     Primary Oncologist: Daquan Peralta MD  Referring Provider: MAURICIO Reyez CNP     Date of Service: 2023      Chief Complaint:    Chief Complaint   Patient presents with    Follow-up     Patient's active problem list:       Liver mass       Lytic bone lesions       Left breast mass    HPI:   Orlin Crandall is a 74year-old very pleasant female with medical history significant for osteoarthritis, initially referred to me on 22 for evaluation of her liver lesion and multiple lytic bone lesions. She initially presented to [facility] ER on 22 with severe lower back pain and constipation. Stated that she has been having back pain for about 4 - 6 weeks duration, which becomes severe pain started a week before presentation. CT scan of the abdomen and pelvis done on 2022 showed lesion (6.1 x 8.1 cm) in the hepatic segment 4, concerning for neoplasm. Further evaluation with MRI of the liver is recommended. Extensive lytic metastatic disease in lumbar and thoracic spine and pelvis with most dominant lesion seen at L5 vertebral body. Several small bowel loops segments in the left abdomen demonstrate mild wall thickening, nonspecific may indicate enteritis. Nonspecific partially visualized inflammatory stranding along the pericardium. 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 CT lumbar spine done on 22 showed extensive multifocal lytic lesions concerning for neoplastic/metastatic disease. Age indeterminant compression fractures at T12 and L4, with mild narrowing of the AP diameter of the canal at L4. She never had colonoscopy before. She had pap smear around . She didn't recall when was her last mammogram.     She denies weight loss. She hasn't been eating much for about 10 days since she has been having nausea.      Since she is found to have multiple lytic bone lesions and liver lesion, she was referred to me for further evaluation. Digital diagnostic bilateral mammogram and bilateral ultrasound done on 6/17/2022 showed left breast mass, highly suspicious for malignancy. Right breast mass at 11:00, 1.5 cm. Mass is highly suspicious for malignancy. Left breast skin thickening. CT chest on 6/20/2022 showed suspicious heterogeneous mass primarily centered in segment 4A of the liver measuring 8.8 cm. Left breast mass with left axillary and subpectoral metastatic disease, suspected bony metastases to thoracolumbar spine and sternum and possible left chest wall invasion. Bone scan on 6/20/2022 showed multifocal osseous metastatic disease. T12 compression fracture, concerning for pathologic fracture. MRI abdomen on 6/25/2022 showed biopsy-proven malignancy in the left breast with suspicion for inflammatory carcinoma given skin and trabecular thickening. There may be invasion of the underlying pectoralis musculature. At least 3 heavily meta stasis, the largest measuring up to 8.7 cm with the others 1 cm or less. Likely metastatic portal hepatic, retroperitoneal and right retrocrural lymph nodes. Trace left pleural effusion. Extensive skeletal metastatic disease. She underwent ultrasound-guided biopsy of the right breast mass at 11:00 and the pathology showed invasive carcinoma with ductal and lobular features. Estrogen receptor and progesterone receptor are positive. HER2 by IHC not overexpressed (score 1+). HER2 by FISH-negative. Left breast mass at 12:00 biopsy revealed grade 2 invasive lobular carcinoma. Estrogen receptor by IHC-positive [greater than 95%, average strong intensity], progesterone receptor-positive [approximately 80%, average moderate intensity], HER2 by IHC-not overexpressed [score 1+] and HER2 by FISH-negative. CARIS panel did not yield any biomarker results with a benefit of clinic at the dense 70 chance to provide an association with a particular therapy. TMB was low. PD-L1 for Elliott Castaneda was not expressed and MSI was stable. Since she has significant visceral disease, I recommend her to initiate first-line chemotherapy with single agent paclitaxel. It was started on 7/27/22 and she completed her sixth cycle on 12/28/22. CT scan of the chest, abdomen and pelvis done on 11/14/2022 showed overall improvement in skin thickening and edema within the left breast as well as left internal thoracic/mammary, axillary and subpectoral adenopathy. Interval decrease in size of dominant left hepatic metastasis. Small hepatic lesion seen on earlier MRI are not well seen on this exam.  No new focal hepatic lesion. There is also interval decrease in size of retroperitoneal lymph nodes. Multifocal osseous metastases are again identified in keeping with recent bone scan. There is overall progression of sclerosis when compared to prior exams throughout the visualized skeleton which may reflect posttreatment changes rather than progression. Focal areas of wall thickening in the distal esophagus extending along the gastric cardia as well as the gastric body. Recommend correlation with endoscopy. First line endocrine therapy with ibrance and letrozole was started on 1/5/2023. On February 16, 2023, she presented to me for follow-up. She was initially referred to me for evaluation of liver lesion and multiple bone lesions. She was found to have left breast mass with a distorted left breast on physical examination. 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 Since she has significant visceral disease, I recommend her to initiate first-line chemotherapy with single agent paclitaxel. It was started on 7/20/22. She completed her sixth cycle on 12/28/2022.      CT scans and bone scans done on November 2022, after 4th cycle of chemo showed very good response to chemotherapy. Since she completed total of 6 cycles of chemotherapy, I recommend to start first-line endocrine therapy with Ibrance and letrozole. It was started today, 1/5/2023. She is tolerating ibrance and letrozole well. Will check interin labs today. She developed shingles in her right upper thigh and lower back area. Will treat with acyclovir 400 mg TID for 10 days. I recognized that her breast lesions are getting smaller and softer since starting ibrance and letrozole. I believe she is achieving good response to therapy. I will see her back in 2 months and will repeat CT scans a week before that to evaluate her metastatic disease. Bone metastasis - we started zolendronic acid since 7/20/22. She only has minimal symptom and will not do vertebroplasty at this time. Reviewed [employer] molecular panel result with her. I requested genetic counseling and testing since she has bilateral metastatic breast cancer. Malignancy related pain - will continue with norco since it has been controlling her pain well. Anxiety and insomnia - on ativan 0.5 mg nightly prn with improvement in her symptom. She is requiring less ativan since starting medical marijuana. She doesn't have any other significant symptoms at today visit. Past Medical History:     Significant for  1. Osteoarthritis    Past Surgery History:    Significant for   1. Left hip replacement    Social History:   She is a current smoker and she smokes 1 pack per day approximately since she was 24. She denies alcohol drinking or illicit drug abuse. Family History:    Significant for Alzheimer's disease in her parents. No family history of malignancy. No Known Allergies    Review of Systems: \"Per interval history; otherwise 10 point ROS is negative. \"  Her energy level is stable and her sleep is fine.  She doesn't have fever, chills, night sweats, cough, SOB, chest pain, hemoptysis or palpitations. Her bowels and bladder functions are normal. She denies nausea, vomiting, abdominal pain, diarrhea, constipation, dysuria, loss of appetite or weight loss. She doesn't have neuropathy and she denies bleeding or clotting issues. She has cancer related mild pain in her lower back, but it is getting better. She barely needs narcotic pain med. Has anxiety. No depression. The rest of the systems are unremarkable. Vital Signs: BP (!) 146/88 (Site: Left Upper Arm, Position: Sitting, Cuff Size: Large Adult)   Pulse 98   Temp 97.7 °F (36.5 °C) (Infrared)   Resp 18   Ht 5' 4\" (1.626 m)   Wt 163 lb (73.9 kg)   SpO2 97%   BMI 27.98 kg/m²      Physical Exam:  CONSTITUTIONAL: awake, alert, cooperative, no apparent distress   EYES: pupils equal, round and reactive to light, sclera clear, normal conjunctiva  ENT: Normocephalic, without obvious abnormality, atraumatic  NECK: supple, symmetrical, no jugular venous distension, no carotid bruits   HEMATOLOGIC/LYMPHATIC: no cervical, supraclavicular lymphadenopathy. Left axillary lymphadenopathy noted,   LUNGS: VBS, no wheezes, clear to auscultation, no crackles, no increased work of breathing, no rhonchi,   CARDIOVASCULAR: regular rate and rhythm, normal S1 and S2, no murmur noted  ABDOMEN: normal bowel sounds x 4, soft, non-distended, non-tender, no masses palpated, no hepatosplenomegaly   MUSCULOSKELETAL: full range of motion noted, tone is normal  NEUROLOGIC: awake, alert, oriented to name, place and time. Motor skills grossly intact.    EXTREMITIES: no cyanosis, no clubbing, no LE edema, no leg swelling,   BREASTS: Left breast lesions are getting softer and smaller, right breast nodularity is better as well,        Labs:  Hematology:  Lab Results   Component Value Date    WBC 2.8 (L) 02/16/2023    RBC 3.46 (L) 02/16/2023    HGB 11.1 (L) 02/16/2023    HCT 34.2 (L) 02/16/2023    MCV 98.8 02/16/2023    MCH 32.1 (H) 02/16/2023    MCHC 32.5 02/16/2023    RDW 17.8 (H) 02/16/2023     02/16/2023    MPV 7.9 02/16/2023    SEGSPCT 52.7 02/16/2023    EOSRELPCT 1.4 02/16/2023    BASOPCT 1.8 (H) 02/16/2023    LYMPHOPCT 39.5 02/16/2023    MONOPCT 4.6 (H) 02/16/2023    SEGSABS 1.5 02/16/2023    EOSABS 0.0 02/16/2023    BASOSABS 0.1 02/16/2023    LYMPHSABS 1.1 02/16/2023    MONOSABS 0.1 02/16/2023    DIFFTYPE AUTOMATED DIFFERENTIAL 02/16/2023     No results found for: ESR  Chemistry:  Lab Results   Component Value Date     02/16/2023    K 4.3 02/16/2023     02/16/2023    CO2 26 02/16/2023    BUN 14 02/16/2023    CREATININE 0.6 02/16/2023    GLUCOSE 85 02/16/2023    CALCIUM 9.4 02/16/2023    PROT 6.5 02/16/2023    LABALBU 4.5 02/16/2023    BILITOT 0.4 02/16/2023    ALKPHOS 61 02/16/2023    AST 13 (L) 02/16/2023    ALT 8 (L) 02/16/2023    LABGLOM >60 02/16/2023    GFRAA >60 09/28/2022    MG 2.3 07/19/2022    POCCA 1.19 01/25/2023    POCGLU 87 01/25/2023     Lab Results   Component Value Date     (H) 06/14/2022     No components found for: LD  No results found for: TSHHS, T4FREE, FT3  Immunology:  Lab Results   Component Value Date    PROT 6.5 02/16/2023    SPEP  06/14/2022     INTERPRETATION - No monoclonal proteins are detected. SAF    SPEP INTERPRETATION - Within normal limits.  SAF 06/14/2022    ALBUMINELP 3.7 06/14/2022    LABALPH 0.4 06/14/2022    LABALPH 1.3 (H) 17/17/7884    LABALPH DUPLICATE ORDER 50/30/8392    LABALPH DUPLICATE ORDER 23/23/2912    LABBETA 1.1 18/76/2438    LABBETA DUPLICATE ORDER 46/71/1341    GAMGLOB 1.1 06/14/2022     No results found for: KEVON [place]  No results found for: B2M  Coagulation Panel:  No results found for: PROTIME, INR, APTT, DDIMER  Anemia Panel:  No results found for: WNFIIPZI28, FOLATE  Tumor Markers:  Lab Results   Component Value Date     377 (H) 06/14/2022    CEA 54.5 06/14/2022     25 06/14/2022    LABCA2 42.6 (H) 01/19/2023        Observations:  No data recorded     Assessment   Left breast mass  Liver lesion and multiple bone lytic lesions - concerning for metastatic breast cancer    Plan:  Caterina Levin is a 74year-old very pleasant female who initially presented to [facility] ER on 6/12/22 with severe lower back pain and constipation. CT scan of the abdomen and pelvis done on 6/12/2022 showed lesion (6.1 x 8.1 cm) in the hepatic segment 4, concerning for neoplasm. Extensive lytic metastatic disease in lumbar and thoracic spine and pelvis with most dominant lesion seen at L5 vertebral body. CT lumbar spine done on 6/12/22 showed extensive multifocal lytic lesions concerning for neoplastic/metastatic disease. She never had colonoscopy before. She had pap smear around 2020. She didn't recall when was her last mammogram.       Digital diagnostic bilateral mammogram and bilateral ultrasound done on 6/17/2022 showed left breast mass, highly suspicious for malignancy. Right breast mass at 11:00, 1.5 cm. Mass is highly suspicious for malignancy. Left breast skin thickening. CT chest on 6/20/2022 showed suspicious heterogeneous mass primarily centered in segment 4A of the liver measuring 8.8 cm. Left breast mass with left axillary and subpectoral metastatic disease, suspected bony metastases to thoracolumbar spine and sternum and possible left chest wall invasion. Bone scan on 6/20/2022 showed multifocal osseous metastatic disease. T12 compression fracture, concerning for pathologic fracture. MRI abdomen on 6/25/2022 showed biopsy-proven malignancy in the left breast with suspicion for inflammatory carcinoma given skin and trabecular thickening. There may be invasion of the underlying pectoralis musculature. At least 3 heavily meta stasis, the largest measuring up to 8.7 cm with the others 1 cm or less. Likely metastatic portal hepatic, retroperitoneal and right retrocrural lymph nodes. Trace left pleural effusion. Extensive skeletal metastatic disease.     She underwent ultrasound-guided biopsy of the right breast mass at 11:00 and the pathology showed invasive carcinoma with ductal and lobular features. Estrogen receptor and progesterone receptor are positive. HER2 by IHC not overexpressed (score 1+). HER2 by FISH-negative. Left breast mass at 12:00 biopsy revealed grade 2 invasive lobular carcinoma. Estrogen receptor by IHC-positive [greater than 95%, average strong intensity], progesterone receptor-positive [approximately 80%, average moderate intensity], HER2 by IHC-not overexpressed [score 1+] and HER2 by FISH-negative. CARIS panel did not yield any biomarker results with a benefit of clinic at the dense 70 chance to provide an association with a particular therapy. TMB was low. PD-L1 for Cletis Pill was not expressed and MSI was stable. Since she has significant visceral disease, I recommend her to initiate first-line chemotherapy with single agent paclitaxel. It was started on 7/27/22. CT scan of the chest, abdomen and pelvis done on 11/14/2022 showed overall improvement in skin thickening and edema within the left breast as well as left internal thoracic/mammary, axillary and subpectoral adenopathy. Interval decrease in size of dominant left hepatic metastasis. Small hepatic lesion seen on earlier MRI are not well seen on this exam.  No new focal hepatic lesion. There is also interval decrease in size of retroperitoneal lymph nodes. Multifocal osseous metastases are again identified in keeping with recent bone scan. There is overall progression of sclerosis when compared to prior exams throughout the visualized skeleton which may reflect posttreatment changes rather than progression. Focal areas of wall thickening in the distal esophagus extending along the gastric cardia as well as the gastric body. Recommend correlation with endoscopy. First line endocrine therapy with ibrance and letrozole was started on 1/5/2023.      On February 16, 2023, she presented to me for follow-up. She was initially referred to me for evaluation of liver lesion and multiple bone lesions. She was found to have left breast mass with a distorted left breast on physical examination. 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 Since she has significant visceral disease, I recommend her to initiate first-line chemotherapy with single agent paclitaxel. It was started on 7/20/22. She completed her sixth cycle on 12/28/2022. CT scans and bone scans done on November 2022, after 4th cycle of chemo showed very good response to chemotherapy. Since she completed total of 6 cycles of chemotherapy, I recommend to start first-line endocrine therapy with Ibrance and letrozole. It was started today, 1/5/2023. She is tolerating ibrance and letrozole well. Will check interin labs today. She developed shingles in her right upper thigh and lower back area. Will treat with acyclovir 400 mg TID for 10 days. I recognized that her breast lesions are getting smaller and softer since starting ibrance and letrozole. I believe she is achieving good response to therapy. I will see her back in 2 months and will repeat CT scans a week before that to evaluate her metastatic disease. Bone metastasis - we started zolendronic acid since 7/20/22. She only has minimal symptom and will not do vertebroplasty at this time. Reviewed [employer] molecular panel result with her. I requested genetic counseling and testing since she has bilateral metastatic breast cancer. Malignancy related pain - will continue with norco since it has been controlling her pain well. Anxiety and insomnia - on ativan 0.5 mg nightly prn with improvement in her symptom. She is requiring less ativan since starting medical marijuana.      I answered all her questions and concerns for today. Recent imaging and labs were reviewed and discussed with the patient.

## 2023-02-16 ENCOUNTER — OFFICE VISIT (OUTPATIENT)
Dept: ONCOLOGY | Age: 68
End: 2023-02-16
Payer: MEDICARE

## 2023-02-16 ENCOUNTER — HOSPITAL ENCOUNTER (OUTPATIENT)
Dept: INFUSION THERAPY | Age: 68
Discharge: HOME | End: 2023-02-16
Payer: MEDICARE

## 2023-02-16 VITALS
HEART RATE: 98 BPM | BODY MASS INDEX: 27.83 KG/M2 | SYSTOLIC BLOOD PRESSURE: 146 MMHG | TEMPERATURE: 97.7 F | RESPIRATION RATE: 18 BRPM | DIASTOLIC BLOOD PRESSURE: 88 MMHG | WEIGHT: 163 LBS | OXYGEN SATURATION: 97 % | HEIGHT: 64 IN

## 2023-02-16 DIAGNOSIS — C50.812 MALIGNANT NEOPLASM OF OVERLAPPING SITES OF BOTH BREASTS IN FEMALE, ESTROGEN RECEPTOR POSITIVE (HCC): Primary | ICD-10-CM

## 2023-02-16 DIAGNOSIS — C50.812 MALIGNANT NEOPLASM OF OVERLAPPING SITES OF BOTH BREASTS IN FEMALE, ESTROGEN RECEPTOR POSITIVE (HCC): ICD-10-CM

## 2023-02-16 DIAGNOSIS — C79.51 BONE METASTASIS (HCC): ICD-10-CM

## 2023-02-16 DIAGNOSIS — C50.811 MALIGNANT NEOPLASM OF OVERLAPPING SITES OF BOTH BREASTS IN FEMALE, ESTROGEN RECEPTOR POSITIVE (HCC): ICD-10-CM

## 2023-02-16 DIAGNOSIS — C50.811 MALIGNANT NEOPLASM OF OVERLAPPING SITES OF BOTH BREASTS IN FEMALE, ESTROGEN RECEPTOR POSITIVE (HCC): Primary | ICD-10-CM

## 2023-02-16 DIAGNOSIS — Z17.0 MALIGNANT NEOPLASM OF OVERLAPPING SITES OF BOTH BREASTS IN FEMALE, ESTROGEN RECEPTOR POSITIVE (HCC): ICD-10-CM

## 2023-02-16 DIAGNOSIS — Z17.0 MALIGNANT NEOPLASM OF OVERLAPPING SITES OF BOTH BREASTS IN FEMALE, ESTROGEN RECEPTOR POSITIVE (HCC): Primary | ICD-10-CM

## 2023-02-16 LAB
ALBUMIN SERPL-MCNC: 4.5 GM/DL (ref 3.4–5)
ALP BLD-CCNC: 61 IU/L (ref 40–128)
ALT SERPL-CCNC: 8 U/L (ref 10–40)
ANION GAP SERPL CALCULATED.3IONS-SCNC: 10 MMOL/L (ref 4–16)
AST SERPL-CCNC: 13 IU/L (ref 15–37)
BASOPHILS ABSOLUTE: 0.1 K/CU MM
BASOPHILS RELATIVE PERCENT: 1.8 % (ref 0–1)
BILIRUB SERPL-MCNC: 0.4 MG/DL (ref 0–1)
BUN SERPL-MCNC: 14 MG/DL (ref 6–23)
CALCIUM SERPL-MCNC: 9.4 MG/DL (ref 8.3–10.6)
CHLORIDE BLD-SCNC: 106 MMOL/L (ref 99–110)
CO2: 26 MMOL/L (ref 21–32)
CREAT SERPL-MCNC: 0.6 MG/DL (ref 0.6–1.1)
DIFFERENTIAL TYPE: ABNORMAL
EOSINOPHILS ABSOLUTE: 0 K/CU MM
EOSINOPHILS RELATIVE PERCENT: 1.4 % (ref 0–3)
GFR SERPL CREATININE-BSD FRML MDRD: >60 ML/MIN/1.73M2
GLUCOSE SERPL-MCNC: 85 MG/DL (ref 70–99)
HCT VFR BLD CALC: 34.2 % (ref 37–47)
HEMOGLOBIN: 11.1 GM/DL (ref 12.5–16)
LYMPHOCYTES ABSOLUTE: 1.1 K/CU MM
LYMPHOCYTES RELATIVE PERCENT: 39.5 % (ref 24–44)
MCH RBC QN AUTO: 32.1 PG (ref 27–31)
MCHC RBC AUTO-ENTMCNC: 32.5 % (ref 32–36)
MCV RBC AUTO: 98.8 FL (ref 78–100)
MONOCYTES ABSOLUTE: 0.1 K/CU MM
MONOCYTES RELATIVE PERCENT: 4.6 % (ref 0–4)
PDW BLD-RTO: 17.8 % (ref 11.7–14.9)
PLATELET # BLD: 233 K/CU MM (ref 140–440)
PMV BLD AUTO: 7.9 FL (ref 7.5–11.1)
POTASSIUM SERPL-SCNC: 4.3 MMOL/L (ref 3.5–5.1)
RBC # BLD: 3.46 M/CU MM (ref 4.2–5.4)
SEGMENTED NEUTROPHILS ABSOLUTE COUNT: 1.5 K/CU MM
SEGMENTED NEUTROPHILS RELATIVE PERCENT: 52.7 % (ref 36–66)
SODIUM BLD-SCNC: 142 MMOL/L (ref 135–145)
TOTAL PROTEIN: 6.5 GM/DL (ref 6.4–8.2)
WBC # BLD: 2.8 K/CU MM (ref 4–10.5)

## 2023-02-16 PROCEDURE — 80053 COMPREHEN METABOLIC PANEL: CPT

## 2023-02-16 PROCEDURE — 99214 OFFICE O/P EST MOD 30 MIN: CPT | Performed by: INTERNAL MEDICINE

## 2023-02-16 PROCEDURE — 99211 OFF/OP EST MAY X REQ PHY/QHP: CPT

## 2023-02-16 PROCEDURE — 85025 COMPLETE CBC W/AUTO DIFF WBC: CPT

## 2023-02-16 PROCEDURE — 1124F ACP DISCUSS-NO DSCNMKR DOCD: CPT | Performed by: INTERNAL MEDICINE

## 2023-02-16 PROCEDURE — 36415 COLL VENOUS BLD VENIPUNCTURE: CPT

## 2023-02-16 PROCEDURE — 86300 IMMUNOASSAY TUMOR CA 15-3: CPT

## 2023-02-16 NOTE — PROGRESS NOTES
MA Rooming Questions  Patient: Laverne Becerril  MRN: [id number]    Date: 2/16/2023        1. Do you have any new issues? yes - Wants to know if she is eligible for shingles vaccine? 2. Do you need any refills on medications?    no    3. Have you had any imaging done since your last visit?   no    4. Have you been hospitalized or seen in the emergency room since your last visit here?   no    5. Did the patient have a depression screening completed today?  No    No data recorded     PHQ-9 Given to (if applicable):               PHQ-9 Score (if applicable):                     [] Positive     []  Negative              Does question #9 need addressed (if applicable)                     [] Yes    []  No               Mary Lou Lopez MA

## 2023-02-18 LAB — CANCER AG27-29 SERPL-ACNC: 50.3 U/ML

## 2023-02-22 ENCOUNTER — HOSPITAL ENCOUNTER (OUTPATIENT)
Dept: INFUSION THERAPY | Age: 68
Discharge: HOME | End: 2023-02-22
Payer: MEDICARE

## 2023-02-22 ENCOUNTER — OFFICE VISIT (OUTPATIENT)
Dept: ONCOLOGY | Age: 68
End: 2023-02-22
Payer: MEDICARE

## 2023-02-22 VITALS
SYSTOLIC BLOOD PRESSURE: 137 MMHG | DIASTOLIC BLOOD PRESSURE: 60 MMHG | HEART RATE: 109 BPM | BODY MASS INDEX: 26.98 KG/M2 | OXYGEN SATURATION: 94 % | TEMPERATURE: 97.5 F | HEIGHT: 64 IN | WEIGHT: 158 LBS

## 2023-02-22 DIAGNOSIS — C50.812 MALIGNANT NEOPLASM OF OVERLAPPING SITES OF BOTH BREASTS IN FEMALE, ESTROGEN RECEPTOR POSITIVE (HCC): Primary | ICD-10-CM

## 2023-02-22 DIAGNOSIS — Z17.0 MALIGNANT NEOPLASM OF OVERLAPPING SITES OF BOTH BREASTS IN FEMALE, ESTROGEN RECEPTOR POSITIVE (HCC): Primary | ICD-10-CM

## 2023-02-22 DIAGNOSIS — C50.811 MALIGNANT NEOPLASM OF OVERLAPPING SITES OF BOTH BREASTS IN FEMALE, ESTROGEN RECEPTOR POSITIVE (HCC): Primary | ICD-10-CM

## 2023-02-22 DIAGNOSIS — C79.51 BONE METASTASIS (HCC): ICD-10-CM

## 2023-02-22 PROCEDURE — 6360000002 HC RX W HCPCS: Performed by: NURSE PRACTITIONER

## 2023-02-22 PROCEDURE — 2580000003 HC RX 258: Performed by: NURSE PRACTITIONER

## 2023-02-22 PROCEDURE — 1124F ACP DISCUSS-NO DSCNMKR DOCD: CPT | Performed by: INTERNAL MEDICINE

## 2023-02-22 PROCEDURE — 99214 OFFICE O/P EST MOD 30 MIN: CPT | Performed by: INTERNAL MEDICINE

## 2023-02-22 PROCEDURE — 96365 THER/PROPH/DIAG IV INF INIT: CPT

## 2023-02-22 RX ORDER — EPINEPHRINE 1 MG/ML
0.3 INJECTION, SOLUTION, CONCENTRATE INTRAVENOUS PRN
OUTPATIENT
Start: 2023-02-22

## 2023-02-22 RX ORDER — SODIUM CHLORIDE 9 MG/ML
5-250 INJECTION, SOLUTION INTRAVENOUS PRN
Status: DISCONTINUED | OUTPATIENT
Start: 2023-02-22 | End: 2023-02-23 | Stop reason: HOSPADM

## 2023-02-22 RX ORDER — ACETAMINOPHEN 325 MG/1
650 TABLET ORAL
OUTPATIENT
Start: 2023-02-22

## 2023-02-22 RX ORDER — HEPARIN SODIUM (PORCINE) LOCK FLUSH IV SOLN 100 UNIT/ML 100 UNIT/ML
500 SOLUTION INTRAVENOUS PRN
Status: DISCONTINUED | OUTPATIENT
Start: 2023-02-22 | End: 2023-02-23 | Stop reason: HOSPADM

## 2023-02-22 RX ORDER — SODIUM CHLORIDE 9 MG/ML
5-250 INJECTION, SOLUTION INTRAVENOUS PRN
OUTPATIENT
Start: 2023-02-22

## 2023-02-22 RX ORDER — FAMOTIDINE 10 MG/ML
20 INJECTION, SOLUTION INTRAVENOUS
OUTPATIENT
Start: 2023-02-22

## 2023-02-22 RX ORDER — NYSTATIN 100000 [USP'U]/G
POWDER TOPICAL
Qty: 60 G | Refills: 1 | Status: SHIPPED | OUTPATIENT
Start: 2023-02-22

## 2023-02-22 RX ORDER — SODIUM CHLORIDE 0.9 % (FLUSH) 0.9 %
5-40 SYRINGE (ML) INJECTION PRN
Status: DISCONTINUED | OUTPATIENT
Start: 2023-02-22 | End: 2023-02-23 | Stop reason: HOSPADM

## 2023-02-22 RX ORDER — SODIUM CHLORIDE 9 MG/ML
INJECTION, SOLUTION INTRAVENOUS CONTINUOUS
OUTPATIENT
Start: 2023-02-22

## 2023-02-22 RX ORDER — SODIUM CHLORIDE 9 MG/ML
5-40 INJECTION INTRAVENOUS PRN
OUTPATIENT
Start: 2023-02-22

## 2023-02-22 RX ORDER — FLUCONAZOLE 100 MG/1
100 TABLET ORAL DAILY
Qty: 5 TABLET | Refills: 0 | Status: SHIPPED | OUTPATIENT
Start: 2023-02-22 | End: 2023-02-27

## 2023-02-22 RX ORDER — ONDANSETRON 2 MG/ML
8 INJECTION INTRAMUSCULAR; INTRAVENOUS
OUTPATIENT
Start: 2023-02-22

## 2023-02-22 RX ORDER — ALBUTEROL SULFATE 90 UG/1
4 AEROSOL, METERED RESPIRATORY (INHALATION) PRN
OUTPATIENT
Start: 2023-02-22

## 2023-02-22 RX ORDER — DIPHENHYDRAMINE HYDROCHLORIDE 50 MG/ML
50 INJECTION INTRAMUSCULAR; INTRAVENOUS
OUTPATIENT
Start: 2023-02-22

## 2023-02-22 RX ORDER — MEPERIDINE HYDROCHLORIDE 25 MG/ML
12.5 INJECTION INTRAMUSCULAR; INTRAVENOUS; SUBCUTANEOUS PRN
OUTPATIENT
Start: 2023-02-22

## 2023-02-22 RX ADMIN — HEPARIN 500 UNITS: 100 SYRINGE at 11:54

## 2023-02-22 RX ADMIN — SODIUM CHLORIDE 20 ML/HR: 9 INJECTION, SOLUTION INTRAVENOUS at 11:33

## 2023-02-22 RX ADMIN — ZOLEDRONIC ACID 4 MG: 4 INJECTION, SOLUTION, CONCENTRATE INTRAVENOUS at 11:32

## 2023-02-22 RX ADMIN — SODIUM CHLORIDE, PRESERVATIVE FREE 10 ML: 5 INJECTION INTRAVENOUS at 11:55

## 2023-02-22 ASSESSMENT — PATIENT HEALTH QUESTIONNAIRE - PHQ9
SUM OF ALL RESPONSES TO PHQ QUESTIONS 1-9: 0
SUM OF ALL RESPONSES TO PHQ QUESTIONS 1-9: 0
2. FEELING DOWN, DEPRESSED OR HOPELESS: 0
SUM OF ALL RESPONSES TO PHQ9 QUESTIONS 1 & 2: 0
SUM OF ALL RESPONSES TO PHQ QUESTIONS 1-9: 0
1. LITTLE INTEREST OR PLEASURE IN DOING THINGS: 0
SUM OF ALL RESPONSES TO PHQ QUESTIONS 1-9: 0

## 2023-02-22 NOTE — PROGRESS NOTES
Patient Name:  Jesús Borrero  Patient :  1955  Patient MRN:  [id number]     Primary Oncologist: Jayla Quigley MD  Referring Provider: MAURICIO Duff CNP     Date of Service: 2023      Chief Complaint:    Chief Complaint   Patient presents with    Follow-up   [=  Patient's active problem list:       Liver mass       Lytic bone lesions       Left breast mass    HPI:   Terrilee Phoenix is a 74year-old very pleasant female with medical history significant for osteoarthritis, initially referred to me on 22 for evaluation of her liver lesion and multiple lytic bone lesions. She initially presented to [facility] on 22 with severe lower back pain and constipation. Stated that she has been having back pain for about 4 - 6 weeks duration, which becomes severe pain started a week before presentation. CT scan of the abdomen and pelvis done on 2022 showed lesion (6.1 x 8.1 cm) in the hepatic segment 4, concerning for neoplasm. Further evaluation with MRI of the liver is recommended. Extensive lytic metastatic disease in lumbar and thoracic spine and pelvis with most dominant lesion seen at L5 vertebral body. Several small bowel loops segments in the left abdomen demonstrate mild wall thickening, nonspecific may indicate enteritis. Nonspecific partially visualized inflammatory stranding along the pericardium. 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 CT lumbar spine done on 22 showed extensive multifocal lytic lesions concerning for neoplastic/metastatic disease. Age indeterminant compression fractures at T12 and L4, with mild narrowing of the AP diameter of the canal at L4. She never had colonoscopy before. She had pap smear around . She didn't recall when was her last mammogram.     She denies weight loss. She hasn't been eating much for about 10 days since she has been having nausea.      Since she is found to have multiple lytic bone lesions and liver lesion, she was referred to me for further evaluation. Digital diagnostic bilateral mammogram and bilateral ultrasound done on 6/17/2022 showed left breast mass, highly suspicious for malignancy. Right breast mass at 11:00, 1.5 cm. Mass is highly suspicious for malignancy. Left breast skin thickening. CT chest on 6/20/2022 showed suspicious heterogeneous mass primarily centered in segment 4A of the liver measuring 8.8 cm. Left breast mass with left axillary and subpectoral metastatic disease, suspected bony metastases to thoracolumbar spine and sternum and possible left chest wall invasion. Bone scan on 6/20/2022 showed multifocal osseous metastatic disease. T12 compression fracture, concerning for pathologic fracture. MRI abdomen on 6/25/2022 showed biopsy-proven malignancy in the left breast with suspicion for inflammatory carcinoma given skin and trabecular thickening. There may be invasion of the underlying pectoralis musculature. At least 3 heavily meta stasis, the largest measuring up to 8.7 cm with the others 1 cm or less. Likely metastatic portal hepatic, retroperitoneal and right retrocrural lymph nodes. Trace left pleural effusion. Extensive skeletal metastatic disease. She underwent ultrasound-guided biopsy of the right breast mass at 11:00 and the pathology showed invasive carcinoma with ductal and lobular features. Estrogen receptor and progesterone receptor are positive. HER2 by IHC not overexpressed (score 1+). HER2 by FISH-negative. Left breast mass at 12:00 biopsy revealed grade 2 invasive lobular carcinoma. Estrogen receptor by IHC-positive [greater than 95%, average strong intensity], progesterone receptor-positive [approximately 80%, average moderate intensity], HER2 by IHC-not overexpressed [score 1+] and HER2 by FISH-negative. CARIS panel did not yield any biomarker results with a benefit of clinic at the dense 70 chance to provide an association with a particular therapy.  TMB was low. [facility] was not expressed and MSI was stable. Since she has significant visceral disease, I recommend her to initiate first-line chemotherapy with single agent paclitaxel. It was started on 7/27/22 and she completed her sixth cycle on 12/28/22. CT scan of the chest, abdomen and pelvis done on 11/14/2022 showed overall improvement in skin thickening and edema within the left breast as well as left internal thoracic/mammary, axillary and subpectoral adenopathy. Interval decrease in size of dominant left hepatic metastasis. Small hepatic lesion seen on earlier MRI are not well seen on this exam.  No new focal hepatic lesion. There is also interval decrease in size of retroperitoneal lymph nodes. Multifocal osseous metastases are again identified in keeping with recent bone scan. There is overall progression of sclerosis when compared to prior exams throughout the visualized skeleton which may reflect posttreatment changes rather than progression. Focal areas of wall thickening in the distal esophagus extending along the gastric cardia as well as the gastric body. Recommend correlation with endoscopy. First line endocrine therapy with ibrance and letrozole was started on 1/5/2023. On February 22, 2023, she presented to me since she has been having rashes underneath her left breast.     She was initially referred to me for evaluation of liver lesion and multiple bone lesions. She was found to have left breast mass with a distorted left breast on physical examination. 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 Since she has significant visceral disease, I recommend her to initiate first-line chemotherapy with single agent paclitaxel. It was started on 7/20/22. She completed her sixth cycle on 12/28/2022.      CT scans and bone scans done on November 2022, after 4th cycle of chemo showed very good response to chemotherapy. Since she completed total of 6 cycles of chemotherapy, I recommend to start first-line endocrine therapy with Ibrance and letrozole. It was started today, 1/5/2023. She is tolerating ibrance and letrozole well. Will check interin labs today. She developed shingles in her right upper thigh and lower back area. She was treated with acyclovir 400 mg TID for 10 days. She doesn't have any more shingles. Left breast skin lesions - consistent with candida rashes. Will treat with nystatin powder and diflucan oral. She finished her last dose of ibrance on 2/21/23. I asked her to contact us if her skin lesion doesn't get better with above measures. I recognized that her breast lesions are getting smaller and softer since starting ibrance and letrozole. I believe she is achieving good response to therapy. I will see her back in 2 months and will repeat CT scans a week before that to evaluate her metastatic disease. Bone metastasis - we started zolendronic acid since 7/20/22. She only has minimal symptom and will not do vertebroplasty at this time. Reviewed Caris molecular panel result with her. I requested genetic counseling and testing since she has bilateral metastatic breast cancer. Malignancy related pain - will continue with norco since it has been controlling her pain well. Anxiety and insomnia - on ativan 0.5 mg nightly prn with improvement in her symptom. She is requiring less ativan since starting medical marijuana. She doesn't have any other significant symptoms at today visit. Past Medical History:     Significant for  1. Osteoarthritis    Past Surgery History:    Significant for   1. Left hip replacement    Social History:   She is a current smoker and she smokes 1 pack per day approximately since she was 24.  She denies alcohol drinking or illicit drug abuse. Family History:    Significant for Alzheimer's disease in her parents. No family history of malignancy. No Known Allergies    Review of Systems: \"Per interval history; otherwise 10 point ROS is negative. \"  Her energy level is pretty good and her sleep is fine. She doesn't have fever, chills, night sweats, cough, SOB, chest pain, hemoptysis or palpitations. Her bowels and bladder functions are normal. She denies nausea, vomiting, abdominal pain, diarrhea, constipation, dysuria, loss of appetite or weight loss. She denies neuropathy and she doesn't have bleeding or clotting issues. She denies any pain in her body. Has anxiety. No depression. The rest of the systems are unremarkable. Vital Signs: /60 (Site: Right Upper Arm, Position: Sitting, Cuff Size: Medium Adult)   Pulse (!) 109   Temp 97.5 °F (36.4 °C) (Temporal)   Ht 5' 4\" (1.626 m)   Wt 158 lb (71.7 kg)   SpO2 94%   BMI 27.12 kg/m²      Physical Exam:  CONSTITUTIONAL: awake, alert, cooperative, no apparent distress   EYES: pupils equal, round and reactive to light, sclera clear, normal conjunctiva  ENT: Normocephalic, without obvious abnormality, atraumatic  NECK: supple, symmetrical, no jugular venous distension, no carotid bruits   HEMATOLOGIC/LYMPHATIC: no cervical, supraclavicular lymphadenopathy. Left axillary lymphadenopathy noted,   LUNGS: VBS, no wheezes, clear to auscultation, no crackles, no increased work of breathing, no rhonchi,   CARDIOVASCULAR: regular rate and rhythm, normal S1 and S2, no murmur noted  ABDOMEN: normal bowel sounds x 4, soft, non-distended, non-tender, no masses palpated, no hepatosplenomegaly   MUSCULOSKELETAL: full range of motion noted, tone is normal  NEUROLOGIC: awake, alert, oriented to name, place and time. Motor skills grossly intact.    EXTREMITIES: no clubbing, no LE edema, no cyanosis, no leg swelling,   BREASTS: Left breast lesions are getting softer and smaller, right breast nodularity is better as well, skin rashes noted below her left breast       Labs:  Hematology:  Lab Results   Component Value Date    WBC 2.8 (L) 02/16/2023    RBC 3.46 (L) 02/16/2023    HGB 11.1 (L) 02/16/2023    HCT 34.2 (L) 02/16/2023    MCV 98.8 02/16/2023    MCH 32.1 (H) 02/16/2023    MCHC 32.5 02/16/2023    RDW 17.8 (H) 02/16/2023     02/16/2023    MPV 7.9 02/16/2023    SEGSPCT 52.7 02/16/2023    EOSRELPCT 1.4 02/16/2023    BASOPCT 1.8 (H) 02/16/2023    LYMPHOPCT 39.5 02/16/2023    MONOPCT 4.6 (H) 02/16/2023    SEGSABS 1.5 02/16/2023    EOSABS 0.0 02/16/2023    BASOSABS 0.1 02/16/2023    LYMPHSABS 1.1 02/16/2023    MONOSABS 0.1 02/16/2023    DIFFTYPE AUTOMATED DIFFERENTIAL 02/16/2023     No results found for: ESR  Chemistry:  Lab Results   Component Value Date     02/16/2023    K 4.3 02/16/2023     02/16/2023    CO2 26 02/16/2023    BUN 14 02/16/2023    CREATININE 0.6 02/16/2023    GLUCOSE 85 02/16/2023    CALCIUM 9.4 02/16/2023    PROT 6.5 02/16/2023    LABALBU 4.5 02/16/2023    BILITOT 0.4 02/16/2023    ALKPHOS 61 02/16/2023    AST 13 (L) 02/16/2023    ALT 8 (L) 02/16/2023    LABGLOM >60 02/16/2023    GFRAA >60 09/28/2022    MG 2.3 07/19/2022    POCCA 1.19 01/25/2023    POCGLU 87 01/25/2023     Lab Results   Component Value Date     (H) 06/14/2022     No components found for: LD  No results found for: TSHHS, T4FREE, FT3  Immunology:  Lab Results   Component Value Date    PROT 6.5 02/16/2023    SPEP  06/14/2022     INTERPRETATION - No monoclonal proteins are detected. SAF    SPEP INTERPRETATION - Within normal limits.  SAF 06/14/2022    ALBUMINELP 3.7 06/14/2022    LABALPH 0.4 06/14/2022    LABALPH 1.3 (H) 71/18/6474    LABALPH DUPLICATE ORDER 47/32/9719    LABALPH DUPLICATE ORDER 11/36/8299    LABBETA 1.1 01/30/5680    LABBETA DUPLICATE ORDER 83/34/4487    GAMGLOB 1.1 06/14/2022     No results found for: Edna Massed, KLFLCR  No results found for: B2M  Coagulation Panel:  No results found for: PROTIME, INR, APTT, DDIMER  Anemia Panel:  No results found for: OFAGEODD82, FOLATE  Tumor Markers:  Lab Results   Component Value Date     377 (H) 06/14/2022    CEA 54.5 06/14/2022     25 06/14/2022    LABCA2 50.3 (H) 02/16/2023        Observations:  PHQ-9 Total Score: 0 (2/22/2023 10:40 AM)     Assessment   Left breast mass  Liver lesion and multiple bone lytic lesions - concerning for metastatic breast cancer    Plan:  Mayank Chaudhary is a 74year-old very pleasant female who initially presented to [facility] on 6/12/22 with severe lower back pain and constipation. CT scan of the abdomen and pelvis done on 6/12/2022 showed lesion (6.1 x 8.1 cm) in the hepatic segment 4, concerning for neoplasm. Extensive lytic metastatic disease in lumbar and thoracic spine and pelvis with most dominant lesion seen at L5 vertebral body. CT lumbar spine done on 6/12/22 showed extensive multifocal lytic lesions concerning for neoplastic/metastatic disease. She never had colonoscopy before. She had pap smear around 2020. She didn't recall when was her last mammogram.       Digital diagnostic bilateral mammogram and bilateral ultrasound done on 6/17/2022 showed left breast mass, highly suspicious for malignancy. Right breast mass at 11:00, 1.5 cm. Mass is highly suspicious for malignancy. Left breast skin thickening. CT chest on 6/20/2022 showed suspicious heterogeneous mass primarily centered in segment 4A of the liver measuring 8.8 cm. Left breast mass with left axillary and subpectoral metastatic disease, suspected bony metastases to thoracolumbar spine and sternum and possible left chest wall invasion. Bone scan on 6/20/2022 showed multifocal osseous metastatic disease. T12 compression fracture, concerning for pathologic fracture. MRI abdomen on 6/25/2022 showed biopsy-proven malignancy in the left breast with suspicion for inflammatory carcinoma given skin and trabecular thickening. There may be invasion of the underlying pectoralis musculature. At least 3 heavily meta stasis, the largest measuring up to 8.7 cm with the others 1 cm or less. Likely metastatic portal hepatic, retroperitoneal and right retrocrural lymph nodes. Trace left pleural effusion. Extensive skeletal metastatic disease. She underwent ultrasound-guided biopsy of the right breast mass at 11:00 and the pathology showed invasive carcinoma with ductal and lobular features. Estrogen receptor and progesterone receptor are positive. HER2 by IHC not overexpressed (score 1+). HER2 by FISH-negative. Left breast mass at 12:00 biopsy revealed grade 2 invasive lobular carcinoma. Estrogen receptor by IHC-positive [greater than 95%, average strong intensity], progesterone receptor-positive [approximately 80%, average moderate intensity], HER2 by IHC-not overexpressed [score 1+] and HER2 by FISH-negative. CARIS panel did not yield any biomarker results with a benefit of clinic at the dense 70 chance to provide an association with a particular therapy. TMB was low. PD-L1 for Kareem Polanco was not expressed and MSI was stable. Since she has significant visceral disease, I recommend her to initiate first-line chemotherapy with single agent paclitaxel. It was started on 7/27/22. CT scan of the chest, abdomen and pelvis done on 11/14/2022 showed overall improvement in skin thickening and edema within the left breast as well as left internal thoracic/mammary, axillary and subpectoral adenopathy. Interval decrease in size of dominant left hepatic metastasis. Small hepatic lesion seen on earlier MRI are not well seen on this exam.  No new focal hepatic lesion. There is also interval decrease in size of retroperitoneal lymph nodes. Multifocal osseous metastases are again identified in keeping with recent bone scan.   There is overall progression of sclerosis when compared to prior exams throughout the visualized skeleton which may reflect posttreatment changes rather than progression. Focal areas of wall thickening in the distal esophagus extending along the gastric cardia as well as the gastric body. Recommend correlation with endoscopy. First line endocrine therapy with ibrance and letrozole was started on 1/5/2023. On February 22, 2023, she presented to me since she has been having rashes underneath her left breast.     She was initially referred to me for evaluation of liver lesion and multiple bone lesions. She was found to have left breast mass with a distorted left breast on physical examination. 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 Since she has significant visceral disease, I recommend her to initiate first-line chemotherapy with single agent paclitaxel. It was started on 7/20/22. She completed her sixth cycle on 12/28/2022. CT scans and bone scans done on November 2022, after 4th cycle of chemo showed very good response to chemotherapy. Since she completed total of 6 cycles of chemotherapy, I recommend to start first-line endocrine therapy with Ibrance and letrozole. It was started today, 1/5/2023. She is tolerating ibrance and letrozole well. Will check interin labs today. She developed shingles in her right upper thigh and lower back area. She was treated with acyclovir 400 mg TID for 10 days. She doesn't have any more shingles. Left breast skin lesions - consistent with candida rashes. Will treat with nystatin powder and diflucan oral. She finished her last dose of ibrance on 2/21/23. I asked her to contact us if her skin lesion doesn't get better with above measures. I recognized that her breast lesions are getting smaller and softer since starting ibrance and letrozole. I believe she is achieving good response to therapy. I will see her back in 2 months and will repeat CT scans a week before that to evaluate her metastatic disease. Bone metastasis - we started zolendronic acid since 7/20/22. She only has minimal symptom and will not do vertebroplasty at this time. Reviewed Caris molecular panel result with her. I requested genetic counseling and testing since she has bilateral metastatic breast cancer. Malignancy related pain - will continue with norco since it has been controlling her pain well. Anxiety and insomnia - on ativan 0.5 mg nightly prn with improvement in her symptom. She is requiring less ativan since starting medical marijuana. I answered all her questions and concerns for today. Recent imaging and labs were reviewed and discussed with the patient.

## 2023-02-22 NOTE — PROGRESS NOTES
MA Rooming Questions  Patient: Lester Brand  MRN: [id number]    Date: 2/22/2023        1. Do you have any new issues? yes - rash on breast          2. Do you need any refills on medications?    no    3. Have you had any imaging done since your last visit?   no    4. Have you been hospitalized or seen in the emergency room since your last visit here?   no    5. Did the patient have a depression screening completed today?  Yes    PHQ-9 Total Score: 0 (2/22/2023 10:40 AM)       PHQ-9 Given to (if applicable):               PHQ-9 Score (if applicable):                     [] Positive     []  Negative              Does question #9 need addressed (if applicable)                     [] Yes    []  No               Nick Bingham CMA

## 2023-03-20 DIAGNOSIS — Z17.0 MALIGNANT NEOPLASM OF OVERLAPPING SITES OF BOTH BREASTS IN FEMALE, ESTROGEN RECEPTOR POSITIVE (HCC): Primary | ICD-10-CM

## 2023-03-20 DIAGNOSIS — C79.51 BONE METASTASIS: ICD-10-CM

## 2023-03-20 DIAGNOSIS — C50.811 MALIGNANT NEOPLASM OF OVERLAPPING SITES OF BOTH BREASTS IN FEMALE, ESTROGEN RECEPTOR POSITIVE (HCC): Primary | ICD-10-CM

## 2023-03-20 DIAGNOSIS — C50.812 MALIGNANT NEOPLASM OF OVERLAPPING SITES OF BOTH BREASTS IN FEMALE, ESTROGEN RECEPTOR POSITIVE (HCC): Primary | ICD-10-CM

## 2023-03-20 RX ORDER — SODIUM CHLORIDE 0.9 % (FLUSH) 0.9 %
5-40 SYRINGE (ML) INJECTION PRN
Status: CANCELLED | OUTPATIENT
Start: 2023-03-22

## 2023-03-20 RX ORDER — EPINEPHRINE 1 MG/ML
0.3 INJECTION, SOLUTION, CONCENTRATE INTRAVENOUS PRN
Status: CANCELLED | OUTPATIENT
Start: 2023-03-22

## 2023-03-20 RX ORDER — SODIUM CHLORIDE 9 MG/ML
5-40 INJECTION INTRAVENOUS PRN
Status: CANCELLED | OUTPATIENT
Start: 2023-03-22

## 2023-03-20 RX ORDER — SODIUM CHLORIDE 9 MG/ML
5-250 INJECTION, SOLUTION INTRAVENOUS PRN
Status: CANCELLED | OUTPATIENT
Start: 2023-03-22

## 2023-03-20 RX ORDER — HEPARIN SODIUM (PORCINE) LOCK FLUSH IV SOLN 100 UNIT/ML 100 UNIT/ML
500 SOLUTION INTRAVENOUS PRN
Status: CANCELLED | OUTPATIENT
Start: 2023-03-22

## 2023-03-20 RX ORDER — ACETAMINOPHEN 325 MG/1
650 TABLET ORAL
Status: CANCELLED | OUTPATIENT
Start: 2023-03-22

## 2023-03-20 RX ORDER — MEPERIDINE HYDROCHLORIDE 25 MG/ML
12.5 INJECTION INTRAMUSCULAR; INTRAVENOUS; SUBCUTANEOUS PRN
Status: CANCELLED | OUTPATIENT
Start: 2023-03-22

## 2023-03-20 RX ORDER — SODIUM CHLORIDE 9 MG/ML
INJECTION, SOLUTION INTRAVENOUS CONTINUOUS
Status: CANCELLED | OUTPATIENT
Start: 2023-03-22

## 2023-03-20 RX ORDER — DIPHENHYDRAMINE HYDROCHLORIDE 50 MG/ML
50 INJECTION INTRAMUSCULAR; INTRAVENOUS
Status: CANCELLED | OUTPATIENT
Start: 2023-03-22

## 2023-03-20 RX ORDER — ONDANSETRON 2 MG/ML
8 INJECTION INTRAMUSCULAR; INTRAVENOUS
Status: CANCELLED | OUTPATIENT
Start: 2023-03-22

## 2023-03-20 RX ORDER — ALBUTEROL SULFATE 90 UG/1
4 AEROSOL, METERED RESPIRATORY (INHALATION) PRN
Status: CANCELLED | OUTPATIENT
Start: 2023-03-22

## 2023-03-22 ENCOUNTER — HOSPITAL ENCOUNTER (OUTPATIENT)
Dept: INFUSION THERAPY | Age: 68
Discharge: HOME | End: 2023-03-22
Payer: MEDICARE

## 2023-03-22 VITALS
OXYGEN SATURATION: 96 % | WEIGHT: 158 LBS | DIASTOLIC BLOOD PRESSURE: 79 MMHG | BODY MASS INDEX: 26.98 KG/M2 | SYSTOLIC BLOOD PRESSURE: 138 MMHG | TEMPERATURE: 98 F | HEIGHT: 64 IN | HEART RATE: 86 BPM

## 2023-03-22 DIAGNOSIS — C50.812 MALIGNANT NEOPLASM OF OVERLAPPING SITES OF BOTH BREASTS IN FEMALE, ESTROGEN RECEPTOR POSITIVE (HCC): ICD-10-CM

## 2023-03-22 DIAGNOSIS — Z17.0 MALIGNANT NEOPLASM OF OVERLAPPING SITES OF BOTH BREASTS IN FEMALE, ESTROGEN RECEPTOR POSITIVE (HCC): ICD-10-CM

## 2023-03-22 DIAGNOSIS — C79.51 BONE METASTASIS: ICD-10-CM

## 2023-03-22 DIAGNOSIS — C50.811 MALIGNANT NEOPLASM OF OVERLAPPING SITES OF BOTH BREASTS IN FEMALE, ESTROGEN RECEPTOR POSITIVE (HCC): ICD-10-CM

## 2023-03-22 DIAGNOSIS — C79.51 BONE METASTASIS: Primary | ICD-10-CM

## 2023-03-22 LAB
ALBUMIN SERPL-MCNC: 4.2 GM/DL (ref 3.4–5)
ALP BLD-CCNC: 52 IU/L (ref 40–128)
ALT SERPL-CCNC: 7 U/L (ref 10–40)
ANION GAP SERPL CALCULATED.3IONS-SCNC: 9 MMOL/L (ref 4–16)
AST SERPL-CCNC: 11 IU/L (ref 15–37)
BILIRUB SERPL-MCNC: 0.5 MG/DL (ref 0–1)
BUN SERPL-MCNC: 13 MG/DL (ref 6–23)
CALCIUM SERPL-MCNC: 8.9 MG/DL (ref 8.3–10.6)
CHLORIDE BLD-SCNC: 108 MMOL/L (ref 99–110)
CO2: 26 MMOL/L (ref 21–32)
CREAT SERPL-MCNC: 0.5 MG/DL (ref 0.6–1.1)
EGFR, POC: >60 ML/MIN/1.73M2
GFR SERPL CREATININE-BSD FRML MDRD: >60 ML/MIN/1.73M2
GLUCOSE BLD-MCNC: 88 MG/DL (ref 70–99)
GLUCOSE SERPL-MCNC: 83 MG/DL (ref 70–99)
POC BUN: 12 MG/DL (ref 8–26)
POC CALCIUM: 1.19 MMOL/L (ref 1.12–1.32)
POC CHLORIDE: 109 MMOL/L (ref 98–109)
POC CO2: 25 MMOL/L (ref 21–32)
POC CREATININE: 0.6 MG/DL (ref 0.6–1.1)
POTASSIUM SERPL-SCNC: 3.8 MMOL/L (ref 3.5–4.5)
POTASSIUM SERPL-SCNC: 4.1 MMOL/L (ref 3.5–5.1)
SODIUM BLD-SCNC: 143 MMOL/L (ref 135–145)
SODIUM BLD-SCNC: 144 MMOL/L (ref 138–146)
SOURCE, BLOOD GAS: NORMAL
TOTAL PROTEIN: 6.2 GM/DL (ref 6.4–8.2)

## 2023-03-22 PROCEDURE — 2580000003 HC RX 258: Performed by: INTERNAL MEDICINE

## 2023-03-22 PROCEDURE — 6360000002 HC RX W HCPCS: Performed by: INTERNAL MEDICINE

## 2023-03-22 PROCEDURE — 96365 THER/PROPH/DIAG IV INF INIT: CPT

## 2023-03-22 PROCEDURE — 80053 COMPREHEN METABOLIC PANEL: CPT

## 2023-03-22 RX ORDER — HEPARIN SODIUM (PORCINE) LOCK FLUSH IV SOLN 100 UNIT/ML 100 UNIT/ML
500 SOLUTION INTRAVENOUS PRN
Status: DISCONTINUED | OUTPATIENT
Start: 2023-03-22 | End: 2023-03-23 | Stop reason: HOSPADM

## 2023-03-22 RX ORDER — SODIUM CHLORIDE 9 MG/ML
5-250 INJECTION, SOLUTION INTRAVENOUS PRN
Status: DISCONTINUED | OUTPATIENT
Start: 2023-03-22 | End: 2023-03-23 | Stop reason: HOSPADM

## 2023-03-22 RX ADMIN — HEPARIN 500 UNITS: 100 SYRINGE at 12:35

## 2023-03-22 RX ADMIN — ZOLEDRONIC ACID 4 MG: 4 INJECTION, SOLUTION, CONCENTRATE INTRAVENOUS at 12:12

## 2023-03-22 RX ADMIN — SODIUM CHLORIDE 20 ML/HR: 9 INJECTION, SOLUTION INTRAVENOUS at 12:12

## 2023-03-22 NOTE — PROGRESS NOTES
Ambulated to infusion area, here today for Zometa. No concerns at this time. Right chest mediport accessed, positive blood return noted. Labs drawn. Labs within defined limits. Zometa administered as ordered. Call light within reach. Tolerated infusion without incident. Discharge instructions provided.  RTC 04/19 for next infusion and OV with Dr. Sarahi Poole.

## 2023-03-24 ENCOUNTER — TELEPHONE (OUTPATIENT)
Dept: ONCOLOGY | Age: 68
End: 2023-03-24

## 2023-03-24 NOTE — TELEPHONE ENCOUNTER
3/24/23 - left pt vm for 4/13/23 CT scan and Bone scan at [facility] arrival time of 8:30. CT scan first and injection next. Go home for 4 hours and back to the hospital at 2:15 for the scan. NPO 4 hours prior. Call [phone] if you ave questions.

## 2023-04-17 DIAGNOSIS — C50.812 MALIGNANT NEOPLASM OF OVERLAPPING SITES OF BOTH BREASTS IN FEMALE, ESTROGEN RECEPTOR POSITIVE (HCC): Primary | ICD-10-CM

## 2023-04-17 DIAGNOSIS — C79.51 MALIGNANT NEOPLASM METASTATIC TO BONE (HCC): ICD-10-CM

## 2023-04-17 DIAGNOSIS — C50.811 MALIGNANT NEOPLASM OF OVERLAPPING SITES OF BOTH BREASTS IN FEMALE, ESTROGEN RECEPTOR POSITIVE (HCC): Primary | ICD-10-CM

## 2023-04-17 DIAGNOSIS — Z17.0 MALIGNANT NEOPLASM OF OVERLAPPING SITES OF BOTH BREASTS IN FEMALE, ESTROGEN RECEPTOR POSITIVE (HCC): Primary | ICD-10-CM

## 2023-04-17 RX ORDER — DIPHENHYDRAMINE HYDROCHLORIDE 50 MG/ML
50 INJECTION INTRAMUSCULAR; INTRAVENOUS
Status: CANCELLED | OUTPATIENT
Start: 2023-04-19

## 2023-04-17 RX ORDER — FAMOTIDINE 10 MG/ML
20 INJECTION, SOLUTION INTRAVENOUS
Status: CANCELLED | OUTPATIENT
Start: 2023-04-19

## 2023-04-17 RX ORDER — MEPERIDINE HYDROCHLORIDE 25 MG/ML
12.5 INJECTION INTRAMUSCULAR; INTRAVENOUS; SUBCUTANEOUS PRN
Status: CANCELLED | OUTPATIENT
Start: 2023-04-19

## 2023-04-17 RX ORDER — ALBUTEROL SULFATE 90 UG/1
4 AEROSOL, METERED RESPIRATORY (INHALATION) PRN
Status: CANCELLED | OUTPATIENT
Start: 2023-04-19

## 2023-04-17 RX ORDER — ONDANSETRON 2 MG/ML
8 INJECTION INTRAMUSCULAR; INTRAVENOUS
Status: CANCELLED | OUTPATIENT
Start: 2023-04-19

## 2023-04-17 RX ORDER — HEPARIN SODIUM (PORCINE) LOCK FLUSH IV SOLN 100 UNIT/ML 100 UNIT/ML
500 SOLUTION INTRAVENOUS PRN
Status: CANCELLED | OUTPATIENT
Start: 2023-04-19

## 2023-04-17 RX ORDER — EPINEPHRINE 1 MG/ML
0.3 INJECTION, SOLUTION, CONCENTRATE INTRAVENOUS PRN
Status: CANCELLED | OUTPATIENT
Start: 2023-04-19

## 2023-04-17 RX ORDER — SODIUM CHLORIDE 9 MG/ML
INJECTION, SOLUTION INTRAVENOUS CONTINUOUS
Status: CANCELLED | OUTPATIENT
Start: 2023-04-19

## 2023-04-17 RX ORDER — ACETAMINOPHEN 325 MG/1
650 TABLET ORAL
Status: CANCELLED | OUTPATIENT
Start: 2023-04-19

## 2023-04-17 RX ORDER — SODIUM CHLORIDE 0.9 % (FLUSH) 0.9 %
5-40 SYRINGE (ML) INJECTION PRN
Status: CANCELLED | OUTPATIENT
Start: 2023-04-19

## 2023-04-17 RX ORDER — SODIUM CHLORIDE 9 MG/ML
5-250 INJECTION, SOLUTION INTRAVENOUS PRN
Status: CANCELLED | OUTPATIENT
Start: 2023-04-19

## 2023-04-19 ENCOUNTER — HOSPITAL ENCOUNTER (OUTPATIENT)
Dept: INFUSION THERAPY | Age: 68
Discharge: HOME | End: 2023-04-19
Payer: MEDICARE

## 2023-04-19 ENCOUNTER — OFFICE VISIT (OUTPATIENT)
Dept: ONCOLOGY | Age: 68
End: 2023-04-19
Payer: MEDICARE

## 2023-04-19 VITALS
TEMPERATURE: 98.2 F | BODY MASS INDEX: 26.8 KG/M2 | HEART RATE: 88 BPM | HEIGHT: 64 IN | OXYGEN SATURATION: 98 % | DIASTOLIC BLOOD PRESSURE: 65 MMHG | SYSTOLIC BLOOD PRESSURE: 146 MMHG | WEIGHT: 157 LBS

## 2023-04-19 VITALS
HEIGHT: 64 IN | WEIGHT: 157.6 LBS | TEMPERATURE: 98.2 F | OXYGEN SATURATION: 98 % | BODY MASS INDEX: 26.91 KG/M2 | DIASTOLIC BLOOD PRESSURE: 65 MMHG | HEART RATE: 88 BPM | SYSTOLIC BLOOD PRESSURE: 146 MMHG

## 2023-04-19 DIAGNOSIS — Z17.0 MALIGNANT NEOPLASM OF OVERLAPPING SITES OF BOTH BREASTS IN FEMALE, ESTROGEN RECEPTOR POSITIVE (HCC): ICD-10-CM

## 2023-04-19 DIAGNOSIS — C50.811 MALIGNANT NEOPLASM OF OVERLAPPING SITES OF BOTH BREASTS IN FEMALE, ESTROGEN RECEPTOR POSITIVE (HCC): Primary | ICD-10-CM

## 2023-04-19 DIAGNOSIS — C50.812 MALIGNANT NEOPLASM OF OVERLAPPING SITES OF BOTH BREASTS IN FEMALE, ESTROGEN RECEPTOR POSITIVE (HCC): ICD-10-CM

## 2023-04-19 DIAGNOSIS — Z17.0 MALIGNANT NEOPLASM OF OVERLAPPING SITES OF BOTH BREASTS IN FEMALE, ESTROGEN RECEPTOR POSITIVE (HCC): Primary | ICD-10-CM

## 2023-04-19 DIAGNOSIS — C79.51 MALIGNANT NEOPLASM METASTATIC TO BONE (HCC): Primary | ICD-10-CM

## 2023-04-19 DIAGNOSIS — C50.811 MALIGNANT NEOPLASM OF OVERLAPPING SITES OF BOTH BREASTS IN FEMALE, ESTROGEN RECEPTOR POSITIVE (HCC): ICD-10-CM

## 2023-04-19 DIAGNOSIS — C50.812 MALIGNANT NEOPLASM OF OVERLAPPING SITES OF BOTH BREASTS IN FEMALE, ESTROGEN RECEPTOR POSITIVE (HCC): Primary | ICD-10-CM

## 2023-04-19 LAB
ALBUMIN SERPL-MCNC: 4.4 GM/DL (ref 3.4–5)
ALP BLD-CCNC: 49 IU/L (ref 40–128)
ALT SERPL-CCNC: 8 U/L (ref 10–40)
ANION GAP SERPL CALCULATED.3IONS-SCNC: 11 MMOL/L (ref 4–16)
AST SERPL-CCNC: 12 IU/L (ref 15–37)
BASOPHILS ABSOLUTE: 0 K/CU MM
BASOPHILS RELATIVE PERCENT: 1.1 % (ref 0–1)
BILIRUB SERPL-MCNC: 0.7 MG/DL (ref 0–1)
BUN SERPL-MCNC: 16 MG/DL (ref 6–23)
CALCIUM SERPL-MCNC: 9.2 MG/DL (ref 8.3–10.6)
CHLORIDE BLD-SCNC: 108 MMOL/L (ref 99–110)
CO2: 23 MMOL/L (ref 21–32)
CREAT SERPL-MCNC: 0.5 MG/DL (ref 0.6–1.1)
DIFFERENTIAL TYPE: ABNORMAL
EGFR, POC: >60 ML/MIN/1.73M2
EOSINOPHILS ABSOLUTE: 0 K/CU MM
EOSINOPHILS RELATIVE PERCENT: 0.7 % (ref 0–3)
GFR SERPL CREATININE-BSD FRML MDRD: >60 ML/MIN/1.73M2
GLUCOSE BLD-MCNC: 86 MG/DL (ref 70–99)
GLUCOSE SERPL-MCNC: 84 MG/DL (ref 70–99)
HCT VFR BLD CALC: 34.8 % (ref 37–47)
HEMOGLOBIN: 11.4 GM/DL (ref 12.5–16)
LYMPHOCYTES ABSOLUTE: 1.4 K/CU MM
LYMPHOCYTES RELATIVE PERCENT: 48.4 % (ref 24–44)
MCH RBC QN AUTO: 33.8 PG (ref 27–31)
MCHC RBC AUTO-ENTMCNC: 32.8 % (ref 32–36)
MCV RBC AUTO: 103.3 FL (ref 78–100)
MONOCYTES ABSOLUTE: 0.2 K/CU MM
MONOCYTES RELATIVE PERCENT: 7.2 % (ref 0–4)
PDW BLD-RTO: 15.2 % (ref 11.7–14.9)
PLATELET # BLD: 180 K/CU MM (ref 140–440)
PMV BLD AUTO: 8.5 FL (ref 7.5–11.1)
POC BUN: 16 MG/DL (ref 8–26)
POC CALCIUM: 1.14 MMOL/L (ref 1.12–1.32)
POC CHLORIDE: 110 MMOL/L (ref 98–109)
POC CO2: 22 MMOL/L (ref 21–32)
POC CREATININE: 0.7 MG/DL (ref 0.6–1.1)
POTASSIUM SERPL-SCNC: 3.8 MMOL/L (ref 3.5–4.5)
POTASSIUM SERPL-SCNC: 4.1 MMOL/L (ref 3.5–5.1)
RBC # BLD: 3.37 M/CU MM (ref 4.2–5.4)
SEGMENTED NEUTROPHILS ABSOLUTE COUNT: 1.2 K/CU MM
SEGMENTED NEUTROPHILS RELATIVE PERCENT: 42.6 % (ref 36–66)
SODIUM BLD-SCNC: 142 MMOL/L (ref 135–145)
SODIUM BLD-SCNC: 142 MMOL/L (ref 138–146)
SOURCE, BLOOD GAS: ABNORMAL
TOTAL PROTEIN: 6.6 GM/DL (ref 6.4–8.2)
WBC # BLD: 2.8 K/CU MM (ref 4–10.5)

## 2023-04-19 PROCEDURE — 96374 THER/PROPH/DIAG INJ IV PUSH: CPT

## 2023-04-19 PROCEDURE — 1124F ACP DISCUSS-NO DSCNMKR DOCD: CPT | Performed by: INTERNAL MEDICINE

## 2023-04-19 PROCEDURE — 85025 COMPLETE CBC W/AUTO DIFF WBC: CPT

## 2023-04-19 PROCEDURE — 6360000002 HC RX W HCPCS: Performed by: INTERNAL MEDICINE

## 2023-04-19 PROCEDURE — 99214 OFFICE O/P EST MOD 30 MIN: CPT | Performed by: INTERNAL MEDICINE

## 2023-04-19 PROCEDURE — 80053 COMPREHEN METABOLIC PANEL: CPT

## 2023-04-19 PROCEDURE — 2580000003 HC RX 258: Performed by: INTERNAL MEDICINE

## 2023-04-19 RX ORDER — SODIUM CHLORIDE 9 MG/ML
5-250 INJECTION, SOLUTION INTRAVENOUS PRN
Status: DISCONTINUED | OUTPATIENT
Start: 2023-04-19 | End: 2023-04-20 | Stop reason: HOSPADM

## 2023-04-19 RX ORDER — HEPARIN SODIUM (PORCINE) LOCK FLUSH IV SOLN 100 UNIT/ML 100 UNIT/ML
500 SOLUTION INTRAVENOUS PRN
Status: DISCONTINUED | OUTPATIENT
Start: 2023-04-19 | End: 2023-04-20 | Stop reason: HOSPADM

## 2023-04-19 RX ORDER — SODIUM CHLORIDE 0.9 % (FLUSH) 0.9 %
5-40 SYRINGE (ML) INJECTION PRN
Status: DISCONTINUED | OUTPATIENT
Start: 2023-04-19 | End: 2023-04-20 | Stop reason: HOSPADM

## 2023-04-19 RX ADMIN — HEPARIN 500 UNITS: 100 SYRINGE at 12:26

## 2023-04-19 RX ADMIN — ZOLEDRONIC ACID 4 MG: 4 INJECTION, SOLUTION, CONCENTRATE INTRAVENOUS at 12:01

## 2023-04-19 RX ADMIN — SODIUM CHLORIDE, PRESERVATIVE FREE 10 ML: 5 INJECTION INTRAVENOUS at 12:26

## 2023-04-19 RX ADMIN — SODIUM CHLORIDE 20 ML/HR: 9 INJECTION, SOLUTION INTRAVENOUS at 12:01

## 2023-04-19 NOTE — PROGRESS NOTES
MA Rooming Questions  Patient: Avelino Childs  MRN: [id number]    Date: 4/19/2023        1. Do you have any new issues?   no         2. Do you need any refills on medications?    no    3. Have you had any imaging done since your last visit? yes - CT scan and bone scan 4/13    4. Have you been hospitalized or seen in the emergency room since your last visit here?   no    5. Did the patient have a depression screening completed today?  No    No data recorded     PHQ-9 Given to (if applicable):               PHQ-9 Score (if applicable):                     [] Positive     []  Negative              Does question #9 need addressed (if applicable)                     [] Yes    []  No               José Miguel Regalado CMA

## 2023-05-16 ENCOUNTER — TELEPHONE (OUTPATIENT)
Dept: INFUSION THERAPY | Age: 68
End: 2023-05-16

## 2023-05-16 DIAGNOSIS — Z17.0 MALIGNANT NEOPLASM OF OVERLAPPING SITES OF BOTH BREASTS IN FEMALE, ESTROGEN RECEPTOR POSITIVE (HCC): Primary | ICD-10-CM

## 2023-05-16 DIAGNOSIS — C79.51 MALIGNANT NEOPLASM METASTATIC TO BONE (HCC): ICD-10-CM

## 2023-05-16 DIAGNOSIS — C50.811 MALIGNANT NEOPLASM OF OVERLAPPING SITES OF BOTH BREASTS IN FEMALE, ESTROGEN RECEPTOR POSITIVE (HCC): Primary | ICD-10-CM

## 2023-05-16 DIAGNOSIS — C50.812 MALIGNANT NEOPLASM OF OVERLAPPING SITES OF BOTH BREASTS IN FEMALE, ESTROGEN RECEPTOR POSITIVE (HCC): Primary | ICD-10-CM

## 2023-05-16 RX ORDER — ONDANSETRON 2 MG/ML
8 INJECTION INTRAMUSCULAR; INTRAVENOUS
Status: CANCELLED | OUTPATIENT
Start: 2023-05-17

## 2023-05-16 RX ORDER — SODIUM CHLORIDE 0.9 % (FLUSH) 0.9 %
5-40 SYRINGE (ML) INJECTION PRN
Status: CANCELLED | OUTPATIENT
Start: 2023-05-17

## 2023-05-16 RX ORDER — HEPARIN SODIUM (PORCINE) LOCK FLUSH IV SOLN 100 UNIT/ML 100 UNIT/ML
500 SOLUTION INTRAVENOUS PRN
Status: CANCELLED | OUTPATIENT
Start: 2023-05-17

## 2023-05-16 RX ORDER — EPINEPHRINE 1 MG/ML
0.3 INJECTION, SOLUTION, CONCENTRATE INTRAVENOUS PRN
Status: CANCELLED | OUTPATIENT
Start: 2023-05-17

## 2023-05-16 RX ORDER — DIPHENHYDRAMINE HYDROCHLORIDE 50 MG/ML
50 INJECTION INTRAMUSCULAR; INTRAVENOUS
Status: CANCELLED | OUTPATIENT
Start: 2023-05-17

## 2023-05-16 RX ORDER — SODIUM CHLORIDE 9 MG/ML
INJECTION, SOLUTION INTRAVENOUS CONTINUOUS
Status: CANCELLED | OUTPATIENT
Start: 2023-05-17

## 2023-05-16 RX ORDER — ALBUTEROL SULFATE 90 UG/1
4 AEROSOL, METERED RESPIRATORY (INHALATION) PRN
Status: CANCELLED | OUTPATIENT
Start: 2023-05-17

## 2023-05-16 RX ORDER — FAMOTIDINE 10 MG/ML
20 INJECTION, SOLUTION INTRAVENOUS
Status: CANCELLED | OUTPATIENT
Start: 2023-05-17

## 2023-05-16 RX ORDER — ACETAMINOPHEN 325 MG/1
650 TABLET ORAL
Status: CANCELLED | OUTPATIENT
Start: 2023-05-17

## 2023-05-16 RX ORDER — SODIUM CHLORIDE 9 MG/ML
5-250 INJECTION, SOLUTION INTRAVENOUS PRN
Status: CANCELLED | OUTPATIENT
Start: 2023-05-17

## 2023-05-16 RX ORDER — MEPERIDINE HYDROCHLORIDE 25 MG/ML
12.5 INJECTION INTRAMUSCULAR; INTRAVENOUS; SUBCUTANEOUS PRN
Status: CANCELLED | OUTPATIENT
Start: 2023-05-17

## 2023-05-17 ENCOUNTER — HOSPITAL ENCOUNTER (OUTPATIENT)
Dept: INFUSION THERAPY | Age: 68
Discharge: HOME | End: 2023-05-17
Payer: MEDICARE

## 2023-05-17 VITALS
DIASTOLIC BLOOD PRESSURE: 61 MMHG | TEMPERATURE: 98.1 F | WEIGHT: 159.6 LBS | BODY MASS INDEX: 27.25 KG/M2 | HEIGHT: 64 IN | SYSTOLIC BLOOD PRESSURE: 126 MMHG | OXYGEN SATURATION: 99 % | HEART RATE: 88 BPM

## 2023-05-17 DIAGNOSIS — C79.51 MALIGNANT NEOPLASM METASTATIC TO BONE (HCC): Primary | ICD-10-CM

## 2023-05-17 DIAGNOSIS — C50.811 MALIGNANT NEOPLASM OF OVERLAPPING SITES OF BOTH BREASTS IN FEMALE, ESTROGEN RECEPTOR POSITIVE (HCC): ICD-10-CM

## 2023-05-17 DIAGNOSIS — C50.812 MALIGNANT NEOPLASM OF OVERLAPPING SITES OF BOTH BREASTS IN FEMALE, ESTROGEN RECEPTOR POSITIVE (HCC): ICD-10-CM

## 2023-05-17 DIAGNOSIS — Z17.0 MALIGNANT NEOPLASM OF OVERLAPPING SITES OF BOTH BREASTS IN FEMALE, ESTROGEN RECEPTOR POSITIVE (HCC): ICD-10-CM

## 2023-05-17 LAB
ALBUMIN SERPL-MCNC: 4.4 GM/DL (ref 3.4–5)
ALP BLD-CCNC: 45 IU/L (ref 40–128)
ALT SERPL-CCNC: 7 U/L (ref 10–40)
ANION GAP SERPL CALCULATED.3IONS-SCNC: 10 MMOL/L (ref 4–16)
AST SERPL-CCNC: 11 IU/L (ref 15–37)
BILIRUB SERPL-MCNC: 0.8 MG/DL (ref 0–1)
BUN SERPL-MCNC: 12 MG/DL (ref 6–23)
CALCIUM SERPL-MCNC: 8.5 MG/DL (ref 8.3–10.6)
CHLORIDE BLD-SCNC: 104 MMOL/L (ref 99–110)
CO2: 23 MMOL/L (ref 21–32)
CREAT SERPL-MCNC: 0.5 MG/DL (ref 0.6–1.1)
EGFR, POC: >60 ML/MIN/1.73M2
GFR SERPL CREATININE-BSD FRML MDRD: >60 ML/MIN/1.73M2
GLUCOSE BLD-MCNC: 104 MG/DL (ref 70–99)
GLUCOSE SERPL-MCNC: 104 MG/DL (ref 70–99)
POC BUN: 11 MG/DL (ref 8–26)
POC CALCIUM: 1.19 MMOL/L (ref 1.12–1.32)
POC CHLORIDE: 106 MMOL/L (ref 98–109)
POC CO2: 25 MMOL/L (ref 21–32)
POC CREATININE: 0.8 MG/DL (ref 0.6–1.1)
POTASSIUM SERPL-SCNC: 3.8 MMOL/L (ref 3.5–4.5)
POTASSIUM SERPL-SCNC: 4 MMOL/L (ref 3.5–5.1)
SODIUM BLD-SCNC: 137 MMOL/L (ref 135–145)
SODIUM BLD-SCNC: 142 MMOL/L (ref 138–146)
SOURCE, BLOOD GAS: ABNORMAL
TOTAL PROTEIN: 6.5 GM/DL (ref 6.4–8.2)

## 2023-05-17 PROCEDURE — 80053 COMPREHEN METABOLIC PANEL: CPT

## 2023-05-17 PROCEDURE — 96365 THER/PROPH/DIAG IV INF INIT: CPT

## 2023-05-17 PROCEDURE — 6360000002 HC RX W HCPCS: Performed by: INTERNAL MEDICINE

## 2023-05-17 PROCEDURE — 2580000003 HC RX 258: Performed by: INTERNAL MEDICINE

## 2023-05-17 RX ORDER — SODIUM CHLORIDE 9 MG/ML
5-250 INJECTION, SOLUTION INTRAVENOUS PRN
Status: DISCONTINUED | OUTPATIENT
Start: 2023-05-17 | End: 2023-05-18 | Stop reason: HOSPADM

## 2023-05-17 RX ORDER — SODIUM CHLORIDE 0.9 % (FLUSH) 0.9 %
5-40 SYRINGE (ML) INJECTION PRN
Status: DISCONTINUED | OUTPATIENT
Start: 2023-05-17 | End: 2023-05-18 | Stop reason: HOSPADM

## 2023-05-17 RX ORDER — HEPARIN SODIUM (PORCINE) LOCK FLUSH IV SOLN 100 UNIT/ML 100 UNIT/ML
500 SOLUTION INTRAVENOUS PRN
Status: DISCONTINUED | OUTPATIENT
Start: 2023-05-17 | End: 2023-05-18 | Stop reason: HOSPADM

## 2023-05-17 RX ADMIN — SODIUM CHLORIDE, PRESERVATIVE FREE 10 ML: 5 INJECTION INTRAVENOUS at 15:11

## 2023-05-17 RX ADMIN — HEPARIN 500 UNITS: 100 SYRINGE at 15:11

## 2023-05-17 RX ADMIN — SODIUM CHLORIDE 20 ML/HR: 9 INJECTION, SOLUTION INTRAVENOUS at 14:51

## 2023-05-17 RX ADMIN — ZOLEDRONIC ACID 4 MG: 4 INJECTION, SOLUTION, CONCENTRATE INTRAVENOUS at 14:51

## 2023-05-17 NOTE — PROGRESS NOTES
Pt  here for Zometa infusion. Port accessed with blood return noted. Labs sent. Pt has no concerns at this time. Patient's status assessed and documented appropriately. All labs and required results were also reviewed today. Treatment parameters have been reviewed. Today's treatment has been approved by the provider. Treatment orders and medication sequencing (when applicable) was verified by 2 registered nurses. The treatment plan was confirmed with the patient prior to administration, and the patient understands the need to report any treatment-related symptoms. Prior to administration, when applicable, the following 8 elements of medication administration were reviewed with 2nd Registered Nurse prior to dosing: drug name, drug dose, infusion volume when prepared in a syringe, rate of administration, expiration dates and/or times, appearance and integrity of drug(s), and rate of pump for infusion. The 5 rights of medication administration have been verified.        Pt tolerated infusion without incident left ambulatory discharge instructions given

## 2023-06-07 ENCOUNTER — HOSPITAL ENCOUNTER (OUTPATIENT)
Dept: INFUSION THERAPY | Age: 68
Discharge: HOME | End: 2023-06-07
Payer: MEDICARE

## 2023-06-07 ENCOUNTER — OFFICE VISIT (OUTPATIENT)
Dept: ONCOLOGY | Age: 68
End: 2023-06-07
Payer: MEDICARE

## 2023-06-07 VITALS
TEMPERATURE: 98.1 F | DIASTOLIC BLOOD PRESSURE: 76 MMHG | WEIGHT: 158 LBS | HEIGHT: 64 IN | HEART RATE: 93 BPM | RESPIRATION RATE: 16 BRPM | BODY MASS INDEX: 26.98 KG/M2 | SYSTOLIC BLOOD PRESSURE: 149 MMHG | OXYGEN SATURATION: 99 %

## 2023-06-07 DIAGNOSIS — C50.811 MALIGNANT NEOPLASM OF OVERLAPPING SITES OF BOTH BREASTS IN FEMALE, ESTROGEN RECEPTOR POSITIVE (HCC): Primary | ICD-10-CM

## 2023-06-07 DIAGNOSIS — C50.812 MALIGNANT NEOPLASM OF OVERLAPPING SITES OF BOTH BREASTS IN FEMALE, ESTROGEN RECEPTOR POSITIVE (HCC): Primary | ICD-10-CM

## 2023-06-07 DIAGNOSIS — Z17.0 MALIGNANT NEOPLASM OF OVERLAPPING SITES OF BOTH BREASTS IN FEMALE, ESTROGEN RECEPTOR POSITIVE (HCC): Primary | ICD-10-CM

## 2023-06-07 DIAGNOSIS — M89.9 LYTIC LESION OF BONE ON X-RAY: ICD-10-CM

## 2023-06-07 PROCEDURE — 99211 OFF/OP EST MAY X REQ PHY/QHP: CPT

## 2023-06-07 PROCEDURE — 99401 PREV MED CNSL INDIV APPRX 15: CPT | Performed by: INTERNAL MEDICINE

## 2023-06-07 NOTE — PROGRESS NOTES
Palliative Care Assessment    Medical Oncology History:    June 22 who was evaluated for severe back pain. CT abdomen and pelvis June 12, 2022 8 cm mass involving liver, extensive lytic metastatic lesions involving the lumbar and thoracic spine and pelvis. Mammogram June 17, 2022 right breast mass. CT chest June 20, 2022 size liver lesion breast mass also noted axillary and subpectoral metastatic disease. She also underwent bone scan and MRI of abdomen and finally biopsy of the right breast pathology revealing invasive ductal and lobular carcinoma, positive estrogen and progesterone receptors. Because of the visceral disease started on single agent paclitaxel on July 20, 2022. CT scan of the chest abdomen and pelvis done on November 14, 2022 revealed overall improvement. January 2023 treatment changed to [facility] and letrozole. Studies done in April 2023 had shown a stable or improving changes  Other Medical Problems:   Basically healthy except for osteoarthritis and having her hip replacement about 2 years prior to this diagnosis     Personal History     A. Life Time:   Moved to [place] in early age from [place]. Went to school here including college at [employer] and after graduation came to [place]. 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  worked at [employer] retiring recently also. Between them to have her 3 daughters 1 living in [place] another 1 in [address] [id number] and third 1 in [place]. She is very close to her daughters and spends a lot of time with her daughters in [place] and [place] enjoying the grandchildren. Negative for history of smoking or ethanol intake or other usage. B. Family:   As above    Physical Symptoms:  Visit here on June 7, 2023 she was continuing to do quite well.   She was no longer having any pain and was not requiring any pain

## 2023-06-14 ENCOUNTER — HOSPITAL ENCOUNTER (OUTPATIENT)
Dept: INFUSION THERAPY | Age: 68
Discharge: HOME | End: 2023-06-14
Payer: MEDICARE

## 2023-06-14 VITALS
TEMPERATURE: 97.9 F | HEIGHT: 64 IN | WEIGHT: 159.2 LBS | DIASTOLIC BLOOD PRESSURE: 65 MMHG | SYSTOLIC BLOOD PRESSURE: 140 MMHG | BODY MASS INDEX: 27.18 KG/M2 | HEART RATE: 87 BPM | OXYGEN SATURATION: 100 %

## 2023-06-14 DIAGNOSIS — C50.811 MALIGNANT NEOPLASM OF OVERLAPPING SITES OF BOTH BREASTS IN FEMALE, ESTROGEN RECEPTOR POSITIVE (HCC): Primary | ICD-10-CM

## 2023-06-14 DIAGNOSIS — C50.011 MALIGNANT NEOPLASM OF NIPPLE OF RIGHT BREAST IN FEMALE, UNSPECIFIED ESTROGEN RECEPTOR STATUS (HCC): ICD-10-CM

## 2023-06-14 DIAGNOSIS — C50.812 MALIGNANT NEOPLASM OF OVERLAPPING SITES OF BOTH BREASTS IN FEMALE, ESTROGEN RECEPTOR POSITIVE (HCC): Primary | ICD-10-CM

## 2023-06-14 DIAGNOSIS — C79.51 MALIGNANT NEOPLASM METASTATIC TO BONE (HCC): ICD-10-CM

## 2023-06-14 DIAGNOSIS — Z17.0 MALIGNANT NEOPLASM OF OVERLAPPING SITES OF BOTH BREASTS IN FEMALE, ESTROGEN RECEPTOR POSITIVE (HCC): Primary | ICD-10-CM

## 2023-06-14 LAB
ALBUMIN SERPL-MCNC: 4.5 GM/DL (ref 3.4–5)
ALP BLD-CCNC: 47 IU/L (ref 40–129)
ALT SERPL-CCNC: 8 U/L (ref 10–40)
ANION GAP SERPL CALCULATED.3IONS-SCNC: 11 MMOL/L (ref 4–16)
AST SERPL-CCNC: 11 IU/L (ref 15–37)
BASOPHILS ABSOLUTE: 0 K/CU MM
BASOPHILS RELATIVE PERCENT: 1 % (ref 0–1)
BILIRUB SERPL-MCNC: 0.7 MG/DL (ref 0–1)
BUN SERPL-MCNC: 10 MG/DL (ref 6–23)
CALCIUM SERPL-MCNC: 9.1 MG/DL (ref 8.3–10.6)
CHLORIDE BLD-SCNC: 107 MMOL/L (ref 99–110)
CO2: 24 MMOL/L (ref 21–32)
CREAT SERPL-MCNC: 0.5 MG/DL (ref 0.6–1.1)
DIFFERENTIAL TYPE: ABNORMAL
EGFR, POC: >60 ML/MIN/1.73M2
EOSINOPHILS ABSOLUTE: 0 K/CU MM
EOSINOPHILS RELATIVE PERCENT: 0.7 % (ref 0–3)
GFR SERPL CREATININE-BSD FRML MDRD: >60 ML/MIN/1.73M2
GLUCOSE BLD-MCNC: 83 MG/DL (ref 70–99)
GLUCOSE SERPL-MCNC: 87 MG/DL (ref 70–99)
HCT VFR BLD CALC: 33.3 % (ref 37–47)
HEMOGLOBIN: 11 GM/DL (ref 12.5–16)
LYMPHOCYTES ABSOLUTE: 1.3 K/CU MM
LYMPHOCYTES RELATIVE PERCENT: 43 % (ref 24–44)
MCH RBC QN AUTO: 35.1 PG (ref 27–31)
MCHC RBC AUTO-ENTMCNC: 33 % (ref 32–36)
MCV RBC AUTO: 106.4 FL (ref 78–100)
MONOCYTES ABSOLUTE: 0.2 K/CU MM
MONOCYTES RELATIVE PERCENT: 5.6 % (ref 0–4)
PDW BLD-RTO: 13.6 % (ref 11.7–14.9)
PLATELET # BLD: 178 K/CU MM (ref 140–440)
PMV BLD AUTO: 8.5 FL (ref 7.5–11.1)
POC BUN: 10 MG/DL (ref 8–26)
POC CALCIUM: 1.23 MMOL/L (ref 1.12–1.32)
POC CHLORIDE: 109 MMOL/L (ref 98–109)
POC CO2: 24 MMOL/L (ref 21–32)
POC CREATININE: 0.6 MG/DL (ref 0.6–1.1)
POTASSIUM SERPL-SCNC: 3.8 MMOL/L (ref 3.5–4.5)
POTASSIUM SERPL-SCNC: 4 MMOL/L (ref 3.5–5.1)
RBC # BLD: 3.13 M/CU MM (ref 4.2–5.4)
SEGMENTED NEUTROPHILS ABSOLUTE COUNT: 1.5 K/CU MM
SEGMENTED NEUTROPHILS RELATIVE PERCENT: 49.7 % (ref 36–66)
SODIUM BLD-SCNC: 142 MMOL/L (ref 135–145)
SODIUM BLD-SCNC: 143 MMOL/L (ref 138–146)
SOURCE, BLOOD GAS: NORMAL
TOTAL PROTEIN: 6.9 GM/DL (ref 6.4–8.2)
WBC # BLD: 3.1 K/CU MM (ref 4–10.5)

## 2023-06-14 PROCEDURE — 85025 COMPLETE CBC W/AUTO DIFF WBC: CPT

## 2023-06-14 PROCEDURE — 36591 DRAW BLOOD OFF VENOUS DEVICE: CPT

## 2023-06-14 PROCEDURE — 80053 COMPREHEN METABOLIC PANEL: CPT

## 2023-06-14 PROCEDURE — 86300 IMMUNOASSAY TUMOR CA 15-3: CPT

## 2023-06-14 PROCEDURE — 96365 THER/PROPH/DIAG IV INF INIT: CPT

## 2023-06-14 PROCEDURE — 6360000002 HC RX W HCPCS: Performed by: INTERNAL MEDICINE

## 2023-06-14 PROCEDURE — 2580000003 HC RX 258: Performed by: INTERNAL MEDICINE

## 2023-06-14 RX ORDER — HEPARIN SODIUM 100 [USP'U]/ML
500 INJECTION, SOLUTION INTRAVENOUS PRN
Status: DISCONTINUED | OUTPATIENT
Start: 2023-06-14 | End: 2023-06-15 | Stop reason: HOSPADM

## 2023-06-14 RX ORDER — SODIUM CHLORIDE 0.9 % (FLUSH) 0.9 %
5-40 SYRINGE (ML) INJECTION PRN
Status: DISCONTINUED | OUTPATIENT
Start: 2023-06-14 | End: 2023-06-15 | Stop reason: HOSPADM

## 2023-06-14 RX ORDER — SODIUM CHLORIDE 9 MG/ML
5-250 INJECTION, SOLUTION INTRAVENOUS PRN
Status: DISCONTINUED | OUTPATIENT
Start: 2023-06-14 | End: 2023-06-15 | Stop reason: HOSPADM

## 2023-06-14 RX ADMIN — ZOLEDRONIC ACID 4 MG: 4 INJECTION, SOLUTION, CONCENTRATE INTRAVENOUS at 12:36

## 2023-06-14 RX ADMIN — SODIUM CHLORIDE 10 ML/HR: 9 INJECTION, SOLUTION INTRAVENOUS at 12:29

## 2023-06-14 NOTE — PROGRESS NOTES
Patient's port was accessed and labs sent Creat was 0.6, received her Zometa today , tolerated treatment well, patient deaccessed and went to  to make next appointment with doctor.

## 2023-06-16 LAB — CANCER AG27-29 SERPL-ACNC: 35 U/ML

## 2023-06-21 DIAGNOSIS — C50.811 MALIGNANT NEOPLASM OF OVERLAPPING SITES OF BOTH BREASTS IN FEMALE, ESTROGEN RECEPTOR POSITIVE (HCC): ICD-10-CM

## 2023-06-21 DIAGNOSIS — Z17.0 MALIGNANT NEOPLASM OF OVERLAPPING SITES OF BOTH BREASTS IN FEMALE, ESTROGEN RECEPTOR POSITIVE (HCC): ICD-10-CM

## 2023-06-21 DIAGNOSIS — C50.812 MALIGNANT NEOPLASM OF OVERLAPPING SITES OF BOTH BREASTS IN FEMALE, ESTROGEN RECEPTOR POSITIVE (HCC): ICD-10-CM

## 2023-06-21 DIAGNOSIS — C79.51 MALIGNANT NEOPLASM METASTATIC TO BONE (HCC): ICD-10-CM

## 2023-06-21 RX ORDER — PALBOCICLIB 125 MG/1
125 TABLET, FILM COATED ORAL DAILY
Qty: 21 TABLET | Refills: 11 | Status: ACTIVE | OUTPATIENT
Start: 2023-06-21

## 2023-06-27 DIAGNOSIS — K59.09 OTHER CONSTIPATION: Primary | ICD-10-CM

## 2023-06-27 RX ORDER — SENNOSIDES 8.6 MG
1 TABLET ORAL DAILY
Qty: 90 TABLET | Refills: 1 | Status: SHIPPED | OUTPATIENT
Start: 2023-06-27

## 2023-07-18 ENCOUNTER — CLINICAL DOCUMENTATION (OUTPATIENT)
Facility: HOSPITAL | Age: 68
End: 2023-07-18

## 2023-07-18 ENCOUNTER — CLINICAL DOCUMENTATION (OUTPATIENT)
Dept: ONCOLOGY | Age: 68
End: 2023-07-18

## 2023-07-18 NOTE — PROGRESS NOTES
Patient Assistance    Met with:     Documentation Type: Contact PAP Sponsor  Contact Type: No Contact  Navigation Status: Free Drug Enrollment  Status of Patient Insurance Coverage: Patient has active coverage          Additional notes: Provided [employer] contact info to Nurse Lane for patient    Drug Name: OTHER  Other Drug Name: Britney Child

## 2023-07-18 NOTE — PROGRESS NOTES
Patient called and stated that she has not received her next shipment of Ibrance which she is due to start on Thursday, 7/20/23. This RN called the [employer] at [phone] to inquire. Rep stated that the patient needs to call and set up shipment with them every month, especially when a new script has been sent in. This RN, along with patient on phone, set up shipment for this Thursday. Phone number given to patient to call each month to set up deliveries. Patient voiced understanding of all the above.

## 2023-07-25 RX ORDER — NYSTATIN 100000 [USP'U]/G
POWDER TOPICAL
Qty: 60 G | Refills: 1 | Status: SHIPPED | OUTPATIENT
Start: 2023-07-25

## 2023-07-28 ENCOUNTER — TELEPHONE (OUTPATIENT)
Dept: ONCOLOGY | Age: 68
End: 2023-07-28

## 2023-07-28 NOTE — TELEPHONE ENCOUNTER
Patient called given time and prep for bone scan to be done at [facility] arrival at 9 AM, injection at 9:30 AM, scan to begin at 1:30 PM.    CT scan to be done on 8/29/23 arrival at 9:30 AM [facility].

## 2023-08-14 ENCOUNTER — HOSPITAL ENCOUNTER (OUTPATIENT)
Dept: NUCLEAR MEDICINE | Age: 68
Discharge: HOME | End: 2023-08-14
Attending: INTERNAL MEDICINE
Payer: MEDICARE

## 2023-08-14 DIAGNOSIS — C50.811 MALIGNANT NEOPLASM OF OVERLAPPING SITES OF BOTH BREASTS IN FEMALE, ESTROGEN RECEPTOR POSITIVE (HCC): ICD-10-CM

## 2023-08-14 DIAGNOSIS — C50.011 MALIGNANT NEOPLASM OF NIPPLE OF RIGHT BREAST IN FEMALE, UNSPECIFIED ESTROGEN RECEPTOR STATUS (HCC): ICD-10-CM

## 2023-08-14 DIAGNOSIS — Z17.0 MALIGNANT NEOPLASM OF OVERLAPPING SITES OF BOTH BREASTS IN FEMALE, ESTROGEN RECEPTOR POSITIVE (HCC): ICD-10-CM

## 2023-08-14 DIAGNOSIS — C50.812 MALIGNANT NEOPLASM OF OVERLAPPING SITES OF BOTH BREASTS IN FEMALE, ESTROGEN RECEPTOR POSITIVE (HCC): ICD-10-CM

## 2023-08-14 PROCEDURE — A9503 TC99M MEDRONATE: HCPCS | Performed by: INTERNAL MEDICINE

## 2023-08-14 PROCEDURE — 78306 BONE IMAGING WHOLE BODY: CPT | Performed by: INTERNAL MEDICINE

## 2023-08-14 PROCEDURE — 3430000000 HC RX DIAGNOSTIC RADIOPHARMACEUTICAL: Performed by: INTERNAL MEDICINE

## 2023-08-14 RX ORDER — TC 99M MEDRONATE 20 MG/10ML
29 INJECTION, POWDER, LYOPHILIZED, FOR SOLUTION INTRAVENOUS
Status: COMPLETED | OUTPATIENT
Start: 2023-08-14 | End: 2023-08-14

## 2023-08-14 RX ADMIN — TC 99M MEDRONATE 29 MILLICURIE: 20 INJECTION, POWDER, LYOPHILIZED, FOR SOLUTION INTRAVENOUS at 09:25

## 2023-08-29 ENCOUNTER — HOSPITAL ENCOUNTER (OUTPATIENT)
Dept: CT IMAGING | Age: 68
Discharge: HOME | End: 2023-08-29
Attending: INTERNAL MEDICINE
Payer: MEDICARE

## 2023-08-29 DIAGNOSIS — Z17.0 MALIGNANT NEOPLASM OF OVERLAPPING SITES OF BOTH BREASTS IN FEMALE, ESTROGEN RECEPTOR POSITIVE (HCC): ICD-10-CM

## 2023-08-29 DIAGNOSIS — C50.811 MALIGNANT NEOPLASM OF OVERLAPPING SITES OF BOTH BREASTS IN FEMALE, ESTROGEN RECEPTOR POSITIVE (HCC): ICD-10-CM

## 2023-08-29 DIAGNOSIS — C50.011 MALIGNANT NEOPLASM OF NIPPLE OF RIGHT BREAST IN FEMALE, UNSPECIFIED ESTROGEN RECEPTOR STATUS (HCC): ICD-10-CM

## 2023-08-29 DIAGNOSIS — C50.812 MALIGNANT NEOPLASM OF OVERLAPPING SITES OF BOTH BREASTS IN FEMALE, ESTROGEN RECEPTOR POSITIVE (HCC): ICD-10-CM

## 2023-08-29 LAB
EGFR, POC: >60 ML/MIN/1.73M2
POC CREATININE: 0.5 MG/DL (ref 0.6–1.1)

## 2023-08-29 PROCEDURE — 71260 CT THORAX DX C+: CPT

## 2023-08-29 PROCEDURE — 82565 ASSAY OF CREATININE: CPT

## 2023-08-29 PROCEDURE — 74177 CT ABD & PELVIS W/CONTRAST: CPT

## 2023-08-29 PROCEDURE — 6360000004 HC RX CONTRAST MEDICATION: Performed by: INTERNAL MEDICINE

## 2023-08-29 PROCEDURE — 2580000003 HC RX 258: Performed by: INTERNAL MEDICINE

## 2023-08-29 RX ORDER — SODIUM CHLORIDE 0.9 % (FLUSH) 0.9 %
10 SYRINGE (ML) INJECTION PRN
Status: COMPLETED | OUTPATIENT
Start: 2023-08-29 | End: 2023-08-29

## 2023-08-29 RX ADMIN — IOPAMIDOL 18 ML: 755 INJECTION, SOLUTION INTRAVENOUS at 09:55

## 2023-08-29 RX ADMIN — SODIUM CHLORIDE, PRESERVATIVE FREE 10 ML: 5 INJECTION INTRAVENOUS at 11:06

## 2023-08-29 RX ADMIN — IOPAMIDOL 75 ML: 755 INJECTION, SOLUTION INTRAVENOUS at 11:10

## 2023-09-06 ENCOUNTER — OFFICE VISIT (OUTPATIENT)
Dept: ONCOLOGY | Age: 68
End: 2023-09-06
Payer: MEDICARE

## 2023-09-06 ENCOUNTER — HOSPITAL ENCOUNTER (OUTPATIENT)
Dept: INFUSION THERAPY | Age: 68
Discharge: HOME | End: 2023-09-06
Payer: MEDICARE

## 2023-09-06 VITALS
WEIGHT: 155 LBS | TEMPERATURE: 97.3 F | HEART RATE: 85 BPM | BODY MASS INDEX: 26.46 KG/M2 | OXYGEN SATURATION: 97 % | SYSTOLIC BLOOD PRESSURE: 120 MMHG | HEIGHT: 64 IN | RESPIRATION RATE: 16 BRPM | DIASTOLIC BLOOD PRESSURE: 56 MMHG

## 2023-09-06 VITALS
TEMPERATURE: 97.3 F | DIASTOLIC BLOOD PRESSURE: 56 MMHG | HEIGHT: 64 IN | HEART RATE: 85 BPM | BODY MASS INDEX: 26.5 KG/M2 | SYSTOLIC BLOOD PRESSURE: 120 MMHG | WEIGHT: 155.2 LBS | OXYGEN SATURATION: 97 %

## 2023-09-06 DIAGNOSIS — C50.812 MALIGNANT NEOPLASM OF OVERLAPPING SITES OF BOTH BREASTS IN FEMALE, ESTROGEN RECEPTOR POSITIVE (HCC): Primary | ICD-10-CM

## 2023-09-06 DIAGNOSIS — Z17.0 MALIGNANT NEOPLASM OF OVERLAPPING SITES OF BOTH BREASTS IN FEMALE, ESTROGEN RECEPTOR POSITIVE (HCC): Primary | ICD-10-CM

## 2023-09-06 DIAGNOSIS — C50.811 MALIGNANT NEOPLASM OF OVERLAPPING SITES OF BOTH BREASTS IN FEMALE, ESTROGEN RECEPTOR POSITIVE (HCC): Primary | ICD-10-CM

## 2023-09-06 DIAGNOSIS — C79.51 MALIGNANT NEOPLASM METASTATIC TO BONE (HCC): ICD-10-CM

## 2023-09-06 LAB
ALBUMIN SERPL-MCNC: 4.6 GM/DL (ref 3.4–5)
ALP BLD-CCNC: 45 IU/L (ref 40–128)
ALT SERPL-CCNC: 7 U/L (ref 10–40)
ANION GAP SERPL CALCULATED.3IONS-SCNC: 12 MMOL/L (ref 4–16)
AST SERPL-CCNC: 14 IU/L (ref 15–37)
BASOPHILS ABSOLUTE: 0 K/CU MM
BASOPHILS RELATIVE PERCENT: 1.5 % (ref 0–1)
BILIRUB SERPL-MCNC: 0.6 MG/DL (ref 0–1)
BUN SERPL-MCNC: 8 MG/DL (ref 6–23)
CALCIUM SERPL-MCNC: 9.1 MG/DL (ref 8.3–10.6)
CHLORIDE BLD-SCNC: 108 MMOL/L (ref 99–110)
CO2: 22 MMOL/L (ref 21–32)
CREAT SERPL-MCNC: 0.6 MG/DL (ref 0.6–1.1)
DIFFERENTIAL TYPE: ABNORMAL
EGFR, POC: >60 ML/MIN/1.73M2
EOSINOPHILS ABSOLUTE: 0 K/CU MM
EOSINOPHILS RELATIVE PERCENT: 0.7 % (ref 0–3)
GFR SERPL CREATININE-BSD FRML MDRD: >60 ML/MIN/1.73M2
GLUCOSE BLD-MCNC: 85 MG/DL (ref 70–99)
GLUCOSE SERPL-MCNC: 85 MG/DL (ref 70–99)
HCT VFR BLD CALC: 32.9 % (ref 37–47)
HEMOGLOBIN: 10.9 GM/DL (ref 12.5–16)
LYMPHOCYTES ABSOLUTE: 1.5 K/CU MM
LYMPHOCYTES RELATIVE PERCENT: 53.8 % (ref 24–44)
MCH RBC QN AUTO: 35.4 PG (ref 27–31)
MCHC RBC AUTO-ENTMCNC: 33.1 % (ref 32–36)
MCV RBC AUTO: 106.8 FL (ref 78–100)
MONOCYTES ABSOLUTE: 0.2 K/CU MM
MONOCYTES RELATIVE PERCENT: 5.5 % (ref 0–4)
PDW BLD-RTO: 12.8 % (ref 11.7–14.9)
PLATELET # BLD: 182 K/CU MM (ref 140–440)
PMV BLD AUTO: 8.4 FL (ref 7.5–11.1)
POC BUN: 6 MG/DL (ref 8–26)
POC CALCIUM: 1.24 MMOL/L (ref 1.12–1.32)
POC CHLORIDE: 109 MMOL/L (ref 98–109)
POC CO2: 25 MMOL/L (ref 21–32)
POC CREATININE: 0.6 MG/DL (ref 0.6–1.1)
POTASSIUM SERPL-SCNC: 3.7 MMOL/L (ref 3.5–4.5)
POTASSIUM SERPL-SCNC: 4 MMOL/L (ref 3.5–5.1)
RBC # BLD: 3.08 M/CU MM (ref 4.2–5.4)
SEGMENTED NEUTROPHILS ABSOLUTE COUNT: 1.1 K/CU MM
SEGMENTED NEUTROPHILS RELATIVE PERCENT: 38.5 % (ref 36–66)
SODIUM BLD-SCNC: 141 MMOL/L (ref 138–146)
SODIUM BLD-SCNC: 142 MMOL/L (ref 135–145)
SOURCE, BLOOD GAS: ABNORMAL
TOTAL PROTEIN: 6.6 GM/DL (ref 6.4–8.2)
WBC # BLD: 2.7 K/CU MM (ref 4–10.5)

## 2023-09-06 PROCEDURE — 86300 IMMUNOASSAY TUMOR CA 15-3: CPT

## 2023-09-06 PROCEDURE — 96365 THER/PROPH/DIAG IV INF INIT: CPT

## 2023-09-06 PROCEDURE — 6360000002 HC RX W HCPCS: Performed by: INTERNAL MEDICINE

## 2023-09-06 PROCEDURE — 2580000003 HC RX 258: Performed by: INTERNAL MEDICINE

## 2023-09-06 PROCEDURE — 80053 COMPREHEN METABOLIC PANEL: CPT

## 2023-09-06 PROCEDURE — 85025 COMPLETE CBC W/AUTO DIFF WBC: CPT

## 2023-09-06 PROCEDURE — 99214 OFFICE O/P EST MOD 30 MIN: CPT | Performed by: INTERNAL MEDICINE

## 2023-09-06 PROCEDURE — 1124F ACP DISCUSS-NO DSCNMKR DOCD: CPT | Performed by: INTERNAL MEDICINE

## 2023-09-06 RX ORDER — SODIUM CHLORIDE 0.9 % (FLUSH) 0.9 %
5-40 SYRINGE (ML) INJECTION PRN
Status: DISCONTINUED | OUTPATIENT
Start: 2023-09-06 | End: 2023-09-07 | Stop reason: HOSPADM

## 2023-09-06 RX ORDER — SODIUM CHLORIDE 0.9 % (FLUSH) 0.9 %
5-40 SYRINGE (ML) INJECTION PRN
Status: CANCELLED | OUTPATIENT
Start: 2023-09-06

## 2023-09-06 RX ORDER — MEPERIDINE HYDROCHLORIDE 50 MG/ML
12.5 INJECTION INTRAMUSCULAR; INTRAVENOUS; SUBCUTANEOUS PRN
Status: CANCELLED | OUTPATIENT
Start: 2023-09-06

## 2023-09-06 RX ORDER — HEPARIN SODIUM (PORCINE) LOCK FLUSH IV SOLN 100 UNIT/ML 100 UNIT/ML
500 SOLUTION INTRAVENOUS PRN
Status: CANCELLED | OUTPATIENT
Start: 2023-09-06

## 2023-09-06 RX ORDER — EPINEPHRINE 1 MG/ML
0.3 INJECTION, SOLUTION, CONCENTRATE INTRAVENOUS PRN
Status: CANCELLED | OUTPATIENT
Start: 2023-09-06

## 2023-09-06 RX ORDER — SODIUM CHLORIDE 9 MG/ML
INJECTION, SOLUTION INTRAVENOUS CONTINUOUS
Status: CANCELLED | OUTPATIENT
Start: 2023-09-06

## 2023-09-06 RX ORDER — FAMOTIDINE 10 MG/ML
20 INJECTION, SOLUTION INTRAVENOUS
Status: CANCELLED | OUTPATIENT
Start: 2023-09-06

## 2023-09-06 RX ORDER — ALBUTEROL SULFATE 90 UG/1
4 AEROSOL, METERED RESPIRATORY (INHALATION) PRN
Status: CANCELLED | OUTPATIENT
Start: 2023-09-06

## 2023-09-06 RX ORDER — HEPARIN 100 UNIT/ML
500 SYRINGE INTRAVENOUS PRN
Status: DISCONTINUED | OUTPATIENT
Start: 2023-09-06 | End: 2023-09-07 | Stop reason: HOSPADM

## 2023-09-06 RX ORDER — ONDANSETRON 2 MG/ML
8 INJECTION INTRAMUSCULAR; INTRAVENOUS
Status: CANCELLED | OUTPATIENT
Start: 2023-09-06

## 2023-09-06 RX ORDER — ACETAMINOPHEN 325 MG/1
650 TABLET ORAL
Status: CANCELLED | OUTPATIENT
Start: 2023-09-06

## 2023-09-06 RX ORDER — SODIUM CHLORIDE 9 MG/ML
5-250 INJECTION, SOLUTION INTRAVENOUS PRN
Status: CANCELLED | OUTPATIENT
Start: 2023-09-06

## 2023-09-06 RX ORDER — ZOLEDRONIC ACID 0.04 MG/ML
4 INJECTION, SOLUTION INTRAVENOUS ONCE
Status: CANCELLED | OUTPATIENT
Start: 2023-09-06 | End: 2023-09-06

## 2023-09-06 RX ORDER — DIPHENHYDRAMINE HYDROCHLORIDE 50 MG/ML
50 INJECTION INTRAMUSCULAR; INTRAVENOUS
Status: CANCELLED | OUTPATIENT
Start: 2023-09-06

## 2023-09-06 RX ORDER — SODIUM CHLORIDE 9 MG/ML
5-250 INJECTION, SOLUTION INTRAVENOUS PRN
Status: DISCONTINUED | OUTPATIENT
Start: 2023-09-06 | End: 2023-09-07 | Stop reason: HOSPADM

## 2023-09-06 RX ADMIN — HEPARIN 500 UNITS: 100 SYRINGE at 12:58

## 2023-09-06 RX ADMIN — SODIUM CHLORIDE 20 ML/HR: 9 INJECTION, SOLUTION INTRAVENOUS at 12:21

## 2023-09-06 RX ADMIN — ZOLEDRONIC ACID 4 MG: 4 INJECTION, SOLUTION, CONCENTRATE INTRAVENOUS at 12:22

## 2023-09-06 RX ADMIN — SODIUM CHLORIDE, PRESERVATIVE FREE 20 ML: 5 INJECTION INTRAVENOUS at 12:58

## 2023-09-06 NOTE — PROGRESS NOTES
Patient arrived to treatment suite, after office visit for mediport access, blood draw,and Zometa infusion. Mediport on RT chest accessed. Good blood return noted, labs drawn and sent to lab for processing. Treatment approved and administered as ordered. Patient tolerated well. Left treatment suite ambulatory. Discharge instructions provided.

## 2023-09-06 NOTE — PROGRESS NOTES
Patient Name:  Vlad Childs  Patient :  1955  Patient MRN:  [id number]     Primary Oncologist: Linda Flowers MD  Referring Provider: MAURICIO Wilson CNP     Date of Service: 2023      Chief Complaint:    Chief Complaint   Patient presents with    Follow-up     Patient's active problem list:       Liver mass       Lytic bone lesions       Left breast mass    HPI:   Williams Ngo is a 79year-old very pleasant female with medical history significant for osteoarthritis, initially referred to me on 22 for evaluation of her liver lesion and multiple lytic bone lesions. She initially presented to [facility] ER on 22 with severe lower back pain and constipation. Stated that she has been having back pain for about 4 - 6 weeks duration, which becomes severe pain started a week before presentation. CT scan of the abdomen and pelvis done on 2022 showed lesion (6.1 x 8.1 cm) in the hepatic segment 4, concerning for neoplasm. Further evaluation with MRI of the liver is recommended. Extensive lytic metastatic disease in lumbar and thoracic spine and pelvis with most dominant lesion seen at L5 vertebral body. Several small bowel loops segments in the left abdomen demonstrate mild wall thickening, nonspecific may indicate enteritis. Nonspecific partially visualized inflammatory stranding along the pericardium. 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 CT lumbar spine done on 22 showed extensive multifocal lytic lesions concerning for neoplastic/metastatic disease. Age indeterminant compression fractures at T12 and L4, with mild narrowing of the AP diameter of the canal at L4. She never had colonoscopy before. She had pap smear around . She didn't recall when was her last mammogram.     She denies weight loss. She hasn't been eating much for about 10 days since she has been having nausea.      Since she is found to have multiple lytic bone lesions and liver lesion, she was referred to

## 2023-09-06 NOTE — PROGRESS NOTES
MA Rooming Questions  Patient: Yaakov Cage  MRN: [id number]    Date: 9/6/2023        1. Do you have any new issues?   no         2. Do you need any refills on medications?    no    3. Have you had any imaging done since your last visit? yes -ct chest abd/pel 8/29    4. Have you been hospitalized or seen in the emergency room since your last visit here?   no    5. Did the patient have a depression screening completed today?  No    No data recorded     PHQ-9 Given to (if applicable):               PHQ-9 Score (if applicable):                     [] Positive     []  Negative              Does question #9 need addressed (if applicable)                     [] Yes    []  No               Gloria Blackburn MA

## 2023-09-08 LAB — CANCER AG27-29 SERPL-ACNC: 62.7 U/ML

## 2023-09-15 DIAGNOSIS — K59.09 OTHER CONSTIPATION: ICD-10-CM

## 2023-09-15 RX ORDER — SENNOSIDES 8.6 MG/1
1 TABLET, COATED ORAL DAILY
Qty: 90 TABLET | Refills: 1 | Status: SHIPPED | OUTPATIENT
Start: 2023-09-15

## 2023-11-21 ENCOUNTER — OFFICE VISIT (OUTPATIENT)
Dept: ONCOLOGY | Age: 68
End: 2023-11-21
Payer: MEDICARE

## 2023-11-21 ENCOUNTER — HOSPITAL ENCOUNTER (OUTPATIENT)
Dept: INFUSION THERAPY | Age: 68
Discharge: HOME | End: 2023-11-21
Payer: MEDICARE

## 2023-11-21 VITALS
DIASTOLIC BLOOD PRESSURE: 66 MMHG | SYSTOLIC BLOOD PRESSURE: 140 MMHG | BODY MASS INDEX: 25.06 KG/M2 | HEIGHT: 64 IN | OXYGEN SATURATION: 98 % | HEART RATE: 94 BPM | WEIGHT: 146.8 LBS | TEMPERATURE: 96.8 F

## 2023-11-21 DIAGNOSIS — T45.1X5A CHEMOTHERAPY INDUCED NAUSEA AND VOMITING: ICD-10-CM

## 2023-11-21 DIAGNOSIS — R11.2 CHEMOTHERAPY INDUCED NAUSEA AND VOMITING: ICD-10-CM

## 2023-11-21 PROCEDURE — 99401 PREV MED CNSL INDIV APPRX 15: CPT | Performed by: INTERNAL MEDICINE

## 2023-11-21 PROCEDURE — 99211 OFF/OP EST MAY X REQ PHY/QHP: CPT

## 2023-11-21 RX ORDER — ONDANSETRON HYDROCHLORIDE 8 MG/1
8 TABLET, FILM COATED ORAL EVERY 8 HOURS PRN
Qty: 30 TABLET | Refills: 1 | Status: SHIPPED | OUTPATIENT
Start: 2023-11-21

## 2023-11-21 ASSESSMENT — PATIENT HEALTH QUESTIONNAIRE - PHQ9
2. FEELING DOWN, DEPRESSED OR HOPELESS: 0
SUM OF ALL RESPONSES TO PHQ QUESTIONS 1-9: 0
SUM OF ALL RESPONSES TO PHQ QUESTIONS 1-9: 0
1. LITTLE INTEREST OR PLEASURE IN DOING THINGS: 0
SUM OF ALL RESPONSES TO PHQ QUESTIONS 1-9: 0
SUM OF ALL RESPONSES TO PHQ QUESTIONS 1-9: 0
SUM OF ALL RESPONSES TO PHQ9 QUESTIONS 1 & 2: 0

## 2023-11-21 NOTE — PROGRESS NOTES
MA Rooming Questions  Patient: Arun Stallworth  MRN: [id number]    Date: 11/21/2023        1. Do you have any new issues?   no         2. Do you need any refills on medications? yes - Zofran    3. Have you had any imaging done since your last visit?   no    4. Have you been hospitalized or seen in the emergency room since your last visit here?   no    5. Did the patient have a depression screening completed today?  Yes    PHQ-9 Total Score: 0 (11/21/2023  9:03 AM)       PHQ-9 Given to (if applicable):               PHQ-9 Score (if applicable):                     [] Positive     []  Negative              Does question #9 need addressed (if applicable)                     [] Yes    []  No               Flash Cueva CMA
For the most part she had good appetite no significant pain. Fairly active though there are days that she felt tired and would take it easy. Otherwise enjoying her family both of her daughters here as well as daughter in [place]. Her daughter in [place] is expecting first baby in May of next year. She has been able to [facility] [place]. All in all I think she was pleased with her progress. Still with present supportive measures including cannabis she was doing quite well. She also is enjoying her 4 grandchildren in [place]. Psychological and Cognitive Symptoms:   Denies any issues in this regard  Illness Understanding and Care Preferences: Has very good understanding about the disease process and so far is very pleased with the care she has been receiving. Existential and Spiritual:   Not a regular [employer] person. Tries to do good by other people  Social and Economic Resources:   Denies any issues in this regard  Care Coordination:   August 3, 2022 spent time with her, reviewing her personal and medical history. So far she has been tolerating at least early on treatments extremely well. Most of her symptoms are extremely well managed including pain. Major issue bothering her has not been able to relax and sleep at night and she has tried sleeping aids without much help and the question of cannabis was raised. I think that would be reasonable. We will try helping her in that regards. Otherwise at this stage as mentioned above most of her symptoms are well managed and I will be seeing her again in few months. November 21, 2023 for the most part her symptoms are fairly well managed with present supportive measures. She was quite pleased encouraged her to continue to follow with her oncology team and I will be seeing her again in 6 months or so.      time spent 15 minutes  Laquita Lanier MD

## 2023-11-27 DIAGNOSIS — C50.811 MALIGNANT NEOPLASM OF OVERLAPPING SITES OF BOTH BREASTS IN FEMALE, ESTROGEN RECEPTOR POSITIVE (HCC): Primary | ICD-10-CM

## 2023-11-27 DIAGNOSIS — C79.51 MALIGNANT NEOPLASM METASTATIC TO BONE (HCC): ICD-10-CM

## 2023-11-27 DIAGNOSIS — Z17.0 MALIGNANT NEOPLASM OF OVERLAPPING SITES OF BOTH BREASTS IN FEMALE, ESTROGEN RECEPTOR POSITIVE (HCC): Primary | ICD-10-CM

## 2023-11-27 DIAGNOSIS — C50.812 MALIGNANT NEOPLASM OF OVERLAPPING SITES OF BOTH BREASTS IN FEMALE, ESTROGEN RECEPTOR POSITIVE (HCC): Primary | ICD-10-CM

## 2023-11-28 ENCOUNTER — CLINICAL DOCUMENTATION (OUTPATIENT)
Facility: HOSPITAL | Age: 68
End: 2023-11-28

## 2023-11-28 NOTE — PROGRESS NOTES
Left a message for the patient to return my call regarding 2024 re-enrollment for her free Ibrance.    Yvrose Sow, CPC  Financial Navigator  [facility]  [phone]

## 2023-11-29 ENCOUNTER — HOSPITAL ENCOUNTER (OUTPATIENT)
Dept: INFUSION THERAPY | Age: 68
Discharge: HOME | End: 2023-11-29
Payer: MEDICARE

## 2023-11-29 VITALS
OXYGEN SATURATION: 99 % | WEIGHT: 146.4 LBS | HEIGHT: 64 IN | BODY MASS INDEX: 25 KG/M2 | DIASTOLIC BLOOD PRESSURE: 62 MMHG | TEMPERATURE: 97.7 F | SYSTOLIC BLOOD PRESSURE: 134 MMHG | HEART RATE: 81 BPM

## 2023-11-29 DIAGNOSIS — C50.811 MALIGNANT NEOPLASM OF OVERLAPPING SITES OF BOTH BREASTS IN FEMALE, ESTROGEN RECEPTOR POSITIVE (HCC): Primary | ICD-10-CM

## 2023-11-29 DIAGNOSIS — C50.812 MALIGNANT NEOPLASM OF OVERLAPPING SITES OF BOTH BREASTS IN FEMALE, ESTROGEN RECEPTOR POSITIVE (HCC): Primary | ICD-10-CM

## 2023-11-29 DIAGNOSIS — C50.812 MALIGNANT NEOPLASM OF OVERLAPPING SITES OF BOTH BREASTS IN FEMALE, ESTROGEN RECEPTOR POSITIVE (HCC): ICD-10-CM

## 2023-11-29 DIAGNOSIS — Z17.0 MALIGNANT NEOPLASM OF OVERLAPPING SITES OF BOTH BREASTS IN FEMALE, ESTROGEN RECEPTOR POSITIVE (HCC): Primary | ICD-10-CM

## 2023-11-29 DIAGNOSIS — Z17.0 MALIGNANT NEOPLASM OF OVERLAPPING SITES OF BOTH BREASTS IN FEMALE, ESTROGEN RECEPTOR POSITIVE (HCC): ICD-10-CM

## 2023-11-29 DIAGNOSIS — C79.51 MALIGNANT NEOPLASM METASTATIC TO BONE (HCC): ICD-10-CM

## 2023-11-29 DIAGNOSIS — C50.811 MALIGNANT NEOPLASM OF OVERLAPPING SITES OF BOTH BREASTS IN FEMALE, ESTROGEN RECEPTOR POSITIVE (HCC): ICD-10-CM

## 2023-11-29 LAB
ALBUMIN SERPL-MCNC: 4.2 GM/DL (ref 3.4–5)
ALP BLD-CCNC: 47 IU/L (ref 40–128)
ALT SERPL-CCNC: 7 U/L (ref 10–40)
ANION GAP SERPL CALCULATED.3IONS-SCNC: 11 MMOL/L (ref 4–16)
AST SERPL-CCNC: 16 IU/L (ref 15–37)
BILIRUB SERPL-MCNC: 0.5 MG/DL (ref 0–1)
BUN SERPL-MCNC: 14 MG/DL (ref 6–23)
CALCIUM SERPL-MCNC: 9.4 MG/DL (ref 8.3–10.6)
CHLORIDE BLD-SCNC: 108 MMOL/L (ref 99–110)
CO2: 23 MMOL/L (ref 21–32)
CREAT SERPL-MCNC: 0.6 MG/DL (ref 0.6–1.1)
EGFR, POC: >60 ML/MIN/1.73M2
GFR SERPL CREATININE-BSD FRML MDRD: >60 ML/MIN/1.73M2
GLUCOSE BLD-MCNC: 84 MG/DL (ref 70–99)
GLUCOSE SERPL-MCNC: 85 MG/DL (ref 70–99)
MAGNESIUM: 1.8 MG/DL (ref 1.8–2.4)
PHOSPHORUS: 3.7 MG/DL (ref 2.5–4.9)
POC BUN: 13 MG/DL (ref 8–26)
POC CALCIUM: 1.23 MMOL/L (ref 1.12–1.32)
POC CHLORIDE: 110 MMOL/L (ref 98–109)
POC CO2: 22 MMOL/L (ref 21–32)
POC CREATININE: 0.5 MG/DL (ref 0.6–1.1)
POTASSIUM SERPL-SCNC: 3.8 MMOL/L (ref 3.5–4.5)
POTASSIUM SERPL-SCNC: 3.9 MMOL/L (ref 3.5–5.1)
SODIUM BLD-SCNC: 142 MMOL/L (ref 135–145)
SODIUM BLD-SCNC: 142 MMOL/L (ref 138–146)
SOURCE, BLOOD GAS: ABNORMAL
TOTAL PROTEIN: 6.4 GM/DL (ref 6.4–8.2)

## 2023-11-29 PROCEDURE — 83735 ASSAY OF MAGNESIUM: CPT

## 2023-11-29 PROCEDURE — 2580000003 HC RX 258: Performed by: INTERNAL MEDICINE

## 2023-11-29 PROCEDURE — 96365 THER/PROPH/DIAG IV INF INIT: CPT

## 2023-11-29 PROCEDURE — 84100 ASSAY OF PHOSPHORUS: CPT

## 2023-11-29 PROCEDURE — 80053 COMPREHEN METABOLIC PANEL: CPT

## 2023-11-29 PROCEDURE — 6360000002 HC RX W HCPCS: Performed by: INTERNAL MEDICINE

## 2023-11-29 RX ORDER — SODIUM CHLORIDE 0.9 % (FLUSH) 0.9 %
5-40 SYRINGE (ML) INJECTION PRN
Status: DISCONTINUED | OUTPATIENT
Start: 2023-11-29 | End: 2023-11-30 | Stop reason: HOSPADM

## 2023-11-29 RX ORDER — HEPARIN 100 UNIT/ML
500 SYRINGE INTRAVENOUS PRN
Status: DISCONTINUED | OUTPATIENT
Start: 2023-11-29 | End: 2023-11-30 | Stop reason: HOSPADM

## 2023-11-29 RX ORDER — FAMOTIDINE 10 MG/ML
20 INJECTION, SOLUTION INTRAVENOUS
Status: CANCELLED | OUTPATIENT
Start: 2024-02-21

## 2023-11-29 RX ORDER — SODIUM CHLORIDE 9 MG/ML
5-250 INJECTION, SOLUTION INTRAVENOUS PRN
Status: CANCELLED | OUTPATIENT
Start: 2024-02-21

## 2023-11-29 RX ORDER — SODIUM CHLORIDE 9 MG/ML
5-250 INJECTION, SOLUTION INTRAVENOUS PRN
Status: CANCELLED | OUTPATIENT
Start: 2023-11-29

## 2023-11-29 RX ORDER — MEPERIDINE HYDROCHLORIDE 50 MG/ML
12.5 INJECTION INTRAMUSCULAR; INTRAVENOUS; SUBCUTANEOUS PRN
Status: CANCELLED | OUTPATIENT
Start: 2024-02-21

## 2023-11-29 RX ORDER — ZOLEDRONIC ACID 0.04 MG/ML
4 INJECTION, SOLUTION INTRAVENOUS ONCE
Status: CANCELLED | OUTPATIENT
Start: 2024-02-21 | End: 2024-02-21

## 2023-11-29 RX ORDER — DIPHENHYDRAMINE HYDROCHLORIDE 50 MG/ML
50 INJECTION INTRAMUSCULAR; INTRAVENOUS
Status: CANCELLED | OUTPATIENT
Start: 2023-11-29

## 2023-11-29 RX ORDER — HEPARIN SODIUM (PORCINE) LOCK FLUSH IV SOLN 100 UNIT/ML 100 UNIT/ML
500 SOLUTION INTRAVENOUS PRN
Status: CANCELLED | OUTPATIENT
Start: 2023-11-29

## 2023-11-29 RX ORDER — EPINEPHRINE 1 MG/ML
0.3 INJECTION, SOLUTION, CONCENTRATE INTRAVENOUS PRN
Status: CANCELLED | OUTPATIENT
Start: 2024-02-21

## 2023-11-29 RX ORDER — SODIUM CHLORIDE 9 MG/ML
INJECTION, SOLUTION INTRAVENOUS CONTINUOUS
Status: CANCELLED | OUTPATIENT
Start: 2023-11-29

## 2023-11-29 RX ORDER — ZOLEDRONIC ACID 0.04 MG/ML
4 INJECTION, SOLUTION INTRAVENOUS ONCE
Status: CANCELLED | OUTPATIENT
Start: 2023-11-29 | End: 2023-11-29

## 2023-11-29 RX ORDER — FAMOTIDINE 10 MG/ML
20 INJECTION, SOLUTION INTRAVENOUS
Status: CANCELLED | OUTPATIENT
Start: 2023-11-29

## 2023-11-29 RX ORDER — ACETAMINOPHEN 325 MG/1
650 TABLET ORAL
Status: CANCELLED | OUTPATIENT
Start: 2023-11-29

## 2023-11-29 RX ORDER — ALBUTEROL SULFATE 90 UG/1
4 AEROSOL, METERED RESPIRATORY (INHALATION) PRN
Status: CANCELLED | OUTPATIENT
Start: 2024-02-21

## 2023-11-29 RX ORDER — ONDANSETRON 2 MG/ML
8 INJECTION INTRAMUSCULAR; INTRAVENOUS
Status: CANCELLED | OUTPATIENT
Start: 2023-11-29

## 2023-11-29 RX ORDER — EPINEPHRINE 1 MG/ML
0.3 INJECTION, SOLUTION, CONCENTRATE INTRAVENOUS PRN
Status: CANCELLED | OUTPATIENT
Start: 2023-11-29

## 2023-11-29 RX ORDER — HEPARIN SODIUM (PORCINE) LOCK FLUSH IV SOLN 100 UNIT/ML 100 UNIT/ML
500 SOLUTION INTRAVENOUS PRN
Status: CANCELLED | OUTPATIENT
Start: 2024-02-21

## 2023-11-29 RX ORDER — ALBUTEROL SULFATE 90 UG/1
4 AEROSOL, METERED RESPIRATORY (INHALATION) PRN
Status: CANCELLED | OUTPATIENT
Start: 2023-11-29

## 2023-11-29 RX ORDER — SODIUM CHLORIDE 9 MG/ML
5-250 INJECTION, SOLUTION INTRAVENOUS PRN
Status: DISCONTINUED | OUTPATIENT
Start: 2023-11-29 | End: 2023-11-30 | Stop reason: HOSPADM

## 2023-11-29 RX ORDER — SODIUM CHLORIDE 9 MG/ML
INJECTION, SOLUTION INTRAVENOUS CONTINUOUS
Status: CANCELLED | OUTPATIENT
Start: 2024-02-21

## 2023-11-29 RX ORDER — ONDANSETRON 2 MG/ML
8 INJECTION INTRAMUSCULAR; INTRAVENOUS
Status: CANCELLED | OUTPATIENT
Start: 2024-02-21

## 2023-11-29 RX ORDER — SODIUM CHLORIDE 0.9 % (FLUSH) 0.9 %
5-40 SYRINGE (ML) INJECTION PRN
Status: CANCELLED | OUTPATIENT
Start: 2023-11-29

## 2023-11-29 RX ORDER — DIPHENHYDRAMINE HYDROCHLORIDE 50 MG/ML
50 INJECTION INTRAMUSCULAR; INTRAVENOUS
Status: CANCELLED | OUTPATIENT
Start: 2024-02-21

## 2023-11-29 RX ORDER — SODIUM CHLORIDE 0.9 % (FLUSH) 0.9 %
5-40 SYRINGE (ML) INJECTION PRN
Status: CANCELLED | OUTPATIENT
Start: 2024-02-21

## 2023-11-29 RX ORDER — MEPERIDINE HYDROCHLORIDE 50 MG/ML
12.5 INJECTION INTRAMUSCULAR; INTRAVENOUS; SUBCUTANEOUS PRN
Status: CANCELLED | OUTPATIENT
Start: 2023-11-29

## 2023-11-29 RX ORDER — ACETAMINOPHEN 325 MG/1
650 TABLET ORAL
Status: CANCELLED | OUTPATIENT
Start: 2024-02-21

## 2023-11-29 RX ADMIN — HEPARIN 500 UNITS: 100 SYRINGE at 12:35

## 2023-11-29 RX ADMIN — ZOLEDRONIC ACID 4 MG: 4 INJECTION, SOLUTION, CONCENTRATE INTRAVENOUS at 12:12

## 2023-11-29 RX ADMIN — SODIUM CHLORIDE 30 ML/HR: 9 INJECTION, SOLUTION INTRAVENOUS at 12:03

## 2023-11-29 NOTE — PROGRESS NOTES
Ambulated to infusion area, here today for chemotherapy. No concerns at this time. Right chest mediport accessed, positive blood return noted. Labs reviewed, Labs within defined limits. Chemo administered as ordered. Call light within reach. Tolerated infusion without incident. Pt did not need AVS, has MyChart.

## 2023-12-05 ENCOUNTER — TELEPHONE (OUTPATIENT)
Dept: ONCOLOGY | Age: 68
End: 2023-12-05

## 2023-12-05 NOTE — TELEPHONE ENCOUNTER
Left message with time and prep for bone scan to be done on 12/18/23 [facility] arrival at 9 AM, returning at 1 PM for scan. Yesica and CT scans to be done on 12/20/23 [facility] arrival at 9:30 AM. Informed to call with any questions.

## 2023-12-07 RX ORDER — NYSTATIN 100000 [USP'U]/G
POWDER TOPICAL
Qty: 60 G | Refills: 1 | Status: SHIPPED | OUTPATIENT
Start: 2023-12-07

## 2023-12-26 ENCOUNTER — HOSPITAL ENCOUNTER (OUTPATIENT)
Dept: INFUSION THERAPY | Age: 68
Discharge: HOME | End: 2023-12-26
Payer: MEDICARE

## 2023-12-26 ENCOUNTER — OFFICE VISIT (OUTPATIENT)
Dept: ONCOLOGY | Age: 68
End: 2023-12-26
Payer: MEDICARE

## 2023-12-26 VITALS
DIASTOLIC BLOOD PRESSURE: 67 MMHG | TEMPERATURE: 97.9 F | BODY MASS INDEX: 25.64 KG/M2 | HEIGHT: 64 IN | HEART RATE: 87 BPM | SYSTOLIC BLOOD PRESSURE: 154 MMHG | WEIGHT: 150.2 LBS | OXYGEN SATURATION: 100 %

## 2023-12-26 DIAGNOSIS — C50.812 MALIGNANT NEOPLASM OF OVERLAPPING SITES OF BOTH BREASTS IN FEMALE, ESTROGEN RECEPTOR POSITIVE (HCC): Primary | ICD-10-CM

## 2023-12-26 DIAGNOSIS — K59.09 OTHER CONSTIPATION: ICD-10-CM

## 2023-12-26 DIAGNOSIS — C50.811 MALIGNANT NEOPLASM OF OVERLAPPING SITES OF BOTH BREASTS IN FEMALE, ESTROGEN RECEPTOR POSITIVE (HCC): Primary | ICD-10-CM

## 2023-12-26 DIAGNOSIS — Z17.0 MALIGNANT NEOPLASM OF OVERLAPPING SITES OF BOTH BREASTS IN FEMALE, ESTROGEN RECEPTOR POSITIVE (HCC): Primary | ICD-10-CM

## 2023-12-26 PROCEDURE — 99211 OFF/OP EST MAY X REQ PHY/QHP: CPT

## 2023-12-26 PROCEDURE — 1124F ACP DISCUSS-NO DSCNMKR DOCD: CPT | Performed by: INTERNAL MEDICINE

## 2023-12-26 PROCEDURE — 99214 OFFICE O/P EST MOD 30 MIN: CPT | Performed by: INTERNAL MEDICINE

## 2023-12-26 RX ORDER — SENNOSIDES A AND B 8.6 MG/1
1 TABLET, FILM COATED ORAL DAILY
Qty: 90 TABLET | Refills: 1 | Status: SHIPPED | OUTPATIENT
Start: 2023-12-26

## 2023-12-26 NOTE — PROGRESS NOTES
MA Rooming Questions  Patient: Vanessa Seuro  MRN: [id number]    Date: 12/26/2023        1. Do you have any new issues?   no         2. Do you need any refills on medications? yes - senna     3. Have you had any imaging done since your last visit? yes - jessica, us, and ct 12/20    4. Have you been hospitalized or seen in the emergency room since your last visit here?   no    5. Did the patient have a depression screening completed today?  No    No data recorded     PHQ-9 Given to (if applicable):               PHQ-9 Score (if applicable):                     [] Positive     []  Negative              Does question #9 need addressed (if applicable)                     [] Yes    []  No               Rebecca Ramirez CMA
Widespread skeletal metastatic disease. Bone scan on 8/14/23 showed stable appearance of the bone scan. No new focal area of increased uptake to suggest new skeletal metastatic disease. CT chest, abdomen and pelvis done on 8/29/23 showed redemonstration of diffuse osseous metastatic disease with multilevel chronic mild compression pathologic fractures of the thoracic and lumbar spine. No significant change since prior examination. Ill-defined hypoattenuating mass in the left hepatic lobe dome is slightly increased in size since prior examination now extending into segment IV of the liver suggesting slight progression of hepatic metastatic disease. No evidence of metastatic disease in the lungs or mediastinum. Bone scan on 12/18/23 showed stable appearance of the bone scan with evidence of multifocal skeletal metastatic disease. No new uptake to suggest new skeletal metastases. CT chest, abdomen, pelvis on 12/20/23 showed no evidence of disease progression within the chest. Findings consistent with progression of metastatic disease within the liver with at least 3 new ill-defined hepatic hypodensities and interval increase in size in previously visualized hypodensity within the hepatic dome now measuring up to 7.2 cm. Diffuse mixed sclerotic and lytic lesions throughout the visualized osseous structures. These appear unchanged compared to prior exam.    On December 26, 2023, she presented to me for follow up. She was initially referred to me for evaluation of liver lesion and multiple bone lesions. She was found to have left breast mass with a distorted left breast on physical examination. 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     Since she has significant visceral disease, I recommend her to initiate first-line chemotherapy with single

## 2024-01-02 ENCOUNTER — TELEPHONE (OUTPATIENT)
Facility: HOSPITAL | Age: 69
End: 2024-01-02

## 2024-01-02 NOTE — TELEPHONE ENCOUNTER
Left a detailed message regarding last shipment for Ibrance.  Want to check to see if received and for a 2 month supply.

## 2024-01-04 ENCOUNTER — TELEPHONE (OUTPATIENT)
Dept: ONCOLOGY | Age: 69
End: 2024-01-04

## 2024-01-04 NOTE — TELEPHONE ENCOUNTER
Left message with time and prep for biopsy to be done on 1/15/24 [facility] arrival at 8:30 AM. Office visit moved to 1/22 informed to call with any questions.

## 2024-01-09 NOTE — PROGRESS NOTES
IR Procedure at [facility]:  Left message for patient to arrive at 0830 at [facility] on 1/15/2024 for her procedure at 1000. Also went over below instructions and told patient to take her medications as scheduled.    NPO at Midnight      2.   Follow your directions as prescribed by the doctor for your procedure and medications.  3.   Consult your provider as to when to stop blood thinner  4.   Do not take any pain medication within 6 hours of your procedure  5.   Do not drink any alcoholic beverages or use any street drugs 24 hours before procedure.  6.   Please wear simple, loose fitting clothing to the hospital.  Do not bring valuables (money,             credit cards, checkbooks, etc.)     7.   If you  have a Living Will and Durable Power of  for Healthcare, please bring in a copy.  8.   Please bring picture ID,  insurance card, paperwork from the doctors office            (H & P, Consent,  & card for implantable devices).  9.   Report to the information desk on the ground floor.  10. Take a shower the night before or morning of your procedure, do not apply any lotion, oil or powder.  11. If you are going to be sedated for the procedure, you will need a responsible adult to drive you home.

## 2024-01-11 ENCOUNTER — CLINICAL DOCUMENTATION (OUTPATIENT)
Dept: ONCOLOGY | Age: 69
End: 2024-01-11

## 2024-01-11 ENCOUNTER — CLINICAL DOCUMENTATION (OUTPATIENT)
Facility: HOSPITAL | Age: 69
End: 2024-01-11

## 2024-01-11 DIAGNOSIS — C79.51 MALIGNANT NEOPLASM METASTATIC TO BONE (HCC): ICD-10-CM

## 2024-01-11 DIAGNOSIS — C50.811 MALIGNANT NEOPLASM OF OVERLAPPING SITES OF BOTH BREASTS IN FEMALE, ESTROGEN RECEPTOR POSITIVE (HCC): ICD-10-CM

## 2024-01-11 DIAGNOSIS — Z17.0 MALIGNANT NEOPLASM OF OVERLAPPING SITES OF BOTH BREASTS IN FEMALE, ESTROGEN RECEPTOR POSITIVE (HCC): ICD-10-CM

## 2024-01-11 DIAGNOSIS — C50.812 MALIGNANT NEOPLASM OF OVERLAPPING SITES OF BOTH BREASTS IN FEMALE, ESTROGEN RECEPTOR POSITIVE (HCC): ICD-10-CM

## 2024-01-11 RX ORDER — PALBOCICLIB 125 MG/1
125 TABLET, FILM COATED ORAL DAILY
Qty: 21 TABLET | Refills: 11 | Status: SHIPPED | OUTPATIENT
Start: 2024-01-11

## 2024-01-11 NOTE — PROGRESS NOTES
See Financial Navigation note from today - patient has been approved through [employer] portal for assistance - requesting new script for Ibrance be sent.  Per Dr. Rudolph - new script for Ibrance 125 mg e-scripted to [facility].

## 2024-01-11 NOTE — PROGRESS NOTES
Patient Assistance    Met with: Aileen    Navigator Type: Oral  Documentation Type: Assistance Review  Contact Type: Telephone  Navigation Status: Follow-up  Status of Patient Insurance Coverage: Patient has active coverage          Additional notes: Called and spoke with Aileen regarding her application with [employer] for free Ibrance. In formed her that [employer] is requiring the patient to use foundation assistance prior to receiving free drug. I informed her that there was foundation assistance open through [employer] . She was agreeable with me applying I her behalf. . I applied through the [employer] portal and she has been approved for $[id number]. I also informed her since she will no longer be on free medication we will send in a new prescription to a specialty pharmacy and the [facility] will pay her out of pocket cost.    Drug Name: OTHER  Other Drug Name: Ibrance  Form of PAP Assistance: Foundation Assistance

## 2024-01-15 ENCOUNTER — HOSPITAL ENCOUNTER (OUTPATIENT)
Dept: CT IMAGING | Age: 69
Discharge: HOME | End: 2024-01-15
Payer: MEDICARE

## 2024-01-15 VITALS
SYSTOLIC BLOOD PRESSURE: 128 MMHG | RESPIRATION RATE: 18 BRPM | DIASTOLIC BLOOD PRESSURE: 65 MMHG | TEMPERATURE: 97.8 F | OXYGEN SATURATION: 95 % | HEART RATE: 87 BPM

## 2024-01-15 DIAGNOSIS — R16.0 LIVER MASS: ICD-10-CM

## 2024-01-15 LAB
APTT: 27.8 SECONDS (ref 25.1–37.1)
HCT VFR BLD CALC: 31.4 % (ref 37–47)
HEMOGLOBIN: 10.3 GM/DL (ref 12.5–16)
INR BLD: 1 INDEX
MCH RBC QN AUTO: 36 PG (ref 27–31)
MCHC RBC AUTO-ENTMCNC: 32.8 % (ref 32–36)
MCV RBC AUTO: 109.8 FL (ref 78–100)
PDW BLD-RTO: 13.2 % (ref 11.7–14.9)
PLATELET # BLD: 224 K/CU MM (ref 140–440)
PMV BLD AUTO: 8.7 FL (ref 7.5–11.1)
PROTHROMBIN TIME: 13.7 SECONDS (ref 11.7–14.5)
RBC # BLD: 2.86 M/CU MM (ref 4.2–5.4)
WBC # BLD: 2.2 K/CU MM (ref 4–10.5)

## 2024-01-15 PROCEDURE — 88333 PATH CONSLTJ SURG CYTO XM 1: CPT

## 2024-01-15 PROCEDURE — 6360000002 HC RX W HCPCS: Performed by: RADIOLOGY

## 2024-01-15 PROCEDURE — 88342 IMHCHEM/IMCYTCHM 1ST ANTB: CPT

## 2024-01-15 PROCEDURE — 88360 TUMOR IMMUNOHISTOCHEM/MANUAL: CPT

## 2024-01-15 PROCEDURE — 88307 TISSUE EXAM BY PATHOLOGIST: CPT

## 2024-01-15 PROCEDURE — 85730 THROMBOPLASTIN TIME PARTIAL: CPT

## 2024-01-15 PROCEDURE — 6360000002 HC RX W HCPCS

## 2024-01-15 PROCEDURE — 77012 CT SCAN FOR NEEDLE BIOPSY: CPT

## 2024-01-15 PROCEDURE — 88341 IMHCHEM/IMCYTCHM EA ADD ANTB: CPT

## 2024-01-15 PROCEDURE — 88334 PATH CONSLTJ SURG CYTO XM EA: CPT

## 2024-01-15 PROCEDURE — 85027 COMPLETE CBC AUTOMATED: CPT

## 2024-01-15 PROCEDURE — 85610 PROTHROMBIN TIME: CPT

## 2024-01-15 RX ORDER — HEPARIN SODIUM,PORCINE/PF 1 UNIT/ML
1 SYRINGE (ML) INTRAVENOUS PRN
Status: DISCONTINUED | OUTPATIENT
Start: 2024-01-15 | End: 2024-01-16 | Stop reason: HOSPADM

## 2024-01-15 RX ORDER — SODIUM CHLORIDE 0.9 % (FLUSH) 0.9 %
10 SYRINGE (ML) INJECTION PRN
Status: DISCONTINUED | OUTPATIENT
Start: 2024-01-15 | End: 2024-01-16 | Stop reason: HOSPADM

## 2024-01-15 RX ORDER — ACETAMINOPHEN 325 MG/1
650 TABLET ORAL EVERY 4 HOURS PRN
Status: DISCONTINUED | OUTPATIENT
Start: 2024-01-15 | End: 2024-01-16 | Stop reason: HOSPADM

## 2024-01-15 RX ADMIN — Medication 1 UNITS: at 13:23

## 2024-01-15 NOTE — H&P
Date:1/15/2024  Name:Aileen Cohn   :1955   MR#:[id number]    SEX:female   Referring Physician:    Ronni  Primary Physician:  Amelia  Chief Complaint:  Liver mass  History of Present Illness:   Liver mass    HISTORY AND PHYSICAL  Liver mass [R16.0]    Past Medical History:  Past Medical History:   Diagnosis Date    Anxiety     Constipation     Hx antineoplastic chemo        Past Surgical History:  Past Surgical History:   Procedure Laterality Date    CT NEEDLE BIOPSY LIVER PERCUTANEOUS  1/15/2024    CT NEEDLE BIOPSY LIVER PERCUTANEOUS 1/15/2024 [facility] CT SCAN    HIP SURGERY      MEDIPORT INSERTION, SINGLE  2022    PORT SURGERY N/A 2022    MEDIPORT INSERTION performed by Lisseth Randhawa MD at [facility] OR    US BREAST BIOPSY W LOC DEVICE 1ST LESION LEFT Left 2022    US BREAST NEEDLE BIOPSY LEFT 2022 Jaden Pisano MD [facility]    US BREAST BIOPSY W LOC DEVICE 1ST LESION RIGHT Right 2022    US BREAST NEEDLE BIOPSY RIGHT 2022 Jaden Pisano MD [facility]       Social History:  Social History     Socioeconomic History    Marital status:      Spouse name: Not on file    Number of children: Not on file    Years of education: Not on file    Highest education level: Not on file   Occupational History    Not on file   Tobacco Use    Smoking status: Every Day     Current packs/day: 1.00     Average packs/day: 1 pack/day for 49.0 years (49.0 ttl pk-yrs)     Types: Cigarettes     Start date:     Smokeless tobacco: Never   Vaping Use    Vaping Use: Every day   Substance and Sexual Activity    Alcohol use: Not Currently    Drug use: Never    Sexual activity: Yes     Partners: Male   Other Topics Concern    Not on file   Social History Narrative    Not on file     Social Determinants of Health     Financial Resource Strain: Not on file   Food Insecurity: Not on file   Transportation Needs: Not on file   Physical Activity: Not on file   Stress: Not on file   Social Connections: Not on

## 2024-01-15 NOTE — PROGRESS NOTES
Pt arrived for their procedure, vss, port accessed and labs sent, pt resting in bed with call light in reach.

## 2024-01-15 NOTE — PROGRESS NOTES
TRANSFER - OUT REPORT:    Verbal report given to Brianna RN and Yuridia RN on Aileen Cohn being transferred to [facility] for routine post-op       Report consisted of patient's Situation, Background, Assessment and   Recommendations(SBAR).     Information from the following report(s) Nurse Handoff Report was reviewed with the receiving nurse.    Opportunity for questions and clarification was provided.      Patient transported with:   Registered Nurse

## 2024-01-15 NOTE — PROGRESS NOTES
Pt arrived back to [facility] 8.  Vitals WNL.  Pt is comfortable at this time.

## 2024-01-22 ENCOUNTER — OFFICE VISIT (OUTPATIENT)
Dept: ONCOLOGY | Age: 69
End: 2024-01-22
Payer: MEDICARE

## 2024-01-22 ENCOUNTER — HOSPITAL ENCOUNTER (OUTPATIENT)
Dept: INFUSION THERAPY | Age: 69
Discharge: HOME | End: 2024-01-22
Payer: MEDICARE

## 2024-01-22 VITALS
TEMPERATURE: 97.5 F | DIASTOLIC BLOOD PRESSURE: 57 MMHG | WEIGHT: 149.2 LBS | HEART RATE: 86 BPM | SYSTOLIC BLOOD PRESSURE: 116 MMHG | BODY MASS INDEX: 25.47 KG/M2 | OXYGEN SATURATION: 99 % | HEIGHT: 64 IN

## 2024-01-22 DIAGNOSIS — C50.811 MALIGNANT NEOPLASM OF OVERLAPPING SITES OF BOTH BREASTS IN FEMALE, ESTROGEN RECEPTOR POSITIVE (HCC): Primary | ICD-10-CM

## 2024-01-22 DIAGNOSIS — C50.812 MALIGNANT NEOPLASM OF OVERLAPPING SITES OF BOTH BREASTS IN FEMALE, ESTROGEN RECEPTOR POSITIVE (HCC): Primary | ICD-10-CM

## 2024-01-22 DIAGNOSIS — Z17.0 MALIGNANT NEOPLASM OF OVERLAPPING SITES OF BOTH BREASTS IN FEMALE, ESTROGEN RECEPTOR POSITIVE (HCC): Primary | ICD-10-CM

## 2024-01-22 DIAGNOSIS — C79.51 MALIGNANT NEOPLASM METASTATIC TO BONE (HCC): ICD-10-CM

## 2024-01-22 DIAGNOSIS — Z11.59 NEED FOR HEPATITIS B SCREENING TEST: Primary | ICD-10-CM

## 2024-01-22 PROCEDURE — 99215 OFFICE O/P EST HI 40 MIN: CPT | Performed by: INTERNAL MEDICINE

## 2024-01-22 PROCEDURE — 1124F ACP DISCUSS-NO DSCNMKR DOCD: CPT | Performed by: INTERNAL MEDICINE

## 2024-01-22 PROCEDURE — 99211 OFF/OP EST MAY X REQ PHY/QHP: CPT

## 2024-01-22 NOTE — PROGRESS NOTES
MA Rooming Questions  Patient: Aileen Cohn  MRN: [id number]    Date: 1/22/2024        1. Do you have any new issues?   no         2. Do you need any refills on medications?    no    3. Have you had any imaging done since your last visit?   yes - CT scans 12/20, bone scan 12/18, ct biopsy 1/15    4. Have you been hospitalized or seen in the emergency room since your last visit here?   no    5. Did the patient have a depression screening completed today? No    No data recorded     PHQ-9 Given to (if applicable):               PHQ-9 Score (if applicable):                     [] Positive     []  Negative              Does question #9 need addressed (if applicable)                     [] Yes    []  No               Estela Elizabeth CMA      
endoscopy.    First line endocrine therapy with ibrance and letrozole was started on 1/5/2023.     Bone scan done on 4/13/2023 showed slightly heterogeneous distribution of radiotracer throughout the bones, unchanged.  No new focal areas of increased uptake.    CT scan of the chest, abdomen and pelvis done on 4/13/2023 showed no active pulmonary disease with no evidence for intrapulmonary metastatic disease.  No mediastinal or axillary lymphadenopathy.  Decrease in size of the left hepatic lesion now measuring 3.6 x 1.7 cm.  No acute intrapelvic abnormality.  Widespread skeletal metastatic disease.    Bone scan on 8/14/23 showed stable appearance of the bone scan.  No new focal area of increased uptake to suggest new skeletal metastatic disease.    CT chest, abdomen and pelvis done on 8/29/23 showed redemonstration of diffuse osseous metastatic disease with multilevel chronic mild compression pathologic fractures of the thoracic and lumbar spine.  No significant change since prior examination. Ill-defined hypoattenuating mass in the left hepatic lobe dome is slightly increased in size since prior examination now extending into segment IV of the liver suggesting slight progression of hepatic metastatic disease. No evidence of metastatic disease in the lungs or mediastinum.    Bone scan on 12/18/23 showed stable appearance of the bone scan with evidence of multifocal skeletal metastatic disease.  No new uptake to suggest new skeletal metastases.    CT chest, abdomen, pelvis on 12/20/23 showed no evidence of disease progression within the chest. Findings consistent with progression of metastatic disease within the liver with at least 3 new ill-defined hepatic hypodensities and interval increase in size in previously visualized hypodensity within the hepatic dome now measuring up to 7.2 cm. Diffuse mixed sclerotic and lytic lesions throughout the visualized osseous structures.  These appear unchanged compared to prior

## 2024-02-01 DIAGNOSIS — Z11.59 NEED FOR HEPATITIS B SCREENING TEST: ICD-10-CM

## 2024-02-01 DIAGNOSIS — C50.811 MALIGNANT NEOPLASM OF OVERLAPPING SITES OF BOTH BREASTS IN FEMALE, ESTROGEN RECEPTOR POSITIVE (HCC): ICD-10-CM

## 2024-02-01 DIAGNOSIS — Z17.0 MALIGNANT NEOPLASM OF OVERLAPPING SITES OF BOTH BREASTS IN FEMALE, ESTROGEN RECEPTOR POSITIVE (HCC): ICD-10-CM

## 2024-02-01 DIAGNOSIS — Z11.59 ENCOUNTER FOR SCREENING FOR OTHER VIRAL DISEASES: Primary | ICD-10-CM

## 2024-02-01 DIAGNOSIS — C50.812 MALIGNANT NEOPLASM OF OVERLAPPING SITES OF BOTH BREASTS IN FEMALE, ESTROGEN RECEPTOR POSITIVE (HCC): ICD-10-CM

## 2024-02-01 RX ORDER — SODIUM CHLORIDE 0.9 % (FLUSH) 0.9 %
5-40 SYRINGE (ML) INJECTION PRN
OUTPATIENT
Start: 2024-02-01

## 2024-02-01 RX ORDER — SODIUM CHLORIDE 9 MG/ML
25 INJECTION, SOLUTION INTRAVENOUS PRN
OUTPATIENT
Start: 2024-02-01

## 2024-02-01 RX ORDER — HEPARIN 100 UNIT/ML
500 SYRINGE INTRAVENOUS PRN
OUTPATIENT
Start: 2024-02-01

## 2024-02-02 ENCOUNTER — HOSPITAL ENCOUNTER (OUTPATIENT)
Dept: INFUSION THERAPY | Age: 69
Discharge: HOME | End: 2024-02-02
Payer: MEDICARE

## 2024-02-02 ENCOUNTER — NURSE ONLY (OUTPATIENT)
Dept: ONCOLOGY | Age: 69
End: 2024-02-02
Payer: MEDICARE

## 2024-02-02 ENCOUNTER — HOSPITAL ENCOUNTER (OUTPATIENT)
Dept: NON INVASIVE DIAGNOSTICS | Age: 69
End: 2024-02-02
Attending: INTERNAL MEDICINE
Payer: MEDICARE

## 2024-02-02 VITALS
DIASTOLIC BLOOD PRESSURE: 63 MMHG | TEMPERATURE: 97.4 F | SYSTOLIC BLOOD PRESSURE: 135 MMHG | BODY MASS INDEX: 25.16 KG/M2 | OXYGEN SATURATION: 100 % | WEIGHT: 147.4 LBS | HEART RATE: 95 BPM | HEIGHT: 64 IN

## 2024-02-02 VITALS
HEIGHT: 64 IN | SYSTOLIC BLOOD PRESSURE: 135 MMHG | WEIGHT: 147 LBS | BODY MASS INDEX: 25.1 KG/M2 | HEART RATE: 95 BPM | DIASTOLIC BLOOD PRESSURE: 63 MMHG

## 2024-02-02 DIAGNOSIS — C50.811 MALIGNANT NEOPLASM OF OVERLAPPING SITES OF BOTH BREASTS IN FEMALE, ESTROGEN RECEPTOR POSITIVE (HCC): Primary | ICD-10-CM

## 2024-02-02 DIAGNOSIS — C79.51 MALIGNANT NEOPLASM METASTATIC TO BONE (HCC): ICD-10-CM

## 2024-02-02 DIAGNOSIS — C50.812 MALIGNANT NEOPLASM OF OVERLAPPING SITES OF BOTH BREASTS IN FEMALE, ESTROGEN RECEPTOR POSITIVE (HCC): Primary | ICD-10-CM

## 2024-02-02 DIAGNOSIS — Z17.0 MALIGNANT NEOPLASM OF OVERLAPPING SITES OF BOTH BREASTS IN FEMALE, ESTROGEN RECEPTOR POSITIVE (HCC): Primary | ICD-10-CM

## 2024-02-02 DIAGNOSIS — C50.812 MALIGNANT NEOPLASM OF OVERLAPPING SITES OF BOTH BREASTS IN FEMALE, ESTROGEN RECEPTOR POSITIVE (HCC): ICD-10-CM

## 2024-02-02 DIAGNOSIS — Z17.0 MALIGNANT NEOPLASM OF OVERLAPPING SITES OF BOTH BREASTS IN FEMALE, ESTROGEN RECEPTOR POSITIVE (HCC): ICD-10-CM

## 2024-02-02 DIAGNOSIS — C50.811 MALIGNANT NEOPLASM OF OVERLAPPING SITES OF BOTH BREASTS IN FEMALE, ESTROGEN RECEPTOR POSITIVE (HCC): ICD-10-CM

## 2024-02-02 DIAGNOSIS — Z79.899 NEED FOR PROPHYLACTIC CHEMOTHERAPY: ICD-10-CM

## 2024-02-02 DIAGNOSIS — Z79.899 NEED FOR PROPHYLACTIC CHEMOTHERAPY: Primary | ICD-10-CM

## 2024-02-02 DIAGNOSIS — Z11.59 NEED FOR HEPATITIS B SCREENING TEST: ICD-10-CM

## 2024-02-02 DIAGNOSIS — Z11.59 ENCOUNTER FOR SCREENING FOR OTHER VIRAL DISEASES: ICD-10-CM

## 2024-02-02 LAB
ALBUMIN SERPL-MCNC: 4.6 GM/DL (ref 3.4–5)
ALP BLD-CCNC: 72 IU/L (ref 40–128)
ALT SERPL-CCNC: 9 U/L (ref 10–40)
ANION GAP SERPL CALCULATED.3IONS-SCNC: 8 MMOL/L (ref 7–16)
AST SERPL-CCNC: 28 IU/L (ref 15–37)
BASOPHILS ABSOLUTE: 0 K/CU MM
BASOPHILS RELATIVE PERCENT: 1.2 % (ref 0–1)
BILIRUB SERPL-MCNC: 0.3 MG/DL (ref 0–1)
BUN SERPL-MCNC: 15 MG/DL (ref 6–23)
CALCIUM SERPL-MCNC: 9.8 MG/DL (ref 8.3–10.6)
CHLORIDE BLD-SCNC: 102 MMOL/L (ref 99–110)
CO2: 28 MMOL/L (ref 21–32)
CREAT SERPL-MCNC: 0.5 MG/DL (ref 0.6–1.1)
DIFFERENTIAL TYPE: ABNORMAL
ECHO AO ROOT DIAM: 3.2 CM
ECHO AO ROOT INDEX: 1.86 CM/M2
ECHO AV AREA PEAK VELOCITY: 3.2 CM2
ECHO AV AREA VTI: 2.9 CM2
ECHO AV AREA/BSA PEAK VELOCITY: 1.9 CM2/M2
ECHO AV AREA/BSA VTI: 1.7 CM2/M2
ECHO AV MEAN GRADIENT: 4 MMHG
ECHO AV MEAN VELOCITY: 0.9 M/S
ECHO AV PEAK GRADIENT: 7 MMHG
ECHO AV PEAK VELOCITY: 1.3 M/S
ECHO AV VELOCITY RATIO: 0.77
ECHO AV VTI: 23.5 CM
ECHO BSA: 1.74 M2
ECHO EST RA PRESSURE: 3 MMHG
ECHO IVC PROX: 1.9 CM
ECHO LA AREA 4C: 15.1 CM2
ECHO LA DIAMETER INDEX: 1.98 CM/M2
ECHO LA DIAMETER: 3.4 CM
ECHO LA MAJOR AXIS: 5.3 CM
ECHO LA TO AORTIC ROOT RATIO: 1.06
ECHO LA VOL MOD A4C: 34 ML (ref 22–52)
ECHO LA VOLUME INDEX MOD A4C: 20 ML/M2 (ref 16–34)
ECHO LV E' LATERAL VELOCITY: 12 CM/S
ECHO LV E' SEPTAL VELOCITY: 7 CM/S
ECHO LV EDV A4C: 71 ML
ECHO LV EDV INDEX A4C: 41 ML/M2
ECHO LV EJECTION FRACTION A4C: 61 %
ECHO LV ESV A4C: 28 ML
ECHO LV ESV INDEX A4C: 16 ML/M2
ECHO LV FRACTIONAL SHORTENING: 44 % (ref 28–44)
ECHO LV INTERNAL DIMENSION DIASTOLE INDEX: 3.14 CM/M2
ECHO LV INTERNAL DIMENSION DIASTOLIC: 5.4 CM (ref 3.9–5.3)
ECHO LV INTERNAL DIMENSION SYSTOLIC INDEX: 1.74 CM/M2
ECHO LV INTERNAL DIMENSION SYSTOLIC: 3 CM
ECHO LV IVSD: 0.7 CM (ref 0.6–0.9)
ECHO LV MASS 2D: 167.4 G (ref 67–162)
ECHO LV MASS INDEX 2D: 97.3 G/M2 (ref 43–95)
ECHO LV POSTERIOR WALL DIASTOLIC: 1 CM (ref 0.6–0.9)
ECHO LV RELATIVE WALL THICKNESS RATIO: 0.37
ECHO LVOT AREA: 4.2 CM2
ECHO LVOT AV VTI INDEX: 0.71
ECHO LVOT DIAM: 2.3 CM
ECHO LVOT MEAN GRADIENT: 2 MMHG
ECHO LVOT PEAK GRADIENT: 4 MMHG
ECHO LVOT PEAK VELOCITY: 1 M/S
ECHO LVOT STROKE VOLUME INDEX: 40.1 ML/M2
ECHO LVOT SV: 68.9 ML
ECHO LVOT VTI: 16.6 CM
ECHO MV A VELOCITY: 0.92 M/S
ECHO MV E VELOCITY: 0.58 M/S
ECHO MV E/A RATIO: 0.63
ECHO MV E/E' LATERAL: 4.83
ECHO MV E/E' RATIO (AVERAGED): 6.56
ECHO RIGHT VENTRICULAR SYSTOLIC PRESSURE (RVSP): 27 MMHG
ECHO RV MID DIMENSION: 2.2 CM
ECHO TV REGURGITANT MAX VELOCITY: 2.44 M/S
ECHO TV REGURGITANT PEAK GRADIENT: 24 MMHG
EOSINOPHILS ABSOLUTE: 0 K/CU MM
EOSINOPHILS RELATIVE PERCENT: 0.9 % (ref 0–3)
GFR SERPL CREATININE-BSD FRML MDRD: >60 ML/MIN/1.73M2
GLUCOSE SERPL-MCNC: 92 MG/DL (ref 70–99)
HBV SURFACE AB SERPL IA-ACNC: <3.5 M[IU]/ML
HBV SURFACE AG SERPL QL IA: NON REACTIVE
HCT VFR BLD CALC: 33.3 % (ref 37–47)
HEMOGLOBIN: 10.9 GM/DL (ref 12.5–16)
LYMPHOCYTES ABSOLUTE: 1.7 K/CU MM
LYMPHOCYTES RELATIVE PERCENT: 48.1 % (ref 24–44)
MCH RBC QN AUTO: 36.2 PG (ref 27–31)
MCHC RBC AUTO-ENTMCNC: 32.7 % (ref 32–36)
MCV RBC AUTO: 110.6 FL (ref 78–100)
MONOCYTES ABSOLUTE: 0.5 K/CU MM
MONOCYTES RELATIVE PERCENT: 13.6 % (ref 0–4)
PDW BLD-RTO: 13.2 % (ref 11.7–14.9)
PLATELET # BLD: 194 K/CU MM (ref 140–440)
PMV BLD AUTO: 8 FL (ref 7.5–11.1)
POTASSIUM SERPL-SCNC: 4.5 MMOL/L (ref 3.5–5.1)
RBC # BLD: 3.01 M/CU MM (ref 4.2–5.4)
SEGMENTED NEUTROPHILS ABSOLUTE COUNT: 1.3 K/CU MM
SEGMENTED NEUTROPHILS RELATIVE PERCENT: 36.2 % (ref 36–66)
SODIUM BLD-SCNC: 138 MMOL/L (ref 135–145)
TOTAL PROTEIN: 6.9 GM/DL (ref 6.4–8.2)
WBC # BLD: 3.5 K/CU MM (ref 4–10.5)

## 2024-02-02 PROCEDURE — 36415 COLL VENOUS BLD VENIPUNCTURE: CPT

## 2024-02-02 PROCEDURE — 85025 COMPLETE CBC W/AUTO DIFF WBC: CPT

## 2024-02-02 PROCEDURE — 86706 HEP B SURFACE ANTIBODY: CPT

## 2024-02-02 PROCEDURE — 99213 OFFICE O/P EST LOW 20 MIN: CPT

## 2024-02-02 PROCEDURE — 99211 OFF/OP EST MAY X REQ PHY/QHP: CPT | Performed by: INTERNAL MEDICINE

## 2024-02-02 PROCEDURE — 86704 HEP B CORE ANTIBODY TOTAL: CPT

## 2024-02-02 PROCEDURE — 80053 COMPREHEN METABOLIC PANEL: CPT

## 2024-02-02 PROCEDURE — 87340 HEPATITIS B SURFACE AG IA: CPT

## 2024-02-02 PROCEDURE — 93306 TTE W/DOPPLER COMPLETE: CPT | Performed by: INTERNAL MEDICINE

## 2024-02-02 PROCEDURE — 93306 TTE W/DOPPLER COMPLETE: CPT

## 2024-02-02 RX ORDER — ONDANSETRON HYDROCHLORIDE 8 MG/1
8 TABLET, FILM COATED ORAL EVERY 8 HOURS PRN
Qty: 60 TABLET | Refills: 1 | Status: SHIPPED | OUTPATIENT
Start: 2024-02-02

## 2024-02-02 RX ORDER — DEXAMETHASONE 4 MG/1
8 TABLET ORAL SEE ADMIN INSTRUCTIONS
Qty: 24 TABLET | Refills: 0 | Status: SHIPPED | OUTPATIENT
Start: 2024-02-02

## 2024-02-02 NOTE — PROGRESS NOTES
MA ROOMRALPH NOTE    Patient: Aileen Cohn  MRN: [id number]    Date: 2/2/2024        Treatment planning               Estela Elizabeth CMA

## 2024-02-02 NOTE — PROGRESS NOTES
Patient arrived with spouse for treatment planning and chemotherapy education.  Discussed treatment plan, potential side effects, prevention, and symptom management.  Reviewed chemotherapy education folder and drug monograph.  Patient's regimen will be Enhertu every 21 days.  Patient instructed to STOP taking Ibrance and Letrozole.    Patient signed chemotherapy consent for Enhertu.     Reviewed chemotherapy schedule/calendar.     Per Dr. Rudolph -  nando for Zofran and Dexamethasone sent to [facility].  Patient given calendar and written instructions for these medications and how/when to take.      Patient's premeds as follows:  -Dexamethasone 8 mg (2-4 mg tabs) in am on days 2 and 3 after each chemo treatment.  -Zofran 8 mg one tab every 8 hours PRN.    Patient scheduled today at 1300 at [facility] for ECHO to check EF.     Mediport to right upper chest intact without redness or drainage.     Distress thermometer given to patient to fill out - this RN reviewed with patient.  Patient deferred any referrals at this time.  Copy given to Psychosocial Coordinator.    Patient given on-call phone number for after office hours and weekends.    CBC, CMP and Hep B panels drawn today prior to first treatment.    Confirmed first chemotherapy appointment for Enhertu on Monday, 2/5/24 at 1015.

## 2024-02-04 DIAGNOSIS — Z11.59 NEED FOR HEPATITIS B SCREENING TEST: ICD-10-CM

## 2024-02-04 DIAGNOSIS — C50.812 MALIGNANT NEOPLASM OF OVERLAPPING SITES OF BOTH BREASTS IN FEMALE, ESTROGEN RECEPTOR POSITIVE (HCC): Primary | ICD-10-CM

## 2024-02-04 DIAGNOSIS — Z17.0 MALIGNANT NEOPLASM OF OVERLAPPING SITES OF BOTH BREASTS IN FEMALE, ESTROGEN RECEPTOR POSITIVE (HCC): Primary | ICD-10-CM

## 2024-02-04 DIAGNOSIS — C50.811 MALIGNANT NEOPLASM OF OVERLAPPING SITES OF BOTH BREASTS IN FEMALE, ESTROGEN RECEPTOR POSITIVE (HCC): Primary | ICD-10-CM

## 2024-02-04 DIAGNOSIS — Z11.59 ENCOUNTER FOR SCREENING FOR OTHER VIRAL DISEASES: ICD-10-CM

## 2024-02-04 LAB — HBV CORE AB SERPL QL IA: NEGATIVE

## 2024-02-04 RX ORDER — FAMOTIDINE 10 MG/ML
20 INJECTION, SOLUTION INTRAVENOUS
OUTPATIENT
Start: 2024-02-26

## 2024-02-04 RX ORDER — ALBUTEROL SULFATE 90 UG/1
4 AEROSOL, METERED RESPIRATORY (INHALATION) PRN
OUTPATIENT
Start: 2024-02-05

## 2024-02-04 RX ORDER — SODIUM CHLORIDE 0.9 % (FLUSH) 0.9 %
5-40 SYRINGE (ML) INJECTION PRN
OUTPATIENT
Start: 2024-02-26

## 2024-02-04 RX ORDER — MEPERIDINE HYDROCHLORIDE 50 MG/ML
12.5 INJECTION INTRAMUSCULAR; INTRAVENOUS; SUBCUTANEOUS PRN
OUTPATIENT
Start: 2024-02-05

## 2024-02-04 RX ORDER — ACETAMINOPHEN 325 MG/1
650 TABLET ORAL
OUTPATIENT
Start: 2024-02-26

## 2024-02-04 RX ORDER — SODIUM CHLORIDE 9 MG/ML
INJECTION, SOLUTION INTRAVENOUS CONTINUOUS
OUTPATIENT
Start: 2024-02-26

## 2024-02-04 RX ORDER — EPINEPHRINE 1 MG/ML
0.3 INJECTION, SOLUTION, CONCENTRATE INTRAVENOUS PRN
OUTPATIENT
Start: 2024-02-05

## 2024-02-04 RX ORDER — EPINEPHRINE 1 MG/ML
0.3 INJECTION, SOLUTION, CONCENTRATE INTRAVENOUS PRN
OUTPATIENT
Start: 2024-02-26

## 2024-02-04 RX ORDER — ACETAMINOPHEN 325 MG/1
650 TABLET ORAL
OUTPATIENT
Start: 2024-02-05

## 2024-02-04 RX ORDER — SODIUM CHLORIDE 9 MG/ML
INJECTION, SOLUTION INTRAVENOUS CONTINUOUS
OUTPATIENT
Start: 2024-02-05

## 2024-02-04 RX ORDER — MEPERIDINE HYDROCHLORIDE 50 MG/ML
12.5 INJECTION INTRAMUSCULAR; INTRAVENOUS; SUBCUTANEOUS PRN
OUTPATIENT
Start: 2024-02-26

## 2024-02-04 RX ORDER — SODIUM CHLORIDE 9 MG/ML
5-250 INJECTION, SOLUTION INTRAVENOUS PRN
OUTPATIENT
Start: 2024-02-05

## 2024-02-04 RX ORDER — SODIUM CHLORIDE 0.9 % (FLUSH) 0.9 %
5-40 SYRINGE (ML) INJECTION PRN
OUTPATIENT
Start: 2024-02-05

## 2024-02-04 RX ORDER — HEPARIN SODIUM (PORCINE) LOCK FLUSH IV SOLN 100 UNIT/ML 100 UNIT/ML
500 SOLUTION INTRAVENOUS PRN
OUTPATIENT
Start: 2024-02-26

## 2024-02-04 RX ORDER — DEXTROSE MONOHYDRATE 50 MG/ML
5-250 INJECTION, SOLUTION INTRAVENOUS PRN
OUTPATIENT
Start: 2024-02-26

## 2024-02-04 RX ORDER — PALONOSETRON 0.05 MG/ML
0.25 INJECTION, SOLUTION INTRAVENOUS ONCE
OUTPATIENT
Start: 2024-02-05 | End: 2024-02-05

## 2024-02-04 RX ORDER — FAMOTIDINE 10 MG/ML
20 INJECTION, SOLUTION INTRAVENOUS
OUTPATIENT
Start: 2024-02-05

## 2024-02-04 RX ORDER — DIPHENHYDRAMINE HYDROCHLORIDE 50 MG/ML
50 INJECTION INTRAMUSCULAR; INTRAVENOUS
OUTPATIENT
Start: 2024-02-26

## 2024-02-04 RX ORDER — DEXTROSE MONOHYDRATE 50 MG/ML
5-250 INJECTION, SOLUTION INTRAVENOUS PRN
OUTPATIENT
Start: 2024-02-05

## 2024-02-04 RX ORDER — SODIUM CHLORIDE 9 MG/ML
5-250 INJECTION, SOLUTION INTRAVENOUS PRN
OUTPATIENT
Start: 2024-02-26

## 2024-02-04 RX ORDER — HEPARIN SODIUM (PORCINE) LOCK FLUSH IV SOLN 100 UNIT/ML 100 UNIT/ML
500 SOLUTION INTRAVENOUS PRN
OUTPATIENT
Start: 2024-02-05

## 2024-02-04 RX ORDER — PALONOSETRON 0.05 MG/ML
0.25 INJECTION, SOLUTION INTRAVENOUS ONCE
OUTPATIENT
Start: 2024-02-26 | End: 2024-02-26

## 2024-02-04 RX ORDER — ONDANSETRON 2 MG/ML
8 INJECTION INTRAMUSCULAR; INTRAVENOUS
OUTPATIENT
Start: 2024-02-05

## 2024-02-04 RX ORDER — ONDANSETRON 2 MG/ML
8 INJECTION INTRAMUSCULAR; INTRAVENOUS
OUTPATIENT
Start: 2024-02-26

## 2024-02-04 RX ORDER — DIPHENHYDRAMINE HYDROCHLORIDE 50 MG/ML
50 INJECTION INTRAMUSCULAR; INTRAVENOUS
OUTPATIENT
Start: 2024-02-05

## 2024-02-04 RX ORDER — ALBUTEROL SULFATE 90 UG/1
4 AEROSOL, METERED RESPIRATORY (INHALATION) PRN
OUTPATIENT
Start: 2024-02-26

## 2024-02-05 ENCOUNTER — CLINICAL DOCUMENTATION (OUTPATIENT)
Facility: HOSPITAL | Age: 69
End: 2024-02-05

## 2024-02-05 ENCOUNTER — HOSPITAL ENCOUNTER (OUTPATIENT)
Dept: INFUSION THERAPY | Age: 69
Discharge: HOME | End: 2024-02-05
Payer: MEDICARE

## 2024-02-05 VITALS
WEIGHT: 148.6 LBS | DIASTOLIC BLOOD PRESSURE: 62 MMHG | HEIGHT: 64 IN | TEMPERATURE: 97.8 F | OXYGEN SATURATION: 98 % | HEART RATE: 89 BPM | SYSTOLIC BLOOD PRESSURE: 139 MMHG | BODY MASS INDEX: 25.37 KG/M2

## 2024-02-05 DIAGNOSIS — C50.811 MALIGNANT NEOPLASM OF OVERLAPPING SITES OF BOTH BREASTS IN FEMALE, ESTROGEN RECEPTOR POSITIVE (HCC): Primary | ICD-10-CM

## 2024-02-05 DIAGNOSIS — Z11.59 NEED FOR HEPATITIS B SCREENING TEST: ICD-10-CM

## 2024-02-05 DIAGNOSIS — Z17.0 MALIGNANT NEOPLASM OF OVERLAPPING SITES OF BOTH BREASTS IN FEMALE, ESTROGEN RECEPTOR POSITIVE (HCC): Primary | ICD-10-CM

## 2024-02-05 DIAGNOSIS — C50.812 MALIGNANT NEOPLASM OF OVERLAPPING SITES OF BOTH BREASTS IN FEMALE, ESTROGEN RECEPTOR POSITIVE (HCC): Primary | ICD-10-CM

## 2024-02-05 DIAGNOSIS — Z11.59 ENCOUNTER FOR SCREENING FOR OTHER VIRAL DISEASES: ICD-10-CM

## 2024-02-05 DIAGNOSIS — C79.51 MALIGNANT NEOPLASM METASTATIC TO BONE (HCC): ICD-10-CM

## 2024-02-05 DIAGNOSIS — C79.51 MALIGNANT NEOPLASM METASTATIC TO BONE (HCC): Primary | ICD-10-CM

## 2024-02-05 LAB
BASOPHILS ABSOLUTE: 0.1 K/CU MM
BASOPHILS RELATIVE PERCENT: 2 % (ref 0–1)
DIFFERENTIAL TYPE: ABNORMAL
EOSINOPHILS ABSOLUTE: 0 K/CU MM
EOSINOPHILS RELATIVE PERCENT: 1 % (ref 0–3)
HCT VFR BLD CALC: 30.7 % (ref 37–47)
HEMOGLOBIN: 10.2 GM/DL (ref 12.5–16)
LYMPHOCYTES ABSOLUTE: 1.7 K/CU MM
LYMPHOCYTES RELATIVE PERCENT: 55 % (ref 24–44)
MCH RBC QN AUTO: 36.4 PG (ref 27–31)
MCHC RBC AUTO-ENTMCNC: 33.2 % (ref 32–36)
MCV RBC AUTO: 109.6 FL (ref 78–100)
MONOCYTES ABSOLUTE: 0.3 K/CU MM
MONOCYTES RELATIVE PERCENT: 11.3 % (ref 0–4)
PDW BLD-RTO: 12.7 % (ref 11.7–14.9)
PLATELET # BLD: 207 K/CU MM (ref 140–440)
PMV BLD AUTO: 8.3 FL (ref 7.5–11.1)
RBC # BLD: 2.8 M/CU MM (ref 4.2–5.4)
SEGMENTED NEUTROPHILS ABSOLUTE COUNT: 0.9 K/CU MM
SEGMENTED NEUTROPHILS RELATIVE PERCENT: 30.7 % (ref 36–66)
WBC # BLD: 3 K/CU MM (ref 4–10.5)

## 2024-02-05 PROCEDURE — 6360000002 HC RX W HCPCS: Performed by: INTERNAL MEDICINE

## 2024-02-05 PROCEDURE — 96375 TX/PRO/DX INJ NEW DRUG ADDON: CPT

## 2024-02-05 PROCEDURE — 85025 COMPLETE CBC W/AUTO DIFF WBC: CPT

## 2024-02-05 PROCEDURE — 96413 CHEMO IV INFUSION 1 HR: CPT

## 2024-02-05 PROCEDURE — 2580000003 HC RX 258: Performed by: INTERNAL MEDICINE

## 2024-02-05 PROCEDURE — 96367 TX/PROPH/DG ADDL SEQ IV INF: CPT

## 2024-02-05 RX ORDER — PALONOSETRON 0.05 MG/ML
0.25 INJECTION, SOLUTION INTRAVENOUS ONCE
Status: COMPLETED | OUTPATIENT
Start: 2024-02-05 | End: 2024-02-05

## 2024-02-05 RX ORDER — SODIUM CHLORIDE 0.9 % (FLUSH) 0.9 %
5-40 SYRINGE (ML) INJECTION PRN
Status: DISCONTINUED | OUTPATIENT
Start: 2024-02-05 | End: 2024-02-06 | Stop reason: HOSPADM

## 2024-02-05 RX ORDER — HEPARIN 100 UNIT/ML
500 SYRINGE INTRAVENOUS PRN
Status: DISCONTINUED | OUTPATIENT
Start: 2024-02-05 | End: 2024-02-06 | Stop reason: HOSPADM

## 2024-02-05 RX ORDER — DEXTROSE MONOHYDRATE 50 MG/ML
5-250 INJECTION, SOLUTION INTRAVENOUS PRN
Status: DISCONTINUED | OUTPATIENT
Start: 2024-02-05 | End: 2024-02-06 | Stop reason: HOSPADM

## 2024-02-05 RX ADMIN — PALONOSETRON 0.25 MG: 0.25 INJECTION, SOLUTION INTRAVENOUS at 11:06

## 2024-02-05 RX ADMIN — SODIUM CHLORIDE, PRESERVATIVE FREE 10 ML: 5 INJECTION INTRAVENOUS at 13:38

## 2024-02-05 RX ADMIN — HEPARIN 500 UNITS: 100 SYRINGE at 13:38

## 2024-02-05 RX ADMIN — DEXAMETHASONE SODIUM PHOSPHATE 12 MG: 4 INJECTION, SOLUTION INTRAMUSCULAR; INTRAVENOUS at 11:33

## 2024-02-05 RX ADMIN — FOSAPREPITANT 150 MG: 150 INJECTION, POWDER, LYOPHILIZED, FOR SOLUTION INTRAVENOUS at 11:08

## 2024-02-05 RX ADMIN — FAM-TRASTUZUMAB DERUXTECAN-NXKI 366 MG: 100 INJECTION, POWDER, LYOPHILIZED, FOR SOLUTION INTRAVENOUS at 11:55

## 2024-02-05 RX ADMIN — DEXTROSE MONOHYDRATE 20 ML/HR: 50 INJECTION, SOLUTION INTRAVENOUS at 11:08

## 2024-02-05 NOTE — PROGRESS NOTES
Since the patient is no longer taking Ibrance and has started new therapy with Enhertu, [facility] her out of pocket costs for her Enhertu.    Yvrose Sow CPC  Financial Navigator   [facility]  [phone]

## 2024-02-05 NOTE — PROGRESS NOTES
Pt here for ne new tx. Port accessed with blood return noted. CBC drawn and sent. Pt has no concerns or issues to discuss at this time. Pt states chronic numbness to bilateral feet. Echo reviewed EF of 55-60%.    Dr Rudolph made aware of ANC 0.9 WBC 3.0. per Dr Rudolph ok to proceed with tx today.     Patient's status assessed and documented appropriately.  All labs and required results were also reviewed today.  Treatment parameters have been reviewed.  Today's treatment has been approved by the provider.      Treatment orders and medication sequencing (when applicable) was verified by 2 registered nurses.  The treatment plan was confirmed with the patient prior to administration, and the patient understands the need to report any treatment-related symptoms.    Prior to administration, when applicable, the following 8 elements of medication administration were reviewed with 2nd Registered Nurse prior to dosing: drug name, drug dose, infusion volume when prepared in a syringe, rate of administration, expiration dates and/or times, appearance and integrity of drug(s), and rate of pump for infusion.  The 5 rights of medication administration have been verified.      Pt tolerated tx without incident left ambulatory discharge instructions given.

## 2024-02-12 NOTE — PROGRESS NOTES
Patient Name:  Aileen Cohn  Patient :  1955  Patient MRN:  [id number]     Primary Oncologist: Quincy Rudolph MD  Referring Provider: Gabrielle Cisneros APRN - CNP     Date of Service: 2024      Chief Complaint:    Chief Complaint   Patient presents with    Follow-up     Patient's active problem list:       Liver mass       Lytic bone lesions       Left breast mass    HPI:   Aileen Cohn is a 68 year-old very pleasant female with medical history significant for osteoarthritis, initially referred to me on 22 for evaluation of her liver lesion and multiple lytic bone lesions.     She initially presented to [facility] on 22 with severe lower back pain and constipation. Stated that she has been having back pain for about 4 - 6 weeks duration, which becomes severe pain started a week before presentation.     CT scan of the abdomen and pelvis done on 2022 showed lesion (6.1 x 8.1 cm) in the hepatic segment 4, concerning for neoplasm.  Further evaluation with MRI of the liver is recommended.  Extensive lytic metastatic disease in lumbar and thoracic spine and pelvis with most dominant lesion seen at L5 vertebral body.  Several small bowel loops segments in the left abdomen demonstrate mild wall thickening, nonspecific may indicate enteritis.  Nonspecific partially visualized inflammatory stranding along the pericardium.  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     CT lumbar spine done on 22 showed extensive multifocal lytic lesions concerning for neoplastic/metastatic disease.  Age indeterminant compression fractures at T12 and L4, with mild narrowing of the AP diameter of the canal at L4.    She never had colonoscopy before. She had pap smear around . She didn't recall when was her last mammogram.     She denies weight loss. She hasn't been eating much for about 10 days since she has been having nausea.     Since she is found to have multiple lytic bone lesions and liver lesion, she was referred

## 2024-02-13 ENCOUNTER — OFFICE VISIT (OUTPATIENT)
Dept: ONCOLOGY | Age: 69
End: 2024-02-13
Payer: MEDICARE

## 2024-02-13 ENCOUNTER — HOSPITAL ENCOUNTER (OUTPATIENT)
Dept: INFUSION THERAPY | Age: 69
Discharge: HOME | End: 2024-02-13
Payer: MEDICARE

## 2024-02-13 VITALS
HEIGHT: 64 IN | OXYGEN SATURATION: 98 % | SYSTOLIC BLOOD PRESSURE: 146 MMHG | DIASTOLIC BLOOD PRESSURE: 70 MMHG | TEMPERATURE: 97.7 F | BODY MASS INDEX: 24.34 KG/M2 | HEART RATE: 105 BPM | WEIGHT: 142.6 LBS

## 2024-02-13 DIAGNOSIS — C50.812 MALIGNANT NEOPLASM OF OVERLAPPING SITES OF BOTH BREASTS IN FEMALE, ESTROGEN RECEPTOR POSITIVE (HCC): ICD-10-CM

## 2024-02-13 DIAGNOSIS — C79.51 MALIGNANT NEOPLASM METASTATIC TO BONE (HCC): Primary | ICD-10-CM

## 2024-02-13 DIAGNOSIS — Z11.59 NEED FOR HEPATITIS B SCREENING TEST: ICD-10-CM

## 2024-02-13 DIAGNOSIS — C50.811 MALIGNANT NEOPLASM OF OVERLAPPING SITES OF BOTH BREASTS IN FEMALE, ESTROGEN RECEPTOR POSITIVE (HCC): Primary | ICD-10-CM

## 2024-02-13 DIAGNOSIS — Z11.59 ENCOUNTER FOR SCREENING FOR OTHER VIRAL DISEASES: ICD-10-CM

## 2024-02-13 DIAGNOSIS — Z17.0 MALIGNANT NEOPLASM OF OVERLAPPING SITES OF BOTH BREASTS IN FEMALE, ESTROGEN RECEPTOR POSITIVE (HCC): ICD-10-CM

## 2024-02-13 DIAGNOSIS — C50.812 MALIGNANT NEOPLASM OF OVERLAPPING SITES OF BOTH BREASTS IN FEMALE, ESTROGEN RECEPTOR POSITIVE (HCC): Primary | ICD-10-CM

## 2024-02-13 DIAGNOSIS — C50.811 MALIGNANT NEOPLASM OF OVERLAPPING SITES OF BOTH BREASTS IN FEMALE, ESTROGEN RECEPTOR POSITIVE (HCC): ICD-10-CM

## 2024-02-13 DIAGNOSIS — Z17.0 MALIGNANT NEOPLASM OF OVERLAPPING SITES OF BOTH BREASTS IN FEMALE, ESTROGEN RECEPTOR POSITIVE (HCC): Primary | ICD-10-CM

## 2024-02-13 LAB
ALBUMIN SERPL-MCNC: 4.3 GM/DL (ref 3.4–5)
ALP BLD-CCNC: 61 IU/L (ref 40–128)
ALT SERPL-CCNC: 18 U/L (ref 10–40)
ANION GAP SERPL CALCULATED.3IONS-SCNC: 11 MMOL/L (ref 7–16)
AST SERPL-CCNC: 23 IU/L (ref 15–37)
ATYPICAL LYMPHOCYTE ABSOLUTE COUNT: ABNORMAL
BANDED NEUTROPHILS ABSOLUTE COUNT: 0.04 K/CU MM
BANDED NEUTROPHILS RELATIVE PERCENT: 1 % (ref 5–11)
BILIRUB SERPL-MCNC: 0.3 MG/DL (ref 0–1)
BUN SERPL-MCNC: 15 MG/DL (ref 6–23)
CALCIUM SERPL-MCNC: 8.8 MG/DL (ref 8.3–10.6)
CHLORIDE BLD-SCNC: 101 MMOL/L (ref 99–110)
CO2: 26 MMOL/L (ref 21–32)
CREAT SERPL-MCNC: 0.6 MG/DL (ref 0.6–1.1)
DIFFERENTIAL TYPE: ABNORMAL
EOSINOPHILS ABSOLUTE: 0.1 K/CU MM
EOSINOPHILS RELATIVE PERCENT: 3 % (ref 0–3)
GFR SERPL CREATININE-BSD FRML MDRD: >60 ML/MIN/1.73M2
GLUCOSE SERPL-MCNC: 98 MG/DL (ref 70–99)
HCT VFR BLD CALC: 33.3 % (ref 37–47)
HEMOGLOBIN: 11.1 GM/DL (ref 12.5–16)
LYMPHOCYTES ABSOLUTE: 1.9 K/CU MM
LYMPHOCYTES RELATIVE PERCENT: 43 % (ref 24–44)
MCH RBC QN AUTO: 36 PG (ref 27–31)
MCHC RBC AUTO-ENTMCNC: 33.3 % (ref 32–36)
MCV RBC AUTO: 108.1 FL (ref 78–100)
METAMYELOCYTES ABSOLUTE COUNT: 0.04 K/CU MM
METAMYELOCYTES PERCENT: 1 %
MONOCYTES ABSOLUTE: 0.1 K/CU MM
MONOCYTES RELATIVE PERCENT: 3 % (ref 0–4)
NUCLEATED RED BLOOD CELLS: 1
PDW BLD-RTO: 12.3 % (ref 11.7–14.9)
PLATELET # BLD: 178 K/CU MM (ref 140–440)
PMV BLD AUTO: 8.2 FL (ref 7.5–11.1)
POLYCHROMASIA: ABNORMAL
POTASSIUM SERPL-SCNC: 4.2 MMOL/L (ref 3.5–5.1)
RBC # BLD: 3.08 M/CU MM (ref 4.2–5.4)
SEGMENTED NEUTROPHILS ABSOLUTE COUNT: 2.2 K/CU MM
SEGMENTED NEUTROPHILS RELATIVE PERCENT: 49 % (ref 36–66)
SODIUM BLD-SCNC: 138 MMOL/L (ref 135–145)
TOTAL PROTEIN: 6.6 GM/DL (ref 6.4–8.2)
WBC # BLD: 4.4 K/CU MM (ref 4–10.5)

## 2024-02-13 PROCEDURE — 85027 COMPLETE CBC AUTOMATED: CPT

## 2024-02-13 PROCEDURE — 1124F ACP DISCUSS-NO DSCNMKR DOCD: CPT | Performed by: INTERNAL MEDICINE

## 2024-02-13 PROCEDURE — 99214 OFFICE O/P EST MOD 30 MIN: CPT | Performed by: INTERNAL MEDICINE

## 2024-02-13 PROCEDURE — 85007 BL SMEAR W/DIFF WBC COUNT: CPT

## 2024-02-13 PROCEDURE — 99211 OFF/OP EST MAY X REQ PHY/QHP: CPT

## 2024-02-13 PROCEDURE — 36415 COLL VENOUS BLD VENIPUNCTURE: CPT

## 2024-02-13 PROCEDURE — 80053 COMPREHEN METABOLIC PANEL: CPT

## 2024-02-13 NOTE — PROGRESS NOTES
MA Rooming Questions  Patient: Aileen Cohn  MRN: [id number]    Date: 2/13/2024        1. Do you have any new issues?   Yes - After her new Enhertu treatment last week on Thursday - Sunday she had nausea, fatigue and felt light headed        2. Do you need any refills on medications?    no    3. Have you had any imaging done since your last visit?   no    4. Have you been hospitalized or seen in the emergency room since your last visit here?   no    5. Did the patient have a depression screening completed today? No    No data recorded     PHQ-9 Given to (if applicable):               PHQ-9 Score (if applicable):                     [] Positive     []  Negative              Does question #9 need addressed (if applicable)                     [] Yes    []  No               Estela Elizabeth CMA

## 2024-02-23 ENCOUNTER — HOSPITAL ENCOUNTER (OUTPATIENT)
Dept: INFUSION THERAPY | Age: 69
Discharge: HOME | End: 2024-02-23
Payer: MEDICARE

## 2024-02-23 DIAGNOSIS — C50.811 MALIGNANT NEOPLASM OF OVERLAPPING SITES OF BOTH BREASTS IN FEMALE, ESTROGEN RECEPTOR POSITIVE (HCC): ICD-10-CM

## 2024-02-23 DIAGNOSIS — Z17.0 MALIGNANT NEOPLASM OF OVERLAPPING SITES OF BOTH BREASTS IN FEMALE, ESTROGEN RECEPTOR POSITIVE (HCC): ICD-10-CM

## 2024-02-23 DIAGNOSIS — Z11.59 ENCOUNTER FOR SCREENING FOR OTHER VIRAL DISEASES: ICD-10-CM

## 2024-02-23 DIAGNOSIS — Z11.59 NEED FOR HEPATITIS B SCREENING TEST: ICD-10-CM

## 2024-02-23 DIAGNOSIS — C50.812 MALIGNANT NEOPLASM OF OVERLAPPING SITES OF BOTH BREASTS IN FEMALE, ESTROGEN RECEPTOR POSITIVE (HCC): ICD-10-CM

## 2024-02-23 DIAGNOSIS — Z11.59 NEED FOR HEPATITIS B SCREENING TEST: Primary | ICD-10-CM

## 2024-02-23 LAB
ALBUMIN SERPL-MCNC: 4.2 GM/DL (ref 3.4–5)
ALP BLD-CCNC: 109 IU/L (ref 40–128)
ALT SERPL-CCNC: 21 U/L (ref 10–40)
ANION GAP SERPL CALCULATED.3IONS-SCNC: 9 MMOL/L (ref 7–16)
AST SERPL-CCNC: 19 IU/L (ref 15–37)
BASOPHILS ABSOLUTE: 0 K/CU MM
BASOPHILS RELATIVE PERCENT: 0.6 % (ref 0–1)
BILIRUB SERPL-MCNC: 0.4 MG/DL (ref 0–1)
BUN SERPL-MCNC: 13 MG/DL (ref 6–23)
CALCIUM SERPL-MCNC: 9.2 MG/DL (ref 8.3–10.6)
CHLORIDE BLD-SCNC: 108 MMOL/L (ref 99–110)
CO2: 25 MMOL/L (ref 21–32)
CREAT SERPL-MCNC: 0.5 MG/DL (ref 0.6–1.1)
DIFFERENTIAL TYPE: ABNORMAL
EOSINOPHILS ABSOLUTE: 0.2 K/CU MM
EOSINOPHILS RELATIVE PERCENT: 6.3 % (ref 0–3)
GFR SERPL CREATININE-BSD FRML MDRD: >60 ML/MIN/1.73M2
GLUCOSE SERPL-MCNC: 87 MG/DL (ref 70–99)
HCT VFR BLD CALC: 31.6 % (ref 37–47)
HEMOGLOBIN: 10.3 GM/DL (ref 12.5–16)
LYMPHOCYTES ABSOLUTE: 1.7 K/CU MM
LYMPHOCYTES RELATIVE PERCENT: 49.6 % (ref 24–44)
MCH RBC QN AUTO: 35.6 PG (ref 27–31)
MCHC RBC AUTO-ENTMCNC: 32.6 % (ref 32–36)
MCV RBC AUTO: 109.3 FL (ref 78–100)
MONOCYTES ABSOLUTE: 0.4 K/CU MM
MONOCYTES RELATIVE PERCENT: 10.7 % (ref 0–4)
PDW BLD-RTO: 13.1 % (ref 11.7–14.9)
PLATELET # BLD: 233 K/CU MM (ref 140–440)
PMV BLD AUTO: 7.8 FL (ref 7.5–11.1)
POTASSIUM SERPL-SCNC: 4.3 MMOL/L (ref 3.5–5.1)
RBC # BLD: 2.89 M/CU MM (ref 4.2–5.4)
SEGMENTED NEUTROPHILS ABSOLUTE COUNT: 1.1 K/CU MM
SEGMENTED NEUTROPHILS RELATIVE PERCENT: 32.8 % (ref 36–66)
SODIUM BLD-SCNC: 142 MMOL/L (ref 135–145)
TOTAL PROTEIN: 6.5 GM/DL (ref 6.4–8.2)
WBC # BLD: 3.4 K/CU MM (ref 4–10.5)

## 2024-02-23 PROCEDURE — 80053 COMPREHEN METABOLIC PANEL: CPT

## 2024-02-23 PROCEDURE — 36415 COLL VENOUS BLD VENIPUNCTURE: CPT

## 2024-02-23 PROCEDURE — 85025 COMPLETE CBC W/AUTO DIFF WBC: CPT

## 2024-02-26 ENCOUNTER — HOSPITAL ENCOUNTER (OUTPATIENT)
Dept: INFUSION THERAPY | Age: 69
Discharge: HOME | End: 2024-02-26
Payer: MEDICARE

## 2024-02-26 ENCOUNTER — OFFICE VISIT (OUTPATIENT)
Dept: ONCOLOGY | Age: 69
End: 2024-02-26
Payer: MEDICARE

## 2024-02-26 VITALS
BODY MASS INDEX: 25.54 KG/M2 | DIASTOLIC BLOOD PRESSURE: 71 MMHG | OXYGEN SATURATION: 98 % | HEIGHT: 64 IN | RESPIRATION RATE: 16 BRPM | HEART RATE: 81 BPM | SYSTOLIC BLOOD PRESSURE: 151 MMHG | TEMPERATURE: 98 F | WEIGHT: 149.6 LBS

## 2024-02-26 DIAGNOSIS — Z11.59 NEED FOR HEPATITIS B SCREENING TEST: ICD-10-CM

## 2024-02-26 DIAGNOSIS — Z17.0 MALIGNANT NEOPLASM OF OVERLAPPING SITES OF BOTH BREASTS IN FEMALE, ESTROGEN RECEPTOR POSITIVE (HCC): Primary | ICD-10-CM

## 2024-02-26 DIAGNOSIS — C79.51 MALIGNANT NEOPLASM METASTATIC TO BONE (HCC): ICD-10-CM

## 2024-02-26 DIAGNOSIS — C50.812 MALIGNANT NEOPLASM OF OVERLAPPING SITES OF BOTH BREASTS IN FEMALE, ESTROGEN RECEPTOR POSITIVE (HCC): Primary | ICD-10-CM

## 2024-02-26 DIAGNOSIS — C50.811 MALIGNANT NEOPLASM OF OVERLAPPING SITES OF BOTH BREASTS IN FEMALE, ESTROGEN RECEPTOR POSITIVE (HCC): Primary | ICD-10-CM

## 2024-02-26 DIAGNOSIS — Z11.59 ENCOUNTER FOR SCREENING FOR OTHER VIRAL DISEASES: ICD-10-CM

## 2024-02-26 PROCEDURE — 6360000002 HC RX W HCPCS: Performed by: INTERNAL MEDICINE

## 2024-02-26 PROCEDURE — 99214 OFFICE O/P EST MOD 30 MIN: CPT | Performed by: INTERNAL MEDICINE

## 2024-02-26 PROCEDURE — 96375 TX/PRO/DX INJ NEW DRUG ADDON: CPT

## 2024-02-26 PROCEDURE — 2580000003 HC RX 258: Performed by: INTERNAL MEDICINE

## 2024-02-26 PROCEDURE — 96367 TX/PROPH/DG ADDL SEQ IV INF: CPT

## 2024-02-26 PROCEDURE — 96413 CHEMO IV INFUSION 1 HR: CPT

## 2024-02-26 PROCEDURE — 1124F ACP DISCUSS-NO DSCNMKR DOCD: CPT | Performed by: INTERNAL MEDICINE

## 2024-02-26 RX ORDER — SODIUM CHLORIDE 0.9 % (FLUSH) 0.9 %
5-40 SYRINGE (ML) INJECTION PRN
Status: CANCELLED | OUTPATIENT
Start: 2024-02-26

## 2024-02-26 RX ORDER — ONDANSETRON 2 MG/ML
8 INJECTION INTRAMUSCULAR; INTRAVENOUS
Status: CANCELLED | OUTPATIENT
Start: 2024-02-26

## 2024-02-26 RX ORDER — ACETAMINOPHEN 325 MG/1
650 TABLET ORAL
Status: CANCELLED | OUTPATIENT
Start: 2024-02-26

## 2024-02-26 RX ORDER — SODIUM CHLORIDE 9 MG/ML
5-250 INJECTION, SOLUTION INTRAVENOUS PRN
Status: DISCONTINUED | OUTPATIENT
Start: 2024-02-26 | End: 2024-02-27 | Stop reason: HOSPADM

## 2024-02-26 RX ORDER — PALONOSETRON 0.05 MG/ML
0.25 INJECTION, SOLUTION INTRAVENOUS ONCE
Status: COMPLETED | OUTPATIENT
Start: 2024-02-26 | End: 2024-02-26

## 2024-02-26 RX ORDER — SODIUM CHLORIDE 9 MG/ML
INJECTION, SOLUTION INTRAVENOUS CONTINUOUS
Status: CANCELLED | OUTPATIENT
Start: 2024-02-26

## 2024-02-26 RX ORDER — SODIUM CHLORIDE 0.9 % (FLUSH) 0.9 %
5-40 SYRINGE (ML) INJECTION PRN
Status: DISCONTINUED | OUTPATIENT
Start: 2024-02-26 | End: 2024-02-27 | Stop reason: HOSPADM

## 2024-02-26 RX ORDER — SODIUM CHLORIDE 9 MG/ML
5-250 INJECTION, SOLUTION INTRAVENOUS PRN
Status: CANCELLED | OUTPATIENT
Start: 2024-02-26

## 2024-02-26 RX ORDER — DIPHENHYDRAMINE HYDROCHLORIDE 50 MG/ML
50 INJECTION INTRAMUSCULAR; INTRAVENOUS
Status: CANCELLED | OUTPATIENT
Start: 2024-02-26

## 2024-02-26 RX ORDER — MEPERIDINE HYDROCHLORIDE 25 MG/ML
12.5 INJECTION INTRAMUSCULAR; INTRAVENOUS; SUBCUTANEOUS PRN
Status: CANCELLED | OUTPATIENT
Start: 2024-02-26

## 2024-02-26 RX ORDER — HEPARIN 100 UNIT/ML
500 SYRINGE INTRAVENOUS PRN
Status: DISCONTINUED | OUTPATIENT
Start: 2024-02-26 | End: 2024-02-27 | Stop reason: HOSPADM

## 2024-02-26 RX ORDER — FAMOTIDINE 10 MG/ML
20 INJECTION, SOLUTION INTRAVENOUS
Status: CANCELLED | OUTPATIENT
Start: 2024-02-26

## 2024-02-26 RX ORDER — DEXTROSE MONOHYDRATE 50 MG/ML
5-250 INJECTION, SOLUTION INTRAVENOUS PRN
Status: DISCONTINUED | OUTPATIENT
Start: 2024-02-26 | End: 2024-02-27 | Stop reason: HOSPADM

## 2024-02-26 RX ORDER — EPINEPHRINE 1 MG/ML
0.3 INJECTION, SOLUTION, CONCENTRATE INTRAVENOUS PRN
Status: CANCELLED | OUTPATIENT
Start: 2024-02-26

## 2024-02-26 RX ORDER — ALBUTEROL SULFATE 90 UG/1
4 AEROSOL, METERED RESPIRATORY (INHALATION) PRN
Status: CANCELLED | OUTPATIENT
Start: 2024-02-26

## 2024-02-26 RX ORDER — HEPARIN 100 UNIT/ML
500 SYRINGE INTRAVENOUS PRN
Status: CANCELLED | OUTPATIENT
Start: 2024-02-26

## 2024-02-26 RX ADMIN — DEXTROSE MONOHYDRATE 20 ML/HR: 50 INJECTION, SOLUTION INTRAVENOUS at 10:48

## 2024-02-26 RX ADMIN — PALONOSETRON 0.25 MG: 0.25 INJECTION, SOLUTION INTRAVENOUS at 10:46

## 2024-02-26 RX ADMIN — SODIUM CHLORIDE 150 MG: 9 INJECTION, SOLUTION INTRAVENOUS at 10:49

## 2024-02-26 RX ADMIN — SODIUM CHLORIDE, PRESERVATIVE FREE 10 ML: 5 INJECTION INTRAVENOUS at 13:01

## 2024-02-26 RX ADMIN — FAM-TRASTUZUMAB DERUXTECAN-NXKI 366 MG: 100 INJECTION, POWDER, LYOPHILIZED, FOR SOLUTION INTRAVENOUS at 11:58

## 2024-02-26 RX ADMIN — HEPARIN 500 UNITS: 100 SYRINGE at 13:01

## 2024-02-26 RX ADMIN — SODIUM CHLORIDE 20 ML/HR: 9 INJECTION, SOLUTION INTRAVENOUS at 12:37

## 2024-02-26 RX ADMIN — DEXAMETHASONE SODIUM PHOSPHATE 12 MG: 4 INJECTION, SOLUTION INTRAMUSCULAR; INTRAVENOUS at 11:15

## 2024-02-26 RX ADMIN — ZOLEDRONIC ACID 4 MG: 4 INJECTION, SOLUTION, CONCENTRATE INTRAVENOUS at 12:37

## 2024-02-26 NOTE — PROGRESS NOTES
Pt here for tx and Zometa infusion after OV. Port accessed by Vickie LORA with blood return noted. CBC CMP reviewed from 2/23 and I spoke with Dr Rudolph to inform him of ANC of 1.1. Per Dr Rudolph ok to proceed with tx and no repeat labs needed. Also pt to be scheduled for 1 L NS bolus on 2/29 per Dr Rudolph.  made aware. Pt has no new concerns or issues to discuss at this time. Echo reviewed from 2/2 EF 55-60%.     Patient's status assessed and documented appropriately.  All labs and required results were also reviewed today.  Treatment parameters have been reviewed.  Today's treatment has been approved by the provider.      Treatment orders and medication sequencing (when applicable) was verified by 2 registered nurses.  The treatment plan was confirmed with the patient prior to administration, and the patient understands the need to report any treatment-related symptoms.    Prior to administration, when applicable, the following 8 elements of medication administration were reviewed with 2nd Registered Nurse prior to dosing: drug name, drug dose, infusion volume when prepared in a syringe, rate of administration, expiration dates and/or times, appearance and integrity of drug(s), and rate of pump for infusion.  The 5 rights of medication administration have been verified.      Pt tolerated tx without incident left ambulatory discharge instructions given

## 2024-02-26 NOTE — PROGRESS NOTES
MA Rooming Questions  Patient: Aileen Cohn  MRN: [id number]    Date: 2/26/2024        1. Do you have any new issues?   no         2. Do you need any refills on medications?    no    3. Have you had any imaging done since your last visit?   no    4. Have you been hospitalized or seen in the emergency room since your last visit here?   no    5. Did the patient have a depression screening completed today? No    No data recorded     PHQ-9 Given to (if applicable):               PHQ-9 Score (if applicable):                     [] Positive     []  Negative              Does question #9 need addressed (if applicable)                     [] Yes    []  No               Chrissy Montemayor CMA      
Motor skills grossly intact.   EXTREMITIES: no clubbing, no LE edema, no cyanosis, no leg swelling,   BREASTS: Left breast lesions are getting softer and smaller, right breast nodularity is better as well,      Labs:  Hematology:  Lab Results   Component Value Date    WBC 3.4 (L) 02/23/2024    RBC 2.89 (L) 02/23/2024    HGB 10.3 (L) 02/23/2024    HCT 31.6 (L) 02/23/2024    .3 (H) 02/23/2024    MCH 35.6 (H) 02/23/2024    MCHC 32.6 02/23/2024    RDW 13.1 02/23/2024     02/23/2024    MPV 7.8 02/23/2024    BANDSPCT 1 (L) 02/13/2024    SEGSPCT 32.8 (L) 02/23/2024    EOSRELPCT 6.3 (H) 02/23/2024    BASOPCT 0.6 02/23/2024    LYMPHOPCT 49.6 (H) 02/23/2024    MONOPCT 10.7 (H) 02/23/2024    BANDABS 0.04 02/13/2024    SEGSABS 1.1 02/23/2024    EOSABS 0.2 02/23/2024    BASOSABS 0.0 02/23/2024    LYMPHSABS 1.7 02/23/2024    MONOSABS 0.4 02/23/2024    DIFFTYPE AUTOMATED DIFFERENTIAL 02/23/2024    POLYCHROM 1+ 02/13/2024     No results found for: \"ESR\"  Chemistry:  Lab Results   Component Value Date     02/23/2024    K 4.3 02/23/2024     02/23/2024    CO2 25 02/23/2024    BUN 13 02/23/2024    CREATININE 0.5 (L) 02/23/2024    GLUCOSE 87 02/23/2024    CALCIUM 9.2 02/23/2024    PROT 6.5 02/23/2024    LABALBU 4.2 02/23/2024    BILITOT 0.4 02/23/2024    ALKPHOS 109 02/23/2024    AST 19 02/23/2024    ALT 21 02/23/2024    LABGLOM >60 02/23/2024    GFRAA >60 09/28/2022    PHOS 3.7 11/29/2023    MG 1.8 11/29/2023    POCCA 1.23 11/29/2023    POCGLU 84 11/29/2023     Lab Results   Component Value Date     (H) 06/14/2022     No results found for: \"LD\"  No results found for: \"TSHHS\", \"T4FREE\", \"FT3\"  Immunology:  Lab Results   Component Value Date    PROT 6.5 02/23/2024    SPEP  06/14/2022     INTERPRETATION - No monoclonal proteins are detected. SAF    SPEP INTERPRETATION - Within normal limits. SAF 06/14/2022    ALBUMINELP 3.7 06/14/2022    LABALPH 0.4 06/14/2022    LABALPH 1.3 (H) 06/14/2022    LABALPH

## 2024-02-29 ENCOUNTER — HOSPITAL ENCOUNTER (OUTPATIENT)
Dept: INFUSION THERAPY | Age: 69
Discharge: HOME | End: 2024-02-29
Payer: MEDICARE

## 2024-02-29 VITALS
SYSTOLIC BLOOD PRESSURE: 134 MMHG | HEIGHT: 64 IN | TEMPERATURE: 97.8 F | BODY MASS INDEX: 26.36 KG/M2 | OXYGEN SATURATION: 96 % | WEIGHT: 154.4 LBS | HEART RATE: 86 BPM | DIASTOLIC BLOOD PRESSURE: 61 MMHG

## 2024-02-29 DIAGNOSIS — C50.811 MALIGNANT NEOPLASM OF OVERLAPPING SITES OF BOTH BREASTS IN FEMALE, ESTROGEN RECEPTOR POSITIVE (HCC): Primary | ICD-10-CM

## 2024-02-29 DIAGNOSIS — Z17.0 MALIGNANT NEOPLASM OF OVERLAPPING SITES OF BOTH BREASTS IN FEMALE, ESTROGEN RECEPTOR POSITIVE (HCC): Primary | ICD-10-CM

## 2024-02-29 DIAGNOSIS — C50.812 MALIGNANT NEOPLASM OF OVERLAPPING SITES OF BOTH BREASTS IN FEMALE, ESTROGEN RECEPTOR POSITIVE (HCC): Primary | ICD-10-CM

## 2024-02-29 DIAGNOSIS — C79.51 MALIGNANT NEOPLASM METASTATIC TO BONE (HCC): ICD-10-CM

## 2024-02-29 PROCEDURE — 96360 HYDRATION IV INFUSION INIT: CPT

## 2024-02-29 PROCEDURE — 6360000002 HC RX W HCPCS: Performed by: INTERNAL MEDICINE

## 2024-02-29 PROCEDURE — 2580000003 HC RX 258: Performed by: INTERNAL MEDICINE

## 2024-02-29 RX ORDER — SODIUM CHLORIDE 9 MG/ML
5-250 INJECTION, SOLUTION INTRAVENOUS PRN
OUTPATIENT
Start: 2024-02-29

## 2024-02-29 RX ORDER — SODIUM CHLORIDE 0.9 % (FLUSH) 0.9 %
5-40 SYRINGE (ML) INJECTION PRN
OUTPATIENT
Start: 2024-02-29

## 2024-02-29 RX ORDER — SODIUM CHLORIDE 9 MG/ML
25 INJECTION, SOLUTION INTRAVENOUS PRN
Status: DISCONTINUED | OUTPATIENT
Start: 2024-02-29 | End: 2024-03-01 | Stop reason: HOSPADM

## 2024-02-29 RX ORDER — 0.9 % SODIUM CHLORIDE 0.9 %
1000 INTRAVENOUS SOLUTION INTRAVENOUS ONCE
Status: CANCELLED | OUTPATIENT
Start: 2024-02-29 | End: 2024-02-29

## 2024-02-29 RX ORDER — SODIUM CHLORIDE 9 MG/ML
25 INJECTION, SOLUTION INTRAVENOUS PRN
OUTPATIENT
Start: 2024-02-29

## 2024-02-29 RX ORDER — HEPARIN 100 UNIT/ML
500 SYRINGE INTRAVENOUS PRN
Status: DISCONTINUED | OUTPATIENT
Start: 2024-02-29 | End: 2024-03-01 | Stop reason: HOSPADM

## 2024-02-29 RX ORDER — SODIUM CHLORIDE 0.9 % (FLUSH) 0.9 %
5-40 SYRINGE (ML) INJECTION PRN
Status: DISCONTINUED | OUTPATIENT
Start: 2024-02-29 | End: 2024-03-01 | Stop reason: HOSPADM

## 2024-02-29 RX ORDER — HEPARIN 100 UNIT/ML
500 SYRINGE INTRAVENOUS PRN
OUTPATIENT
Start: 2024-02-29

## 2024-02-29 RX ORDER — 0.9 % SODIUM CHLORIDE 0.9 %
1000 INTRAVENOUS SOLUTION INTRAVENOUS ONCE
Status: COMPLETED | OUTPATIENT
Start: 2024-02-29 | End: 2024-02-29

## 2024-02-29 RX ADMIN — SODIUM CHLORIDE 1000 ML: 9 INJECTION, SOLUTION INTRAVENOUS at 09:49

## 2024-02-29 RX ADMIN — HEPARIN 500 UNITS: 100 SYRINGE at 10:55

## 2024-02-29 NOTE — PROGRESS NOTES
Pt arrived to tx suite for hydration. Right chest port accessed with brisk blood return.  Tolerated well. States some dizziness but is common after treatments states improvement with nausea with routine use of zofran. No other omplaints voiced. Fall risk education provided. Port deaccessed and patient discharged ambulatory in stable condition

## 2024-03-14 DIAGNOSIS — Z11.59 ENCOUNTER FOR SCREENING FOR OTHER VIRAL DISEASES: ICD-10-CM

## 2024-03-14 DIAGNOSIS — C50.811 MALIGNANT NEOPLASM OF OVERLAPPING SITES OF BOTH BREASTS IN FEMALE, ESTROGEN RECEPTOR POSITIVE (HCC): ICD-10-CM

## 2024-03-14 DIAGNOSIS — Z17.0 MALIGNANT NEOPLASM OF OVERLAPPING SITES OF BOTH BREASTS IN FEMALE, ESTROGEN RECEPTOR POSITIVE (HCC): ICD-10-CM

## 2024-03-14 DIAGNOSIS — Z11.59 NEED FOR HEPATITIS B SCREENING TEST: Primary | ICD-10-CM

## 2024-03-14 DIAGNOSIS — C50.812 MALIGNANT NEOPLASM OF OVERLAPPING SITES OF BOTH BREASTS IN FEMALE, ESTROGEN RECEPTOR POSITIVE (HCC): ICD-10-CM

## 2024-03-15 ENCOUNTER — HOSPITAL ENCOUNTER (OUTPATIENT)
Dept: INFUSION THERAPY | Age: 69
Discharge: HOME | End: 2024-03-15
Payer: MEDICARE

## 2024-03-15 DIAGNOSIS — C50.812 MALIGNANT NEOPLASM OF OVERLAPPING SITES OF BOTH BREASTS IN FEMALE, ESTROGEN RECEPTOR POSITIVE (HCC): ICD-10-CM

## 2024-03-15 DIAGNOSIS — C50.811 MALIGNANT NEOPLASM OF OVERLAPPING SITES OF BOTH BREASTS IN FEMALE, ESTROGEN RECEPTOR POSITIVE (HCC): ICD-10-CM

## 2024-03-15 DIAGNOSIS — Z11.59 ENCOUNTER FOR SCREENING FOR OTHER VIRAL DISEASES: ICD-10-CM

## 2024-03-15 DIAGNOSIS — Z17.0 MALIGNANT NEOPLASM OF OVERLAPPING SITES OF BOTH BREASTS IN FEMALE, ESTROGEN RECEPTOR POSITIVE (HCC): ICD-10-CM

## 2024-03-15 DIAGNOSIS — Z11.59 NEED FOR HEPATITIS B SCREENING TEST: ICD-10-CM

## 2024-03-15 LAB
ALBUMIN SERPL-MCNC: 4.2 GM/DL (ref 3.4–5)
ALP BLD-CCNC: 96 IU/L (ref 40–129)
ALT SERPL-CCNC: 21 U/L (ref 10–40)
ANION GAP SERPL CALCULATED.3IONS-SCNC: 9 MMOL/L (ref 7–16)
AST SERPL-CCNC: 19 IU/L (ref 15–37)
BASOPHILS ABSOLUTE: 0 K/CU MM
BASOPHILS RELATIVE PERCENT: 0.6 % (ref 0–1)
BILIRUB SERPL-MCNC: 0.4 MG/DL (ref 0–1)
BUN SERPL-MCNC: 13 MG/DL (ref 6–23)
CALCIUM SERPL-MCNC: 8.5 MG/DL (ref 8.3–10.6)
CHLORIDE BLD-SCNC: 107 MMOL/L (ref 99–110)
CO2: 25 MMOL/L (ref 21–32)
CREAT SERPL-MCNC: 0.5 MG/DL (ref 0.6–1.1)
DIFFERENTIAL TYPE: ABNORMAL
EOSINOPHILS ABSOLUTE: 0.2 K/CU MM
EOSINOPHILS RELATIVE PERCENT: 4.9 % (ref 0–3)
GFR SERPL CREATININE-BSD FRML MDRD: >60 ML/MIN/1.73M2
GLUCOSE SERPL-MCNC: 85 MG/DL (ref 70–99)
HCT VFR BLD CALC: 32.9 % (ref 37–47)
HEMOGLOBIN: 10.6 GM/DL (ref 12.5–16)
LYMPHOCYTES ABSOLUTE: 1.4 K/CU MM
LYMPHOCYTES RELATIVE PERCENT: 41.9 % (ref 24–44)
MCH RBC QN AUTO: 35.2 PG (ref 27–31)
MCHC RBC AUTO-ENTMCNC: 32.2 % (ref 32–36)
MCV RBC AUTO: 109.3 FL (ref 78–100)
MONOCYTES ABSOLUTE: 0.4 K/CU MM
MONOCYTES RELATIVE PERCENT: 11.6 % (ref 0–4)
PDW BLD-RTO: 13.9 % (ref 11.7–14.9)
PLATELET # BLD: 227 K/CU MM (ref 140–440)
PMV BLD AUTO: 8.1 FL (ref 7.5–11.1)
POTASSIUM SERPL-SCNC: 4.1 MMOL/L (ref 3.5–5.1)
RBC # BLD: 3.01 M/CU MM (ref 4.2–5.4)
SEGMENTED NEUTROPHILS ABSOLUTE COUNT: 1.4 K/CU MM
SEGMENTED NEUTROPHILS RELATIVE PERCENT: 41 % (ref 36–66)
SODIUM BLD-SCNC: 141 MMOL/L (ref 135–145)
TOTAL PROTEIN: 6.4 GM/DL (ref 6.4–8.2)
WBC # BLD: 3.3 K/CU MM (ref 4–10.5)

## 2024-03-15 PROCEDURE — 80053 COMPREHEN METABOLIC PANEL: CPT

## 2024-03-15 PROCEDURE — 85025 COMPLETE CBC W/AUTO DIFF WBC: CPT

## 2024-03-15 PROCEDURE — 36415 COLL VENOUS BLD VENIPUNCTURE: CPT

## 2024-03-18 ENCOUNTER — OFFICE VISIT (OUTPATIENT)
Dept: ONCOLOGY | Age: 69
End: 2024-03-18

## 2024-03-18 ENCOUNTER — HOSPITAL ENCOUNTER (OUTPATIENT)
Dept: INFUSION THERAPY | Age: 69
Discharge: HOME | End: 2024-03-18
Payer: MEDICARE

## 2024-03-18 VITALS
OXYGEN SATURATION: 100 % | SYSTOLIC BLOOD PRESSURE: 155 MMHG | RESPIRATION RATE: 16 BRPM | DIASTOLIC BLOOD PRESSURE: 72 MMHG | TEMPERATURE: 97.7 F | BODY MASS INDEX: 26.43 KG/M2 | HEART RATE: 89 BPM | HEIGHT: 64 IN | WEIGHT: 154.8 LBS

## 2024-03-18 DIAGNOSIS — C50.812 MALIGNANT NEOPLASM OF OVERLAPPING SITES OF BOTH BREASTS IN FEMALE, ESTROGEN RECEPTOR POSITIVE (HCC): Primary | ICD-10-CM

## 2024-03-18 DIAGNOSIS — T45.1X5A CHEMOTHERAPY INDUCED NAUSEA AND VOMITING: ICD-10-CM

## 2024-03-18 DIAGNOSIS — Z17.0 MALIGNANT NEOPLASM OF OVERLAPPING SITES OF BOTH BREASTS IN FEMALE, ESTROGEN RECEPTOR POSITIVE (HCC): Primary | ICD-10-CM

## 2024-03-18 DIAGNOSIS — C50.811 MALIGNANT NEOPLASM OF OVERLAPPING SITES OF BOTH BREASTS IN FEMALE, ESTROGEN RECEPTOR POSITIVE (HCC): Primary | ICD-10-CM

## 2024-03-18 DIAGNOSIS — Z11.59 ENCOUNTER FOR SCREENING FOR OTHER VIRAL DISEASES: ICD-10-CM

## 2024-03-18 DIAGNOSIS — R11.2 CHEMOTHERAPY INDUCED NAUSEA AND VOMITING: ICD-10-CM

## 2024-03-18 DIAGNOSIS — Z11.59 NEED FOR HEPATITIS B SCREENING TEST: ICD-10-CM

## 2024-03-18 PROCEDURE — 6360000002 HC RX W HCPCS: Performed by: INTERNAL MEDICINE

## 2024-03-18 PROCEDURE — 96413 CHEMO IV INFUSION 1 HR: CPT

## 2024-03-18 PROCEDURE — 96375 TX/PRO/DX INJ NEW DRUG ADDON: CPT

## 2024-03-18 PROCEDURE — 96367 TX/PROPH/DG ADDL SEQ IV INF: CPT

## 2024-03-18 PROCEDURE — 2580000003 HC RX 258: Performed by: INTERNAL MEDICINE

## 2024-03-18 RX ORDER — 0.9 % SODIUM CHLORIDE 0.9 %
1000 INTRAVENOUS SOLUTION INTRAVENOUS ONCE
Status: CANCELLED
Start: 2024-03-18

## 2024-03-18 RX ORDER — FAMOTIDINE 10 MG/ML
20 INJECTION, SOLUTION INTRAVENOUS
Status: CANCELLED | OUTPATIENT
Start: 2024-03-18

## 2024-03-18 RX ORDER — PALONOSETRON 0.05 MG/ML
0.25 INJECTION, SOLUTION INTRAVENOUS ONCE
OUTPATIENT
Start: 2024-04-08 | End: 2024-04-08

## 2024-03-18 RX ORDER — EPINEPHRINE 1 MG/ML
0.3 INJECTION, SOLUTION, CONCENTRATE INTRAVENOUS PRN
Status: CANCELLED | OUTPATIENT
Start: 2024-03-18

## 2024-03-18 RX ORDER — MEPERIDINE HYDROCHLORIDE 50 MG/ML
12.5 INJECTION INTRAMUSCULAR; INTRAVENOUS; SUBCUTANEOUS PRN
OUTPATIENT
Start: 2024-04-29

## 2024-03-18 RX ORDER — ALBUTEROL SULFATE 90 UG/1
4 AEROSOL, METERED RESPIRATORY (INHALATION) PRN
OUTPATIENT
Start: 2024-04-08

## 2024-03-18 RX ORDER — MEPERIDINE HYDROCHLORIDE 50 MG/ML
12.5 INJECTION INTRAMUSCULAR; INTRAVENOUS; SUBCUTANEOUS PRN
Status: CANCELLED | OUTPATIENT
Start: 2024-03-18

## 2024-03-18 RX ORDER — ACETAMINOPHEN 325 MG/1
650 TABLET ORAL
OUTPATIENT
Start: 2024-04-08

## 2024-03-18 RX ORDER — ALBUTEROL SULFATE 90 UG/1
4 AEROSOL, METERED RESPIRATORY (INHALATION) PRN
Status: CANCELLED | OUTPATIENT
Start: 2024-03-18

## 2024-03-18 RX ORDER — ACETAMINOPHEN 325 MG/1
650 TABLET ORAL
Status: CANCELLED | OUTPATIENT
Start: 2024-03-18

## 2024-03-18 RX ORDER — ACETAMINOPHEN 325 MG/1
650 TABLET ORAL
OUTPATIENT
Start: 2024-04-29

## 2024-03-18 RX ORDER — SODIUM CHLORIDE 9 MG/ML
5-250 INJECTION, SOLUTION INTRAVENOUS PRN
Status: CANCELLED | OUTPATIENT
Start: 2024-03-18

## 2024-03-18 RX ORDER — SODIUM CHLORIDE 9 MG/ML
5-250 INJECTION, SOLUTION INTRAVENOUS PRN
OUTPATIENT
Start: 2024-04-08

## 2024-03-18 RX ORDER — DEXTROSE MONOHYDRATE 50 MG/ML
5-250 INJECTION, SOLUTION INTRAVENOUS PRN
OUTPATIENT
Start: 2024-04-29

## 2024-03-18 RX ORDER — HEPARIN SODIUM (PORCINE) LOCK FLUSH IV SOLN 100 UNIT/ML 100 UNIT/ML
500 SOLUTION INTRAVENOUS PRN
OUTPATIENT
Start: 2024-04-08

## 2024-03-18 RX ORDER — SODIUM CHLORIDE 9 MG/ML
INJECTION, SOLUTION INTRAVENOUS CONTINUOUS
OUTPATIENT
Start: 2024-04-08

## 2024-03-18 RX ORDER — DEXTROSE MONOHYDRATE 50 MG/ML
5-250 INJECTION, SOLUTION INTRAVENOUS PRN
Status: DISCONTINUED | OUTPATIENT
Start: 2024-03-18 | End: 2024-03-19 | Stop reason: HOSPADM

## 2024-03-18 RX ORDER — ONDANSETRON 2 MG/ML
8 INJECTION INTRAMUSCULAR; INTRAVENOUS
OUTPATIENT
Start: 2024-04-29

## 2024-03-18 RX ORDER — PALONOSETRON 0.05 MG/ML
0.25 INJECTION, SOLUTION INTRAVENOUS ONCE
OUTPATIENT
Start: 2024-04-29 | End: 2024-04-29

## 2024-03-18 RX ORDER — MEPERIDINE HYDROCHLORIDE 50 MG/ML
12.5 INJECTION INTRAMUSCULAR; INTRAVENOUS; SUBCUTANEOUS PRN
OUTPATIENT
Start: 2024-04-08

## 2024-03-18 RX ORDER — EPINEPHRINE 1 MG/ML
0.3 INJECTION, SOLUTION, CONCENTRATE INTRAVENOUS PRN
OUTPATIENT
Start: 2024-04-08

## 2024-03-18 RX ORDER — DIPHENHYDRAMINE HYDROCHLORIDE 50 MG/ML
50 INJECTION INTRAMUSCULAR; INTRAVENOUS
OUTPATIENT
Start: 2024-04-08

## 2024-03-18 RX ORDER — DIPHENHYDRAMINE HYDROCHLORIDE 50 MG/ML
50 INJECTION INTRAMUSCULAR; INTRAVENOUS
Status: CANCELLED | OUTPATIENT
Start: 2024-03-18

## 2024-03-18 RX ORDER — HEPARIN SODIUM (PORCINE) LOCK FLUSH IV SOLN 100 UNIT/ML 100 UNIT/ML
500 SOLUTION INTRAVENOUS PRN
Status: CANCELLED | OUTPATIENT
Start: 2024-03-18

## 2024-03-18 RX ORDER — ONDANSETRON 2 MG/ML
8 INJECTION INTRAMUSCULAR; INTRAVENOUS
Status: CANCELLED | OUTPATIENT
Start: 2024-03-18

## 2024-03-18 RX ORDER — FAMOTIDINE 10 MG/ML
20 INJECTION, SOLUTION INTRAVENOUS
OUTPATIENT
Start: 2024-04-08

## 2024-03-18 RX ORDER — ALBUTEROL SULFATE 90 UG/1
4 AEROSOL, METERED RESPIRATORY (INHALATION) PRN
OUTPATIENT
Start: 2024-04-29

## 2024-03-18 RX ORDER — SODIUM CHLORIDE 9 MG/ML
INJECTION, SOLUTION INTRAVENOUS CONTINUOUS
OUTPATIENT
Start: 2024-04-29

## 2024-03-18 RX ORDER — PALONOSETRON 0.05 MG/ML
0.25 INJECTION, SOLUTION INTRAVENOUS ONCE
Status: CANCELLED | OUTPATIENT
Start: 2024-03-18 | End: 2024-03-18

## 2024-03-18 RX ORDER — FAMOTIDINE 10 MG/ML
20 INJECTION, SOLUTION INTRAVENOUS
OUTPATIENT
Start: 2024-04-29

## 2024-03-18 RX ORDER — PALONOSETRON 0.05 MG/ML
0.25 INJECTION, SOLUTION INTRAVENOUS ONCE
Status: COMPLETED | OUTPATIENT
Start: 2024-03-18 | End: 2024-03-18

## 2024-03-18 RX ORDER — EPINEPHRINE 1 MG/ML
0.3 INJECTION, SOLUTION, CONCENTRATE INTRAVENOUS PRN
OUTPATIENT
Start: 2024-04-29

## 2024-03-18 RX ORDER — SODIUM CHLORIDE 9 MG/ML
5-250 INJECTION, SOLUTION INTRAVENOUS PRN
OUTPATIENT
Start: 2024-04-29

## 2024-03-18 RX ORDER — DIPHENHYDRAMINE HYDROCHLORIDE 50 MG/ML
50 INJECTION INTRAMUSCULAR; INTRAVENOUS
OUTPATIENT
Start: 2024-04-29

## 2024-03-18 RX ORDER — SODIUM CHLORIDE 9 MG/ML
INJECTION, SOLUTION INTRAVENOUS CONTINUOUS
Status: CANCELLED | OUTPATIENT
Start: 2024-03-18

## 2024-03-18 RX ORDER — HEPARIN 100 UNIT/ML
500 SYRINGE INTRAVENOUS PRN
Status: DISCONTINUED | OUTPATIENT
Start: 2024-03-18 | End: 2024-03-19 | Stop reason: HOSPADM

## 2024-03-18 RX ORDER — ONDANSETRON 2 MG/ML
8 INJECTION INTRAMUSCULAR; INTRAVENOUS
OUTPATIENT
Start: 2024-04-08

## 2024-03-18 RX ORDER — SODIUM CHLORIDE 0.9 % (FLUSH) 0.9 %
5-40 SYRINGE (ML) INJECTION PRN
OUTPATIENT
Start: 2024-04-08

## 2024-03-18 RX ORDER — SODIUM CHLORIDE 0.9 % (FLUSH) 0.9 %
5-40 SYRINGE (ML) INJECTION PRN
Status: CANCELLED | OUTPATIENT
Start: 2024-03-18

## 2024-03-18 RX ORDER — DEXTROSE MONOHYDRATE 50 MG/ML
5-250 INJECTION, SOLUTION INTRAVENOUS PRN
OUTPATIENT
Start: 2024-04-08

## 2024-03-18 RX ORDER — SODIUM CHLORIDE 0.9 % (FLUSH) 0.9 %
5-40 SYRINGE (ML) INJECTION PRN
OUTPATIENT
Start: 2024-04-29

## 2024-03-18 RX ORDER — DEXTROSE MONOHYDRATE 50 MG/ML
5-250 INJECTION, SOLUTION INTRAVENOUS PRN
Status: CANCELLED | OUTPATIENT
Start: 2024-03-18

## 2024-03-18 RX ORDER — HEPARIN SODIUM (PORCINE) LOCK FLUSH IV SOLN 100 UNIT/ML 100 UNIT/ML
500 SOLUTION INTRAVENOUS PRN
OUTPATIENT
Start: 2024-04-29

## 2024-03-18 RX ADMIN — DEXTROSE MONOHYDRATE 20 ML/HR: 50 INJECTION, SOLUTION INTRAVENOUS at 11:11

## 2024-03-18 RX ADMIN — HEPARIN 500 UNITS: 100 SYRINGE at 12:36

## 2024-03-18 RX ADMIN — FAM-TRASTUZUMAB DERUXTECAN-NXKI 366 MG: 100 INJECTION, POWDER, LYOPHILIZED, FOR SOLUTION INTRAVENOUS at 11:59

## 2024-03-18 RX ADMIN — PALONOSETRON 0.25 MG: 0.25 INJECTION, SOLUTION INTRAVENOUS at 11:09

## 2024-03-18 RX ADMIN — DEXAMETHASONE SODIUM PHOSPHATE 12 MG: 4 INJECTION, SOLUTION INTRAMUSCULAR; INTRAVENOUS at 11:12

## 2024-03-18 RX ADMIN — SODIUM CHLORIDE 150 MG: 9 INJECTION, SOLUTION INTRAVENOUS at 11:34

## 2024-03-18 NOTE — PROGRESS NOTES
Pt here for treatment today. Pt saw Dr. Rudolph for office visit per Dr. Rudolph port draw to be scheduled and fluids for Thursday. This nurse let MILY Koch know.  Pt withtout any new complaints.   Labs within treatment range, and last echo 2/2/24 EF 55-60%.  Port accessed using sterile technique, orders released.  Pt tolerated treatment well, left amublatory without complaint.

## 2024-03-18 NOTE — PROGRESS NOTES
MA Rooming Questions  Patient: Aileen Cohn  MRN: [id number]    Date: 3/18/2024        1. Do you have any new issues?   no         2. Do you need any refills on medications?    no    3. Have you had any imaging done since your last visit?   no    4. Have you been hospitalized or seen in the emergency room since your last visit here?   no    5. Did the patient have a depression screening completed today? No    No data recorded     PHQ-9 Given to (if applicable):               PHQ-9 Score (if applicable):                     [] Positive     []  Negative              Does question #9 need addressed (if applicable)                     [] Yes    []  No               Hortencia Lambert MA      
to me for further evaluation.     Digital diagnostic bilateral mammogram and bilateral ultrasound done on 6/17/2022 showed left breast mass, highly suspicious for malignancy.  Right breast mass at 11:00, 1.5 cm.  Mass is highly suspicious for malignancy.  Left breast skin thickening.      CT chest on 6/20/2022 showed suspicious heterogeneous mass primarily centered in segment 4A of the liver measuring 8.8 cm.  Left breast mass with left axillary and subpectoral metastatic disease, suspected bony metastases to thoracolumbar spine and sternum and possible left chest wall invasion.    Bone scan on 6/20/2022 showed multifocal osseous metastatic disease.  T12 compression fracture, concerning for pathologic fracture.    MRI abdomen on 6/25/2022 showed biopsy-proven malignancy in the left breast with suspicion for inflammatory carcinoma given skin and trabecular thickening.  There may be invasion of the underlying pectoralis musculature.  At least 3 hepatic metastasis, the largest measuring up to 8.7 cm with the others 1 cm or less.  Likely metastatic portal hepatic, retroperitoneal and right retrocrural lymph nodes.  Trace left pleural effusion. Extensive skeletal metastatic disease.    She underwent ultrasound-guided biopsy of the right breast mass at 11:00 and the pathology showed invasive carcinoma with ductal and lobular features.  Estrogen receptor and progesterone receptor are positive.  HER2 by IHC not overexpressed (score 1+).  HER2 by FISH-negative.    Left breast mass at 12:00 biopsy revealed grade 2 invasive lobular carcinoma.  Estrogen receptor by IHC-positive [greater than 95%, average strong intensity], progesterone receptor-positive [approximately 80%, average moderate intensity], HER2 by IHC-not overexpressed [score 1+] and HER2 by FISH-negative.    CARIS panel did not yield any biomarker results with a level of clinical evidence sufficient to provide an association with a particular therapy. TMB was low.

## 2024-03-21 ENCOUNTER — HOSPITAL ENCOUNTER (OUTPATIENT)
Dept: INFUSION THERAPY | Age: 69
Discharge: HOME | End: 2024-03-21
Payer: MEDICARE

## 2024-03-21 VITALS
BODY MASS INDEX: 26.29 KG/M2 | TEMPERATURE: 97.7 F | RESPIRATION RATE: 18 BRPM | HEART RATE: 72 BPM | SYSTOLIC BLOOD PRESSURE: 132 MMHG | HEIGHT: 64 IN | WEIGHT: 154 LBS | OXYGEN SATURATION: 100 % | DIASTOLIC BLOOD PRESSURE: 77 MMHG

## 2024-03-21 DIAGNOSIS — C79.51 MALIGNANT NEOPLASM METASTATIC TO BONE (HCC): Primary | ICD-10-CM

## 2024-03-21 PROCEDURE — 2580000003 HC RX 258: Performed by: INTERNAL MEDICINE

## 2024-03-21 PROCEDURE — 96360 HYDRATION IV INFUSION INIT: CPT

## 2024-03-21 PROCEDURE — 6360000002 HC RX W HCPCS: Performed by: INTERNAL MEDICINE

## 2024-03-21 RX ORDER — HEPARIN 100 UNIT/ML
500 SYRINGE INTRAVENOUS PRN
Status: DISCONTINUED | OUTPATIENT
Start: 2024-03-21 | End: 2024-03-22 | Stop reason: HOSPADM

## 2024-03-21 RX ORDER — HEPARIN 100 UNIT/ML
500 SYRINGE INTRAVENOUS PRN
OUTPATIENT
Start: 2024-03-21

## 2024-03-21 RX ORDER — SODIUM CHLORIDE 0.9 % (FLUSH) 0.9 %
5-40 SYRINGE (ML) INJECTION PRN
OUTPATIENT
Start: 2024-03-21

## 2024-03-21 RX ORDER — SODIUM CHLORIDE 9 MG/ML
5-250 INJECTION, SOLUTION INTRAVENOUS PRN
OUTPATIENT
Start: 2024-03-21

## 2024-03-21 RX ORDER — 0.9 % SODIUM CHLORIDE 0.9 %
1000 INTRAVENOUS SOLUTION INTRAVENOUS ONCE
Status: COMPLETED | OUTPATIENT
Start: 2024-03-21 | End: 2024-03-21

## 2024-03-21 RX ORDER — SODIUM CHLORIDE 0.9 % (FLUSH) 0.9 %
5-40 SYRINGE (ML) INJECTION PRN
Status: DISCONTINUED | OUTPATIENT
Start: 2024-03-21 | End: 2024-03-22 | Stop reason: HOSPADM

## 2024-03-21 RX ORDER — 0.9 % SODIUM CHLORIDE 0.9 %
1000 INTRAVENOUS SOLUTION INTRAVENOUS ONCE
Start: 2024-03-21 | End: 2024-03-21

## 2024-03-21 RX ADMIN — SODIUM CHLORIDE 1000 ML: 9 INJECTION, SOLUTION INTRAVENOUS at 11:52

## 2024-03-21 RX ADMIN — HEPARIN 500 UNITS: 100 SYRINGE at 12:51

## 2024-03-21 NOTE — PROGRESS NOTES
Arrived to treatment suite for IV hydration.  Mediport accessed per protocol.  Positive blood return noted.  Flushed and locked with normal saline. Treatment plan approved, released and completed as ordered.  Mediport de-accessed per protocol. Pt tolerated well.  AVS provided.  Discharge ambulatory in stable condition.  Kim Kimbrough RN

## 2024-04-04 ENCOUNTER — HOSPITAL ENCOUNTER (OUTPATIENT)
Dept: INFUSION THERAPY | Age: 69
Discharge: HOME | End: 2024-04-04
Payer: MEDICARE

## 2024-04-04 DIAGNOSIS — Z11.59 ENCOUNTER FOR SCREENING FOR OTHER VIRAL DISEASES: ICD-10-CM

## 2024-04-04 DIAGNOSIS — C50.812 MALIGNANT NEOPLASM OF OVERLAPPING SITES OF BOTH BREASTS IN FEMALE, ESTROGEN RECEPTOR POSITIVE (HCC): ICD-10-CM

## 2024-04-04 DIAGNOSIS — C50.811 MALIGNANT NEOPLASM OF OVERLAPPING SITES OF BOTH BREASTS IN FEMALE, ESTROGEN RECEPTOR POSITIVE (HCC): ICD-10-CM

## 2024-04-04 DIAGNOSIS — Z17.0 MALIGNANT NEOPLASM OF OVERLAPPING SITES OF BOTH BREASTS IN FEMALE, ESTROGEN RECEPTOR POSITIVE (HCC): ICD-10-CM

## 2024-04-04 DIAGNOSIS — Z11.59 NEED FOR HEPATITIS B SCREENING TEST: ICD-10-CM

## 2024-04-04 LAB
ALBUMIN SERPL-MCNC: 4 GM/DL (ref 3.4–5)
ALP BLD-CCNC: 70 IU/L (ref 40–129)
ALT SERPL-CCNC: 17 U/L (ref 10–40)
ANION GAP SERPL CALCULATED.3IONS-SCNC: 7 MMOL/L (ref 7–16)
AST SERPL-CCNC: 17 IU/L (ref 15–37)
BASOPHILS ABSOLUTE: 0 K/CU MM
BASOPHILS RELATIVE PERCENT: 0.5 % (ref 0–1)
BILIRUB SERPL-MCNC: 0.3 MG/DL (ref 0–1)
BUN SERPL-MCNC: 12 MG/DL (ref 6–23)
CALCIUM SERPL-MCNC: 8.7 MG/DL (ref 8.3–10.6)
CHLORIDE BLD-SCNC: 110 MMOL/L (ref 99–110)
CO2: 24 MMOL/L (ref 21–32)
CREAT SERPL-MCNC: 0.5 MG/DL (ref 0.6–1.1)
DIFFERENTIAL TYPE: ABNORMAL
EOSINOPHILS ABSOLUTE: 0.1 K/CU MM
EOSINOPHILS RELATIVE PERCENT: 2.5 % (ref 0–3)
GFR SERPL CREATININE-BSD FRML MDRD: >90 ML/MIN/1.73M2
GLUCOSE SERPL-MCNC: 86 MG/DL (ref 70–99)
HCT VFR BLD CALC: 34.1 % (ref 37–47)
HEMOGLOBIN: 10.8 GM/DL (ref 12.5–16)
LYMPHOCYTES ABSOLUTE: 1.4 K/CU MM
LYMPHOCYTES RELATIVE PERCENT: 33.7 % (ref 24–44)
MCH RBC QN AUTO: 34.5 PG (ref 27–31)
MCHC RBC AUTO-ENTMCNC: 31.7 % (ref 32–36)
MCV RBC AUTO: 108.9 FL (ref 78–100)
MONOCYTES ABSOLUTE: 0.4 K/CU MM
MONOCYTES RELATIVE PERCENT: 9.4 % (ref 0–4)
PDW BLD-RTO: 13.9 % (ref 11.7–14.9)
PLATELET # BLD: 230 K/CU MM (ref 140–440)
PMV BLD AUTO: 8.4 FL (ref 7.5–11.1)
POTASSIUM SERPL-SCNC: 4.3 MMOL/L (ref 3.5–5.1)
RBC # BLD: 3.13 M/CU MM (ref 4.2–5.4)
SEGMENTED NEUTROPHILS ABSOLUTE COUNT: 2.2 K/CU MM
SEGMENTED NEUTROPHILS RELATIVE PERCENT: 53.9 % (ref 36–66)
SODIUM BLD-SCNC: 141 MMOL/L (ref 135–145)
TOTAL PROTEIN: 6.3 GM/DL (ref 6.4–8.2)
WBC # BLD: 4 K/CU MM (ref 4–10.5)

## 2024-04-04 PROCEDURE — 85025 COMPLETE CBC W/AUTO DIFF WBC: CPT

## 2024-04-04 PROCEDURE — 36415 COLL VENOUS BLD VENIPUNCTURE: CPT

## 2024-04-04 PROCEDURE — 80053 COMPREHEN METABOLIC PANEL: CPT

## 2024-04-08 ENCOUNTER — OFFICE VISIT (OUTPATIENT)
Dept: ONCOLOGY | Age: 69
End: 2024-04-08
Payer: MEDICARE

## 2024-04-08 ENCOUNTER — HOSPITAL ENCOUNTER (OUTPATIENT)
Dept: INFUSION THERAPY | Age: 69
Discharge: HOME | End: 2024-04-08
Payer: MEDICARE

## 2024-04-08 VITALS
OXYGEN SATURATION: 99 % | SYSTOLIC BLOOD PRESSURE: 134 MMHG | WEIGHT: 155 LBS | DIASTOLIC BLOOD PRESSURE: 67 MMHG | BODY MASS INDEX: 26.46 KG/M2 | TEMPERATURE: 97.9 F | HEART RATE: 94 BPM | HEIGHT: 64 IN | RESPIRATION RATE: 16 BRPM

## 2024-04-08 DIAGNOSIS — C50.812 MALIGNANT NEOPLASM OF OVERLAPPING SITES OF BOTH BREASTS IN FEMALE, ESTROGEN RECEPTOR POSITIVE (HCC): Primary | ICD-10-CM

## 2024-04-08 DIAGNOSIS — C50.811 MALIGNANT NEOPLASM OF OVERLAPPING SITES OF BOTH BREASTS IN FEMALE, ESTROGEN RECEPTOR POSITIVE (HCC): Primary | ICD-10-CM

## 2024-04-08 DIAGNOSIS — Z11.59 NEED FOR HEPATITIS B SCREENING TEST: ICD-10-CM

## 2024-04-08 DIAGNOSIS — C79.51 MALIGNANT NEOPLASM METASTATIC TO BONE (HCC): ICD-10-CM

## 2024-04-08 DIAGNOSIS — Z17.0 MALIGNANT NEOPLASM OF OVERLAPPING SITES OF BOTH BREASTS IN FEMALE, ESTROGEN RECEPTOR POSITIVE (HCC): Primary | ICD-10-CM

## 2024-04-08 DIAGNOSIS — Z11.59 ENCOUNTER FOR SCREENING FOR OTHER VIRAL DISEASES: ICD-10-CM

## 2024-04-08 PROCEDURE — 96361 HYDRATE IV INFUSION ADD-ON: CPT

## 2024-04-08 PROCEDURE — 96413 CHEMO IV INFUSION 1 HR: CPT

## 2024-04-08 PROCEDURE — 1124F ACP DISCUSS-NO DSCNMKR DOCD: CPT | Performed by: INTERNAL MEDICINE

## 2024-04-08 PROCEDURE — 6360000002 HC RX W HCPCS: Performed by: INTERNAL MEDICINE

## 2024-04-08 PROCEDURE — 96367 TX/PROPH/DG ADDL SEQ IV INF: CPT

## 2024-04-08 PROCEDURE — 99214 OFFICE O/P EST MOD 30 MIN: CPT | Performed by: INTERNAL MEDICINE

## 2024-04-08 PROCEDURE — 96375 TX/PRO/DX INJ NEW DRUG ADDON: CPT

## 2024-04-08 PROCEDURE — 2580000003 HC RX 258: Performed by: INTERNAL MEDICINE

## 2024-04-08 RX ORDER — DEXTROSE MONOHYDRATE 50 MG/ML
5-250 INJECTION, SOLUTION INTRAVENOUS PRN
Status: DISCONTINUED | OUTPATIENT
Start: 2024-04-08 | End: 2024-04-09 | Stop reason: HOSPADM

## 2024-04-08 RX ORDER — PALONOSETRON 0.05 MG/ML
0.25 INJECTION, SOLUTION INTRAVENOUS ONCE
Status: COMPLETED | OUTPATIENT
Start: 2024-04-08 | End: 2024-04-08

## 2024-04-08 RX ORDER — HEPARIN 100 UNIT/ML
500 SYRINGE INTRAVENOUS PRN
Status: CANCELLED | OUTPATIENT
Start: 2024-04-08

## 2024-04-08 RX ORDER — 0.9 % SODIUM CHLORIDE 0.9 %
1000 INTRAVENOUS SOLUTION INTRAVENOUS ONCE
Status: COMPLETED | OUTPATIENT
Start: 2024-04-08 | End: 2024-04-08

## 2024-04-08 RX ORDER — HEPARIN 100 UNIT/ML
500 SYRINGE INTRAVENOUS PRN
Status: DISCONTINUED | OUTPATIENT
Start: 2024-04-08 | End: 2024-04-09 | Stop reason: HOSPADM

## 2024-04-08 RX ORDER — SODIUM CHLORIDE 0.9 % (FLUSH) 0.9 %
5-40 SYRINGE (ML) INJECTION PRN
Status: DISCONTINUED | OUTPATIENT
Start: 2024-04-08 | End: 2024-04-09 | Stop reason: HOSPADM

## 2024-04-08 RX ORDER — SODIUM CHLORIDE 0.9 % (FLUSH) 0.9 %
5-40 SYRINGE (ML) INJECTION PRN
Status: CANCELLED | OUTPATIENT
Start: 2024-04-08

## 2024-04-08 RX ORDER — SODIUM CHLORIDE 9 MG/ML
5-250 INJECTION, SOLUTION INTRAVENOUS PRN
Status: CANCELLED | OUTPATIENT
Start: 2024-04-08

## 2024-04-08 RX ORDER — 0.9 % SODIUM CHLORIDE 0.9 %
1000 INTRAVENOUS SOLUTION INTRAVENOUS ONCE
Status: CANCELLED
Start: 2024-04-08 | End: 2024-04-08

## 2024-04-08 RX ADMIN — HEPARIN 500 UNITS: 100 SYRINGE at 11:54

## 2024-04-08 RX ADMIN — FAM-TRASTUZUMAB DERUXTECAN-NXKI 366 MG: 100 INJECTION, POWDER, LYOPHILIZED, FOR SOLUTION INTRAVENOUS at 11:17

## 2024-04-08 RX ADMIN — PALONOSETRON 0.25 MG: 0.25 INJECTION, SOLUTION INTRAVENOUS at 10:54

## 2024-04-08 RX ADMIN — SODIUM CHLORIDE, PRESERVATIVE FREE 10 ML: 5 INJECTION INTRAVENOUS at 11:54

## 2024-04-08 RX ADMIN — DEXTROSE MONOHYDRATE 20 ML/HR: 50 INJECTION, SOLUTION INTRAVENOUS at 11:16

## 2024-04-08 RX ADMIN — SODIUM CHLORIDE 150 MG: 9 INJECTION, SOLUTION INTRAVENOUS at 10:13

## 2024-04-08 RX ADMIN — DEXAMETHASONE SODIUM PHOSPHATE 12 MG: 4 INJECTION, SOLUTION INTRAMUSCULAR; INTRAVENOUS at 10:55

## 2024-04-08 RX ADMIN — SODIUM CHLORIDE 1000 ML: 9 INJECTION, SOLUTION INTRAVENOUS at 09:29

## 2024-04-08 NOTE — PROGRESS NOTES
Pt here for tx  and OV and IVF hydration. Port accessed with blood return noted. CBC CMP reviewed from 4/4 and within defined limits. Pt has no new concerns or issues to discuss. Pt states increased fatigue and intermittent nausea.     Patient's status assessed and documented appropriately.  All labs and required results were also reviewed today.  Treatment parameters have been reviewed.  Today's treatment has been approved by the provider.      Treatment orders and medication sequencing (when applicable) was verified by 2 registered nurses.  The treatment plan was confirmed with the patient prior to administration, and the patient understands the need to report any treatment-related symptoms.    Prior to administration, when applicable, the following 8 elements of medication administration were reviewed with 2nd Registered Nurse prior to dosing: drug name, drug dose, infusion volume when prepared in a syringe, rate of administration, expiration dates and/or times, appearance and integrity of drug(s), and rate of pump for infusion.  The 5 rights of medication administration have been verified.      Per Dr Ronni cornelius for pt to have IVF on Thursday as well.    made aware.     Pt tolerated tx without incident left ambulatory discharge instructions given

## 2024-04-08 NOTE — PROGRESS NOTES
MA Rooming Questions  Patient: Aileen Cohn  MRN: [id number]    Date: 4/8/2024        1. Do you have any new issues?   yes - fatigue, nausea         2. Do you need any refills on medications?    no    3. Have you had any imaging done since your last visit?   no    4. Have you been hospitalized or seen in the emergency room since your last visit here?   no    5. Did the patient have a depression screening completed today? No    No data recorded     PHQ-9 Given to (if applicable):               PHQ-9 Score (if applicable):                     [] Positive     []  Negative              Does question #9 need addressed (if applicable)                     [] Yes    []  No               Chrissy Montemayor CMA

## 2024-04-08 NOTE — PROGRESS NOTES
Patient Name:  Aileen Cohn  Patient :  1955  Patient MRN:  [id number]     Primary Oncologist: Quincy Rudolph MD  Referring Provider: Gabrielle Cisneros APRN - CNP     Date of Service: 2024      Chief Complaint:    Chief Complaint   Patient presents with    Follow-up     Patient's active problem list:       Liver mass       Lytic bone lesions       Left breast mass    HPI:   Aileen Cohn is a 68 year-old very pleasant female with medical history significant for osteoarthritis, initially referred to me on 22 for evaluation of her liver lesion and multiple lytic bone lesions.     She initially presented to [facility] on 22 with severe lower back pain and constipation. Stated that she has been having back pain for about 4 - 6 weeks duration, which becomes severe pain started a week before presentation.     CT scan of the abdomen and pelvis done on 2022 showed lesion (6.1 x 8.1 cm) in the hepatic segment 4, concerning for neoplasm.  Further evaluation with MRI of the liver is recommended.  Extensive lytic metastatic disease in lumbar and thoracic spine and pelvis with most dominant lesion seen at L5 vertebral body.  Several small bowel loops segments in the left abdomen demonstrate mild wall thickening, nonspecific may indicate enteritis.  Nonspecific partially visualized inflammatory stranding along the pericardium.  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     CT lumbar spine done on 22 showed extensive multifocal lytic lesions concerning for neoplastic/metastatic disease.  Age indeterminant compression fractures at T12 and L4, with mild narrowing of the AP diameter of the canal at L4.    She never had colonoscopy before. She had pap smear around . She didn't recall when was her last mammogram.     She denies weight loss. She hasn't been eating much for about 10 days since she has been having nausea.     Since she is found to have multiple lytic bone lesions and liver lesion, she was referred to

## 2024-04-11 ENCOUNTER — HOSPITAL ENCOUNTER (OUTPATIENT)
Dept: INFUSION THERAPY | Age: 69
Discharge: HOME | End: 2024-04-11
Payer: MEDICARE

## 2024-04-11 VITALS
OXYGEN SATURATION: 100 % | BODY MASS INDEX: 27.14 KG/M2 | TEMPERATURE: 98 F | HEART RATE: 72 BPM | HEIGHT: 64 IN | SYSTOLIC BLOOD PRESSURE: 130 MMHG | DIASTOLIC BLOOD PRESSURE: 59 MMHG | WEIGHT: 159 LBS

## 2024-04-11 DIAGNOSIS — C79.51 MALIGNANT NEOPLASM METASTATIC TO BONE (HCC): Primary | ICD-10-CM

## 2024-04-11 PROCEDURE — 2580000003 HC RX 258: Performed by: INTERNAL MEDICINE

## 2024-04-11 PROCEDURE — 96360 HYDRATION IV INFUSION INIT: CPT

## 2024-04-11 RX ORDER — HEPARIN 100 UNIT/ML
500 SYRINGE INTRAVENOUS PRN
Status: DISCONTINUED | OUTPATIENT
Start: 2024-04-11 | End: 2024-04-12 | Stop reason: HOSPADM

## 2024-04-11 RX ORDER — HEPARIN 100 UNIT/ML
500 SYRINGE INTRAVENOUS PRN
OUTPATIENT
Start: 2024-04-11

## 2024-04-11 RX ORDER — SODIUM CHLORIDE 9 MG/ML
5-250 INJECTION, SOLUTION INTRAVENOUS PRN
OUTPATIENT
Start: 2024-04-11

## 2024-04-11 RX ORDER — SODIUM CHLORIDE 0.9 % (FLUSH) 0.9 %
5-40 SYRINGE (ML) INJECTION PRN
Status: DISCONTINUED | OUTPATIENT
Start: 2024-04-11 | End: 2024-04-12 | Stop reason: HOSPADM

## 2024-04-11 RX ORDER — SODIUM CHLORIDE 0.9 % (FLUSH) 0.9 %
5-40 SYRINGE (ML) INJECTION PRN
OUTPATIENT
Start: 2024-04-11

## 2024-04-11 RX ORDER — 0.9 % SODIUM CHLORIDE 0.9 %
1000 INTRAVENOUS SOLUTION INTRAVENOUS ONCE
Start: 2024-04-11 | End: 2024-04-11

## 2024-04-11 RX ORDER — 0.9 % SODIUM CHLORIDE 0.9 %
1000 INTRAVENOUS SOLUTION INTRAVENOUS ONCE
Status: COMPLETED | OUTPATIENT
Start: 2024-04-11 | End: 2024-04-11

## 2024-04-11 RX ADMIN — HEPARIN 500 UNITS: 100 SYRINGE at 12:13

## 2024-04-11 RX ADMIN — SODIUM CHLORIDE 1000 ML: 9 INJECTION, SOLUTION INTRAVENOUS at 11:05

## 2024-04-11 NOTE — PROGRESS NOTES
Assisted to infusion area, here today for hydration. Patient has no concerns at this time. Right chest mediport accessed, positive blood return noted. Labs drawn. Labs within defined limits.  1L NS administered as ordered. Call light within reach. Tolerated infusion without incident.  Discharge instructions declined

## 2024-04-16 ENCOUNTER — HOSPITAL ENCOUNTER (OUTPATIENT)
Dept: CT IMAGING | Age: 69
Discharge: HOME | End: 2024-04-16
Attending: INTERNAL MEDICINE
Payer: MEDICARE

## 2024-04-16 DIAGNOSIS — C50.811 MALIGNANT NEOPLASM OF OVERLAPPING SITES OF BOTH BREASTS IN FEMALE, ESTROGEN RECEPTOR POSITIVE (HCC): ICD-10-CM

## 2024-04-16 DIAGNOSIS — C50.812 MALIGNANT NEOPLASM OF OVERLAPPING SITES OF BOTH BREASTS IN FEMALE, ESTROGEN RECEPTOR POSITIVE (HCC): ICD-10-CM

## 2024-04-16 DIAGNOSIS — Z17.0 MALIGNANT NEOPLASM OF OVERLAPPING SITES OF BOTH BREASTS IN FEMALE, ESTROGEN RECEPTOR POSITIVE (HCC): ICD-10-CM

## 2024-04-16 PROCEDURE — 74177 CT ABD & PELVIS W/CONTRAST: CPT

## 2024-04-16 PROCEDURE — 6360000004 HC RX CONTRAST MEDICATION: Performed by: INTERNAL MEDICINE

## 2024-04-16 PROCEDURE — 2580000003 HC RX 258: Performed by: INTERNAL MEDICINE

## 2024-04-16 PROCEDURE — 71260 CT THORAX DX C+: CPT

## 2024-04-16 RX ORDER — SODIUM CHLORIDE 9 MG/ML
10 INJECTION, SOLUTION INTRAMUSCULAR; INTRAVENOUS; SUBCUTANEOUS PRN
Status: DISCONTINUED | OUTPATIENT
Start: 2024-04-16 | End: 2024-04-17 | Stop reason: HOSPADM

## 2024-04-16 RX ADMIN — IOPAMIDOL 75 ML: 755 INJECTION, SOLUTION INTRAVENOUS at 13:32

## 2024-04-16 RX ADMIN — SODIUM CHLORIDE, PRESERVATIVE FREE 10 ML: 5 INJECTION INTRAVENOUS at 13:31

## 2024-04-16 RX ADMIN — IOPAMIDOL 18 ML: 755 INJECTION, SOLUTION INTRAVENOUS at 12:45

## 2024-04-23 ENCOUNTER — HOSPITAL ENCOUNTER (OUTPATIENT)
Dept: INFUSION THERAPY | Age: 69
Discharge: HOME | End: 2024-04-23
Payer: MEDICARE

## 2024-04-23 ENCOUNTER — OFFICE VISIT (OUTPATIENT)
Dept: ONCOLOGY | Age: 69
End: 2024-04-23
Payer: MEDICARE

## 2024-04-23 VITALS
SYSTOLIC BLOOD PRESSURE: 149 MMHG | DIASTOLIC BLOOD PRESSURE: 65 MMHG | TEMPERATURE: 97.8 F | BODY MASS INDEX: 26.73 KG/M2 | HEART RATE: 92 BPM | WEIGHT: 156.6 LBS | HEIGHT: 64 IN | OXYGEN SATURATION: 100 %

## 2024-04-23 DIAGNOSIS — K59.09 OTHER CONSTIPATION: ICD-10-CM

## 2024-04-23 DIAGNOSIS — C50.812 MALIGNANT NEOPLASM OF OVERLAPPING SITES OF BOTH BREASTS IN FEMALE, ESTROGEN RECEPTOR POSITIVE (HCC): Primary | ICD-10-CM

## 2024-04-23 DIAGNOSIS — C50.811 MALIGNANT NEOPLASM OF OVERLAPPING SITES OF BOTH BREASTS IN FEMALE, ESTROGEN RECEPTOR POSITIVE (HCC): Primary | ICD-10-CM

## 2024-04-23 DIAGNOSIS — Z17.0 MALIGNANT NEOPLASM OF OVERLAPPING SITES OF BOTH BREASTS IN FEMALE, ESTROGEN RECEPTOR POSITIVE (HCC): Primary | ICD-10-CM

## 2024-04-23 DIAGNOSIS — C79.51 MALIGNANT NEOPLASM METASTATIC TO BONE (HCC): ICD-10-CM

## 2024-04-23 PROCEDURE — 99401 PREV MED CNSL INDIV APPRX 15: CPT | Performed by: INTERNAL MEDICINE

## 2024-04-23 PROCEDURE — 99211 OFF/OP EST MAY X REQ PHY/QHP: CPT

## 2024-04-23 RX ORDER — SENNOSIDES 8.6 MG
1 TABLET ORAL DAILY
Qty: 90 TABLET | Refills: 1 | Status: SHIPPED | OUTPATIENT
Start: 2024-04-23

## 2024-04-23 NOTE — PROGRESS NOTES
MA Rooming Questions  Patient: Aileen Cohn  MRN: [id number]    Date: 4/23/2024        1. Do you have any new issues?   no         2. Do you need any refills on medications?    no    3. Have you had any imaging done since your last visit?   yes - ct chest abd pelvis 4/16    4. Have you been hospitalized or seen in the emergency room since your last visit here?   no    5. Did the patient have a depression screening completed today? No    No data recorded     PHQ-9 Given to (if applicable):               PHQ-9 Score (if applicable):                     [] Positive     []  Negative              Does question #9 need addressed (if applicable)                     [] Yes    []  No               Izabela Feng CMA

## 2024-04-23 NOTE — PROGRESS NOTES
Palliative Care Assessment    Medical Oncology History:    June 22 who was evaluated for severe back pain.  CT abdomen and pelvis June 12, 2022 8 cm mass involving liver, extensive lytic metastatic lesions involving the lumbar and thoracic spine and pelvis.  Mammogram June 17, 2022 right breast mass.  CT chest June 20, 2022 size liver lesion breast mass also noted axillary and subpectoral metastatic disease.  She also underwent bone scan and MRI of abdomen and finally biopsy of the right breast pathology revealing invasive ductal and lobular carcinoma, positive estrogen and progesterone receptors.  Because of the visceral disease started on single agent paclitaxel on July 20, 2022.  CT scan of the chest abdomen and pelvis done on November 14, 2022 revealed overall improvement.  January 2023 treatment changed to Ibrance and letrozole.  Studies done in April 2023 had shown a stable or improving changes.  CT chest abdomen pelvis, December 20, 2023 suggestive of progression of metastatic disease within the liver at least 3 new lesions.  CT-guided biopsy January 15, 2024 metastatic disease from breast, ER positive, MN positive HER2 1+.  Letrozole and Ibrance was stopped and February 5, 2024 started on Enhertu.    Other Medical Problems:   Basically healthy except for osteoarthritis and having her hip replacement about 2 years prior to this diagnosis     Personal History     A. Life Time:   Moved to [place] in early age from [place].  Went to school here including college at [employer] and after graduation came to [place].  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   worked at [employer] retiring recently also.  Between them to have her 3 daughters 1 living in [place] another 1 in [place] [facility] and third 1 in [place].  She is very close to her daughters and spends a lot of time with her

## 2024-04-26 ENCOUNTER — HOSPITAL ENCOUNTER (OUTPATIENT)
Dept: INFUSION THERAPY | Age: 69
Discharge: HOME | End: 2024-04-26
Payer: MEDICARE

## 2024-04-26 DIAGNOSIS — Z11.59 NEED FOR HEPATITIS B SCREENING TEST: ICD-10-CM

## 2024-04-26 DIAGNOSIS — Z17.0 MALIGNANT NEOPLASM OF OVERLAPPING SITES OF BOTH BREASTS IN FEMALE, ESTROGEN RECEPTOR POSITIVE (HCC): ICD-10-CM

## 2024-04-26 DIAGNOSIS — Z11.59 ENCOUNTER FOR SCREENING FOR OTHER VIRAL DISEASES: ICD-10-CM

## 2024-04-26 DIAGNOSIS — C50.811 MALIGNANT NEOPLASM OF OVERLAPPING SITES OF BOTH BREASTS IN FEMALE, ESTROGEN RECEPTOR POSITIVE (HCC): ICD-10-CM

## 2024-04-26 DIAGNOSIS — C50.812 MALIGNANT NEOPLASM OF OVERLAPPING SITES OF BOTH BREASTS IN FEMALE, ESTROGEN RECEPTOR POSITIVE (HCC): ICD-10-CM

## 2024-04-26 LAB
ALBUMIN SERPL-MCNC: 4.3 GM/DL (ref 3.4–5)
ALP BLD-CCNC: 74 IU/L (ref 40–128)
ALT SERPL-CCNC: 19 U/L (ref 10–40)
ANION GAP SERPL CALCULATED.3IONS-SCNC: 9 MMOL/L (ref 7–16)
AST SERPL-CCNC: 17 IU/L (ref 15–37)
BASOPHILS ABSOLUTE: 0 K/CU MM
BASOPHILS RELATIVE PERCENT: 0.6 % (ref 0–1)
BILIRUB SERPL-MCNC: 0.5 MG/DL (ref 0–1)
BUN SERPL-MCNC: 11 MG/DL (ref 6–23)
CALCIUM SERPL-MCNC: 9.1 MG/DL (ref 8.3–10.6)
CHLORIDE BLD-SCNC: 106 MMOL/L (ref 99–110)
CO2: 26 MMOL/L (ref 21–32)
CREAT SERPL-MCNC: 0.5 MG/DL (ref 0.6–1.1)
DIFFERENTIAL TYPE: ABNORMAL
EOSINOPHILS ABSOLUTE: 0.2 K/CU MM
EOSINOPHILS RELATIVE PERCENT: 2.8 % (ref 0–3)
GFR SERPL CREATININE-BSD FRML MDRD: >90 ML/MIN/1.73M2
GLUCOSE SERPL-MCNC: 68 MG/DL (ref 70–99)
HCT VFR BLD CALC: 35.6 % (ref 37–47)
HEMOGLOBIN: 11.4 GM/DL (ref 12.5–16)
LYMPHOCYTES ABSOLUTE: 1.6 K/CU MM
LYMPHOCYTES RELATIVE PERCENT: 28.4 % (ref 24–44)
MCH RBC QN AUTO: 33.7 PG (ref 27–31)
MCHC RBC AUTO-ENTMCNC: 32 % (ref 32–36)
MCV RBC AUTO: 105.3 FL (ref 78–100)
MONOCYTES ABSOLUTE: 0.6 K/CU MM
MONOCYTES RELATIVE PERCENT: 10.6 % (ref 0–4)
NEUTROPHILS RELATIVE PERCENT: 57.6 % (ref 36–66)
PDW BLD-RTO: 13.9 % (ref 11.7–14.9)
PLATELET # BLD: 227 K/CU MM (ref 140–440)
PMV BLD AUTO: 8.3 FL (ref 7.5–11.1)
POTASSIUM SERPL-SCNC: 4.5 MMOL/L (ref 3.5–5.1)
RBC # BLD: 3.38 M/CU MM (ref 4.2–5.4)
SEGMENTED NEUTROPHILS ABSOLUTE COUNT: 3.1 K/CU MM
SODIUM BLD-SCNC: 141 MMOL/L (ref 135–145)
TOTAL PROTEIN: 6.9 GM/DL (ref 6.4–8.2)
WBC # BLD: 5.5 K/CU MM (ref 4–10.5)

## 2024-04-26 PROCEDURE — 80053 COMPREHEN METABOLIC PANEL: CPT

## 2024-04-26 PROCEDURE — 85025 COMPLETE CBC W/AUTO DIFF WBC: CPT

## 2024-04-26 PROCEDURE — 36415 COLL VENOUS BLD VENIPUNCTURE: CPT

## 2024-04-29 ENCOUNTER — OFFICE VISIT (OUTPATIENT)
Dept: ONCOLOGY | Age: 69
End: 2024-04-29
Payer: MEDICARE

## 2024-04-29 ENCOUNTER — HOSPITAL ENCOUNTER (OUTPATIENT)
Dept: RADIATION ONCOLOGY | Age: 69
Discharge: HOME | End: 2024-04-29
Payer: MEDICARE

## 2024-04-29 ENCOUNTER — HOSPITAL ENCOUNTER (OUTPATIENT)
Dept: INFUSION THERAPY | Age: 69
Discharge: HOME | End: 2024-04-29
Payer: MEDICARE

## 2024-04-29 VITALS
WEIGHT: 155.6 LBS | OXYGEN SATURATION: 98 % | RESPIRATION RATE: 16 BRPM | DIASTOLIC BLOOD PRESSURE: 87 MMHG | BODY MASS INDEX: 26.56 KG/M2 | SYSTOLIC BLOOD PRESSURE: 144 MMHG | HEIGHT: 64 IN | HEART RATE: 104 BPM | TEMPERATURE: 98.1 F

## 2024-04-29 DIAGNOSIS — C50.811 MALIGNANT NEOPLASM OF OVERLAPPING SITES OF BOTH BREASTS IN FEMALE, ESTROGEN RECEPTOR POSITIVE (HCC): Primary | ICD-10-CM

## 2024-04-29 DIAGNOSIS — C79.51 MALIGNANT NEOPLASM METASTATIC TO BONE (HCC): ICD-10-CM

## 2024-04-29 DIAGNOSIS — C50.812 MALIGNANT NEOPLASM OF OVERLAPPING SITES OF BOTH BREASTS IN FEMALE, ESTROGEN RECEPTOR POSITIVE (HCC): Primary | ICD-10-CM

## 2024-04-29 DIAGNOSIS — Z17.0 MALIGNANT NEOPLASM OF OVERLAPPING SITES OF BOTH BREASTS IN FEMALE, ESTROGEN RECEPTOR POSITIVE (HCC): Primary | ICD-10-CM

## 2024-04-29 DIAGNOSIS — Z11.59 ENCOUNTER FOR SCREENING FOR OTHER VIRAL DISEASES: ICD-10-CM

## 2024-04-29 DIAGNOSIS — Z11.59 NEED FOR HEPATITIS B SCREENING TEST: ICD-10-CM

## 2024-04-29 PROCEDURE — 6360000002 HC RX W HCPCS: Performed by: INTERNAL MEDICINE

## 2024-04-29 PROCEDURE — 99214 OFFICE O/P EST MOD 30 MIN: CPT | Performed by: INTERNAL MEDICINE

## 2024-04-29 PROCEDURE — 99205 OFFICE O/P NEW HI 60 MIN: CPT | Performed by: RADIOLOGY

## 2024-04-29 PROCEDURE — 99202 OFFICE O/P NEW SF 15 MIN: CPT | Performed by: RADIOLOGY

## 2024-04-29 PROCEDURE — 96375 TX/PRO/DX INJ NEW DRUG ADDON: CPT

## 2024-04-29 PROCEDURE — 96367 TX/PROPH/DG ADDL SEQ IV INF: CPT

## 2024-04-29 PROCEDURE — 2580000003 HC RX 258: Performed by: INTERNAL MEDICINE

## 2024-04-29 PROCEDURE — 1124F ACP DISCUSS-NO DSCNMKR DOCD: CPT | Performed by: INTERNAL MEDICINE

## 2024-04-29 PROCEDURE — 96361 HYDRATE IV INFUSION ADD-ON: CPT

## 2024-04-29 PROCEDURE — 96413 CHEMO IV INFUSION 1 HR: CPT

## 2024-04-29 RX ORDER — 0.9 % SODIUM CHLORIDE 0.9 %
1000 INTRAVENOUS SOLUTION INTRAVENOUS ONCE
Status: CANCELLED
Start: 2024-04-29 | End: 2024-04-29

## 2024-04-29 RX ORDER — 0.9 % SODIUM CHLORIDE 0.9 %
1000 INTRAVENOUS SOLUTION INTRAVENOUS ONCE
Status: COMPLETED | OUTPATIENT
Start: 2024-04-29 | End: 2024-04-29

## 2024-04-29 RX ORDER — HEPARIN 100 UNIT/ML
500 SYRINGE INTRAVENOUS PRN
Status: DISCONTINUED | OUTPATIENT
Start: 2024-04-29 | End: 2024-04-30 | Stop reason: HOSPADM

## 2024-04-29 RX ORDER — SODIUM CHLORIDE 9 MG/ML
5-250 INJECTION, SOLUTION INTRAVENOUS PRN
Status: CANCELLED | OUTPATIENT
Start: 2024-04-29

## 2024-04-29 RX ORDER — DEXTROSE MONOHYDRATE 50 MG/ML
5-250 INJECTION, SOLUTION INTRAVENOUS PRN
Status: DISCONTINUED | OUTPATIENT
Start: 2024-04-29 | End: 2024-04-30 | Stop reason: HOSPADM

## 2024-04-29 RX ORDER — PALONOSETRON 0.05 MG/ML
0.25 INJECTION, SOLUTION INTRAVENOUS ONCE
Status: COMPLETED | OUTPATIENT
Start: 2024-04-29 | End: 2024-04-29

## 2024-04-29 RX ORDER — HEPARIN 100 UNIT/ML
500 SYRINGE INTRAVENOUS PRN
Status: CANCELLED | OUTPATIENT
Start: 2024-04-29

## 2024-04-29 RX ORDER — SODIUM CHLORIDE 0.9 % (FLUSH) 0.9 %
5-40 SYRINGE (ML) INJECTION PRN
Status: CANCELLED | OUTPATIENT
Start: 2024-04-29

## 2024-04-29 RX ORDER — ATORVASTATIN CALCIUM 20 MG/1
20 TABLET, FILM COATED ORAL NIGHTLY
COMMUNITY

## 2024-04-29 RX ADMIN — FAM-TRASTUZUMAB DERUXTECAN-NXKI 366 MG: 100 INJECTION, POWDER, LYOPHILIZED, FOR SOLUTION INTRAVENOUS at 12:44

## 2024-04-29 RX ADMIN — DEXAMETHASONE SODIUM PHOSPHATE 12 MG: 4 INJECTION, SOLUTION INTRAMUSCULAR; INTRAVENOUS at 12:25

## 2024-04-29 RX ADMIN — PALONOSETRON 0.25 MG: 0.25 INJECTION, SOLUTION INTRAVENOUS at 11:50

## 2024-04-29 RX ADMIN — HEPARIN 500 UNITS: 100 SYRINGE at 14:34

## 2024-04-29 RX ADMIN — DEXTROSE MONOHYDRATE 20 ML/HR: 50 INJECTION, SOLUTION INTRAVENOUS at 11:54

## 2024-04-29 RX ADMIN — SODIUM CHLORIDE 1000 ML: 9 INJECTION, SOLUTION INTRAVENOUS at 13:29

## 2024-04-29 RX ADMIN — SODIUM CHLORIDE 150 MG: 9 INJECTION, SOLUTION INTRAVENOUS at 11:54

## 2024-04-29 NOTE — PROGRESS NOTES
MA Rooming Questions  Patient: Aileen Cohn  MRN: [id number]    Date: 4/29/2024        1. Do you have any new issues?   no         2. Do you need any refills on medications?    no    3. Have you had any imaging done since your last visit?   yes - CT    4. Have you been hospitalized or seen in the emergency room since your last visit here?   no    5. Did the patient have a depression screening completed today? No    No data recorded     PHQ-9 Given to (if applicable):               PHQ-9 Score (if applicable):                     [] Positive     []  Negative              Does question #9 need addressed (if applicable)                     [] Yes    []  No               Chrissy Montemayor CMA      
to me for further evaluation.     Digital diagnostic bilateral mammogram and bilateral ultrasound done on 6/17/2022 showed left breast mass, highly suspicious for malignancy.  Right breast mass at 11:00, 1.5 cm.  Mass is highly suspicious for malignancy.  Left breast skin thickening.      CT chest on 6/20/2022 showed suspicious heterogeneous mass primarily centered in segment 4A of the liver measuring 8.8 cm.  Left breast mass with left axillary and subpectoral metastatic disease, suspected bony metastases to thoracolumbar spine and sternum and possible left chest wall invasion.    Bone scan on 6/20/2022 showed multifocal osseous metastatic disease.  T12 compression fracture, concerning for pathologic fracture.    MRI abdomen on 6/25/2022 showed biopsy-proven malignancy in the left breast with suspicion for inflammatory carcinoma given skin and trabecular thickening.  There may be invasion of the underlying pectoralis musculature.  At least 3 hepatic metastasis, the largest measuring up to 8.7 cm with the others 1 cm or less.  Likely metastatic portal hepatic, retroperitoneal and right retrocrural lymph nodes.  Trace left pleural effusion. Extensive skeletal metastatic disease.    She underwent ultrasound-guided biopsy of the right breast mass at 11:00 and the pathology showed invasive carcinoma with ductal and lobular features.  Estrogen receptor and progesterone receptor are positive.  HER2 by IHC not overexpressed (score 1+).  HER2 by FISH-negative.    Left breast mass at 12:00 biopsy revealed grade 2 invasive lobular carcinoma.  Estrogen receptor by IHC-positive [greater than 95%, average strong intensity], progesterone receptor-positive [approximately 80%, average moderate intensity], HER2 by IHC-not overexpressed [score 1+] and HER2 by FISH-negative.    CARIS panel did not yield any biomarker results with a level of clinical evidence sufficient to provide an association with a particular therapy. TMB was low.

## 2024-04-29 NOTE — PROGRESS NOTES
Aileenalex Cohn  4/29/2024    CONSULT / LIVER LESION    There were no vitals filed for this visit.          Wt Readings from Last 3 Encounters:   04/29/24 70.6 kg (155 lb 9.6 oz)   04/23/24 71 kg (156 lb 9.6 oz)   04/11/24 72.1 kg (159 lb)        No c/o's pain   Denies Need for Intervention    Fall Risk:    Not currently a fall risk  Re-Evaluate Weekly    Nutritional Alteration:  None  No Difficulties Swallowing  Voices Sufficient Oral Intake    Mediport: yes    Pacemaker/ICD: no    Implants: no    Previous XRT: no    Assessment: Pt in clinic today to discuss radiation treatment options with Dr. Hernández.       Past Medical History:   Diagnosis Date    Anxiety     Constipation     Hx antineoplastic chemo        Past Surgical History:   Procedure Laterality Date    CT NEEDLE BIOPSY LIVER PERCUTANEOUS  1/15/2024    CT NEEDLE BIOPSY LIVER PERCUTANEOUS 1/15/2024 [facility] CT SCAN    HIP SURGERY      MEDIPORT INSERTION, SINGLE  07/2022    PORT SURGERY N/A 7/13/2022    MEDIPORT INSERTION performed by Lisseth Randhawa MD at [facility] OR     BREAST BIOPSY W LOC DEVICE 1ST LESION LEFT Left 6/22/2022    US BREAST NEEDLE BIOPSY LEFT 6/22/2022 Jaden Pisano MD [facility]    US BREAST BIOPSY W LOC DEVICE 1ST LESION RIGHT Right 6/22/2022    US BREAST NEEDLE BIOPSY RIGHT 6/22/2022 Jaden Pisano MD [facility]       No Known Allergies       Current Outpatient Medications:     atorvastatin (LIPITOR) 20 MG tablet, Take 1 tablet by mouth nightly, Disp: , Rfl:     senna (SENOKOT) 8.6 MG TABS tablet, TAKE 1 TABLET BY MOUTH DAILY, Disp: 90 tablet, Rfl: 1    ondansetron (ZOFRAN) 8 MG tablet, Take 1 tablet by mouth every 8 hours as needed for Nausea or Vomiting, Disp: 60 tablet, Rfl: 1    dexAMETHasone (DECADRON) 4 MG tablet, Take 2 tablets by mouth See Admin Instructions on days 2 and 3 after chemotherapy in the am with food., Disp: 24 tablet, Rfl: 0    nystatin (MYCOSTATIN) 879747 UNIT/GM powder, Apply topically FOUR TIMES DAILY, Disp: 60 g,

## 2024-04-29 NOTE — PROGRESS NOTES
Pt here for treatment, no complaints. Labs and vitals within treatment range. Last echo, 02/02/24 55-60% EF. Port accessed with sterile technique treatment released.     Pt tolerated treatment well, left ambulatory

## 2024-04-29 NOTE — CONSULTS
Radiation Oncology Consultation  Encounter Date: 2024 11:30 AM    Ms. Aileen Cohn is a 68 y.o. female  : 1955  MRN: [id number]  [id number]  Requesting Provider: No att. providers found        CONSULTANT: Michael Hernández MD    PHYSICIANS:   Primary Care: Gabrielle Cisneros, APRN - CNP   Dr. RAMON    DIAGNOSIS: metastatic breast cancer [id number] ER+VT+her2    HPI:     Ms. Cohn is a 68-year-old female who presents for consultation of radiotherapy treatment options above diagnosis.    She initially presented to [facility] on 2022 with severe lower back pain and constipation going on for about 4 to 6 weeks.  CT scans at the time showed a 6 x 8 cm lesion in hepatic segment 4 concerning for neoplasm.  She also had disease in the lumbar and thoracic spine and pelvis.    Diagnostic bilateral mammogram and ultrasound was done on 2022 and showed a left breast mass highly suspicious for malignancy.  She underwent ultrasound-guided biopsy of the right breast and pathology revealed invasive carcinoma with ductal and lobular features, ER/VT positive and HER2 unamplified by IHC and FISH.    She is been on various systemic therapies since then.  She had a CT chest abdomen pelvis done on 2023 that showed no evidence of progression within the chest but there was progression within the liver.  She underwent biopsy of the liver lesion on 1/15/2024 and pathology revealed adenocarcinoma consistent with breast primary that was ER/VT positive and HER2 unamplified by IHC.    She was changed to Enhertu after the biopsy and has received a few cycles since then.  CT chest abdomen pelvis done on 2024 showed partial treatment response hepatic metastatic disease with similar appearance of osseous disease and no evidence of progression in the abdomen or pelvis.    She has been seen by medical oncology.  Dr. Ramon sent her for consideration of SBRT to the dominant liver lesion because he wants to keep her on her

## 2024-05-02 ENCOUNTER — HOSPITAL ENCOUNTER (OUTPATIENT)
Dept: INFUSION THERAPY | Age: 69
Discharge: HOME | End: 2024-05-02
Payer: MEDICARE

## 2024-05-02 VITALS
TEMPERATURE: 98.1 F | WEIGHT: 160 LBS | SYSTOLIC BLOOD PRESSURE: 135 MMHG | HEART RATE: 67 BPM | BODY MASS INDEX: 27.31 KG/M2 | OXYGEN SATURATION: 99 % | DIASTOLIC BLOOD PRESSURE: 62 MMHG | HEIGHT: 64 IN

## 2024-05-02 DIAGNOSIS — C79.51 MALIGNANT NEOPLASM METASTATIC TO BONE (HCC): Primary | ICD-10-CM

## 2024-05-02 PROCEDURE — 2580000003 HC RX 258: Performed by: INTERNAL MEDICINE

## 2024-05-02 PROCEDURE — 96360 HYDRATION IV INFUSION INIT: CPT

## 2024-05-02 RX ORDER — 0.9 % SODIUM CHLORIDE 0.9 %
1000 INTRAVENOUS SOLUTION INTRAVENOUS ONCE
Status: COMPLETED | OUTPATIENT
Start: 2024-05-02 | End: 2024-05-02

## 2024-05-02 RX ORDER — 0.9 % SODIUM CHLORIDE 0.9 %
1000 INTRAVENOUS SOLUTION INTRAVENOUS ONCE
Start: 2024-05-02 | End: 2024-05-02

## 2024-05-02 RX ORDER — SODIUM CHLORIDE 9 MG/ML
5-250 INJECTION, SOLUTION INTRAVENOUS PRN
OUTPATIENT
Start: 2024-05-02

## 2024-05-02 RX ORDER — SODIUM CHLORIDE 0.9 % (FLUSH) 0.9 %
5-40 SYRINGE (ML) INJECTION PRN
OUTPATIENT
Start: 2024-05-02

## 2024-05-02 RX ORDER — HEPARIN 100 UNIT/ML
500 SYRINGE INTRAVENOUS PRN
OUTPATIENT
Start: 2024-05-02

## 2024-05-02 RX ADMIN — SODIUM CHLORIDE 1000 ML: 9 INJECTION, SOLUTION INTRAVENOUS at 11:48

## 2024-05-02 NOTE — PROGRESS NOTES
Pt here for IVF. Port accessed with blood return noted.  Pt has no concerns or issues to discuss at this time.     1 L NS bolus given. Pt tolerated without incident left ambulatory declined discharge instructions

## 2024-05-08 ENCOUNTER — HOSPITAL ENCOUNTER (OUTPATIENT)
Dept: WOMENS IMAGING | Age: 69
Discharge: HOME | End: 2024-05-08
Payer: MEDICARE

## 2024-05-08 DIAGNOSIS — Z78.0 POST-MENOPAUSAL: ICD-10-CM

## 2024-05-08 PROCEDURE — 77080 DXA BONE DENSITY AXIAL: CPT

## 2024-05-23 ENCOUNTER — TELEPHONE (OUTPATIENT)
Dept: RADIATION ONCOLOGY | Age: 69
End: 2024-05-23

## 2024-05-23 NOTE — TELEPHONE ENCOUNTER
Called pt to reschedule CT/SIM due to scheduling conflict. Pt reports still out of town tending to family matters and may not be back next week. Pt rescheduled for radiation planning on 6/6/2024 at 9:00 AM. This RN's direct contact info given, advised to call with any needs, voices understanding. Dr. Hernández notified of change.

## 2024-05-28 DIAGNOSIS — C50.812 MALIGNANT NEOPLASM OF OVERLAPPING SITES OF BOTH BREASTS IN FEMALE, ESTROGEN RECEPTOR POSITIVE (HCC): ICD-10-CM

## 2024-05-28 DIAGNOSIS — C79.51 MALIGNANT NEOPLASM METASTATIC TO BONE (HCC): ICD-10-CM

## 2024-05-28 DIAGNOSIS — Z17.0 MALIGNANT NEOPLASM OF OVERLAPPING SITES OF BOTH BREASTS IN FEMALE, ESTROGEN RECEPTOR POSITIVE (HCC): ICD-10-CM

## 2024-05-28 DIAGNOSIS — Z11.59 NEED FOR HEPATITIS B SCREENING TEST: Primary | ICD-10-CM

## 2024-05-28 DIAGNOSIS — C50.811 MALIGNANT NEOPLASM OF OVERLAPPING SITES OF BOTH BREASTS IN FEMALE, ESTROGEN RECEPTOR POSITIVE (HCC): ICD-10-CM

## 2024-05-28 DIAGNOSIS — Z11.59 ENCOUNTER FOR SCREENING FOR OTHER VIRAL DISEASES: ICD-10-CM

## 2024-05-28 RX ORDER — ONDANSETRON 2 MG/ML
8 INJECTION INTRAMUSCULAR; INTRAVENOUS
OUTPATIENT
Start: 2024-05-30

## 2024-05-28 RX ORDER — DIPHENHYDRAMINE HYDROCHLORIDE 50 MG/ML
50 INJECTION INTRAMUSCULAR; INTRAVENOUS
Status: CANCELLED | OUTPATIENT
Start: 2024-05-30

## 2024-05-28 RX ORDER — SODIUM CHLORIDE 9 MG/ML
INJECTION, SOLUTION INTRAVENOUS CONTINUOUS
Status: CANCELLED | OUTPATIENT
Start: 2024-05-30

## 2024-05-28 RX ORDER — ACETAMINOPHEN 325 MG/1
650 TABLET ORAL
OUTPATIENT
Start: 2024-05-30

## 2024-05-28 RX ORDER — EPINEPHRINE 1 MG/ML
0.3 INJECTION, SOLUTION, CONCENTRATE INTRAVENOUS PRN
OUTPATIENT
Start: 2024-05-30

## 2024-05-28 RX ORDER — HEPARIN 100 UNIT/ML
500 SYRINGE INTRAVENOUS PRN
OUTPATIENT
Start: 2024-05-30

## 2024-05-28 RX ORDER — HEPARIN SODIUM (PORCINE) LOCK FLUSH IV SOLN 100 UNIT/ML 100 UNIT/ML
500 SOLUTION INTRAVENOUS PRN
Status: CANCELLED | OUTPATIENT
Start: 2024-05-30

## 2024-05-28 RX ORDER — ONDANSETRON 2 MG/ML
8 INJECTION INTRAMUSCULAR; INTRAVENOUS
Status: CANCELLED | OUTPATIENT
Start: 2024-05-30

## 2024-05-28 RX ORDER — ALBUTEROL SULFATE 90 UG/1
4 AEROSOL, METERED RESPIRATORY (INHALATION) PRN
Status: CANCELLED | OUTPATIENT
Start: 2024-05-30

## 2024-05-28 RX ORDER — FAMOTIDINE 10 MG/ML
20 INJECTION, SOLUTION INTRAVENOUS
Status: CANCELLED | OUTPATIENT
Start: 2024-05-30

## 2024-05-28 RX ORDER — EPINEPHRINE 1 MG/ML
0.3 INJECTION, SOLUTION, CONCENTRATE INTRAVENOUS PRN
Status: CANCELLED | OUTPATIENT
Start: 2024-05-30

## 2024-05-28 RX ORDER — FAMOTIDINE 10 MG/ML
20 INJECTION, SOLUTION INTRAVENOUS
OUTPATIENT
Start: 2024-05-30

## 2024-05-28 RX ORDER — MEPERIDINE HYDROCHLORIDE 25 MG/ML
12.5 INJECTION INTRAMUSCULAR; INTRAVENOUS; SUBCUTANEOUS PRN
OUTPATIENT
Start: 2024-05-30

## 2024-05-28 RX ORDER — SODIUM CHLORIDE 0.9 % (FLUSH) 0.9 %
5-40 SYRINGE (ML) INJECTION PRN
OUTPATIENT
Start: 2024-05-30

## 2024-05-28 RX ORDER — DIPHENHYDRAMINE HYDROCHLORIDE 50 MG/ML
50 INJECTION INTRAMUSCULAR; INTRAVENOUS
OUTPATIENT
Start: 2024-05-30

## 2024-05-28 RX ORDER — SODIUM CHLORIDE 9 MG/ML
5-250 INJECTION, SOLUTION INTRAVENOUS PRN
OUTPATIENT
Start: 2024-05-30

## 2024-05-28 RX ORDER — ZOLEDRONIC ACID 0.04 MG/ML
4 INJECTION, SOLUTION INTRAVENOUS ONCE
Status: CANCELLED | OUTPATIENT
Start: 2024-05-30 | End: 2024-05-30

## 2024-05-28 RX ORDER — DEXTROSE MONOHYDRATE 50 MG/ML
5-250 INJECTION, SOLUTION INTRAVENOUS PRN
Status: CANCELLED | OUTPATIENT
Start: 2024-05-30

## 2024-05-28 RX ORDER — SODIUM CHLORIDE 0.9 % (FLUSH) 0.9 %
5-40 SYRINGE (ML) INJECTION PRN
Status: CANCELLED | OUTPATIENT
Start: 2024-05-30

## 2024-05-28 RX ORDER — PALONOSETRON 0.05 MG/ML
0.25 INJECTION, SOLUTION INTRAVENOUS ONCE
Status: CANCELLED | OUTPATIENT
Start: 2024-05-30 | End: 2024-05-30

## 2024-05-28 RX ORDER — MEPERIDINE HYDROCHLORIDE 50 MG/ML
12.5 INJECTION INTRAMUSCULAR; INTRAVENOUS; SUBCUTANEOUS PRN
Status: CANCELLED | OUTPATIENT
Start: 2024-05-30

## 2024-05-28 RX ORDER — SODIUM CHLORIDE 9 MG/ML
5-250 INJECTION, SOLUTION INTRAVENOUS PRN
Status: CANCELLED | OUTPATIENT
Start: 2024-05-30

## 2024-05-28 RX ORDER — ACETAMINOPHEN 325 MG/1
650 TABLET ORAL
Status: CANCELLED | OUTPATIENT
Start: 2024-05-30

## 2024-05-28 RX ORDER — SODIUM CHLORIDE 9 MG/ML
INJECTION, SOLUTION INTRAVENOUS CONTINUOUS
OUTPATIENT
Start: 2024-05-30

## 2024-05-28 RX ORDER — ALBUTEROL SULFATE 90 UG/1
4 AEROSOL, METERED RESPIRATORY (INHALATION) PRN
OUTPATIENT
Start: 2024-05-30

## 2024-05-30 ENCOUNTER — HOSPITAL ENCOUNTER (OUTPATIENT)
Dept: INFUSION THERAPY | Age: 69
Discharge: HOME | End: 2024-05-30
Payer: MEDICARE

## 2024-05-30 VITALS
SYSTOLIC BLOOD PRESSURE: 143 MMHG | TEMPERATURE: 97.8 F | WEIGHT: 157.4 LBS | HEIGHT: 64 IN | DIASTOLIC BLOOD PRESSURE: 63 MMHG | OXYGEN SATURATION: 99 % | BODY MASS INDEX: 26.87 KG/M2 | HEART RATE: 89 BPM

## 2024-05-30 DIAGNOSIS — C50.812 MALIGNANT NEOPLASM OF OVERLAPPING SITES OF BOTH BREASTS IN FEMALE, ESTROGEN RECEPTOR POSITIVE (HCC): ICD-10-CM

## 2024-05-30 DIAGNOSIS — C50.811 MALIGNANT NEOPLASM OF OVERLAPPING SITES OF BOTH BREASTS IN FEMALE, ESTROGEN RECEPTOR POSITIVE (HCC): ICD-10-CM

## 2024-05-30 DIAGNOSIS — Z11.59 ENCOUNTER FOR SCREENING FOR OTHER VIRAL DISEASES: Primary | ICD-10-CM

## 2024-05-30 DIAGNOSIS — C79.51 MALIGNANT NEOPLASM METASTATIC TO BONE (HCC): ICD-10-CM

## 2024-05-30 DIAGNOSIS — Z11.59 NEED FOR HEPATITIS B SCREENING TEST: ICD-10-CM

## 2024-05-30 DIAGNOSIS — Z79.899 NEED FOR PROPHYLACTIC CHEMOTHERAPY: Primary | ICD-10-CM

## 2024-05-30 DIAGNOSIS — Z17.0 MALIGNANT NEOPLASM OF OVERLAPPING SITES OF BOTH BREASTS IN FEMALE, ESTROGEN RECEPTOR POSITIVE (HCC): ICD-10-CM

## 2024-05-30 LAB
ALBUMIN SERPL-MCNC: 3.9 GM/DL (ref 3.4–5)
ALP BLD-CCNC: 86 IU/L (ref 40–129)
ALT SERPL-CCNC: 13 U/L (ref 10–40)
ANION GAP SERPL CALCULATED.3IONS-SCNC: 9 MMOL/L (ref 7–16)
AST SERPL-CCNC: 14 IU/L (ref 15–37)
BASOPHILS ABSOLUTE: 0 K/CU MM
BASOPHILS RELATIVE PERCENT: 0.2 % (ref 0–1)
BILIRUB SERPL-MCNC: 0.3 MG/DL (ref 0–1)
BUN SERPL-MCNC: 17 MG/DL (ref 6–23)
CALCIUM SERPL-MCNC: 8.8 MG/DL (ref 8.3–10.6)
CHLORIDE BLD-SCNC: 112 MMOL/L (ref 99–110)
CO2: 24 MMOL/L (ref 21–32)
CREAT SERPL-MCNC: 0.5 MG/DL (ref 0.6–1.1)
DIFFERENTIAL TYPE: ABNORMAL
EGFR, POC: >90 ML/MIN/1.73M2
EOSINOPHILS ABSOLUTE: 0.3 K/CU MM
EOSINOPHILS RELATIVE PERCENT: 4.7 % (ref 0–3)
GFR, ESTIMATED: >90 ML/MIN/1.73M2
GLUCOSE BLD-MCNC: 79 MG/DL (ref 70–99)
GLUCOSE SERPL-MCNC: 84 MG/DL (ref 70–99)
HCT VFR BLD CALC: 33.8 % (ref 37–47)
HEMOGLOBIN: 10.9 GM/DL (ref 12.5–16)
LYMPHOCYTES ABSOLUTE: 1.6 K/CU MM
LYMPHOCYTES RELATIVE PERCENT: 26.9 % (ref 24–44)
MAGNESIUM: 1.8 MG/DL (ref 1.8–2.4)
MCH RBC QN AUTO: 33 PG (ref 27–31)
MCHC RBC AUTO-ENTMCNC: 32.2 % (ref 32–36)
MCV RBC AUTO: 102.4 FL (ref 78–100)
MONOCYTES ABSOLUTE: 0.5 K/CU MM
MONOCYTES RELATIVE PERCENT: 8.3 % (ref 0–4)
NEUTROPHILS ABSOLUTE: 3.5 K/CU MM
NEUTROPHILS RELATIVE PERCENT: 59.9 % (ref 36–66)
PDW BLD-RTO: 13.8 % (ref 11.7–14.9)
PHOSPHORUS: 3.9 MG/DL (ref 2.5–4.9)
PLATELET # BLD: 234 K/CU MM (ref 140–440)
PMV BLD AUTO: 8.6 FL (ref 7.5–11.1)
POC BUN: 16 MG/DL (ref 6–23)
POC CALCIUM: 1.25 MMOL/L (ref 1.15–1.33)
POC CHLORIDE: 111 MMOL/L (ref 99–110)
POC CO2: 23 MMOL/L (ref 21–32)
POC CREATININE: 0.3 MG/DL (ref 0.5–1.2)
POTASSIUM SERPL-SCNC: 3.7 MMOL/L (ref 3.5–5.1)
POTASSIUM SERPL-SCNC: 4 MMOL/L (ref 3.5–5.1)
RBC # BLD: 3.3 M/CU MM (ref 4.2–5.4)
SODIUM BLD-SCNC: 143 MMOL/L (ref 135–145)
SODIUM BLD-SCNC: 145 MMOL/L (ref 135–145)
SOURCE, BLOOD GAS: ABNORMAL
TOTAL PROTEIN: 5.9 GM/DL (ref 6.4–8.2)
WBC # BLD: 5.9 K/CU MM (ref 4–10.5)

## 2024-05-30 PROCEDURE — 96375 TX/PRO/DX INJ NEW DRUG ADDON: CPT

## 2024-05-30 PROCEDURE — 83735 ASSAY OF MAGNESIUM: CPT

## 2024-05-30 PROCEDURE — 96361 HYDRATE IV INFUSION ADD-ON: CPT

## 2024-05-30 PROCEDURE — 6360000002 HC RX W HCPCS: Performed by: INTERNAL MEDICINE

## 2024-05-30 PROCEDURE — 85025 COMPLETE CBC W/AUTO DIFF WBC: CPT

## 2024-05-30 PROCEDURE — 96413 CHEMO IV INFUSION 1 HR: CPT

## 2024-05-30 PROCEDURE — 80053 COMPREHEN METABOLIC PANEL: CPT

## 2024-05-30 PROCEDURE — 96367 TX/PROPH/DG ADDL SEQ IV INF: CPT

## 2024-05-30 PROCEDURE — 84100 ASSAY OF PHOSPHORUS: CPT

## 2024-05-30 PROCEDURE — 2580000003 HC RX 258: Performed by: INTERNAL MEDICINE

## 2024-05-30 RX ORDER — SODIUM CHLORIDE 9 MG/ML
5-250 INJECTION, SOLUTION INTRAVENOUS PRN
Status: CANCELLED | OUTPATIENT
Start: 2024-05-30

## 2024-05-30 RX ORDER — SODIUM CHLORIDE 0.9 % (FLUSH) 0.9 %
5-40 SYRINGE (ML) INJECTION PRN
Status: DISCONTINUED | OUTPATIENT
Start: 2024-05-30 | End: 2024-05-31 | Stop reason: HOSPADM

## 2024-05-30 RX ORDER — PALONOSETRON 0.05 MG/ML
0.25 INJECTION, SOLUTION INTRAVENOUS ONCE
Status: COMPLETED | OUTPATIENT
Start: 2024-05-30 | End: 2024-05-30

## 2024-05-30 RX ORDER — HEPARIN 100 UNIT/ML
500 SYRINGE INTRAVENOUS PRN
Status: CANCELLED | OUTPATIENT
Start: 2024-05-30

## 2024-05-30 RX ORDER — SODIUM CHLORIDE 0.9 % (FLUSH) 0.9 %
5-40 SYRINGE (ML) INJECTION PRN
Status: CANCELLED | OUTPATIENT
Start: 2024-05-30

## 2024-05-30 RX ORDER — 0.9 % SODIUM CHLORIDE 0.9 %
1000 INTRAVENOUS SOLUTION INTRAVENOUS ONCE
Status: CANCELLED
Start: 2024-05-30 | End: 2024-05-30

## 2024-05-30 RX ORDER — 0.9 % SODIUM CHLORIDE 0.9 %
1000 INTRAVENOUS SOLUTION INTRAVENOUS ONCE
Status: COMPLETED | OUTPATIENT
Start: 2024-05-30 | End: 2024-05-30

## 2024-05-30 RX ORDER — DEXTROSE MONOHYDRATE 50 MG/ML
5-250 INJECTION, SOLUTION INTRAVENOUS PRN
Status: DISCONTINUED | OUTPATIENT
Start: 2024-05-30 | End: 2024-05-31 | Stop reason: HOSPADM

## 2024-05-30 RX ADMIN — PALONOSETRON 0.25 MG: 0.25 INJECTION, SOLUTION INTRAVENOUS at 10:52

## 2024-05-30 RX ADMIN — SODIUM CHLORIDE 1000 ML: 9 INJECTION, SOLUTION INTRAVENOUS at 10:44

## 2024-05-30 RX ADMIN — SODIUM CHLORIDE 150 MG: 9 INJECTION, SOLUTION INTRAVENOUS at 11:28

## 2024-05-30 RX ADMIN — DEXTROSE MONOHYDRATE 20 ML/HR: 50 INJECTION, SOLUTION INTRAVENOUS at 12:23

## 2024-05-30 RX ADMIN — ZOLEDRONIC ACID 4 MG: 4 INJECTION, SOLUTION, CONCENTRATE INTRAVENOUS at 13:08

## 2024-05-30 RX ADMIN — FAM-TRASTUZUMAB DERUXTECAN-NXKI 366 MG: 100 INJECTION, POWDER, LYOPHILIZED, FOR SOLUTION INTRAVENOUS at 12:26

## 2024-05-30 RX ADMIN — DEXAMETHASONE SODIUM PHOSPHATE 12 MG: 4 INJECTION, SOLUTION INTRAMUSCULAR; INTRAVENOUS at 10:53

## 2024-05-30 NOTE — PROGRESS NOTES
Patient needs ECHO every 3 months while on current regimen of Enhertu.  Last done on 2/2/24 prior to starting new regimen.  Per Dr. Rudolph - order placed for ECHO to be done next available.

## 2024-06-03 ENCOUNTER — OFFICE VISIT (OUTPATIENT)
Dept: ONCOLOGY | Age: 69
End: 2024-06-03
Payer: MEDICARE

## 2024-06-03 ENCOUNTER — HOSPITAL ENCOUNTER (OUTPATIENT)
Dept: INFUSION THERAPY | Age: 69
Discharge: HOME | End: 2024-06-03
Payer: MEDICARE

## 2024-06-03 VITALS
HEIGHT: 64 IN | OXYGEN SATURATION: 98 % | SYSTOLIC BLOOD PRESSURE: 139 MMHG | HEART RATE: 82 BPM | BODY MASS INDEX: 27.08 KG/M2 | DIASTOLIC BLOOD PRESSURE: 60 MMHG | TEMPERATURE: 97.8 F | WEIGHT: 158.6 LBS

## 2024-06-03 DIAGNOSIS — C50.811 MALIGNANT NEOPLASM OF OVERLAPPING SITES OF BOTH BREASTS IN FEMALE, ESTROGEN RECEPTOR POSITIVE (HCC): Primary | ICD-10-CM

## 2024-06-03 DIAGNOSIS — C50.812 MALIGNANT NEOPLASM OF OVERLAPPING SITES OF BOTH BREASTS IN FEMALE, ESTROGEN RECEPTOR POSITIVE (HCC): Primary | ICD-10-CM

## 2024-06-03 DIAGNOSIS — C79.51 MALIGNANT NEOPLASM METASTATIC TO BONE (HCC): Primary | ICD-10-CM

## 2024-06-03 DIAGNOSIS — Z17.0 MALIGNANT NEOPLASM OF OVERLAPPING SITES OF BOTH BREASTS IN FEMALE, ESTROGEN RECEPTOR POSITIVE (HCC): Primary | ICD-10-CM

## 2024-06-03 PROCEDURE — 99213 OFFICE O/P EST LOW 20 MIN: CPT | Performed by: PHYSICIAN ASSISTANT

## 2024-06-03 PROCEDURE — 1124F ACP DISCUSS-NO DSCNMKR DOCD: CPT | Performed by: PHYSICIAN ASSISTANT

## 2024-06-03 PROCEDURE — 2580000003 HC RX 258: Performed by: INTERNAL MEDICINE

## 2024-06-03 PROCEDURE — 96360 HYDRATION IV INFUSION INIT: CPT

## 2024-06-03 RX ORDER — SODIUM CHLORIDE 0.9 % (FLUSH) 0.9 %
5-40 SYRINGE (ML) INJECTION PRN
Status: DISCONTINUED | OUTPATIENT
Start: 2024-06-03 | End: 2024-06-04 | Stop reason: HOSPADM

## 2024-06-03 RX ORDER — 0.9 % SODIUM CHLORIDE 0.9 %
1000 INTRAVENOUS SOLUTION INTRAVENOUS ONCE
Start: 2024-06-03 | End: 2024-06-03

## 2024-06-03 RX ORDER — SODIUM CHLORIDE 0.9 % (FLUSH) 0.9 %
5-40 SYRINGE (ML) INJECTION PRN
OUTPATIENT
Start: 2024-06-03

## 2024-06-03 RX ORDER — HEPARIN 100 UNIT/ML
500 SYRINGE INTRAVENOUS PRN
OUTPATIENT
Start: 2024-06-03

## 2024-06-03 RX ORDER — SODIUM CHLORIDE 9 MG/ML
5-250 INJECTION, SOLUTION INTRAVENOUS PRN
OUTPATIENT
Start: 2024-06-03

## 2024-06-03 RX ORDER — 0.9 % SODIUM CHLORIDE 0.9 %
1000 INTRAVENOUS SOLUTION INTRAVENOUS ONCE
Status: COMPLETED | OUTPATIENT
Start: 2024-06-03 | End: 2024-06-03

## 2024-06-03 RX ADMIN — SODIUM CHLORIDE 1000 ML: 9 INJECTION, SOLUTION INTRAVENOUS at 12:13

## 2024-06-03 ASSESSMENT — PATIENT HEALTH QUESTIONNAIRE - PHQ9
SUM OF ALL RESPONSES TO PHQ9 QUESTIONS 1 & 2: 0
SUM OF ALL RESPONSES TO PHQ QUESTIONS 1-9: 0
2. FEELING DOWN, DEPRESSED OR HOPELESS: NOT AT ALL
1. LITTLE INTEREST OR PLEASURE IN DOING THINGS: NOT AT ALL
SUM OF ALL RESPONSES TO PHQ QUESTIONS 1-9: 0

## 2024-06-03 NOTE — PROGRESS NOTES
MA Rooming Questions  Patient: Aileen Cohn  MRN: [id number]    Date: 6/3/2024        1. Do you have any new issues?   no         2. Do you need any refills on medications?    no    3. Have you had any imaging done since your last visit?   no    4. Have you been hospitalized or seen in the emergency room since your last visit here?   yes - ER 4/17    5. Did the patient have a depression screening completed today? Yes    PHQ-9 Total Score: 0 (6/3/2024  1:10 PM)       PHQ-9 Given to (if applicable):               PHQ-9 Score (if applicable):                     [] Positive     []  Negative              Does question #9 need addressed (if applicable)                     [] Yes    []  No               Kelly Rolon MA

## 2024-06-03 NOTE — PROGRESS NOTES
Patient Name:  Aileen Cohn  Patient :  1955  Patient MRN:  [id number]     Primary Oncologist: Quincy Rudolph MD  Referring Provider: Gabrielle Cisneros APRN - CNP     Date of Service: 6/3/2024      Chief Complaint:    Chief Complaint   Patient presents with    Follow-up     Patient's active problem list:       Liver mass       Lytic bone lesions       Left breast mass    HPI:   Aileen Cohn is a 68 year-old very pleasant female with medical history significant for osteoarthritis, initially referred to me on 22 for evaluation of her liver lesion and multiple lytic bone lesions.     She initially presented to [place] ER on 22 with severe lower back pain and constipation. Stated that she has been having back pain for about 4 - 6 weeks duration, which becomes severe pain started a week before presentation.     CT scan of the abdomen and pelvis done on 2022 showed lesion (6.1 x 8.1 cm) in the hepatic segment 4, concerning for neoplasm.  Further evaluation with MRI of the liver is recommended.  Extensive lytic metastatic disease in lumbar and thoracic spine and pelvis with most dominant lesion seen at L5 vertebral body.  Several small bowel loops segments in the left abdomen demonstrate mild wall thickening, nonspecific may indicate enteritis.  Nonspecific partially visualized inflammatory stranding along the pericardium.  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     CT lumbar spine done on 22 showed extensive multifocal lytic lesions concerning for neoplastic/metastatic disease.  Age indeterminant compression fractures at T12 and L4, with mild narrowing of the AP diameter of the canal at L4.    She never had colonoscopy before. She had pap smear around . She didn't recall when was her last mammogram.     She denies weight loss. She hasn't been eating much for about 10 days since she has been having nausea.     Since she is found to have multiple lytic bone lesions and liver lesion, she was referred to  disease, suspected bony metastases to thoracolumbar spine and sternum and possible left chest wall invasion.    Bone scan on 6/20/2022 showed multifocal osseous metastatic disease.  T12 compression fracture, concerning for pathologic fracture.    MRI abdomen on 6/25/2022 showed biopsy-proven malignancy in the left breast with suspicion for inflammatory carcinoma given skin and trabecular thickening.  There may be invasion of the underlying pectoralis musculature.  At least 3 heavily meta stasis, the largest measuring up to 8.7 cm with the others 1 cm or less.  Likely metastatic portal hepatic, retroperitoneal and right retrocrural lymph nodes.  Trace left pleural effusion. Extensive skeletal metastatic disease.    She underwent ultrasound-guided biopsy of the right breast mass at 11:00 and the pathology showed invasive carcinoma with ductal and lobular features.  Estrogen receptor and progesterone receptor are positive.  HER2 by IHC not overexpressed (score 1+).  HER2 by FISH-negative.    Left breast mass at 12:00 biopsy revealed grade 2 invasive lobular carcinoma.  Estrogen receptor by IHC-positive [greater than 95%, average strong intensity], progesterone receptor-positive [approximately 80%, average moderate intensity], HER2 by IHC-not overexpressed [score 1+] and HER2 by FISH-negative.    CARIS panel did not yield any biomarker results with a benefit of clinic at the dense 70 chance to provide an association with a particular therapy. TMB was low. PD-L1 for keytruda was not expressed and MSI was stable.     Since she has significant visceral disease, I recommend her to initiate first-line chemotherapy with single agent paclitaxel. It was started on 7/27/22.     CT scan of the chest, abdomen and pelvis done on 11/14/2022 showed overall improvement in skin thickening and edema within the left breast as well as left internal thoracic/mammary, axillary and subpectoral adenopathy.  Interval decrease in size of

## 2024-06-04 NOTE — PROGRESS NOTES
Here for office visit and supportive IVF after treatment.     IV hydration administered. Tolerated infusion without incident. Discharged in stable condition. Patient sent to checkout to schedule next office visit.

## 2024-06-06 ENCOUNTER — HOSPITAL ENCOUNTER (OUTPATIENT)
Dept: RADIATION ONCOLOGY | Age: 69
Discharge: HOME | End: 2024-06-06
Payer: MEDICARE

## 2024-06-06 ENCOUNTER — HOSPITAL ENCOUNTER (OUTPATIENT)
Dept: NON INVASIVE DIAGNOSTICS | Age: 69
Discharge: HOME | End: 2024-06-08
Attending: INTERNAL MEDICINE
Payer: MEDICARE

## 2024-06-06 VITALS
WEIGHT: 158 LBS | BODY MASS INDEX: 26.98 KG/M2 | HEIGHT: 64 IN | SYSTOLIC BLOOD PRESSURE: 139 MMHG | DIASTOLIC BLOOD PRESSURE: 60 MMHG

## 2024-06-06 DIAGNOSIS — Z79.899 NEED FOR PROPHYLACTIC CHEMOTHERAPY: ICD-10-CM

## 2024-06-06 LAB
ECHO AO ROOT DIAM: 2.9 CM
ECHO AO ROOT INDEX: 1.64 CM/M2
ECHO AV AREA PEAK VELOCITY: 2.9 CM2
ECHO AV AREA VTI: 3.1 CM2
ECHO AV AREA/BSA PEAK VELOCITY: 1.6 CM2/M2
ECHO AV AREA/BSA VTI: 1.8 CM2/M2
ECHO AV MEAN GRADIENT: 4 MMHG
ECHO AV MEAN VELOCITY: 0.9 M/S
ECHO AV PEAK GRADIENT: 6 MMHG
ECHO AV PEAK VELOCITY: 1.2 M/S
ECHO AV VELOCITY RATIO: 0.75
ECHO AV VTI: 22.2 CM
ECHO BSA: 1.8 M2
ECHO EST RA PRESSURE: 3 MMHG
ECHO IVC PROX: 1.8 CM
ECHO LA AREA 4C: 10.6 CM2
ECHO LA DIAMETER INDEX: 1.47 CM/M2
ECHO LA DIAMETER: 2.6 CM
ECHO LA MAJOR AXIS: 5.1 CM
ECHO LA TO AORTIC ROOT RATIO: 0.9
ECHO LA VOL MOD A4C: 16 ML (ref 22–52)
ECHO LA VOLUME INDEX MOD A4C: 9 ML/M2 (ref 16–34)
ECHO LV E' LATERAL VELOCITY: 10 CM/S
ECHO LV E' SEPTAL VELOCITY: 6 CM/S
ECHO LV EDV A4C: 87 ML
ECHO LV EDV INDEX A4C: 49 ML/M2
ECHO LV EJECTION FRACTION A4C: 57 %
ECHO LV ESV A4C: 38 ML
ECHO LV ESV INDEX A4C: 21 ML/M2
ECHO LV FRACTIONAL SHORTENING: 40 % (ref 28–44)
ECHO LV INTERNAL DIMENSION DIASTOLE INDEX: 2.94 CM/M2
ECHO LV INTERNAL DIMENSION DIASTOLIC: 5.2 CM (ref 3.9–5.3)
ECHO LV INTERNAL DIMENSION SYSTOLIC INDEX: 1.75 CM/M2
ECHO LV INTERNAL DIMENSION SYSTOLIC: 3.1 CM
ECHO LV IVSD: 0.8 CM (ref 0.6–0.9)
ECHO LV MASS 2D: 156.9 G (ref 67–162)
ECHO LV MASS INDEX 2D: 88.7 G/M2 (ref 43–95)
ECHO LV POSTERIOR WALL DIASTOLIC: 0.9 CM (ref 0.6–0.9)
ECHO LV RELATIVE WALL THICKNESS RATIO: 0.35
ECHO LVOT AREA: 3.8 CM2
ECHO LVOT AV VTI INDEX: 0.82
ECHO LVOT DIAM: 2.2 CM
ECHO LVOT MEAN GRADIENT: 2 MMHG
ECHO LVOT PEAK GRADIENT: 3 MMHG
ECHO LVOT PEAK VELOCITY: 0.9 M/S
ECHO LVOT STROKE VOLUME INDEX: 38.9 ML/M2
ECHO LVOT SV: 68.8 ML
ECHO LVOT VTI: 18.1 CM
ECHO MV A VELOCITY: 1.03 M/S
ECHO MV E VELOCITY: 0.65 M/S
ECHO MV E/A RATIO: 0.63
ECHO MV E/E' LATERAL: 6.5
ECHO MV E/E' RATIO (AVERAGED): 8.67
ECHO MV E/E' SEPTAL: 10.83
ECHO RIGHT VENTRICULAR SYSTOLIC PRESSURE (RVSP): 21 MMHG
ECHO RV MID DIMENSION: 3.3 CM
ECHO TV REGURGITANT MAX VELOCITY: 2.1 M/S
ECHO TV REGURGITANT PEAK GRADIENT: 18 MMHG

## 2024-06-06 PROCEDURE — 77470 SPECIAL RADIATION TREATMENT: CPT | Performed by: RADIOLOGY

## 2024-06-06 PROCEDURE — 77334 RADIATION TREATMENT AID(S): CPT | Performed by: RADIOLOGY

## 2024-06-06 PROCEDURE — 6360000004 HC RX CONTRAST MEDICATION

## 2024-06-06 PROCEDURE — 93306 TTE W/DOPPLER COMPLETE: CPT

## 2024-06-06 PROCEDURE — 93306 TTE W/DOPPLER COMPLETE: CPT | Performed by: INTERNAL MEDICINE

## 2024-06-06 RX ORDER — SODIUM CHLORIDE 0.9 % (FLUSH) 0.9 %
5-40 SYRINGE (ML) INJECTION PRN
Status: DISCONTINUED | OUTPATIENT
Start: 2024-06-06 | End: 2024-06-07 | Stop reason: HOSPADM

## 2024-06-06 RX ORDER — SODIUM CHLORIDE 9 MG/ML
INJECTION, SOLUTION INTRAVENOUS CONTINUOUS
Status: DISCONTINUED | OUTPATIENT
Start: 2024-06-06 | End: 2024-06-07 | Stop reason: HOSPADM

## 2024-06-14 RX ORDER — DEXAMETHASONE 4 MG/1
8 TABLET ORAL SEE ADMIN INSTRUCTIONS
Qty: 24 TABLET | Refills: 0 | Status: SHIPPED | OUTPATIENT
Start: 2024-06-14

## 2024-06-18 DIAGNOSIS — Z17.0 MALIGNANT NEOPLASM OF OVERLAPPING SITES OF BOTH BREASTS IN FEMALE, ESTROGEN RECEPTOR POSITIVE (HCC): ICD-10-CM

## 2024-06-18 DIAGNOSIS — Z11.59 NEED FOR HEPATITIS B SCREENING TEST: Primary | ICD-10-CM

## 2024-06-18 DIAGNOSIS — Z11.59 ENCOUNTER FOR SCREENING FOR OTHER VIRAL DISEASES: ICD-10-CM

## 2024-06-18 DIAGNOSIS — C50.812 MALIGNANT NEOPLASM OF OVERLAPPING SITES OF BOTH BREASTS IN FEMALE, ESTROGEN RECEPTOR POSITIVE (HCC): ICD-10-CM

## 2024-06-18 DIAGNOSIS — C50.811 MALIGNANT NEOPLASM OF OVERLAPPING SITES OF BOTH BREASTS IN FEMALE, ESTROGEN RECEPTOR POSITIVE (HCC): ICD-10-CM

## 2024-06-19 ENCOUNTER — HOSPITAL ENCOUNTER (OUTPATIENT)
Dept: RADIATION ONCOLOGY | Age: 69
End: 2024-06-19
Payer: MEDICARE

## 2024-06-19 ENCOUNTER — HOSPITAL ENCOUNTER (OUTPATIENT)
Dept: INFUSION THERAPY | Age: 69
Discharge: HOME | End: 2024-06-19
Payer: MEDICARE

## 2024-06-19 DIAGNOSIS — Z11.59 ENCOUNTER FOR SCREENING FOR OTHER VIRAL DISEASES: ICD-10-CM

## 2024-06-19 DIAGNOSIS — C50.812 MALIGNANT NEOPLASM OF OVERLAPPING SITES OF BOTH BREASTS IN FEMALE, ESTROGEN RECEPTOR POSITIVE (HCC): ICD-10-CM

## 2024-06-19 DIAGNOSIS — C50.811 MALIGNANT NEOPLASM OF OVERLAPPING SITES OF BOTH BREASTS IN FEMALE, ESTROGEN RECEPTOR POSITIVE (HCC): ICD-10-CM

## 2024-06-19 DIAGNOSIS — Z17.0 MALIGNANT NEOPLASM OF OVERLAPPING SITES OF BOTH BREASTS IN FEMALE, ESTROGEN RECEPTOR POSITIVE (HCC): ICD-10-CM

## 2024-06-19 DIAGNOSIS — Z11.59 NEED FOR HEPATITIS B SCREENING TEST: ICD-10-CM

## 2024-06-19 LAB
ALBUMIN SERPL-MCNC: 4.5 GM/DL (ref 3.4–5)
ALP BLD-CCNC: 110 IU/L (ref 40–128)
ALT SERPL-CCNC: 24 U/L (ref 10–40)
ANION GAP SERPL CALCULATED.3IONS-SCNC: 14 MMOL/L (ref 7–16)
AST SERPL-CCNC: 22 IU/L (ref 15–37)
BASOPHILS ABSOLUTE: 0 K/CU MM
BASOPHILS RELATIVE PERCENT: 0.7 % (ref 0–1)
BILIRUB SERPL-MCNC: 0.5 MG/DL (ref 0–1)
BUN SERPL-MCNC: 10 MG/DL (ref 6–23)
CALCIUM SERPL-MCNC: 9.3 MG/DL (ref 8.3–10.6)
CHLORIDE BLD-SCNC: 106 MMOL/L (ref 99–110)
CO2: 24 MMOL/L (ref 21–32)
CREAT SERPL-MCNC: 0.6 MG/DL (ref 0.6–1.1)
DIFFERENTIAL TYPE: ABNORMAL
EOSINOPHILS ABSOLUTE: 0.3 K/CU MM
EOSINOPHILS RELATIVE PERCENT: 7.1 % (ref 0–3)
GFR, ESTIMATED: >90 ML/MIN/1.73M2
GLUCOSE SERPL-MCNC: 80 MG/DL (ref 70–99)
HCT VFR BLD CALC: 37.4 % (ref 37–47)
HEMOGLOBIN: 12.1 GM/DL (ref 12.5–16)
LYMPHOCYTES ABSOLUTE: 1.4 K/CU MM
LYMPHOCYTES RELATIVE PERCENT: 33.8 % (ref 24–44)
MCH RBC QN AUTO: 31.8 PG (ref 27–31)
MCHC RBC AUTO-ENTMCNC: 32.4 % (ref 32–36)
MCV RBC AUTO: 98.4 FL (ref 78–100)
MONOCYTES ABSOLUTE: 0.4 K/CU MM
MONOCYTES RELATIVE PERCENT: 8.3 % (ref 0–4)
NEUTROPHILS ABSOLUTE: 2.1 K/CU MM
NEUTROPHILS RELATIVE PERCENT: 50.1 % (ref 36–66)
PDW BLD-RTO: 13.9 % (ref 11.7–14.9)
PLATELET # BLD: 265 K/CU MM (ref 140–440)
PMV BLD AUTO: 8.5 FL (ref 7.5–11.1)
POTASSIUM SERPL-SCNC: 3.6 MMOL/L (ref 3.5–5.1)
RBC # BLD: 3.8 M/CU MM (ref 4.2–5.4)
SODIUM BLD-SCNC: 144 MMOL/L (ref 135–145)
TOTAL PROTEIN: 7.2 GM/DL (ref 6.4–8.2)
WBC # BLD: 4.2 K/CU MM (ref 4–10.5)

## 2024-06-19 PROCEDURE — 85025 COMPLETE CBC W/AUTO DIFF WBC: CPT

## 2024-06-19 PROCEDURE — 36415 COLL VENOUS BLD VENIPUNCTURE: CPT

## 2024-06-19 PROCEDURE — 80053 COMPREHEN METABOLIC PANEL: CPT

## 2024-06-20 ENCOUNTER — HOSPITAL ENCOUNTER (OUTPATIENT)
Dept: INFUSION THERAPY | Age: 69
Discharge: HOME | End: 2024-06-20
Payer: MEDICARE

## 2024-06-20 ENCOUNTER — APPOINTMENT (OUTPATIENT)
Dept: RADIATION ONCOLOGY | Age: 69
End: 2024-06-20
Payer: MEDICARE

## 2024-06-20 VITALS
RESPIRATION RATE: 16 BRPM | BODY MASS INDEX: 26.55 KG/M2 | OXYGEN SATURATION: 97 % | WEIGHT: 155.5 LBS | TEMPERATURE: 98.1 F | HEART RATE: 87 BPM | HEIGHT: 64 IN | SYSTOLIC BLOOD PRESSURE: 135 MMHG | DIASTOLIC BLOOD PRESSURE: 63 MMHG

## 2024-06-20 DIAGNOSIS — Z11.59 NEED FOR HEPATITIS B SCREENING TEST: Primary | ICD-10-CM

## 2024-06-20 DIAGNOSIS — Z11.59 ENCOUNTER FOR SCREENING FOR OTHER VIRAL DISEASES: ICD-10-CM

## 2024-06-20 DIAGNOSIS — Z11.59 NEED FOR HEPATITIS B SCREENING TEST: ICD-10-CM

## 2024-06-20 DIAGNOSIS — C50.811 MALIGNANT NEOPLASM OF OVERLAPPING SITES OF BOTH BREASTS IN FEMALE, ESTROGEN RECEPTOR POSITIVE (HCC): ICD-10-CM

## 2024-06-20 DIAGNOSIS — C79.51 MALIGNANT NEOPLASM METASTATIC TO BONE (HCC): ICD-10-CM

## 2024-06-20 DIAGNOSIS — C50.811 MALIGNANT NEOPLASM OF OVERLAPPING SITES OF BOTH BREASTS IN FEMALE, ESTROGEN RECEPTOR POSITIVE (HCC): Primary | ICD-10-CM

## 2024-06-20 DIAGNOSIS — C50.812 MALIGNANT NEOPLASM OF OVERLAPPING SITES OF BOTH BREASTS IN FEMALE, ESTROGEN RECEPTOR POSITIVE (HCC): ICD-10-CM

## 2024-06-20 DIAGNOSIS — Z17.0 MALIGNANT NEOPLASM OF OVERLAPPING SITES OF BOTH BREASTS IN FEMALE, ESTROGEN RECEPTOR POSITIVE (HCC): ICD-10-CM

## 2024-06-20 DIAGNOSIS — Z17.0 MALIGNANT NEOPLASM OF OVERLAPPING SITES OF BOTH BREASTS IN FEMALE, ESTROGEN RECEPTOR POSITIVE (HCC): Primary | ICD-10-CM

## 2024-06-20 DIAGNOSIS — C50.812 MALIGNANT NEOPLASM OF OVERLAPPING SITES OF BOTH BREASTS IN FEMALE, ESTROGEN RECEPTOR POSITIVE (HCC): Primary | ICD-10-CM

## 2024-06-20 PROCEDURE — 96367 TX/PROPH/DG ADDL SEQ IV INF: CPT

## 2024-06-20 PROCEDURE — 96361 HYDRATE IV INFUSION ADD-ON: CPT

## 2024-06-20 PROCEDURE — 96375 TX/PRO/DX INJ NEW DRUG ADDON: CPT

## 2024-06-20 PROCEDURE — 2580000003 HC RX 258: Performed by: INTERNAL MEDICINE

## 2024-06-20 PROCEDURE — 6360000002 HC RX W HCPCS: Performed by: INTERNAL MEDICINE

## 2024-06-20 PROCEDURE — 96413 CHEMO IV INFUSION 1 HR: CPT

## 2024-06-20 RX ORDER — 0.9 % SODIUM CHLORIDE 0.9 %
1000 INTRAVENOUS SOLUTION INTRAVENOUS ONCE
Start: 2024-06-20 | End: 2024-06-20

## 2024-06-20 RX ORDER — DIPHENHYDRAMINE HYDROCHLORIDE 50 MG/ML
50 INJECTION INTRAMUSCULAR; INTRAVENOUS
OUTPATIENT
Start: 2024-07-11

## 2024-06-20 RX ORDER — EPINEPHRINE 1 MG/ML
0.3 INJECTION, SOLUTION, CONCENTRATE INTRAVENOUS PRN
OUTPATIENT
Start: 2024-07-11

## 2024-06-20 RX ORDER — ACETAMINOPHEN 325 MG/1
650 TABLET ORAL
Status: CANCELLED | OUTPATIENT
Start: 2024-06-20

## 2024-06-20 RX ORDER — 0.9 % SODIUM CHLORIDE 0.9 %
1000 INTRAVENOUS SOLUTION INTRAVENOUS ONCE
Status: COMPLETED | OUTPATIENT
Start: 2024-06-20 | End: 2024-06-20

## 2024-06-20 RX ORDER — PALONOSETRON 0.05 MG/ML
0.25 INJECTION, SOLUTION INTRAVENOUS ONCE
Status: COMPLETED | OUTPATIENT
Start: 2024-06-20 | End: 2024-06-20

## 2024-06-20 RX ORDER — MEPERIDINE HYDROCHLORIDE 50 MG/ML
12.5 INJECTION INTRAMUSCULAR; INTRAVENOUS; SUBCUTANEOUS PRN
Status: CANCELLED | OUTPATIENT
Start: 2024-06-20

## 2024-06-20 RX ORDER — DEXTROSE MONOHYDRATE 50 MG/ML
5-250 INJECTION, SOLUTION INTRAVENOUS PRN
Status: CANCELLED | OUTPATIENT
Start: 2024-06-20

## 2024-06-20 RX ORDER — SODIUM CHLORIDE 0.9 % (FLUSH) 0.9 %
5-40 SYRINGE (ML) INJECTION PRN
Status: CANCELLED | OUTPATIENT
Start: 2024-06-20

## 2024-06-20 RX ORDER — ALBUTEROL SULFATE 90 UG/1
4 AEROSOL, METERED RESPIRATORY (INHALATION) PRN
Status: CANCELLED | OUTPATIENT
Start: 2024-06-20

## 2024-06-20 RX ORDER — SODIUM CHLORIDE 9 MG/ML
5-250 INJECTION, SOLUTION INTRAVENOUS PRN
OUTPATIENT
Start: 2024-06-20

## 2024-06-20 RX ORDER — SODIUM CHLORIDE 9 MG/ML
5-250 INJECTION, SOLUTION INTRAVENOUS PRN
Status: CANCELLED | OUTPATIENT
Start: 2024-06-20

## 2024-06-20 RX ORDER — SODIUM CHLORIDE 0.9 % (FLUSH) 0.9 %
5-40 SYRINGE (ML) INJECTION PRN
OUTPATIENT
Start: 2024-06-20

## 2024-06-20 RX ORDER — DEXTROSE MONOHYDRATE 50 MG/ML
5-250 INJECTION, SOLUTION INTRAVENOUS PRN
OUTPATIENT
Start: 2024-07-11

## 2024-06-20 RX ORDER — ONDANSETRON 2 MG/ML
8 INJECTION INTRAMUSCULAR; INTRAVENOUS
OUTPATIENT
Start: 2024-07-11

## 2024-06-20 RX ORDER — PALONOSETRON 0.05 MG/ML
0.25 INJECTION, SOLUTION INTRAVENOUS ONCE
OUTPATIENT
Start: 2024-07-11 | End: 2024-07-11

## 2024-06-20 RX ORDER — PALONOSETRON 0.05 MG/ML
0.25 INJECTION, SOLUTION INTRAVENOUS ONCE
Status: CANCELLED | OUTPATIENT
Start: 2024-06-20 | End: 2024-06-20

## 2024-06-20 RX ORDER — SODIUM CHLORIDE 9 MG/ML
INJECTION, SOLUTION INTRAVENOUS CONTINUOUS
OUTPATIENT
Start: 2024-07-11

## 2024-06-20 RX ORDER — FAMOTIDINE 10 MG/ML
20 INJECTION, SOLUTION INTRAVENOUS
OUTPATIENT
Start: 2024-07-11

## 2024-06-20 RX ORDER — ALBUTEROL SULFATE 90 UG/1
4 AEROSOL, METERED RESPIRATORY (INHALATION) PRN
OUTPATIENT
Start: 2024-07-11

## 2024-06-20 RX ORDER — HEPARIN SODIUM (PORCINE) LOCK FLUSH IV SOLN 100 UNIT/ML 100 UNIT/ML
500 SOLUTION INTRAVENOUS PRN
Status: CANCELLED | OUTPATIENT
Start: 2024-06-20

## 2024-06-20 RX ORDER — EPINEPHRINE 1 MG/ML
0.3 INJECTION, SOLUTION, CONCENTRATE INTRAVENOUS PRN
Status: CANCELLED | OUTPATIENT
Start: 2024-06-20

## 2024-06-20 RX ORDER — MEPERIDINE HYDROCHLORIDE 50 MG/ML
12.5 INJECTION INTRAMUSCULAR; INTRAVENOUS; SUBCUTANEOUS PRN
OUTPATIENT
Start: 2024-07-11

## 2024-06-20 RX ORDER — ACETAMINOPHEN 325 MG/1
650 TABLET ORAL
OUTPATIENT
Start: 2024-07-11

## 2024-06-20 RX ORDER — SODIUM CHLORIDE 9 MG/ML
5-250 INJECTION, SOLUTION INTRAVENOUS PRN
OUTPATIENT
Start: 2024-07-11

## 2024-06-20 RX ORDER — SODIUM CHLORIDE 0.9 % (FLUSH) 0.9 %
5-40 SYRINGE (ML) INJECTION PRN
Status: DISCONTINUED | OUTPATIENT
Start: 2024-06-20 | End: 2024-06-21 | Stop reason: HOSPADM

## 2024-06-20 RX ORDER — DEXTROSE MONOHYDRATE 50 MG/ML
5-250 INJECTION, SOLUTION INTRAVENOUS PRN
Status: DISCONTINUED | OUTPATIENT
Start: 2024-06-20 | End: 2024-06-21 | Stop reason: HOSPADM

## 2024-06-20 RX ORDER — HEPARIN 100 UNIT/ML
500 SYRINGE INTRAVENOUS PRN
OUTPATIENT
Start: 2024-06-20

## 2024-06-20 RX ORDER — SODIUM CHLORIDE 0.9 % (FLUSH) 0.9 %
5-40 SYRINGE (ML) INJECTION PRN
OUTPATIENT
Start: 2024-07-11

## 2024-06-20 RX ORDER — ONDANSETRON 2 MG/ML
8 INJECTION INTRAMUSCULAR; INTRAVENOUS
Status: CANCELLED | OUTPATIENT
Start: 2024-06-20

## 2024-06-20 RX ORDER — DIPHENHYDRAMINE HYDROCHLORIDE 50 MG/ML
50 INJECTION INTRAMUSCULAR; INTRAVENOUS
Status: CANCELLED | OUTPATIENT
Start: 2024-06-20

## 2024-06-20 RX ORDER — FAMOTIDINE 10 MG/ML
20 INJECTION, SOLUTION INTRAVENOUS
Status: CANCELLED | OUTPATIENT
Start: 2024-06-20

## 2024-06-20 RX ORDER — SODIUM CHLORIDE 9 MG/ML
INJECTION, SOLUTION INTRAVENOUS CONTINUOUS
Status: CANCELLED | OUTPATIENT
Start: 2024-06-20

## 2024-06-20 RX ORDER — HEPARIN SODIUM (PORCINE) LOCK FLUSH IV SOLN 100 UNIT/ML 100 UNIT/ML
500 SOLUTION INTRAVENOUS PRN
OUTPATIENT
Start: 2024-07-11

## 2024-06-20 RX ADMIN — SODIUM CHLORIDE 150 MG: 9 INJECTION, SOLUTION INTRAVENOUS at 09:47

## 2024-06-20 RX ADMIN — DEXTROSE MONOHYDRATE 20 ML/HR: 50 INJECTION, SOLUTION INTRAVENOUS at 10:44

## 2024-06-20 RX ADMIN — PALONOSETRON 0.25 MG: 0.25 INJECTION, SOLUTION INTRAVENOUS at 09:42

## 2024-06-20 RX ADMIN — FAM-TRASTUZUMAB DERUXTECAN-NXKI 366 MG: 100 INJECTION, POWDER, LYOPHILIZED, FOR SOLUTION INTRAVENOUS at 10:44

## 2024-06-20 RX ADMIN — SODIUM CHLORIDE 1000 ML: 9 INJECTION, SOLUTION INTRAVENOUS at 09:38

## 2024-06-20 RX ADMIN — DEXAMETHASONE SODIUM PHOSPHATE 12 MG: 4 INJECTION, SOLUTION INTRAMUSCULAR; INTRAVENOUS at 10:15

## 2024-06-20 NOTE — PROGRESS NOTES
Status appropriately assessed and documented. All required labs and results reviewed. Treatment approved by provider. Treatment orders and medications verified by 2 Registered Nurses where applicable. Treatment plan was confirmed with patient prior to administration, and educated the need to report any treatment-related symptoms      Ambulated to infusion area, here today for chemotherapy and 1L NS. No concerns at this time. Right chest mediport accessed, positive blood return noted. Labs reviewed, Labs within defined limits. Echo 6/6/24 was EF 55-60%.  Chemo and IVF administered as ordered. Call light within reach. Tolerated infusion without incident.  Discharge instructions declined.

## 2024-06-21 ENCOUNTER — APPOINTMENT (OUTPATIENT)
Dept: RADIATION ONCOLOGY | Age: 69
End: 2024-06-21
Payer: MEDICARE

## 2024-06-24 ENCOUNTER — APPOINTMENT (OUTPATIENT)
Dept: RADIATION ONCOLOGY | Age: 69
End: 2024-06-24
Payer: MEDICARE

## 2024-06-25 ENCOUNTER — APPOINTMENT (OUTPATIENT)
Dept: RADIATION ONCOLOGY | Age: 69
End: 2024-06-25
Payer: MEDICARE

## 2024-06-26 ENCOUNTER — APPOINTMENT (OUTPATIENT)
Dept: RADIATION ONCOLOGY | Age: 69
End: 2024-06-26
Payer: MEDICARE

## 2024-06-27 ENCOUNTER — APPOINTMENT (OUTPATIENT)
Dept: RADIATION ONCOLOGY | Age: 69
End: 2024-06-27
Payer: MEDICARE

## 2024-06-27 ENCOUNTER — HOSPITAL ENCOUNTER (OUTPATIENT)
Dept: RADIATION ONCOLOGY | Age: 69
Discharge: HOME | End: 2024-06-27
Payer: MEDICARE

## 2024-06-27 PROCEDURE — 77412 RADIATION TX DELIVERY LVL 3: CPT | Performed by: RADIOLOGY

## 2024-06-27 PROCEDURE — 77387 GUIDANCE FOR RADJ TX DLVR: CPT | Performed by: RADIOLOGY

## 2024-06-27 PROCEDURE — 77280 THER RAD SIMULAJ FIELD SMPL: CPT | Performed by: RADIOLOGY

## 2024-06-28 ENCOUNTER — APPOINTMENT (OUTPATIENT)
Dept: RADIATION ONCOLOGY | Age: 69
End: 2024-06-28
Payer: MEDICARE

## 2024-06-28 ENCOUNTER — HOSPITAL ENCOUNTER (OUTPATIENT)
Dept: RADIATION ONCOLOGY | Age: 69
Discharge: HOME | End: 2024-06-28
Payer: MEDICARE

## 2024-06-28 PROCEDURE — 77412 RADIATION TX DELIVERY LVL 3: CPT | Performed by: RADIOLOGY

## 2024-06-28 PROCEDURE — 77387 GUIDANCE FOR RADJ TX DLVR: CPT | Performed by: RADIOLOGY

## 2024-07-01 ENCOUNTER — HOSPITAL ENCOUNTER (OUTPATIENT)
Dept: RADIATION ONCOLOGY | Age: 69
Discharge: HOME | End: 2024-07-01
Payer: MEDICARE

## 2024-07-01 ENCOUNTER — APPOINTMENT (OUTPATIENT)
Dept: RADIATION ONCOLOGY | Age: 69
End: 2024-07-01
Payer: MEDICARE

## 2024-07-01 PROCEDURE — G6002 STEREOSCOPIC X-RAY GUIDANCE: HCPCS | Performed by: RADIOLOGY

## 2024-07-01 PROCEDURE — NBSRV NON-BILLABLE SERVICE: Performed by: RADIOLOGY

## 2024-07-01 PROCEDURE — 77412 RADIATION TX DELIVERY LVL 3: CPT | Performed by: RADIOLOGY

## 2024-07-01 PROCEDURE — 77387 GUIDANCE FOR RADJ TX DLVR: CPT | Performed by: RADIOLOGY

## 2024-07-02 ENCOUNTER — HOSPITAL ENCOUNTER (OUTPATIENT)
Dept: RADIATION ONCOLOGY | Age: 69
Discharge: HOME | End: 2024-07-02
Payer: MEDICARE

## 2024-07-02 ENCOUNTER — APPOINTMENT (OUTPATIENT)
Dept: RADIATION ONCOLOGY | Age: 69
End: 2024-07-02
Payer: MEDICARE

## 2024-07-02 VITALS — BODY MASS INDEX: 26.61 KG/M2 | WEIGHT: 155 LBS

## 2024-07-02 PROCEDURE — 77412 RADIATION TX DELIVERY LVL 3: CPT | Performed by: RADIOLOGY

## 2024-07-02 PROCEDURE — NBSRV NON-BILLABLE SERVICE: Performed by: RADIOLOGY

## 2024-07-02 PROCEDURE — 77387 GUIDANCE FOR RADJ TX DLVR: CPT | Performed by: RADIOLOGY

## 2024-07-02 PROCEDURE — G6002 STEREOSCOPIC X-RAY GUIDANCE: HCPCS | Performed by: RADIOLOGY

## 2024-07-02 ASSESSMENT — PAIN SCALES - GENERAL: PAINLEVEL_OUTOF10: 0

## 2024-07-02 NOTE — PLAN OF CARE
Radiation education and handouts given. Side effects and management reviewed with pt. All questions answered and pt voices understanding .    Nursing Care Plan for Abdominal Radiation    Pain related to cancer diagnosis and treatment.    Interventions   Pain assessment.   Monitor pharmacological pain medication.   Teach relaxation and repositioning techniques.     Expected Outcome   Maintain patient's acceptable pain level or less than 5 on pain scale.       Knowledge deficit related to diagnosis and treatment plan.    Interventions   Assess patient's ability to comprehend diagnosis and treatment plan.   Provide educational materials and teaching regarding plan of care.   Provide emotional support and continued education.   Refer to psychosocial coordinator if further assistance needed.     Expected Outcome   Patient voices understanding of diagnosis and treatment plan.       Altered skin integrity related to treatment.    Interventions   Evaluation of skin integrity at therapy site.   Advise patient on appropriate skin care.   Instruct patient on recommended lotions/ointments/creams/dressing changes to use on therapy site.     Expected Outcome   Minimize adverse skin reaction/infection at treatment site.       Altered nutritional status due to treatment process.    Interventions   Initial nutritional assessment.   Assess weight and intake during treatment.   Management of treatment side effects.   Refer to nutritional class/evaluation.     Expected Outcome   Patient's weight loss <10% from initial assessment.       Gastrointestinal disturbances due to treatment process.    Interventions   Evaluate for treatment side effects.   Evaluate treatment modalities for effectiveness.   Initiate and educate on appropriate bowel program if needed.     Expected Outcome   Patient with minimal bowel complications and controlled management of side effects.     Nurse to evaluate weekly during radiation treatments.

## 2024-07-02 NOTE — PROGRESS NOTES
Weekly Radiation Treatment Progress Note    DATE OF SERVICE: 7/2/2024     DIAGNOSIS:  metastatic breast to liver       TREATMENT COURSE:       Site: Liver lesion   Current Total Radiation Dose: 2800 cGy  Fraction: 4/5    Pt doing well. Energy fairly good.  No bladder or bowel issues. No nausea.       EXAM  Wt Readings from Last 3 Encounters:   06/20/24 70.5 kg (155 lb 8 oz)   06/06/24 71.7 kg (158 lb)   06/03/24 71.9 kg (158 lb 9.6 oz)     NAD      Setup images, chart, plan reviewed    A/P:   Tolerating RT well  Continue RT as planned  Rtc 4-6 weeks after completion      Electronically signed by Michael Hernández MD on 7/2/2024 at 11:57 AM

## 2024-07-03 ENCOUNTER — HOSPITAL ENCOUNTER (OUTPATIENT)
Dept: RADIATION ONCOLOGY | Age: 69
Discharge: HOME | End: 2024-07-03
Payer: MEDICARE

## 2024-07-03 ENCOUNTER — APPOINTMENT (OUTPATIENT)
Dept: RADIATION ONCOLOGY | Age: 69
End: 2024-07-03
Payer: MEDICARE

## 2024-07-03 PROCEDURE — 77336 RADIATION PHYSICS CONSULT: CPT | Performed by: RADIOLOGY

## 2024-07-03 PROCEDURE — 77412 RADIATION TX DELIVERY LVL 3: CPT | Performed by: RADIOLOGY

## 2024-07-03 PROCEDURE — 77387 GUIDANCE FOR RADJ TX DLVR: CPT | Performed by: RADIOLOGY

## 2024-07-03 NOTE — PROGRESS NOTES
Radiation Oncology  Treatment Completion Summary  Encounter Date: 7/3/2024 12:26 PM    Ms. Aileen Cohn is a 69 y.o. female  : 1955  MRN: [id number]  Acct Number: [id number]    FOLLOW UP PHYSICIANS:   Primary Care: Gabrielle Cisneros APRN - CNP   Medical Oncologist: Dr. Rudolph    DIAGNOSIS: met breast to liver    TREATMENT COURSE:       HISTORY:  Aileen Cohn is a 69 y.o. female with the above referenced diagnosis.  Complete details on history of present illness please see my initial consultation note.  The patient has recently completed a course of  radiation therapy and what follows is a description of the treatments received:    ANATOMIC SITE: liver lesion   BEAM ORIENTATION: SBRT  ENERGY: 6MV photons  DOSE PER FRACTION: 700cGy  TOTAL DOSE: 3500cGy; 5/5 fractions    ELAPSED DAYS: 24 - 7/3/24    TREATMENT TOLERANCE:   Overall, the patient tolerated radiation therapy quite well.  No belly pain, nausea, or other issues.     FOLLOW-UP PLANS:   The patient is to return to see me in 1 month      Electronically signed by Michael Hernández MD on 7/3/2024 at 12:26 PM     Detail Level: Detailed

## 2024-07-10 ENCOUNTER — HOSPITAL ENCOUNTER (OUTPATIENT)
Dept: INFUSION THERAPY | Age: 69
Discharge: HOME | End: 2024-07-10
Payer: MEDICARE

## 2024-07-10 DIAGNOSIS — Z11.59 ENCOUNTER FOR SCREENING FOR OTHER VIRAL DISEASES: ICD-10-CM

## 2024-07-10 DIAGNOSIS — C50.811 MALIGNANT NEOPLASM OF OVERLAPPING SITES OF BOTH BREASTS IN FEMALE, ESTROGEN RECEPTOR POSITIVE (HCC): ICD-10-CM

## 2024-07-10 DIAGNOSIS — Z17.0 MALIGNANT NEOPLASM OF OVERLAPPING SITES OF BOTH BREASTS IN FEMALE, ESTROGEN RECEPTOR POSITIVE (HCC): ICD-10-CM

## 2024-07-10 DIAGNOSIS — Z11.59 NEED FOR HEPATITIS B SCREENING TEST: ICD-10-CM

## 2024-07-10 DIAGNOSIS — C50.812 MALIGNANT NEOPLASM OF OVERLAPPING SITES OF BOTH BREASTS IN FEMALE, ESTROGEN RECEPTOR POSITIVE (HCC): ICD-10-CM

## 2024-07-10 LAB
ALBUMIN SERPL-MCNC: 4.1 GM/DL (ref 3.4–5)
ALP BLD-CCNC: 80 IU/L (ref 40–128)
ALT SERPL-CCNC: 24 U/L (ref 10–40)
ANION GAP SERPL CALCULATED.3IONS-SCNC: 10 MMOL/L (ref 7–16)
AST SERPL-CCNC: 19 IU/L (ref 15–37)
BASOPHILS ABSOLUTE: 0 K/CU MM
BASOPHILS RELATIVE PERCENT: 0.6 % (ref 0–1)
BILIRUB SERPL-MCNC: 0.5 MG/DL (ref 0–1)
BUN SERPL-MCNC: 10 MG/DL (ref 6–23)
CALCIUM SERPL-MCNC: 9.3 MG/DL (ref 8.3–10.6)
CHLORIDE BLD-SCNC: 108 MMOL/L (ref 99–110)
CO2: 26 MMOL/L (ref 21–32)
CREAT SERPL-MCNC: 0.5 MG/DL (ref 0.6–1.1)
DIFFERENTIAL TYPE: ABNORMAL
EOSINOPHILS ABSOLUTE: 0.3 K/CU MM
EOSINOPHILS RELATIVE PERCENT: 7.2 % (ref 0–3)
GFR, ESTIMATED: >90 ML/MIN/1.73M2
GLUCOSE SERPL-MCNC: 84 MG/DL (ref 70–99)
HCT VFR BLD CALC: 35.9 % (ref 37–47)
HEMOGLOBIN: 11.5 GM/DL (ref 12.5–16)
LYMPHOCYTES ABSOLUTE: 1 K/CU MM
LYMPHOCYTES RELATIVE PERCENT: 29.6 % (ref 24–44)
MCH RBC QN AUTO: 31.9 PG (ref 27–31)
MCHC RBC AUTO-ENTMCNC: 32 % (ref 32–36)
MCV RBC AUTO: 99.4 FL (ref 78–100)
MONOCYTES ABSOLUTE: 0.4 K/CU MM
MONOCYTES RELATIVE PERCENT: 12.8 % (ref 0–4)
NEUTROPHILS ABSOLUTE: 1.7 K/CU MM
NEUTROPHILS RELATIVE PERCENT: 49.8 % (ref 36–66)
PDW BLD-RTO: 14.4 % (ref 11.7–14.9)
PLATELET # BLD: 234 K/CU MM (ref 140–440)
PMV BLD AUTO: 9.3 FL (ref 7.5–11.1)
POTASSIUM SERPL-SCNC: 4 MMOL/L (ref 3.5–5.1)
RBC # BLD: 3.61 M/CU MM (ref 4.2–5.4)
SODIUM BLD-SCNC: 144 MMOL/L (ref 135–145)
TOTAL PROTEIN: 6.8 GM/DL (ref 6.4–8.2)
WBC # BLD: 3.5 K/CU MM (ref 4–10.5)

## 2024-07-10 PROCEDURE — 80053 COMPREHEN METABOLIC PANEL: CPT

## 2024-07-10 PROCEDURE — 36415 COLL VENOUS BLD VENIPUNCTURE: CPT

## 2024-07-10 PROCEDURE — 85025 COMPLETE CBC W/AUTO DIFF WBC: CPT

## 2024-07-10 NOTE — PROGRESS NOTES
Patient Name:  Aileen Cohn  Patient :  1955  Patient MRN:  [id number]     Primary Oncologist: Quincy Rudolph MD  Referring Provider: Gabrielle Cisneros APRN - CNP     Date of Service: 2024      Chief Complaint:    No chief complaint on file.    Patient's active problem list:       Liver mass       Lytic bone lesions       Left breast mass    HPI:   Aileen Cohn is a 68 year-old very pleasant female with medical history significant for osteoarthritis, initially referred to me on 22 for evaluation of her liver lesion and multiple lytic bone lesions.     She initially presented to [facility] on 22 with severe lower back pain and constipation. Stated that she has been having back pain for about 4 - 6 weeks duration, which becomes severe pain started a week before presentation.     CT scan of the abdomen and pelvis done on 2022 showed lesion (6.1 x 8.1 cm) in the hepatic segment 4, concerning for neoplasm.  Further evaluation with MRI of the liver is recommended.  Extensive lytic metastatic disease in lumbar and thoracic spine and pelvis with most dominant lesion seen at L5 vertebral body.  Several small bowel loops segments in the left abdomen demonstrate mild wall thickening, nonspecific may indicate enteritis.  Nonspecific partially visualized inflammatory stranding along the pericardium.  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     CT lumbar spine done on 22 showed extensive multifocal lytic lesions concerning for neoplastic/metastatic disease.  Age indeterminant compression fractures at T12 and L4, with mild narrowing of the AP diameter of the canal at L4.    She never had colonoscopy before. She had pap smear around . She didn't recall when was her last mammogram.     She denies weight loss. She hasn't been eating much for about 10 days since she has been having nausea.     Since she is found to have multiple lytic bone lesions and liver lesion, she was referred to me for further

## 2024-07-11 ENCOUNTER — HOSPITAL ENCOUNTER (OUTPATIENT)
Dept: INFUSION THERAPY | Age: 69
Discharge: HOME | End: 2024-07-11
Payer: MEDICARE

## 2024-07-11 ENCOUNTER — OFFICE VISIT (OUTPATIENT)
Dept: ONCOLOGY | Age: 69
End: 2024-07-11
Payer: MEDICARE

## 2024-07-11 VITALS
WEIGHT: 157.6 LBS | TEMPERATURE: 98 F | HEIGHT: 64 IN | DIASTOLIC BLOOD PRESSURE: 66 MMHG | SYSTOLIC BLOOD PRESSURE: 145 MMHG | OXYGEN SATURATION: 97 % | RESPIRATION RATE: 18 BRPM | BODY MASS INDEX: 26.91 KG/M2 | HEART RATE: 101 BPM

## 2024-07-11 DIAGNOSIS — C79.51 MALIGNANT NEOPLASM METASTATIC TO BONE (HCC): ICD-10-CM

## 2024-07-11 DIAGNOSIS — Z11.59 NEED FOR HEPATITIS B SCREENING TEST: ICD-10-CM

## 2024-07-11 DIAGNOSIS — Z17.0 MALIGNANT NEOPLASM OF OVERLAPPING SITES OF BOTH BREASTS IN FEMALE, ESTROGEN RECEPTOR POSITIVE (HCC): Primary | ICD-10-CM

## 2024-07-11 DIAGNOSIS — C50.811 MALIGNANT NEOPLASM OF OVERLAPPING SITES OF BOTH BREASTS IN FEMALE, ESTROGEN RECEPTOR POSITIVE (HCC): Primary | ICD-10-CM

## 2024-07-11 DIAGNOSIS — Z11.59 ENCOUNTER FOR SCREENING FOR OTHER VIRAL DISEASES: ICD-10-CM

## 2024-07-11 DIAGNOSIS — C50.812 MALIGNANT NEOPLASM OF OVERLAPPING SITES OF BOTH BREASTS IN FEMALE, ESTROGEN RECEPTOR POSITIVE (HCC): Primary | ICD-10-CM

## 2024-07-11 PROCEDURE — 96367 TX/PROPH/DG ADDL SEQ IV INF: CPT

## 2024-07-11 PROCEDURE — 1124F ACP DISCUSS-NO DSCNMKR DOCD: CPT | Performed by: INTERNAL MEDICINE

## 2024-07-11 PROCEDURE — 2580000003 HC RX 258: Performed by: INTERNAL MEDICINE

## 2024-07-11 PROCEDURE — 99214 OFFICE O/P EST MOD 30 MIN: CPT | Performed by: INTERNAL MEDICINE

## 2024-07-11 PROCEDURE — 96413 CHEMO IV INFUSION 1 HR: CPT

## 2024-07-11 PROCEDURE — 6360000002 HC RX W HCPCS: Performed by: INTERNAL MEDICINE

## 2024-07-11 PROCEDURE — 96361 HYDRATE IV INFUSION ADD-ON: CPT

## 2024-07-11 RX ORDER — PALONOSETRON 0.05 MG/ML
0.25 INJECTION, SOLUTION INTRAVENOUS ONCE
Status: COMPLETED | OUTPATIENT
Start: 2024-07-11 | End: 2024-07-11

## 2024-07-11 RX ORDER — SODIUM CHLORIDE 0.9 % (FLUSH) 0.9 %
5-40 SYRINGE (ML) INJECTION PRN
OUTPATIENT
Start: 2024-07-11

## 2024-07-11 RX ORDER — 0.9 % SODIUM CHLORIDE 0.9 %
1000 INTRAVENOUS SOLUTION INTRAVENOUS ONCE
Status: COMPLETED | OUTPATIENT
Start: 2024-07-11 | End: 2024-07-11

## 2024-07-11 RX ORDER — SODIUM CHLORIDE 9 MG/ML
5-250 INJECTION, SOLUTION INTRAVENOUS PRN
OUTPATIENT
Start: 2024-07-11

## 2024-07-11 RX ORDER — SODIUM CHLORIDE 0.9 % (FLUSH) 0.9 %
5-40 SYRINGE (ML) INJECTION PRN
Status: DISCONTINUED | OUTPATIENT
Start: 2024-07-11 | End: 2024-07-12 | Stop reason: HOSPADM

## 2024-07-11 RX ORDER — DEXTROSE MONOHYDRATE 50 MG/ML
5-250 INJECTION, SOLUTION INTRAVENOUS PRN
Status: DISCONTINUED | OUTPATIENT
Start: 2024-07-11 | End: 2024-07-12 | Stop reason: HOSPADM

## 2024-07-11 RX ORDER — HEPARIN 100 UNIT/ML
500 SYRINGE INTRAVENOUS PRN
OUTPATIENT
Start: 2024-07-11

## 2024-07-11 RX ORDER — 0.9 % SODIUM CHLORIDE 0.9 %
1000 INTRAVENOUS SOLUTION INTRAVENOUS ONCE
Start: 2024-07-11 | End: 2024-07-11

## 2024-07-11 RX ADMIN — SODIUM CHLORIDE 1000 ML: 9 INJECTION, SOLUTION INTRAVENOUS at 10:37

## 2024-07-11 RX ADMIN — FAM-TRASTUZUMAB DERUXTECAN-NXKI 366 MG: 100 INJECTION, POWDER, LYOPHILIZED, FOR SOLUTION INTRAVENOUS at 11:51

## 2024-07-11 RX ADMIN — SODIUM CHLORIDE 150 MG: 9 INJECTION, SOLUTION INTRAVENOUS at 11:22

## 2024-07-11 RX ADMIN — DEXTROSE MONOHYDRATE 20 ML/HR: 50 INJECTION, SOLUTION INTRAVENOUS at 11:51

## 2024-07-11 RX ADMIN — PALONOSETRON 0.25 MG: 0.25 INJECTION, SOLUTION INTRAVENOUS at 10:53

## 2024-07-11 RX ADMIN — DEXAMETHASONE SODIUM PHOSPHATE 12 MG: 4 INJECTION, SOLUTION INTRAMUSCULAR; INTRAVENOUS at 10:57

## 2024-07-11 NOTE — PROGRESS NOTES
Ambulated to infusion area after Ov with Dr. Rudolph, here today for chemotherapy. No concerns at this time. Right chest mediport accessed, positive blood return noted. Labs reviewed, Labs within defined limits.  Echo on  06/06 revealed an EF of 55-60%. Chemo administered as ordered. Call light within reach. Tolerated infusion without incident.  RTC 07/31 for labs.    Status appropriately assessed and documented. All required labs and results reviewed. Treatment approved by provider. Treatment orders and medications verified by 2 Registered Nurses where applicable. Treatment plan was confirmed with patient prior to administration, and educated the need to report any treatment-related symptoms         Maternal Fetal Medicine Ultrasound Visit    HPI: Ms.Shannon Onofre is a 40 year old year old year old       at 20w0d weeks gestation. Pregnancy is complicated by IVF, advanced maternal age, and obesity.    She is doing well and  has no complaints today. She denies vaginal bleeding, leakage of fluid or abdominal pain. She reports good fetal movement.     Obstetrical history:    OB History    Para Term  AB SAB TAB Ectopic Multiple Living   3    2 2          # Outcome Date GA Lbr Jamil/2nd Weight Sex Delivery Anes PTL Lv   3 Current            2 SAB 10/2016 4w0d          1 SAB 2016 7w0d             Obstetric Comments   All pregnancies via IVF procedure.       Allergies:  ALLERGIES:  No Known Allergies    Medications:  Current Outpatient Prescriptions   Medication Sig Dispense Refill   • escitalopram (LEXAPRO) 5 MG/5ML solution Take 10 mLs by mouth daily. 120 mL 3   • ciclopirox (PENLAC) 8 % topical solution Apply topically nightly. 6.6 mL 2   • Prenatal Vit-Fe Fumarate-FA (MULTIVITAMIN & MINERAL W/FOLIC ACID- PRENATAL) 27-1 MG Tab Take 1 tablet by mouth daily.     • VITAMIN D, CHOLECALCIFEROL, PO Take 1 capsule by mouth daily.     • fish oil-omega-3 fatty acids 1000 MG CAPS Take  by mouth.     • B Complex Vitamins (B COMPLEX 1 PO) Take  by mouth.     • aspirin 81 MG tablet Take 81 mg by mouth daily.       No current facility-administered medications for this visit.          Review of Systems:   FETAL MOVEMENT:  Reports fetal movement.  OB:  Denies vaginal bleeding, abnormal vaginal discharge, uterine contractions or leakage of fluid.      PE:   VS:    Visit Vitals   • /73   • Pulse 92   • Ht 5' 9\" (1.753 m)   • Wt 118.4 kg   • LMP 2017   • BMI 38.55 kg/m2     GEN: well-appearing female, NAD, A&O x3  Pulm: Respirations are unlabored  : Uterus is gravid    Please see separate document for fetal ultrasound report.    A/P: Ms. Adriana Onofre is a 40 year old female. She is 20w0d pregnancy  today. She is doing well and here today for ultrasound.    I had a discussion with the patient regarding management and the maternal/fetal risks associated with a twin gestation. I explained to the patient that twin pregnancy is associated with increased risks of maternal and  morbidity.  delivery is the most significant complication of multiple gestations. The mean gestational age for delivery of twins is 35-36 weeks. The primary cause of these  births is  labor. Other common etiologies include preeclampsia,  premature rupture of membranes (PPROM), fetal growth restriction, and nonreassuring fetal testing. We also discussed other risks of twin pregnancy including preeclampsia, pregnancy associated hypertension, intrahepatic cholestasis, iron deficiency anemia, and gestational diabetes mellitus occur more commonly in twins compared to banks pregnancies, as is the increased need for  delivery.     A variety of interventions have been recommended to prevent  birth or to predict women at highest risk of developing  labor. Although there are tests that can predict pregnancies at particularly increased risk of  delivery, no intervention has been proven effective in reducing  birth rates. Bedrest is the traditional therapy for many obstetrical complications; however, there are few studies that have prospectively evaluated its value. In addition, retrospective analyses have not clearly demonstrated the benefit of bedrest for prevention of  delivery. Likewise, there is no clear benefit to the use of routine hospitalization, prophylactic cerclage, home uterine monitoring, prophylactic tocolysis, or weekly 17-OHP. There is insufficient data to demonstrate the value of sonographic cervical assessment of twin pregnancies and thresholds for intervention have not been well defined and no intervention has been shown to improve outcome. Cervical  length was reassuring today.     I counseled the patient on the limitations of ultrasound for diagnosing fetal aneuploidy as well as genetic syndromes. We also discussed that amniocentesis is the gold standard for evaluation of fetal karyotype as well as Down syndrome. The risks and benefits of amniocentesis were discussed.   I did  the patient that should amniocentesis be done, both fetuses should undergo karyotyping when karyotyping is performed. After full discussion, the patient declines aneuploidy testing and does not desire invasive/diagnostic testing.     Since twins are at risk for growth abnormalities, we recommend ultrasound examinations for fetal growth every 4 weeks. Cervical length could be assessed periodically with possible vaginal progesterone supplementation should it become shortened. Cerclage could be considered, although the data on its benefit in multiple gestations is limited and some studies report increased risk of  birth with cerclage in multiple gestations. Twice weekly  assessment should begin as early as 32 weeks' gestation. Testing should be instituted earlier in the presence of intrauterine growth restriction.     Assuming that maternal and fetal statuses remain reassuring, based on the risk of stillbirth with advancing gestational age, there is probably little benefit allowing a twin gestation to go beyond 37 to 38 weeks, and delivery during that time frame should be considered.     Fetal surveillance should be with interval serial growth ultrasounds (every 4-6 weeks) and antepartum testing should be considered at 32 weeks with twice weekly non-stress tests or weekly biophysical profile assessment.     Summary of recommendations:  -Evaluation of interval fetal growth and suboptimal fetal anatomy in 4 weeks  -Evaluation of interval fetal growth every 4 weeks in the third trimester  -Initiation of twice-weekly antepartum fetal surveillance at 32 weeks gestation or  earlier if clinically indicated  -Delivery at 37-38 weeks gestation should fetal maternal condition remain reassuring    All patient's questions answered.     Thank you for the opportunity to be a part of the care of Ms. Adriana Onofre.  We will continue to follow her with ultrasounds in our office. Please contact our office if we can be of further assistance.       Aguilar Vega MD

## 2024-07-11 NOTE — PROGRESS NOTES
MA Rooming Questions  Patient: Aileen Cohn  MRN: [id number]    Date: 7/11/2024        1. Do you have any new issues?   no         2. Do you need any refills on medications?    no    3. Have you had any imaging done since your last visit?   yes -     4. Have you been hospitalized or seen in the emergency room since your last visit here?   no    5. Did the patient have a depression screening completed today? No    No data recorded     PHQ-9 Given to (if applicable):               PHQ-9 Score (if applicable):                     [] Positive     []  Negative              Does question #9 need addressed (if applicable)                     [] Yes    []  No               Chrissy Montemayor CMA

## 2024-07-17 DIAGNOSIS — K59.09 OTHER CONSTIPATION: ICD-10-CM

## 2024-07-17 RX ORDER — NYSTATIN 100000 [USP'U]/G
POWDER TOPICAL
Qty: 60 G | Refills: 1 | Status: SHIPPED | OUTPATIENT
Start: 2024-07-17

## 2024-07-17 RX ORDER — SENNOSIDES 8.6 MG
1 TABLET ORAL DAILY
Qty: 90 TABLET | Refills: 1 | OUTPATIENT
Start: 2024-07-17

## 2024-07-30 DIAGNOSIS — Z17.0 MALIGNANT NEOPLASM OF OVERLAPPING SITES OF BOTH BREASTS IN FEMALE, ESTROGEN RECEPTOR POSITIVE (HCC): ICD-10-CM

## 2024-07-30 DIAGNOSIS — Z11.59 NEED FOR HEPATITIS B SCREENING TEST: Primary | ICD-10-CM

## 2024-07-30 DIAGNOSIS — Z11.59 ENCOUNTER FOR SCREENING FOR OTHER VIRAL DISEASES: ICD-10-CM

## 2024-07-30 DIAGNOSIS — C50.811 MALIGNANT NEOPLASM OF OVERLAPPING SITES OF BOTH BREASTS IN FEMALE, ESTROGEN RECEPTOR POSITIVE (HCC): ICD-10-CM

## 2024-07-30 DIAGNOSIS — C50.812 MALIGNANT NEOPLASM OF OVERLAPPING SITES OF BOTH BREASTS IN FEMALE, ESTROGEN RECEPTOR POSITIVE (HCC): ICD-10-CM

## 2024-07-31 ENCOUNTER — HOSPITAL ENCOUNTER (OUTPATIENT)
Dept: INFUSION THERAPY | Age: 69
Discharge: HOME | End: 2024-07-31
Payer: MEDICARE

## 2024-07-31 DIAGNOSIS — C50.811 MALIGNANT NEOPLASM OF OVERLAPPING SITES OF BOTH BREASTS IN FEMALE, ESTROGEN RECEPTOR POSITIVE (HCC): ICD-10-CM

## 2024-07-31 DIAGNOSIS — Z17.0 MALIGNANT NEOPLASM OF OVERLAPPING SITES OF BOTH BREASTS IN FEMALE, ESTROGEN RECEPTOR POSITIVE (HCC): ICD-10-CM

## 2024-07-31 DIAGNOSIS — Z11.59 ENCOUNTER FOR SCREENING FOR OTHER VIRAL DISEASES: ICD-10-CM

## 2024-07-31 DIAGNOSIS — C50.812 MALIGNANT NEOPLASM OF OVERLAPPING SITES OF BOTH BREASTS IN FEMALE, ESTROGEN RECEPTOR POSITIVE (HCC): ICD-10-CM

## 2024-07-31 DIAGNOSIS — Z11.59 NEED FOR HEPATITIS B SCREENING TEST: ICD-10-CM

## 2024-07-31 LAB
ALBUMIN SERPL-MCNC: 4 GM/DL (ref 3.4–5)
ALP BLD-CCNC: 60 IU/L (ref 40–129)
ALT SERPL-CCNC: 26 U/L (ref 10–40)
ANION GAP SERPL CALCULATED.3IONS-SCNC: 10 MMOL/L (ref 7–16)
AST SERPL-CCNC: 24 IU/L (ref 15–37)
BASOPHILS ABSOLUTE: 0 K/CU MM
BASOPHILS RELATIVE PERCENT: 0.8 % (ref 0–1)
BILIRUB SERPL-MCNC: 0.3 MG/DL (ref 0–1)
BUN SERPL-MCNC: 9 MG/DL (ref 6–23)
CALCIUM SERPL-MCNC: 9.1 MG/DL (ref 8.3–10.6)
CHLORIDE BLD-SCNC: 108 MMOL/L (ref 99–110)
CO2: 25 MMOL/L (ref 21–32)
CREAT SERPL-MCNC: 0.5 MG/DL (ref 0.6–1.1)
DIFFERENTIAL TYPE: ABNORMAL
EOSINOPHILS ABSOLUTE: 0.2 K/CU MM
EOSINOPHILS RELATIVE PERCENT: 4.7 % (ref 0–3)
GFR, ESTIMATED: >90 ML/MIN/1.73M2
GLUCOSE SERPL-MCNC: 72 MG/DL (ref 70–99)
HCT VFR BLD CALC: 36.1 % (ref 37–47)
HEMOGLOBIN: 11.7 GM/DL (ref 12.5–16)
LYMPHOCYTES ABSOLUTE: 1.3 K/CU MM
LYMPHOCYTES RELATIVE PERCENT: 35 % (ref 24–44)
MCH RBC QN AUTO: 32.3 PG (ref 27–31)
MCHC RBC AUTO-ENTMCNC: 32.4 % (ref 32–36)
MCV RBC AUTO: 99.7 FL (ref 78–100)
MONOCYTES ABSOLUTE: 0.6 K/CU MM
MONOCYTES RELATIVE PERCENT: 16.9 % (ref 0–4)
NEUTROPHILS ABSOLUTE: 1.5 K/CU MM
NEUTROPHILS RELATIVE PERCENT: 42.6 % (ref 36–66)
PDW BLD-RTO: 15.3 % (ref 11.7–14.9)
PLATELET # BLD: 214 K/CU MM (ref 140–440)
PMV BLD AUTO: 8 FL (ref 7.5–11.1)
POTASSIUM SERPL-SCNC: 3.6 MMOL/L (ref 3.5–5.1)
RBC # BLD: 3.62 M/CU MM (ref 4.2–5.4)
SODIUM BLD-SCNC: 143 MMOL/L (ref 135–145)
TOTAL PROTEIN: 6.3 GM/DL (ref 6.4–8.2)
WBC # BLD: 3.6 K/CU MM (ref 4–10.5)

## 2024-07-31 PROCEDURE — 85025 COMPLETE CBC W/AUTO DIFF WBC: CPT

## 2024-07-31 PROCEDURE — 36415 COLL VENOUS BLD VENIPUNCTURE: CPT

## 2024-07-31 PROCEDURE — 80053 COMPREHEN METABOLIC PANEL: CPT

## 2024-08-01 ENCOUNTER — HOSPITAL ENCOUNTER (OUTPATIENT)
Dept: INFUSION THERAPY | Age: 69
Discharge: HOME | End: 2024-08-01
Payer: MEDICARE

## 2024-08-01 VITALS
WEIGHT: 157 LBS | HEART RATE: 88 BPM | HEIGHT: 64 IN | DIASTOLIC BLOOD PRESSURE: 65 MMHG | SYSTOLIC BLOOD PRESSURE: 142 MMHG | BODY MASS INDEX: 26.8 KG/M2 | OXYGEN SATURATION: 98 % | TEMPERATURE: 97.7 F

## 2024-08-01 DIAGNOSIS — C50.811 MALIGNANT NEOPLASM OF OVERLAPPING SITES OF BOTH BREASTS IN FEMALE, ESTROGEN RECEPTOR POSITIVE (HCC): Primary | ICD-10-CM

## 2024-08-01 DIAGNOSIS — Z11.59 ENCOUNTER FOR SCREENING FOR OTHER VIRAL DISEASES: ICD-10-CM

## 2024-08-01 DIAGNOSIS — C50.812 MALIGNANT NEOPLASM OF OVERLAPPING SITES OF BOTH BREASTS IN FEMALE, ESTROGEN RECEPTOR POSITIVE (HCC): Primary | ICD-10-CM

## 2024-08-01 DIAGNOSIS — C50.812 MALIGNANT NEOPLASM OF OVERLAPPING SITES OF BOTH BREASTS IN FEMALE, ESTROGEN RECEPTOR POSITIVE (HCC): ICD-10-CM

## 2024-08-01 DIAGNOSIS — Z11.59 NEED FOR HEPATITIS B SCREENING TEST: ICD-10-CM

## 2024-08-01 DIAGNOSIS — Z17.0 MALIGNANT NEOPLASM OF OVERLAPPING SITES OF BOTH BREASTS IN FEMALE, ESTROGEN RECEPTOR POSITIVE (HCC): Primary | ICD-10-CM

## 2024-08-01 DIAGNOSIS — Z17.0 MALIGNANT NEOPLASM OF OVERLAPPING SITES OF BOTH BREASTS IN FEMALE, ESTROGEN RECEPTOR POSITIVE (HCC): ICD-10-CM

## 2024-08-01 DIAGNOSIS — C79.51 MALIGNANT NEOPLASM METASTATIC TO BONE (HCC): ICD-10-CM

## 2024-08-01 DIAGNOSIS — Z11.59 NEED FOR HEPATITIS B SCREENING TEST: Primary | ICD-10-CM

## 2024-08-01 DIAGNOSIS — C50.811 MALIGNANT NEOPLASM OF OVERLAPPING SITES OF BOTH BREASTS IN FEMALE, ESTROGEN RECEPTOR POSITIVE (HCC): ICD-10-CM

## 2024-08-01 PROCEDURE — 6360000002 HC RX W HCPCS: Performed by: INTERNAL MEDICINE

## 2024-08-01 PROCEDURE — 2580000003 HC RX 258: Performed by: INTERNAL MEDICINE

## 2024-08-01 PROCEDURE — 96375 TX/PRO/DX INJ NEW DRUG ADDON: CPT

## 2024-08-01 PROCEDURE — 96413 CHEMO IV INFUSION 1 HR: CPT

## 2024-08-01 PROCEDURE — 96367 TX/PROPH/DG ADDL SEQ IV INF: CPT

## 2024-08-01 PROCEDURE — 96361 HYDRATE IV INFUSION ADD-ON: CPT

## 2024-08-01 RX ORDER — 0.9 % SODIUM CHLORIDE 0.9 %
1000 INTRAVENOUS SOLUTION INTRAVENOUS ONCE
Status: COMPLETED | OUTPATIENT
Start: 2024-08-01 | End: 2024-08-01

## 2024-08-01 RX ORDER — SODIUM CHLORIDE 9 MG/ML
5-250 INJECTION, SOLUTION INTRAVENOUS PRN
Status: CANCELLED | OUTPATIENT
Start: 2024-08-01

## 2024-08-01 RX ORDER — MEPERIDINE HYDROCHLORIDE 50 MG/ML
12.5 INJECTION INTRAMUSCULAR; INTRAVENOUS; SUBCUTANEOUS PRN
OUTPATIENT
Start: 2024-08-22

## 2024-08-01 RX ORDER — HEPARIN SODIUM (PORCINE) LOCK FLUSH IV SOLN 100 UNIT/ML 100 UNIT/ML
500 SOLUTION INTRAVENOUS PRN
Status: CANCELLED | OUTPATIENT
Start: 2024-08-01

## 2024-08-01 RX ORDER — PALONOSETRON 0.05 MG/ML
0.25 INJECTION, SOLUTION INTRAVENOUS ONCE
Status: DISCONTINUED | OUTPATIENT
Start: 2024-08-01 | End: 2024-08-01

## 2024-08-01 RX ORDER — EPINEPHRINE 1 MG/ML
0.3 INJECTION, SOLUTION, CONCENTRATE INTRAVENOUS PRN
OUTPATIENT
Start: 2024-08-22

## 2024-08-01 RX ORDER — FAMOTIDINE 10 MG/ML
20 INJECTION, SOLUTION INTRAVENOUS
OUTPATIENT
Start: 2024-08-22

## 2024-08-01 RX ORDER — 0.9 % SODIUM CHLORIDE 0.9 %
1000 INTRAVENOUS SOLUTION INTRAVENOUS ONCE
Start: 2024-08-01 | End: 2024-08-01

## 2024-08-01 RX ORDER — EPINEPHRINE 1 MG/ML
0.3 INJECTION, SOLUTION, CONCENTRATE INTRAVENOUS PRN
Status: CANCELLED | OUTPATIENT
Start: 2024-08-01

## 2024-08-01 RX ORDER — DEXTROSE MONOHYDRATE 50 MG/ML
5-250 INJECTION, SOLUTION INTRAVENOUS PRN
OUTPATIENT
Start: 2024-08-22

## 2024-08-01 RX ORDER — ALBUTEROL SULFATE 90 UG/1
4 AEROSOL, METERED RESPIRATORY (INHALATION) PRN
Status: CANCELLED | OUTPATIENT
Start: 2024-08-01

## 2024-08-01 RX ORDER — PALONOSETRON 0.05 MG/ML
0.25 INJECTION, SOLUTION INTRAVENOUS ONCE
Status: COMPLETED | OUTPATIENT
Start: 2024-08-01 | End: 2024-08-01

## 2024-08-01 RX ORDER — SODIUM CHLORIDE 0.9 % (FLUSH) 0.9 %
5-40 SYRINGE (ML) INJECTION PRN
OUTPATIENT
Start: 2024-08-01

## 2024-08-01 RX ORDER — ALBUTEROL SULFATE 90 UG/1
4 AEROSOL, METERED RESPIRATORY (INHALATION) PRN
OUTPATIENT
Start: 2024-08-22

## 2024-08-01 RX ORDER — DEXTROSE MONOHYDRATE 50 MG/ML
5-250 INJECTION, SOLUTION INTRAVENOUS PRN
Status: CANCELLED | OUTPATIENT
Start: 2024-08-01

## 2024-08-01 RX ORDER — SODIUM CHLORIDE 9 MG/ML
INJECTION, SOLUTION INTRAVENOUS CONTINUOUS
OUTPATIENT
Start: 2024-08-22

## 2024-08-01 RX ORDER — FAMOTIDINE 10 MG/ML
20 INJECTION, SOLUTION INTRAVENOUS
Status: CANCELLED | OUTPATIENT
Start: 2024-08-01

## 2024-08-01 RX ORDER — PALONOSETRON 0.05 MG/ML
0.25 INJECTION, SOLUTION INTRAVENOUS ONCE
OUTPATIENT
Start: 2024-08-22 | End: 2024-08-22

## 2024-08-01 RX ORDER — DEXTROSE MONOHYDRATE 50 MG/ML
5-250 INJECTION, SOLUTION INTRAVENOUS PRN
Status: DISCONTINUED | OUTPATIENT
Start: 2024-08-01 | End: 2024-08-02 | Stop reason: HOSPADM

## 2024-08-01 RX ORDER — ONDANSETRON 2 MG/ML
8 INJECTION INTRAMUSCULAR; INTRAVENOUS
OUTPATIENT
Start: 2024-08-22

## 2024-08-01 RX ORDER — SODIUM CHLORIDE 9 MG/ML
INJECTION, SOLUTION INTRAVENOUS CONTINUOUS
Status: CANCELLED | OUTPATIENT
Start: 2024-08-01

## 2024-08-01 RX ORDER — DIPHENHYDRAMINE HYDROCHLORIDE 50 MG/ML
50 INJECTION INTRAMUSCULAR; INTRAVENOUS
OUTPATIENT
Start: 2024-08-22

## 2024-08-01 RX ORDER — HEPARIN 100 UNIT/ML
500 SYRINGE INTRAVENOUS PRN
OUTPATIENT
Start: 2024-08-01

## 2024-08-01 RX ORDER — SODIUM CHLORIDE 0.9 % (FLUSH) 0.9 %
5-40 SYRINGE (ML) INJECTION PRN
OUTPATIENT
Start: 2024-08-22

## 2024-08-01 RX ORDER — SODIUM CHLORIDE 9 MG/ML
5-250 INJECTION, SOLUTION INTRAVENOUS PRN
OUTPATIENT
Start: 2024-08-22

## 2024-08-01 RX ORDER — SODIUM CHLORIDE 0.9 % (FLUSH) 0.9 %
5-40 SYRINGE (ML) INJECTION PRN
Status: DISCONTINUED | OUTPATIENT
Start: 2024-08-01 | End: 2024-08-02 | Stop reason: HOSPADM

## 2024-08-01 RX ORDER — SODIUM CHLORIDE 9 MG/ML
5-250 INJECTION, SOLUTION INTRAVENOUS PRN
OUTPATIENT
Start: 2024-08-01

## 2024-08-01 RX ORDER — MEPERIDINE HYDROCHLORIDE 50 MG/ML
12.5 INJECTION INTRAMUSCULAR; INTRAVENOUS; SUBCUTANEOUS PRN
Status: CANCELLED | OUTPATIENT
Start: 2024-08-01

## 2024-08-01 RX ORDER — PALONOSETRON 0.05 MG/ML
0.25 INJECTION, SOLUTION INTRAVENOUS ONCE
Status: CANCELLED | OUTPATIENT
Start: 2024-08-01 | End: 2024-08-01

## 2024-08-01 RX ORDER — SODIUM CHLORIDE 0.9 % (FLUSH) 0.9 %
5-40 SYRINGE (ML) INJECTION PRN
Status: CANCELLED | OUTPATIENT
Start: 2024-08-01

## 2024-08-01 RX ORDER — ACETAMINOPHEN 325 MG/1
650 TABLET ORAL
OUTPATIENT
Start: 2024-08-22

## 2024-08-01 RX ORDER — ACETAMINOPHEN 325 MG/1
650 TABLET ORAL
Status: CANCELLED | OUTPATIENT
Start: 2024-08-01

## 2024-08-01 RX ORDER — ONDANSETRON 2 MG/ML
8 INJECTION INTRAMUSCULAR; INTRAVENOUS
Status: CANCELLED | OUTPATIENT
Start: 2024-08-01

## 2024-08-01 RX ORDER — DIPHENHYDRAMINE HYDROCHLORIDE 50 MG/ML
50 INJECTION INTRAMUSCULAR; INTRAVENOUS
Status: CANCELLED | OUTPATIENT
Start: 2024-08-01

## 2024-08-01 RX ORDER — HEPARIN SODIUM (PORCINE) LOCK FLUSH IV SOLN 100 UNIT/ML 100 UNIT/ML
500 SOLUTION INTRAVENOUS PRN
OUTPATIENT
Start: 2024-08-22

## 2024-08-01 RX ADMIN — DEXTROSE MONOHYDRATE 20 ML/HR: 50 INJECTION, SOLUTION INTRAVENOUS at 11:48

## 2024-08-01 RX ADMIN — FAM-TRASTUZUMAB DERUXTECAN-NXKI 366 MG: 100 INJECTION, POWDER, LYOPHILIZED, FOR SOLUTION INTRAVENOUS at 12:55

## 2024-08-01 RX ADMIN — SODIUM CHLORIDE 150 MG: 9 INJECTION, SOLUTION INTRAVENOUS at 11:50

## 2024-08-01 RX ADMIN — SODIUM CHLORIDE 1000 ML: 9 INJECTION, SOLUTION INTRAVENOUS at 10:45

## 2024-08-01 RX ADMIN — PALONOSETRON 0.25 MG: 0.25 INJECTION, SOLUTION INTRAVENOUS at 11:47

## 2024-08-01 RX ADMIN — DEXAMETHASONE SODIUM PHOSPHATE 12 MG: 4 INJECTION, SOLUTION INTRAMUSCULAR; INTRAVENOUS at 12:27

## 2024-08-01 RX ADMIN — SODIUM CHLORIDE, PRESERVATIVE FREE 20 ML: 5 INJECTION INTRAVENOUS at 13:34

## 2024-08-01 NOTE — PROGRESS NOTES
Here for treatment. No concerns voiced. States doing well. Labs within defined parameters.     Chemo and IV hydration administered as ordered. Tolerated infusion without incident. AVS provided. Discharged in stable condition.

## 2024-08-09 RX ORDER — TRAZODONE HYDROCHLORIDE 50 MG/1
50 TABLET ORAL NIGHTLY
Qty: 30 TABLET | Refills: 0 | Status: SHIPPED | OUTPATIENT
Start: 2024-08-09

## 2024-08-09 NOTE — TELEPHONE ENCOUNTER
Patient contacted our office in need of refill for trazadone. Patient has a scheduled appointment on 08/22/24. Patients preferred pharmacy is [facility] . Pending for patients chart provider FILIX KENCANA AS HE IS STANDING IN FOR [facility] Sepulveda

## 2024-08-21 ENCOUNTER — HOSPITAL ENCOUNTER (OUTPATIENT)
Dept: INFUSION THERAPY | Age: 69
Discharge: HOME | End: 2024-08-21
Payer: MEDICARE

## 2024-08-21 ENCOUNTER — HOSPITAL ENCOUNTER (OUTPATIENT)
Dept: RADIATION ONCOLOGY | Age: 69
Discharge: HOME | End: 2024-08-21
Payer: MEDICARE

## 2024-08-21 ENCOUNTER — OFFICE VISIT (OUTPATIENT)
Dept: ONCOLOGY | Age: 69
End: 2024-08-21
Payer: MEDICARE

## 2024-08-21 VITALS
TEMPERATURE: 97.6 F | WEIGHT: 157 LBS | DIASTOLIC BLOOD PRESSURE: 67 MMHG | HEART RATE: 105 BPM | BODY MASS INDEX: 26.8 KG/M2 | OXYGEN SATURATION: 100 % | HEIGHT: 64 IN | SYSTOLIC BLOOD PRESSURE: 145 MMHG

## 2024-08-21 VITALS
BODY MASS INDEX: 26.8 KG/M2 | TEMPERATURE: 97.6 F | DIASTOLIC BLOOD PRESSURE: 67 MMHG | WEIGHT: 157 LBS | HEART RATE: 105 BPM | SYSTOLIC BLOOD PRESSURE: 145 MMHG | OXYGEN SATURATION: 100 % | HEIGHT: 64 IN

## 2024-08-21 DIAGNOSIS — Z11.59 NEED FOR HEPATITIS B SCREENING TEST: ICD-10-CM

## 2024-08-21 DIAGNOSIS — C50.811 MALIGNANT NEOPLASM OF OVERLAPPING SITES OF BOTH BREASTS IN FEMALE, ESTROGEN RECEPTOR POSITIVE (HCC): ICD-10-CM

## 2024-08-21 DIAGNOSIS — Z17.0 MALIGNANT NEOPLASM OF OVERLAPPING SITES OF BOTH BREASTS IN FEMALE, ESTROGEN RECEPTOR POSITIVE (HCC): Primary | ICD-10-CM

## 2024-08-21 DIAGNOSIS — Z11.59 ENCOUNTER FOR SCREENING FOR OTHER VIRAL DISEASES: ICD-10-CM

## 2024-08-21 DIAGNOSIS — C50.812 MALIGNANT NEOPLASM OF OVERLAPPING SITES OF BOTH BREASTS IN FEMALE, ESTROGEN RECEPTOR POSITIVE (HCC): ICD-10-CM

## 2024-08-21 DIAGNOSIS — C50.811 MALIGNANT NEOPLASM OF OVERLAPPING SITES OF BOTH BREASTS IN FEMALE, ESTROGEN RECEPTOR POSITIVE (HCC): Primary | ICD-10-CM

## 2024-08-21 DIAGNOSIS — C50.812 MALIGNANT NEOPLASM OF OVERLAPPING SITES OF BOTH BREASTS IN FEMALE, ESTROGEN RECEPTOR POSITIVE (HCC): Primary | ICD-10-CM

## 2024-08-21 DIAGNOSIS — Z17.0 MALIGNANT NEOPLASM OF OVERLAPPING SITES OF BOTH BREASTS IN FEMALE, ESTROGEN RECEPTOR POSITIVE (HCC): ICD-10-CM

## 2024-08-21 LAB
ALBUMIN SERPL-MCNC: 4 GM/DL (ref 3.4–5)
ALP BLD-CCNC: 91 IU/L (ref 40–129)
ALT SERPL-CCNC: 21 U/L (ref 10–40)
ANION GAP SERPL CALCULATED.3IONS-SCNC: 8 MMOL/L (ref 7–16)
AST SERPL-CCNC: 19 IU/L (ref 15–37)
BASOPHILS ABSOLUTE: 0 K/CU MM
BASOPHILS RELATIVE PERCENT: 0.6 % (ref 0–1)
BILIRUB SERPL-MCNC: 0.7 MG/DL (ref 0–1)
BUN SERPL-MCNC: 13 MG/DL (ref 6–23)
CALCIUM SERPL-MCNC: 9.1 MG/DL (ref 8.3–10.6)
CHLORIDE BLD-SCNC: 108 MMOL/L (ref 99–110)
CO2: 27 MMOL/L (ref 21–32)
CREAT SERPL-MCNC: 0.5 MG/DL (ref 0.6–1.1)
DIFFERENTIAL TYPE: ABNORMAL
EOSINOPHILS ABSOLUTE: 0.1 K/CU MM
EOSINOPHILS RELATIVE PERCENT: 2.9 % (ref 0–3)
GFR, ESTIMATED: >90 ML/MIN/1.73M2
GLUCOSE SERPL-MCNC: 93 MG/DL (ref 70–99)
HCT VFR BLD CALC: 35.2 % (ref 37–47)
HEMOGLOBIN: 11.3 GM/DL (ref 12.5–16)
LYMPHOCYTES ABSOLUTE: 0.8 K/CU MM
LYMPHOCYTES RELATIVE PERCENT: 26.7 % (ref 24–44)
MCH RBC QN AUTO: 32.2 PG (ref 27–31)
MCHC RBC AUTO-ENTMCNC: 32.1 % (ref 32–36)
MCV RBC AUTO: 100.3 FL (ref 78–100)
MONOCYTES ABSOLUTE: 0.5 K/CU MM
MONOCYTES RELATIVE PERCENT: 15.2 % (ref 0–4)
NEUTROPHILS ABSOLUTE: 1.7 K/CU MM
NEUTROPHILS RELATIVE PERCENT: 54.6 % (ref 36–66)
PDW BLD-RTO: 15.1 % (ref 11.7–14.9)
PLATELET # BLD: 188 K/CU MM (ref 140–440)
PMV BLD AUTO: 8.1 FL (ref 7.5–11.1)
POTASSIUM SERPL-SCNC: 3.8 MMOL/L (ref 3.5–5.1)
RBC # BLD: 3.51 M/CU MM (ref 4.2–5.4)
SODIUM BLD-SCNC: 143 MMOL/L (ref 135–145)
TOTAL PROTEIN: 6.4 GM/DL (ref 6.4–8.2)
WBC # BLD: 3.2 K/CU MM (ref 4–10.5)

## 2024-08-21 PROCEDURE — 80053 COMPREHEN METABOLIC PANEL: CPT

## 2024-08-21 PROCEDURE — 85025 COMPLETE CBC W/AUTO DIFF WBC: CPT

## 2024-08-21 PROCEDURE — 99401 PREV MED CNSL INDIV APPRX 15: CPT | Performed by: INTERNAL MEDICINE

## 2024-08-21 PROCEDURE — 99211 OFF/OP EST MAY X REQ PHY/QHP: CPT

## 2024-08-21 PROCEDURE — 36415 COLL VENOUS BLD VENIPUNCTURE: CPT

## 2024-08-21 ASSESSMENT — PAIN SCALES - GENERAL: PAINLEVEL_OUTOF10: 0

## 2024-08-21 NOTE — PROGRESS NOTES
[facility]   Radiation Oncology Center  [address] [place] [address]  Phone: [phone]  Fax: [phone]    RADIATION ONCOLOGY FOLLOW UP REPORT    PATIENT NAME:  Aileen Cohn              : 1955  MEDICAL RECORD NO: [id number]    [facility] NO: [id number]        PROVIDER: Michael Hernández MD    DATE OF SERVICE: 2024     FOLLOW UP PHYSICIANS:  Dr. Rudolph    DIAGNOSIS:  met breast to liver    TREATMENT COURSE: SBRT to liver lesion 35Gy/5fx completed 7/3/24    HPI:   Aileen Cohn is a 69 y.o. female who has a history as above, who returns today for a routine follow-up visit.    The patient completed treatment 7/3/24 without issue or problem.     No new problems or issues. Seeing Dr. Hooker today. Sees Dr. Rudolph tomorrow. No belly pain, nausea/vomiting, or bowel issues.     Currently, the patient reports she's been doing well.  Her energy level has been fairly good overall.  She denies any fevers, chills, or drenching night sweats.  Her weight and appetite have been stable.  She denies any chest pain or shortness of breath.  She has not noticed any new lumps or bumps anywhere throughout her body.  She denies the new onset of bony pain.        RADIOLOGIC STUDIES:    LABORATORY STUDIES:   CBC:   Lab Results   Component Value Date/Time    WBC 3.6 2024 11:25 AM    RBC 3.62 2024 11:25 AM    HGB 11.7 2024 11:25 AM    HCT 36.1 2024 11:25 AM    MCV 99.7 2024 11:25 AM    MCH 32.3 2024 11:25 AM    MCHC 32.4 2024 11:25 AM    RDW 15.3 2024 11:25 AM     2024 11:25 AM    MPV 8.0 2024 11:25 AM     CMP:  Lab Results   Component Value Date/Time     2024 11:25 AM    K 3.6 2024 11:25 AM     2024 11:25 AM    CO2 25 2024 11:25 AM    BUN 9 2024 11:25 AM    CREATININE 0.5 2024 11:25 AM    GFRAA >60 2022 11:32 AM    LABGLOM >90 2024 11:25 AM    LABGLOM >90 2024 11:24 AM    GLUCOSE

## 2024-08-21 NOTE — PROGRESS NOTES
Palliative Care Assessment    Medical Oncology History:    June 2022 who was evaluated for severe back pain.  CT abdomen and pelvis June 12, 2022 8 cm mass involving liver, extensive lytic metastatic lesions involving the lumbar and thoracic spine and pelvis.  Mammogram June 17, 2022 right breast mass.  CT chest June 20, 2022 size liver lesion breast mass also noted axillary and subpectoral metastatic disease.  She also underwent bone scan and MRI of abdomen and finally biopsy of the right breast pathology revealing invasive ductal and lobular carcinoma, positive estrogen and progesterone receptors.  Because of the visceral disease started on single agent paclitaxel on July 20, 2022.  CT scan of the chest abdomen and pelvis done on November 14, 2022 revealed overall improvement.  January 2023 treatment changed to Ibrance and letrozole.  Studies done in April 2023 had shown a stable or improving changes.  CT chest abdomen pelvis, December 20, 2023 suggestive of progression of metastatic disease within the liver at least 3 new lesions.  CT-guided biopsy January 15, 2024 metastatic disease from breast, ER positive, MO positive HER2 1+.  Letrozole and Ibrance was stopped and February 5, 2024 started on Enhertu.  Since CT done on April 16, 2024 showed very good response to the treatments liver lesion had decreased in size.  Subsequently she was advised and received SBRT to her liver lesions from June 27, 2024 to July 3, 2024.  August 21, 2024 but still being treated with Enhertu    Other Medical Problems:   Basically healthy except for osteoarthritis and having her hip replacement about 2 years prior to this diagnosis     Personal History     A. Life Time:   Moved to [place] in early age from [place].  Went to school here including college at [employer] and after graduation came to [place].  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

## 2024-08-21 NOTE — PROGRESS NOTES
Aileen WEISS Lalit  8/21/2024    Patient is seen today for follow up.     Vitals:    08/21/24 0915   BP: (!) 145/67   Pulse: (!) 105   Temp: 97.6 °F (36.4 °C)   SpO2: 100%        Oxygen Therapy  SpO2: 100 %  Pulse Oximeter Device Location: Finger  O2 Device: None (Room air)  Skin Assessment: Clean, dry, & intact  Oxygen Therapy: None (Room air)    Wt Readings from Last 3 Encounters:   08/21/24 71.2 kg (157 lb)   08/21/24 71.2 kg (157 lb)   08/01/24 71.2 kg (157 lb)       Pain Assessment  Pain Assessment: None - Denies Pain  Pain Level: 0  Patient's Stated Pain Goal: 0 - No pain  Multiple Pain Sites: No  Denies Need for Intervention     No Known Allergies     Current Outpatient Medications   Medication Sig Dispense Refill    traZODone (DESYREL) 50 MG tablet Take 1 tablet by mouth nightly 30 tablet 0    nystatin (MYCOSTATIN) 571553 UNIT/GM powder Apply topically FOUR TIMES DAILY 60 g 1    dexAMETHasone (DECADRON) 4 MG tablet Take 2 tablets by mouth See Admin Instructions on days 2 and 3 after chemotherapy in the am with food. 24 tablet 0    atorvastatin (LIPITOR) 20 MG tablet Take 1 tablet by mouth nightly      senna (SENOKOT) 8.6 MG TABS tablet TAKE 1 TABLET BY MOUTH DAILY 90 tablet 1    ondansetron (ZOFRAN) 8 MG tablet Take 1 tablet by mouth every 8 hours as needed for Nausea or Vomiting 60 tablet 1     No current facility-administered medications for this encounter.        Additional Comments: Patient states she is having some numbness in her feet along with aching in her ankles. Patient also states she has been getting justice horses in her calf's. Patient states she does not drink as much water as she could and will try to do better. Patient states her chemo effects are starting to last longer.     Electronically signed by Izabela Feng CMA on 8/21/2024 at 9:16 AM

## 2024-08-21 NOTE — PROGRESS NOTES
MA Rooming Questions  Patient: Aileen Cohn  MRN: [id number]    Date: 8/21/2024        1. Do you have any new issues?   yes - Patient states she is having some numbness in her feet along with aching in her ankles. Patient also states she has been getting justice horses in her calf's. Patient states she does not drink as much water as she could and will try to do better. Patient states her chemo effects are starting to last longer.          2. Do you need any refills on medications?    no    3. Have you had any imaging done since your last visit?   no    4. Have you been hospitalized or seen in the emergency room since your last visit here?   no    5. Did the patient have a depression screening completed today? No    No data recorded     PHQ-9 Given to (if applicable):               PHQ-9 Score (if applicable):                     [] Positive     []  Negative              Does question #9 need addressed (if applicable)                     [] Yes    []  No               Izabela Feng CMA

## 2024-08-22 ENCOUNTER — OFFICE VISIT (OUTPATIENT)
Dept: ONCOLOGY | Age: 69
End: 2024-08-22
Payer: MEDICARE

## 2024-08-22 ENCOUNTER — HOSPITAL ENCOUNTER (OUTPATIENT)
Dept: INFUSION THERAPY | Age: 69
Discharge: HOME | End: 2024-08-22
Payer: MEDICARE

## 2024-08-22 VITALS
OXYGEN SATURATION: 97 % | BODY MASS INDEX: 26.8 KG/M2 | TEMPERATURE: 97.9 F | HEART RATE: 98 BPM | SYSTOLIC BLOOD PRESSURE: 156 MMHG | WEIGHT: 157 LBS | DIASTOLIC BLOOD PRESSURE: 80 MMHG | HEIGHT: 64 IN

## 2024-08-22 VITALS
DIASTOLIC BLOOD PRESSURE: 80 MMHG | OXYGEN SATURATION: 97 % | TEMPERATURE: 97.9 F | SYSTOLIC BLOOD PRESSURE: 156 MMHG | BODY MASS INDEX: 26.95 KG/M2 | HEART RATE: 98 BPM | HEIGHT: 64 IN

## 2024-08-22 DIAGNOSIS — Z17.0 MALIGNANT NEOPLASM OF OVERLAPPING SITES OF BOTH BREASTS IN FEMALE, ESTROGEN RECEPTOR POSITIVE (HCC): Primary | ICD-10-CM

## 2024-08-22 DIAGNOSIS — C50.812 MALIGNANT NEOPLASM OF OVERLAPPING SITES OF BOTH BREASTS IN FEMALE, ESTROGEN RECEPTOR POSITIVE (HCC): Primary | ICD-10-CM

## 2024-08-22 DIAGNOSIS — C50.811 MALIGNANT NEOPLASM OF OVERLAPPING SITES OF BOTH BREASTS IN FEMALE, ESTROGEN RECEPTOR POSITIVE (HCC): Primary | ICD-10-CM

## 2024-08-22 DIAGNOSIS — Z11.59 NEED FOR HEPATITIS B SCREENING TEST: ICD-10-CM

## 2024-08-22 DIAGNOSIS — Z11.59 ENCOUNTER FOR SCREENING FOR OTHER VIRAL DISEASES: ICD-10-CM

## 2024-08-22 PROCEDURE — 96413 CHEMO IV INFUSION 1 HR: CPT

## 2024-08-22 PROCEDURE — 96367 TX/PROPH/DG ADDL SEQ IV INF: CPT

## 2024-08-22 PROCEDURE — 2580000003 HC RX 258: Performed by: INTERNAL MEDICINE

## 2024-08-22 PROCEDURE — 1124F ACP DISCUSS-NO DSCNMKR DOCD: CPT | Performed by: INTERNAL MEDICINE

## 2024-08-22 PROCEDURE — 96375 TX/PRO/DX INJ NEW DRUG ADDON: CPT

## 2024-08-22 PROCEDURE — 6360000002 HC RX W HCPCS: Performed by: INTERNAL MEDICINE

## 2024-08-22 PROCEDURE — 99214 OFFICE O/P EST MOD 30 MIN: CPT | Performed by: INTERNAL MEDICINE

## 2024-08-22 RX ORDER — SODIUM CHLORIDE 0.9 % (FLUSH) 0.9 %
5-40 SYRINGE (ML) INJECTION PRN
Status: DISCONTINUED | OUTPATIENT
Start: 2024-08-22 | End: 2024-08-23 | Stop reason: HOSPADM

## 2024-08-22 RX ORDER — DEXTROSE MONOHYDRATE 50 MG/ML
5-250 INJECTION, SOLUTION INTRAVENOUS PRN
Status: DISCONTINUED | OUTPATIENT
Start: 2024-08-22 | End: 2024-08-23 | Stop reason: HOSPADM

## 2024-08-22 RX ORDER — PALONOSETRON 0.05 MG/ML
0.25 INJECTION, SOLUTION INTRAVENOUS ONCE
Status: COMPLETED | OUTPATIENT
Start: 2024-08-22 | End: 2024-08-22

## 2024-08-22 RX ADMIN — SODIUM CHLORIDE, PRESERVATIVE FREE 20 ML: 5 INJECTION INTRAVENOUS at 13:05

## 2024-08-22 RX ADMIN — PALONOSETRON 0.25 MG: 0.25 INJECTION, SOLUTION INTRAVENOUS at 12:18

## 2024-08-22 RX ADMIN — FAM-TRASTUZUMAB DERUXTECAN-NXKI 366 MG: 100 INJECTION, POWDER, LYOPHILIZED, FOR SOLUTION INTRAVENOUS at 12:28

## 2024-08-22 RX ADMIN — DEXAMETHASONE SODIUM PHOSPHATE 12 MG: 4 INJECTION INTRA-ARTICULAR; INTRALESIONAL; INTRAMUSCULAR; INTRAVENOUS; SOFT TISSUE at 12:05

## 2024-08-22 RX ADMIN — SODIUM CHLORIDE 150 MG: 9 INJECTION, SOLUTION INTRAVENOUS at 11:36

## 2024-08-22 NOTE — PROGRESS NOTES
MA Rooming Questions  Patient: Aileen Cohn  MRN: [id number]    Date: 8/22/2024        1. Do you have any new issues?   no         2. Do you need any refills on medications?    no    3. Have you had any imaging done since your last visit?   no    4. Have you been hospitalized or seen in the emergency room since your last visit here?   no    5. Did the patient have a depression screening completed today? No    No data recorded     PHQ-9 Given to (if applicable):               PHQ-9 Score (if applicable):                     [] Positive     []  Negative              Does question #9 need addressed (if applicable)                     [] Yes    []  No               Estela Elizabeth CMA      
  Component Value Date    SPEP  06/14/2022     INTERPRETATION - No monoclonal proteins are detected. SAF    SPEP INTERPRETATION - Within normal limits. SAF 06/14/2022    ALBUMINELP 3.7 06/14/2022    GAMGLOB 1.1 06/14/2022     No results found for: \"KAPPAUVOL\", \"LAMBDAUVOL\", \"KLFLCR\"  No results found for: \"B2M\"  Coagulation Panel:  Lab Results   Component Value Date    PROTIME 13.7 01/15/2024    INR 1.0 01/15/2024    APTT 27.8 01/15/2024     Anemia Panel:  No results found for: \"LQAOZKNH13\", \"FOLATE\"  Tumor Markers:  Lab Results   Component Value Date     377 (H) 06/14/2022    CEA 54.5 06/14/2022     25 06/14/2022    LABCA2 62.7 (H) 09/06/2023     Observations:  No data recorded     Assessment   Left breast mass  Liver lesion and multiple bone lytic lesions - concerning for metastatic breast cancer    Plan:  Aileen Cohn is a 67 year-old very pleasant female who initially presented to [facility] on 6/12/22 with severe lower back pain and constipation.     CT scan of the abdomen and pelvis done on 6/12/2022 showed lesion (6.1 x 8.1 cm) in the hepatic segment 4, concerning for neoplasm. Extensive lytic metastatic disease in lumbar and thoracic spine and pelvis with most dominant lesion seen at L5 vertebral body.      CT lumbar spine done on 6/12/22 showed extensive multifocal lytic lesions concerning for neoplastic/metastatic disease.      She never had colonoscopy before. She had pap smear around 2020. She didn't recall when was her last mammogram.     Digital diagnostic bilateral mammogram and bilateral ultrasound done on 6/17/2022 showed left breast mass, highly suspicious for malignancy.  Right breast mass at 11:00, 1.5 cm.  Mass is highly suspicious for malignancy.  Left breast skin thickening.      CT chest on 6/20/2022 showed suspicious heterogeneous mass primarily centered in segment 4A of the liver measuring 8.8 cm.  Left breast mass with left axillary and subpectoral metastatic disease, suspected

## 2024-08-22 NOTE — PROGRESS NOTES
Arrived to treatment suite for scheduled OV and Enhertu infusion.  Mediport accessed, no blood return noted despite multiple position changes and flushes.  Assessment complete, denies concerns or questions at this time. PIV placed after two attempts, pt to office visit.  Returned from OV, Dr. Rudolph would like patient to be accessed via port again.  Second attempt made to access Mediport, successful blood return noted.  Treatment plan approved, released and completed as ordered.  Mediport de-accessed per protocol. Pt tolerated well.  AVS declined.  Discharge ambulatory in stable condition.  Kim Kimbrough RN

## 2024-08-23 ENCOUNTER — TELEPHONE (OUTPATIENT)
Dept: ONCOLOGY | Age: 69
End: 2024-08-23

## 2024-08-23 NOTE — TELEPHONE ENCOUNTER
8/23/24 - lefat pt vm for the 9/3/24 Bone scan and CT CAP at [facility] arrival time of 9:30 am for the injection for the bone scan. The CT scans to follow at 10:30 am. PT will need to be back at the hospital at 1:30 for the bone scan. Due to the CT CAP pt needs to be NPO 4 hours prior.

## 2024-09-03 ENCOUNTER — HOSPITAL ENCOUNTER (OUTPATIENT)
Dept: NUCLEAR MEDICINE | Age: 69
Discharge: HOME | End: 2024-09-03
Attending: INTERNAL MEDICINE
Payer: MEDICARE

## 2024-09-03 ENCOUNTER — HOSPITAL ENCOUNTER (OUTPATIENT)
Dept: CT IMAGING | Age: 69
Discharge: HOME | End: 2024-09-03
Attending: INTERNAL MEDICINE
Payer: MEDICARE

## 2024-09-03 DIAGNOSIS — Z17.0 MALIGNANT NEOPLASM OF OVERLAPPING SITES OF BOTH BREASTS IN FEMALE, ESTROGEN RECEPTOR POSITIVE (HCC): ICD-10-CM

## 2024-09-03 DIAGNOSIS — C50.811 MALIGNANT NEOPLASM OF OVERLAPPING SITES OF BOTH BREASTS IN FEMALE, ESTROGEN RECEPTOR POSITIVE (HCC): ICD-10-CM

## 2024-09-03 DIAGNOSIS — C50.812 MALIGNANT NEOPLASM OF OVERLAPPING SITES OF BOTH BREASTS IN FEMALE, ESTROGEN RECEPTOR POSITIVE (HCC): ICD-10-CM

## 2024-09-03 PROCEDURE — 3430000000 HC RX DIAGNOSTIC RADIOPHARMACEUTICAL: Performed by: INTERNAL MEDICINE

## 2024-09-03 PROCEDURE — 74177 CT ABD & PELVIS W/CONTRAST: CPT

## 2024-09-03 PROCEDURE — 6360000004 HC RX CONTRAST MEDICATION: Performed by: INTERNAL MEDICINE

## 2024-09-03 PROCEDURE — 2500000003 HC RX 250 WO HCPCS: Performed by: INTERNAL MEDICINE

## 2024-09-03 PROCEDURE — 78306 BONE IMAGING WHOLE BODY: CPT | Performed by: INTERNAL MEDICINE

## 2024-09-03 PROCEDURE — A9503 TC99M MEDRONATE: HCPCS | Performed by: INTERNAL MEDICINE

## 2024-09-03 RX ORDER — IOPAMIDOL 755 MG/ML
80 INJECTION, SOLUTION INTRAVASCULAR
Status: COMPLETED | OUTPATIENT
Start: 2024-09-03 | End: 2024-09-03

## 2024-09-03 RX ORDER — TC 99M MEDRONATE 20 MG/10ML
30 INJECTION, POWDER, LYOPHILIZED, FOR SOLUTION INTRAVENOUS
Status: COMPLETED | OUTPATIENT
Start: 2024-09-03 | End: 2024-09-03

## 2024-09-03 RX ADMIN — BARIUM SULFATE 900 ML: 20 SUSPENSION ORAL at 13:29

## 2024-09-03 RX ADMIN — TC 99M MEDRONATE 30 MILLICURIE: 20 INJECTION, POWDER, LYOPHILIZED, FOR SOLUTION INTRAVENOUS at 09:46

## 2024-09-03 RX ADMIN — IOPAMIDOL 80 ML: 755 INJECTION, SOLUTION INTRAVENOUS at 13:29

## 2024-09-05 RX ORDER — TRAZODONE HYDROCHLORIDE 50 MG/1
50 TABLET, FILM COATED ORAL NIGHTLY
Qty: 30 TABLET | Refills: 1 | Status: SHIPPED | OUTPATIENT
Start: 2024-09-05

## 2024-09-10 DIAGNOSIS — Z17.0 MALIGNANT NEOPLASM OF OVERLAPPING SITES OF BOTH BREASTS IN FEMALE, ESTROGEN RECEPTOR POSITIVE (HCC): ICD-10-CM

## 2024-09-10 DIAGNOSIS — Z11.59 ENCOUNTER FOR SCREENING FOR OTHER VIRAL DISEASES: ICD-10-CM

## 2024-09-10 DIAGNOSIS — C50.812 MALIGNANT NEOPLASM OF OVERLAPPING SITES OF BOTH BREASTS IN FEMALE, ESTROGEN RECEPTOR POSITIVE (HCC): ICD-10-CM

## 2024-09-10 DIAGNOSIS — Z11.59 NEED FOR HEPATITIS B SCREENING TEST: Primary | ICD-10-CM

## 2024-09-10 DIAGNOSIS — C50.811 MALIGNANT NEOPLASM OF OVERLAPPING SITES OF BOTH BREASTS IN FEMALE, ESTROGEN RECEPTOR POSITIVE (HCC): ICD-10-CM

## 2024-09-11 ENCOUNTER — HOSPITAL ENCOUNTER (OUTPATIENT)
Dept: INFUSION THERAPY | Age: 69
Discharge: HOME | End: 2024-09-11
Payer: MEDICARE

## 2024-09-11 DIAGNOSIS — C50.812 MALIGNANT NEOPLASM OF OVERLAPPING SITES OF BOTH BREASTS IN FEMALE, ESTROGEN RECEPTOR POSITIVE (HCC): ICD-10-CM

## 2024-09-11 DIAGNOSIS — Z11.59 NEED FOR HEPATITIS B SCREENING TEST: ICD-10-CM

## 2024-09-11 DIAGNOSIS — C50.811 MALIGNANT NEOPLASM OF OVERLAPPING SITES OF BOTH BREASTS IN FEMALE, ESTROGEN RECEPTOR POSITIVE (HCC): ICD-10-CM

## 2024-09-11 DIAGNOSIS — Z17.0 MALIGNANT NEOPLASM OF OVERLAPPING SITES OF BOTH BREASTS IN FEMALE, ESTROGEN RECEPTOR POSITIVE (HCC): ICD-10-CM

## 2024-09-11 DIAGNOSIS — Z11.59 ENCOUNTER FOR SCREENING FOR OTHER VIRAL DISEASES: ICD-10-CM

## 2024-09-11 DIAGNOSIS — C79.51 MALIGNANT NEOPLASM METASTATIC TO BONE (HCC): ICD-10-CM

## 2024-09-11 DIAGNOSIS — C79.51 MALIGNANT NEOPLASM METASTATIC TO BONE (HCC): Primary | ICD-10-CM

## 2024-09-11 LAB
ALBUMIN SERPL-MCNC: 3.9 G/DL (ref 3.4–5)
ALBUMIN/GLOB SERPL: 1.9 {RATIO} (ref 1.1–2.2)
ALP SERPL-CCNC: 100 U/L (ref 40–129)
ALT SERPL-CCNC: 20 U/L (ref 10–40)
ANION GAP SERPL CALCULATED.3IONS-SCNC: 11 MMOL/L (ref 9–17)
AST SERPL-CCNC: 23 U/L (ref 15–37)
BASOPHILS # BLD: 0.01 K/UL
BASOPHILS NFR BLD: 0 % (ref 0–1)
BILIRUB SERPL-MCNC: 0.3 MG/DL (ref 0–1)
BUN SERPL-MCNC: 9 MG/DL (ref 7–20)
CALCIUM SERPL-MCNC: 8.9 MG/DL (ref 8.3–10.6)
CHLORIDE SERPL-SCNC: 106 MMOL/L (ref 99–110)
CO2 SERPL-SCNC: 25 MMOL/L (ref 21–32)
CREAT SERPL-MCNC: 0.6 MG/DL (ref 0.6–1.2)
EOSINOPHIL # BLD: 0.1 K/UL
EOSINOPHILS RELATIVE PERCENT: 3 % (ref 0–3)
ERYTHROCYTE [DISTWIDTH] IN BLOOD BY AUTOMATED COUNT: 15.1 % (ref 11.7–14.9)
GFR, ESTIMATED: >90 ML/MIN/1.73M2
GLUCOSE SERPL-MCNC: 103 MG/DL (ref 74–99)
HCT VFR BLD AUTO: 33.6 % (ref 37–47)
HGB BLD-MCNC: 10.7 G/DL (ref 12.5–16)
LYMPHOCYTES NFR BLD: 1.26 K/UL
LYMPHOCYTES RELATIVE PERCENT: 36 % (ref 24–44)
MAGNESIUM SERPL-MCNC: 2.1 MG/DL (ref 1.8–2.4)
MCH RBC QN AUTO: 32.1 PG (ref 27–31)
MCHC RBC AUTO-ENTMCNC: 31.8 G/DL (ref 32–36)
MCV RBC AUTO: 100.9 FL (ref 78–100)
MONOCYTES NFR BLD: 0.37 K/UL
MONOCYTES NFR BLD: 11 % (ref 0–4)
NEUTROPHILS NFR BLD: 51 % (ref 36–66)
NEUTS SEG NFR BLD: 1.79 K/UL
PHOSPHATE SERPL-MCNC: 3.1 MG/DL (ref 2.5–4.9)
PLATELET # BLD AUTO: 263 K/UL (ref 140–440)
PMV BLD AUTO: 8.3 FL (ref 7.5–11.1)
POTASSIUM SERPL-SCNC: 3.7 MMOL/L (ref 3.5–5.1)
PROT SERPL-MCNC: 5.9 G/DL (ref 6.4–8.2)
RBC # BLD AUTO: 3.33 M/UL (ref 4.2–5.4)
SODIUM SERPL-SCNC: 142 MMOL/L (ref 136–145)
WBC OTHER # BLD: 3.5 K/UL (ref 4–10.5)

## 2024-09-11 PROCEDURE — 80053 COMPREHEN METABOLIC PANEL: CPT

## 2024-09-11 PROCEDURE — 83735 ASSAY OF MAGNESIUM: CPT

## 2024-09-11 PROCEDURE — 36415 COLL VENOUS BLD VENIPUNCTURE: CPT

## 2024-09-11 PROCEDURE — 84100 ASSAY OF PHOSPHORUS: CPT

## 2024-09-11 PROCEDURE — 85025 COMPLETE CBC W/AUTO DIFF WBC: CPT

## 2024-09-11 RX ORDER — SODIUM CHLORIDE 9 MG/ML
5-250 INJECTION, SOLUTION INTRAVENOUS PRN
Status: CANCELLED | OUTPATIENT
Start: 2024-09-12

## 2024-09-11 RX ORDER — FAMOTIDINE 10 MG/ML
20 INJECTION, SOLUTION INTRAVENOUS
Status: CANCELLED | OUTPATIENT
Start: 2024-09-12

## 2024-09-11 RX ORDER — DIPHENHYDRAMINE HYDROCHLORIDE 50 MG/ML
50 INJECTION INTRAMUSCULAR; INTRAVENOUS
OUTPATIENT
Start: 2024-10-03

## 2024-09-11 RX ORDER — ONDANSETRON 2 MG/ML
8 INJECTION INTRAMUSCULAR; INTRAVENOUS
Status: CANCELLED | OUTPATIENT
Start: 2024-09-12

## 2024-09-11 RX ORDER — DEXTROSE MONOHYDRATE 50 MG/ML
5-250 INJECTION, SOLUTION INTRAVENOUS PRN
OUTPATIENT
Start: 2024-10-24

## 2024-09-11 RX ORDER — PALONOSETRON 0.05 MG/ML
0.25 INJECTION, SOLUTION INTRAVENOUS ONCE
Status: CANCELLED | OUTPATIENT
Start: 2024-09-12 | End: 2024-09-12

## 2024-09-11 RX ORDER — ONDANSETRON 2 MG/ML
8 INJECTION INTRAMUSCULAR; INTRAVENOUS
OUTPATIENT
Start: 2024-10-03

## 2024-09-11 RX ORDER — MEPERIDINE HYDROCHLORIDE 50 MG/ML
12.5 INJECTION INTRAMUSCULAR; INTRAVENOUS; SUBCUTANEOUS PRN
OUTPATIENT
Start: 2024-10-03

## 2024-09-11 RX ORDER — FAMOTIDINE 10 MG/ML
20 INJECTION, SOLUTION INTRAVENOUS
OUTPATIENT
Start: 2024-10-24

## 2024-09-11 RX ORDER — MEPERIDINE HYDROCHLORIDE 50 MG/ML
12.5 INJECTION INTRAMUSCULAR; INTRAVENOUS; SUBCUTANEOUS PRN
Status: CANCELLED | OUTPATIENT
Start: 2024-09-12

## 2024-09-11 RX ORDER — SODIUM CHLORIDE 9 MG/ML
5-250 INJECTION, SOLUTION INTRAVENOUS PRN
OUTPATIENT
Start: 2024-10-24

## 2024-09-11 RX ORDER — ALBUTEROL SULFATE 90 UG/1
4 AEROSOL, METERED RESPIRATORY (INHALATION) PRN
Status: CANCELLED | OUTPATIENT
Start: 2024-09-12

## 2024-09-11 RX ORDER — SODIUM CHLORIDE 0.9 % (FLUSH) 0.9 %
5-40 SYRINGE (ML) INJECTION PRN
Status: CANCELLED | OUTPATIENT
Start: 2024-09-12

## 2024-09-11 RX ORDER — SODIUM CHLORIDE 9 MG/ML
INJECTION, SOLUTION INTRAVENOUS CONTINUOUS
OUTPATIENT
Start: 2024-10-24

## 2024-09-11 RX ORDER — ONDANSETRON 2 MG/ML
8 INJECTION INTRAMUSCULAR; INTRAVENOUS
OUTPATIENT
Start: 2024-10-24

## 2024-09-11 RX ORDER — ACETAMINOPHEN 325 MG/1
650 TABLET ORAL
Status: CANCELLED | OUTPATIENT
Start: 2024-09-12

## 2024-09-11 RX ORDER — PALONOSETRON 0.05 MG/ML
0.25 INJECTION, SOLUTION INTRAVENOUS ONCE
OUTPATIENT
Start: 2024-10-24 | End: 2024-10-24

## 2024-09-11 RX ORDER — ALBUTEROL SULFATE 90 UG/1
4 AEROSOL, METERED RESPIRATORY (INHALATION) PRN
OUTPATIENT
Start: 2024-10-24

## 2024-09-11 RX ORDER — DIPHENHYDRAMINE HYDROCHLORIDE 50 MG/ML
50 INJECTION INTRAMUSCULAR; INTRAVENOUS
OUTPATIENT
Start: 2024-10-24

## 2024-09-11 RX ORDER — SODIUM CHLORIDE 0.9 % (FLUSH) 0.9 %
5-40 SYRINGE (ML) INJECTION PRN
OUTPATIENT
Start: 2024-10-03

## 2024-09-11 RX ORDER — ACETAMINOPHEN 325 MG/1
650 TABLET ORAL
OUTPATIENT
Start: 2024-10-03

## 2024-09-11 RX ORDER — HEPARIN 100 UNIT/ML
500 SYRINGE INTRAVENOUS PRN
Status: CANCELLED | OUTPATIENT
Start: 2024-09-12

## 2024-09-11 RX ORDER — SODIUM CHLORIDE 9 MG/ML
INJECTION, SOLUTION INTRAVENOUS CONTINUOUS
OUTPATIENT
Start: 2024-10-03

## 2024-09-11 RX ORDER — EPINEPHRINE 1 MG/ML
0.3 INJECTION, SOLUTION, CONCENTRATE INTRAVENOUS PRN
Status: CANCELLED | OUTPATIENT
Start: 2024-09-12

## 2024-09-11 RX ORDER — FAMOTIDINE 10 MG/ML
20 INJECTION, SOLUTION INTRAVENOUS
OUTPATIENT
Start: 2024-10-03

## 2024-09-11 RX ORDER — MEPERIDINE HYDROCHLORIDE 25 MG/ML
12.5 INJECTION INTRAMUSCULAR; INTRAVENOUS; SUBCUTANEOUS PRN
Status: CANCELLED | OUTPATIENT
Start: 2024-09-12

## 2024-09-11 RX ORDER — DEXTROSE MONOHYDRATE 50 MG/ML
5-250 INJECTION, SOLUTION INTRAVENOUS PRN
Status: CANCELLED | OUTPATIENT
Start: 2024-09-12

## 2024-09-11 RX ORDER — ALBUTEROL SULFATE 90 UG/1
4 AEROSOL, METERED RESPIRATORY (INHALATION) PRN
OUTPATIENT
Start: 2024-10-03

## 2024-09-11 RX ORDER — HEPARIN SODIUM (PORCINE) LOCK FLUSH IV SOLN 100 UNIT/ML 100 UNIT/ML
500 SOLUTION INTRAVENOUS PRN
OUTPATIENT
Start: 2024-10-03

## 2024-09-11 RX ORDER — EPINEPHRINE 1 MG/ML
0.3 INJECTION, SOLUTION, CONCENTRATE INTRAVENOUS PRN
OUTPATIENT
Start: 2024-10-24

## 2024-09-11 RX ORDER — SODIUM CHLORIDE 9 MG/ML
INJECTION, SOLUTION INTRAVENOUS CONTINUOUS
Status: CANCELLED | OUTPATIENT
Start: 2024-09-12

## 2024-09-11 RX ORDER — MEPERIDINE HYDROCHLORIDE 50 MG/ML
12.5 INJECTION INTRAMUSCULAR; INTRAVENOUS; SUBCUTANEOUS PRN
OUTPATIENT
Start: 2024-10-24

## 2024-09-11 RX ORDER — HEPARIN SODIUM (PORCINE) LOCK FLUSH IV SOLN 100 UNIT/ML 100 UNIT/ML
500 SOLUTION INTRAVENOUS PRN
OUTPATIENT
Start: 2024-10-24

## 2024-09-11 RX ORDER — SODIUM CHLORIDE 9 MG/ML
5-250 INJECTION, SOLUTION INTRAVENOUS PRN
OUTPATIENT
Start: 2024-10-03

## 2024-09-11 RX ORDER — DIPHENHYDRAMINE HYDROCHLORIDE 50 MG/ML
50 INJECTION INTRAMUSCULAR; INTRAVENOUS
Status: CANCELLED | OUTPATIENT
Start: 2024-09-12

## 2024-09-11 RX ORDER — PALONOSETRON 0.05 MG/ML
0.25 INJECTION, SOLUTION INTRAVENOUS ONCE
OUTPATIENT
Start: 2024-10-03 | End: 2024-10-03

## 2024-09-11 RX ORDER — SODIUM CHLORIDE 0.9 % (FLUSH) 0.9 %
5-40 SYRINGE (ML) INJECTION PRN
OUTPATIENT
Start: 2024-10-24

## 2024-09-11 RX ORDER — EPINEPHRINE 1 MG/ML
0.3 INJECTION, SOLUTION, CONCENTRATE INTRAVENOUS PRN
OUTPATIENT
Start: 2024-10-03

## 2024-09-11 RX ORDER — DEXTROSE MONOHYDRATE 50 MG/ML
5-250 INJECTION, SOLUTION INTRAVENOUS PRN
OUTPATIENT
Start: 2024-10-03

## 2024-09-11 RX ORDER — HEPARIN SODIUM (PORCINE) LOCK FLUSH IV SOLN 100 UNIT/ML 100 UNIT/ML
500 SOLUTION INTRAVENOUS PRN
Status: CANCELLED | OUTPATIENT
Start: 2024-09-12

## 2024-09-11 RX ORDER — ACETAMINOPHEN 325 MG/1
650 TABLET ORAL
OUTPATIENT
Start: 2024-10-24

## 2024-09-11 RX ORDER — ZOLEDRONIC ACID 0.04 MG/ML
4 INJECTION, SOLUTION INTRAVENOUS ONCE
Status: CANCELLED | OUTPATIENT
Start: 2024-09-12 | End: 2024-09-12

## 2024-09-12 ENCOUNTER — OFFICE VISIT (OUTPATIENT)
Dept: ONCOLOGY | Age: 69
End: 2024-09-12
Payer: MEDICARE

## 2024-09-12 ENCOUNTER — HOSPITAL ENCOUNTER (OUTPATIENT)
Dept: INFUSION THERAPY | Age: 69
Discharge: HOME | End: 2024-09-12
Payer: MEDICARE

## 2024-09-12 VITALS
DIASTOLIC BLOOD PRESSURE: 63 MMHG | WEIGHT: 157.4 LBS | TEMPERATURE: 97.8 F | BODY MASS INDEX: 27.02 KG/M2 | OXYGEN SATURATION: 96 % | HEART RATE: 84 BPM | RESPIRATION RATE: 16 BRPM | SYSTOLIC BLOOD PRESSURE: 134 MMHG

## 2024-09-12 VITALS
WEIGHT: 157 LBS | RESPIRATION RATE: 16 BRPM | HEART RATE: 84 BPM | OXYGEN SATURATION: 96 % | TEMPERATURE: 97.8 F | SYSTOLIC BLOOD PRESSURE: 134 MMHG | DIASTOLIC BLOOD PRESSURE: 63 MMHG | BODY MASS INDEX: 26.8 KG/M2 | HEIGHT: 64 IN

## 2024-09-12 DIAGNOSIS — Z11.59 NEED FOR HEPATITIS B SCREENING TEST: ICD-10-CM

## 2024-09-12 DIAGNOSIS — Z17.0 MALIGNANT NEOPLASM OF OVERLAPPING SITES OF BOTH BREASTS IN FEMALE, ESTROGEN RECEPTOR POSITIVE (HCC): ICD-10-CM

## 2024-09-12 DIAGNOSIS — C50.811 MALIGNANT NEOPLASM OF OVERLAPPING SITES OF BOTH BREASTS IN FEMALE, ESTROGEN RECEPTOR POSITIVE (HCC): ICD-10-CM

## 2024-09-12 DIAGNOSIS — C79.51 MALIGNANT NEOPLASM METASTATIC TO BONE (HCC): ICD-10-CM

## 2024-09-12 DIAGNOSIS — Z11.59 ENCOUNTER FOR SCREENING FOR OTHER VIRAL DISEASES: ICD-10-CM

## 2024-09-12 DIAGNOSIS — C50.811 MALIGNANT NEOPLASM OF OVERLAPPING SITES OF BOTH BREASTS IN FEMALE, ESTROGEN RECEPTOR POSITIVE (HCC): Primary | ICD-10-CM

## 2024-09-12 DIAGNOSIS — Z17.0 MALIGNANT NEOPLASM OF OVERLAPPING SITES OF BOTH BREASTS IN FEMALE, ESTROGEN RECEPTOR POSITIVE (HCC): Primary | ICD-10-CM

## 2024-09-12 DIAGNOSIS — Z11.59 NEED FOR HEPATITIS B SCREENING TEST: Primary | ICD-10-CM

## 2024-09-12 DIAGNOSIS — C50.812 MALIGNANT NEOPLASM OF OVERLAPPING SITES OF BOTH BREASTS IN FEMALE, ESTROGEN RECEPTOR POSITIVE (HCC): Primary | ICD-10-CM

## 2024-09-12 DIAGNOSIS — C50.812 MALIGNANT NEOPLASM OF OVERLAPPING SITES OF BOTH BREASTS IN FEMALE, ESTROGEN RECEPTOR POSITIVE (HCC): ICD-10-CM

## 2024-09-12 PROCEDURE — 96375 TX/PRO/DX INJ NEW DRUG ADDON: CPT

## 2024-09-12 PROCEDURE — 99214 OFFICE O/P EST MOD 30 MIN: CPT | Performed by: INTERNAL MEDICINE

## 2024-09-12 PROCEDURE — 6360000002 HC RX W HCPCS: Performed by: INTERNAL MEDICINE

## 2024-09-12 PROCEDURE — 1124F ACP DISCUSS-NO DSCNMKR DOCD: CPT | Performed by: INTERNAL MEDICINE

## 2024-09-12 PROCEDURE — 96413 CHEMO IV INFUSION 1 HR: CPT

## 2024-09-12 PROCEDURE — 96361 HYDRATE IV INFUSION ADD-ON: CPT

## 2024-09-12 PROCEDURE — 96367 TX/PROPH/DG ADDL SEQ IV INF: CPT

## 2024-09-12 PROCEDURE — 2580000003 HC RX 258: Performed by: INTERNAL MEDICINE

## 2024-09-12 RX ORDER — SODIUM CHLORIDE 9 MG/ML
5-250 INJECTION, SOLUTION INTRAVENOUS PRN
Status: DISCONTINUED | OUTPATIENT
Start: 2024-09-12 | End: 2024-09-13 | Stop reason: HOSPADM

## 2024-09-12 RX ORDER — 0.9 % SODIUM CHLORIDE 0.9 %
1000 INTRAVENOUS SOLUTION INTRAVENOUS ONCE
Start: 2024-09-12 | End: 2024-09-12

## 2024-09-12 RX ORDER — SODIUM CHLORIDE 9 MG/ML
5-250 INJECTION, SOLUTION INTRAVENOUS PRN
OUTPATIENT
Start: 2024-09-12

## 2024-09-12 RX ORDER — DEXTROSE MONOHYDRATE 50 MG/ML
5-250 INJECTION, SOLUTION INTRAVENOUS PRN
Status: DISCONTINUED | OUTPATIENT
Start: 2024-09-12 | End: 2024-09-13 | Stop reason: HOSPADM

## 2024-09-12 RX ORDER — HEPARIN 100 UNIT/ML
500 SYRINGE INTRAVENOUS PRN
OUTPATIENT
Start: 2024-09-12

## 2024-09-12 RX ORDER — SODIUM CHLORIDE 0.9 % (FLUSH) 0.9 %
5-40 SYRINGE (ML) INJECTION PRN
OUTPATIENT
Start: 2024-09-12

## 2024-09-12 RX ORDER — PALONOSETRON 0.05 MG/ML
0.25 INJECTION, SOLUTION INTRAVENOUS ONCE
Status: COMPLETED | OUTPATIENT
Start: 2024-09-12 | End: 2024-09-12

## 2024-09-12 RX ORDER — SODIUM CHLORIDE 0.9 % (FLUSH) 0.9 %
5-40 SYRINGE (ML) INJECTION PRN
Status: DISCONTINUED | OUTPATIENT
Start: 2024-09-12 | End: 2024-09-13 | Stop reason: HOSPADM

## 2024-09-12 RX ORDER — 0.9 % SODIUM CHLORIDE 0.9 %
1000 INTRAVENOUS SOLUTION INTRAVENOUS ONCE
Status: COMPLETED | OUTPATIENT
Start: 2024-09-12 | End: 2024-09-12

## 2024-09-12 RX ADMIN — SODIUM CHLORIDE 1000 ML: 9 INJECTION, SOLUTION INTRAVENOUS at 10:30

## 2024-09-12 RX ADMIN — DEXAMETHASONE SODIUM PHOSPHATE 12 MG: 4 INJECTION, SOLUTION INTRAMUSCULAR; INTRAVENOUS at 12:51

## 2024-09-12 RX ADMIN — SODIUM CHLORIDE, PRESERVATIVE FREE 20 ML: 5 INJECTION INTRAVENOUS at 13:58

## 2024-09-12 RX ADMIN — DEXTROSE MONOHYDRATE 20 ML/HR: 50 INJECTION, SOLUTION INTRAVENOUS at 12:19

## 2024-09-12 RX ADMIN — PALONOSETRON 0.25 MG: 0.25 INJECTION, SOLUTION INTRAVENOUS at 12:17

## 2024-09-12 RX ADMIN — ZOLEDRONIC ACID 4 MG: 4 INJECTION, SOLUTION, CONCENTRATE INTRAVENOUS at 11:12

## 2024-09-12 RX ADMIN — FAM-TRASTUZUMAB DERUXTECAN-NXKI 366 MG: 100 INJECTION, POWDER, LYOPHILIZED, FOR SOLUTION INTRAVENOUS at 13:19

## 2024-09-12 RX ADMIN — SODIUM CHLORIDE 150 MG: 9 INJECTION, SOLUTION INTRAVENOUS at 12:20

## 2024-09-19 ENCOUNTER — APPOINTMENT (OUTPATIENT)
Dept: INFUSION THERAPY | Age: 69
End: 2024-09-19
Payer: MEDICARE

## 2024-10-02 ENCOUNTER — HOSPITAL ENCOUNTER (OUTPATIENT)
Dept: INFUSION THERAPY | Age: 69
Discharge: HOME | End: 2024-10-02
Payer: MEDICARE

## 2024-10-02 DIAGNOSIS — Z11.59 NEED FOR HEPATITIS B SCREENING TEST: ICD-10-CM

## 2024-10-02 DIAGNOSIS — Z17.0 MALIGNANT NEOPLASM OF OVERLAPPING SITES OF BOTH BREASTS IN FEMALE, ESTROGEN RECEPTOR POSITIVE (HCC): ICD-10-CM

## 2024-10-02 DIAGNOSIS — C50.811 MALIGNANT NEOPLASM OF OVERLAPPING SITES OF BOTH BREASTS IN FEMALE, ESTROGEN RECEPTOR POSITIVE (HCC): ICD-10-CM

## 2024-10-02 DIAGNOSIS — Z11.59 ENCOUNTER FOR SCREENING FOR OTHER VIRAL DISEASES: ICD-10-CM

## 2024-10-02 DIAGNOSIS — C50.812 MALIGNANT NEOPLASM OF OVERLAPPING SITES OF BOTH BREASTS IN FEMALE, ESTROGEN RECEPTOR POSITIVE (HCC): ICD-10-CM

## 2024-10-02 LAB
ALBUMIN SERPL-MCNC: 4 G/DL (ref 3.4–5)
ALBUMIN/GLOB SERPL: 2 {RATIO} (ref 1.1–2.2)
ALP SERPL-CCNC: 74 U/L (ref 40–129)
ALT SERPL-CCNC: 22 U/L (ref 10–40)
ANION GAP SERPL CALCULATED.3IONS-SCNC: 10 MMOL/L (ref 9–17)
AST SERPL-CCNC: 27 U/L (ref 15–37)
BASOPHILS # BLD: 0.02 K/UL
BASOPHILS NFR BLD: 1 % (ref 0–1)
BILIRUB SERPL-MCNC: 0.5 MG/DL (ref 0–1)
BUN SERPL-MCNC: 8 MG/DL (ref 7–20)
CALCIUM SERPL-MCNC: 9 MG/DL (ref 8.3–10.6)
CHLORIDE SERPL-SCNC: 107 MMOL/L (ref 99–110)
CO2 SERPL-SCNC: 25 MMOL/L (ref 21–32)
CREAT SERPL-MCNC: 0.6 MG/DL (ref 0.6–1.2)
EOSINOPHIL # BLD: 0.11 K/UL
EOSINOPHILS RELATIVE PERCENT: 4 % (ref 0–3)
ERYTHROCYTE [DISTWIDTH] IN BLOOD BY AUTOMATED COUNT: 16.1 % (ref 11.7–14.9)
GFR, ESTIMATED: >90 ML/MIN/1.73M2
GLUCOSE SERPL-MCNC: 84 MG/DL (ref 74–99)
HCT VFR BLD AUTO: 35.3 % (ref 37–47)
HGB BLD-MCNC: 11.3 G/DL (ref 12.5–16)
LYMPHOCYTES NFR BLD: 0.91 K/UL
LYMPHOCYTES RELATIVE PERCENT: 31 % (ref 24–44)
MCH RBC QN AUTO: 32.6 PG (ref 27–31)
MCHC RBC AUTO-ENTMCNC: 32 G/DL (ref 32–36)
MCV RBC AUTO: 101.7 FL (ref 78–100)
MONOCYTES NFR BLD: 0.29 K/UL
MONOCYTES NFR BLD: 10 % (ref 0–4)
NEUTROPHILS NFR BLD: 54 % (ref 36–66)
NEUTS SEG NFR BLD: 1.58 K/UL
PLATELET # BLD AUTO: 213 K/UL (ref 140–440)
PMV BLD AUTO: 8.7 FL (ref 7.5–11.1)
POTASSIUM SERPL-SCNC: 3.4 MMOL/L (ref 3.5–5.1)
PROT SERPL-MCNC: 6 G/DL (ref 6.4–8.2)
RBC # BLD AUTO: 3.47 M/UL (ref 4.2–5.4)
SODIUM SERPL-SCNC: 142 MMOL/L (ref 136–145)
WBC OTHER # BLD: 2.9 K/UL (ref 4–10.5)

## 2024-10-02 PROCEDURE — 85025 COMPLETE CBC W/AUTO DIFF WBC: CPT

## 2024-10-02 PROCEDURE — 80053 COMPREHEN METABOLIC PANEL: CPT

## 2024-10-02 PROCEDURE — 36415 COLL VENOUS BLD VENIPUNCTURE: CPT

## 2024-10-03 ENCOUNTER — HOSPITAL ENCOUNTER (OUTPATIENT)
Dept: INFUSION THERAPY | Age: 69
Discharge: HOME | End: 2024-10-03
Payer: MEDICARE

## 2024-10-03 ENCOUNTER — OFFICE VISIT (OUTPATIENT)
Dept: ONCOLOGY | Age: 69
End: 2024-10-03
Payer: MEDICARE

## 2024-10-03 VITALS
OXYGEN SATURATION: 98 % | BODY MASS INDEX: 26.53 KG/M2 | HEART RATE: 100 BPM | DIASTOLIC BLOOD PRESSURE: 65 MMHG | TEMPERATURE: 98.1 F | WEIGHT: 155.42 LBS | SYSTOLIC BLOOD PRESSURE: 144 MMHG | HEIGHT: 64 IN

## 2024-10-03 VITALS
DIASTOLIC BLOOD PRESSURE: 65 MMHG | HEART RATE: 100 BPM | TEMPERATURE: 98.1 F | OXYGEN SATURATION: 98 % | WEIGHT: 155.4 LBS | BODY MASS INDEX: 26.67 KG/M2 | SYSTOLIC BLOOD PRESSURE: 144 MMHG

## 2024-10-03 DIAGNOSIS — C50.812 MALIGNANT NEOPLASM OF OVERLAPPING SITES OF BOTH BREASTS IN FEMALE, ESTROGEN RECEPTOR POSITIVE (HCC): Primary | ICD-10-CM

## 2024-10-03 DIAGNOSIS — C50.811 MALIGNANT NEOPLASM OF OVERLAPPING SITES OF BOTH BREASTS IN FEMALE, ESTROGEN RECEPTOR POSITIVE (HCC): ICD-10-CM

## 2024-10-03 DIAGNOSIS — Z17.0 MALIGNANT NEOPLASM OF OVERLAPPING SITES OF BOTH BREASTS IN FEMALE, ESTROGEN RECEPTOR POSITIVE (HCC): Primary | ICD-10-CM

## 2024-10-03 DIAGNOSIS — C79.51 MALIGNANT NEOPLASM METASTATIC TO BONE (HCC): ICD-10-CM

## 2024-10-03 DIAGNOSIS — C79.51 MALIGNANT NEOPLASM METASTATIC TO BONE (HCC): Primary | ICD-10-CM

## 2024-10-03 DIAGNOSIS — C50.812 MALIGNANT NEOPLASM OF OVERLAPPING SITES OF BOTH BREASTS IN FEMALE, ESTROGEN RECEPTOR POSITIVE (HCC): ICD-10-CM

## 2024-10-03 DIAGNOSIS — Z11.59 NEED FOR HEPATITIS B SCREENING TEST: ICD-10-CM

## 2024-10-03 DIAGNOSIS — C50.811 MALIGNANT NEOPLASM OF OVERLAPPING SITES OF BOTH BREASTS IN FEMALE, ESTROGEN RECEPTOR POSITIVE (HCC): Primary | ICD-10-CM

## 2024-10-03 DIAGNOSIS — Z17.0 MALIGNANT NEOPLASM OF OVERLAPPING SITES OF BOTH BREASTS IN FEMALE, ESTROGEN RECEPTOR POSITIVE (HCC): ICD-10-CM

## 2024-10-03 DIAGNOSIS — Z11.59 ENCOUNTER FOR SCREENING FOR OTHER VIRAL DISEASES: ICD-10-CM

## 2024-10-03 PROCEDURE — 99214 OFFICE O/P EST MOD 30 MIN: CPT | Performed by: INTERNAL MEDICINE

## 2024-10-03 PROCEDURE — 2580000003 HC RX 258: Performed by: INTERNAL MEDICINE

## 2024-10-03 PROCEDURE — 96375 TX/PRO/DX INJ NEW DRUG ADDON: CPT

## 2024-10-03 PROCEDURE — 6360000002 HC RX W HCPCS: Performed by: INTERNAL MEDICINE

## 2024-10-03 PROCEDURE — 1124F ACP DISCUSS-NO DSCNMKR DOCD: CPT | Performed by: INTERNAL MEDICINE

## 2024-10-03 PROCEDURE — 96367 TX/PROPH/DG ADDL SEQ IV INF: CPT

## 2024-10-03 PROCEDURE — 96361 HYDRATE IV INFUSION ADD-ON: CPT

## 2024-10-03 PROCEDURE — 96413 CHEMO IV INFUSION 1 HR: CPT

## 2024-10-03 RX ORDER — DEXTROSE MONOHYDRATE 50 MG/ML
5-250 INJECTION, SOLUTION INTRAVENOUS PRN
Status: DISCONTINUED | OUTPATIENT
Start: 2024-10-03 | End: 2024-10-04 | Stop reason: HOSPADM

## 2024-10-03 RX ORDER — PALONOSETRON 0.05 MG/ML
0.25 INJECTION, SOLUTION INTRAVENOUS ONCE
Status: COMPLETED | OUTPATIENT
Start: 2024-10-03 | End: 2024-10-03

## 2024-10-03 RX ORDER — 0.9 % SODIUM CHLORIDE 0.9 %
1000 INTRAVENOUS SOLUTION INTRAVENOUS ONCE
Start: 2024-10-03 | End: 2024-10-03

## 2024-10-03 RX ORDER — 0.9 % SODIUM CHLORIDE 0.9 %
1000 INTRAVENOUS SOLUTION INTRAVENOUS ONCE
Status: COMPLETED | OUTPATIENT
Start: 2024-10-03 | End: 2024-10-03

## 2024-10-03 RX ORDER — SODIUM CHLORIDE 0.9 % (FLUSH) 0.9 %
5-40 SYRINGE (ML) INJECTION PRN
OUTPATIENT
Start: 2024-10-03

## 2024-10-03 RX ORDER — SODIUM CHLORIDE 0.9 % (FLUSH) 0.9 %
5-40 SYRINGE (ML) INJECTION PRN
Status: DISCONTINUED | OUTPATIENT
Start: 2024-10-03 | End: 2024-10-04 | Stop reason: HOSPADM

## 2024-10-03 RX ORDER — HEPARIN 100 UNIT/ML
500 SYRINGE INTRAVENOUS PRN
OUTPATIENT
Start: 2024-10-03

## 2024-10-03 RX ORDER — SODIUM CHLORIDE 9 MG/ML
5-250 INJECTION, SOLUTION INTRAVENOUS PRN
OUTPATIENT
Start: 2024-10-03

## 2024-10-03 RX ADMIN — FAM-TRASTUZUMAB DERUXTECAN-NXKI 366 MG: 100 INJECTION, POWDER, LYOPHILIZED, FOR SOLUTION INTRAVENOUS at 12:41

## 2024-10-03 RX ADMIN — DEXTROSE MONOHYDRATE 20 ML/HR: 50 INJECTION, SOLUTION INTRAVENOUS at 12:40

## 2024-10-03 RX ADMIN — SODIUM CHLORIDE 150 MG: 9 INJECTION, SOLUTION INTRAVENOUS at 11:24

## 2024-10-03 RX ADMIN — SODIUM CHLORIDE 1000 ML: 9 INJECTION, SOLUTION INTRAVENOUS at 10:19

## 2024-10-03 RX ADMIN — DEXAMETHASONE SODIUM PHOSPHATE 12 MG: 4 INJECTION, SOLUTION INTRAMUSCULAR; INTRAVENOUS at 10:43

## 2024-10-03 RX ADMIN — PALONOSETRON 0.25 MG: 0.25 INJECTION, SOLUTION INTRAVENOUS at 10:43

## 2024-10-03 NOTE — PROGRESS NOTES
Pt here for D1 C2 tx, OV and IVF hydration. Port accessed with blood return noted. CBC CMP reivwed from 10/2 and Dr Rudolph made aware of WBC 2.9, K+ 3.4 at OV. Pt has no new concerns or issues to discuss at this time. Echo reviewed from 6/6 EF 55-60%. Dr Rudolph made aware of last echo and order placed by Dr Rudolph    Patient's status assessed and documented appropriately.  All labs and required results were also reviewed today.  Treatment parameters have been reviewed.  Today's treatment has been approved by the provider.      Treatment orders and medication sequencing (when applicable) was verified by 2 registered nurses.  The treatment plan was confirmed with the patient prior to administration, and the patient understands the need to report any treatment-related symptoms.    Prior to administration, when applicable, the following 8 elements of medication administration were reviewed with 2nd Registered Nurse prior to dosing: drug name, drug dose, infusion volume when prepared in a syringe, rate of administration, expiration dates and/or times, appearance and integrity of drug(s), and rate of pump for infusion.  The 5 rights of medication administration have been verified.      Pt tolerated tx without incident left ambulatory declined discharge paperwork

## 2024-10-03 NOTE — PROGRESS NOTES
MA Rooming Questions  Patient: Aileen Cohn  MRN: [id number]    Date: 10/3/2024        1. Do you have any new issues?   no         2. Do you need any refills on medications?    no    3. Have you had any imaging done since your last visit?   no    4. Have you been hospitalized or seen in the emergency room since your last visit here?   no    5. Did the patient have a depression screening completed today? No    No data recorded     PHQ-9 Given to (if applicable):               PHQ-9 Score (if applicable):                     [] Positive     []  Negative              Does question #9 need addressed (if applicable)                     [] Yes    []  No               Izabela Feng CMA      
Patient Name:  Aileen Cohn  Patient :  1955  Patient MRN:  [id number]     Primary Oncologist: Quincy Rudolph MD  Referring Provider: Gabrielle Cisneros APRN - CNP     Date of Service: 10/3/2024      Chief Complaint:    Chief Complaint   Patient presents with    Follow-up    Treatment     Patient's active problem list:       Liver mass       Lytic bone lesions       Left breast mass    HPI:   Aileen Cohn is a 69 year-old very pleasant female with medical history significant for osteoarthritis, initially referred to me on 22 for evaluation of her liver lesion and multiple lytic bone lesions.     She initially presented to [facility] on 22 with severe lower back pain and constipation. Stated that she has been having back pain for about 4 - 6 weeks duration, which becomes severe pain started a week before presentation.     CT scan of the abdomen and pelvis done on 2022 showed lesion (6.1 x 8.1 cm) in the hepatic segment 4, concerning for neoplasm.  Further evaluation with MRI of the liver is recommended.  Extensive lytic metastatic disease in lumbar and thoracic spine and pelvis with most dominant lesion seen at L5 vertebral body.  Several small bowel loops segments in the left abdomen demonstrate mild wall thickening, nonspecific may indicate enteritis.  Nonspecific partially visualized inflammatory stranding along the pericardium.  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     CT lumbar spine done on 22 showed extensive multifocal lytic lesions concerning for neoplastic/metastatic disease.  Age indeterminant compression fractures at T12 and L4, with mild narrowing of the AP diameter of the canal at L4.    She never had colonoscopy before. She had pap smear around . She didn't recall when was her last mammogram.     She denies weight loss. She hasn't been eating much for about 10 days since she has been having nausea.     Since she is found to have multiple lytic bone lesions and liver lesion, she 
marijuana.     I answered all her questions and concerns for today. Recent imaging and labs were reviewed and discussed with the patient.     Time to complete visit: 32 minutes  This time includes: preparing to see the patient (review of tests/history), obtaining and/or reviewing separately obtained history, performing a medically appropriate evaluation, counseling and educating the patient and documenting clinical information in the electronic health record. This time also includes multiple disciplinary evaluation and management of cancer as documented.

## 2024-10-07 DIAGNOSIS — Z79.899 NEED FOR PROPHYLACTIC CHEMOTHERAPY: Primary | ICD-10-CM

## 2024-10-08 RX ORDER — DEXAMETHASONE 4 MG/1
8 TABLET ORAL SEE ADMIN INSTRUCTIONS
Qty: 24 TABLET | Refills: 0 | Status: SHIPPED | OUTPATIENT
Start: 2024-10-08

## 2024-10-08 RX ORDER — TRAZODONE HYDROCHLORIDE 50 MG/1
50 TABLET, FILM COATED ORAL NIGHTLY
Qty: 30 TABLET | Refills: 1 | Status: SHIPPED | OUTPATIENT
Start: 2024-10-08

## 2024-10-19 NOTE — PROGRESS NOTES
Patient Name:  Aileen Cohn  Patient :  1955  Patient MRN:  [id number]     Primary Oncologist: Quincy Rudolph MD  Referring Provider: Gabrielle Cisneros APRN - CNP     Date of Service: 10/24/2024      Chief Complaint:    Chief Complaint   Patient presents with    Follow-up     Patient's active problem list:       Liver mass       Lytic bone lesions       Left breast mass    HPI:   Aileen Cohn is a 69 year-old very pleasant female with medical history significant for osteoarthritis, initially referred to me on 22 for evaluation of her liver lesion and multiple lytic bone lesions.     She initially presented to [facility] on 22 with severe lower back pain and constipation. Stated that she has been having back pain for about 4 - 6 weeks duration, which becomes severe pain started a week before presentation.     CT scan of the abdomen and pelvis done on 2022 showed lesion (6.1 x 8.1 cm) in the hepatic segment 4, concerning for neoplasm.  Further evaluation with MRI of the liver is recommended.  Extensive lytic metastatic disease in lumbar and thoracic spine and pelvis with most dominant lesion seen at L5 vertebral body.  Several small bowel loops segments in the left abdomen demonstrate mild wall thickening, nonspecific may indicate enteritis.  Nonspecific partially visualized inflammatory stranding along the pericardium.  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     CT lumbar spine done on 22 showed extensive multifocal lytic lesions concerning for neoplastic/metastatic disease.  Age indeterminant compression fractures at T12 and L4, with mild narrowing of the AP diameter of the canal at L4.    She never had colonoscopy before. She had pap smear around . She didn't recall when was her last mammogram.     She denies weight loss. She hasn't been eating much for about 10 days since she has been having nausea.     Since she is found to have multiple lytic bone lesions and liver lesion, she was referred

## 2024-10-23 ENCOUNTER — HOSPITAL ENCOUNTER (OUTPATIENT)
Dept: NON INVASIVE DIAGNOSTICS | Age: 69
Discharge: HOME | End: 2024-10-25
Attending: INTERNAL MEDICINE
Payer: MEDICARE

## 2024-10-23 ENCOUNTER — HOSPITAL ENCOUNTER (OUTPATIENT)
Dept: INFUSION THERAPY | Age: 69
Discharge: HOME | End: 2024-10-23
Payer: MEDICARE

## 2024-10-23 VITALS
WEIGHT: 155 LBS | DIASTOLIC BLOOD PRESSURE: 65 MMHG | HEART RATE: 100 BPM | BODY MASS INDEX: 26.46 KG/M2 | HEIGHT: 64 IN | SYSTOLIC BLOOD PRESSURE: 144 MMHG

## 2024-10-23 DIAGNOSIS — Z17.0 MALIGNANT NEOPLASM OF OVERLAPPING SITES OF BOTH BREASTS IN FEMALE, ESTROGEN RECEPTOR POSITIVE (HCC): ICD-10-CM

## 2024-10-23 DIAGNOSIS — Z11.59 NEED FOR HEPATITIS B SCREENING TEST: ICD-10-CM

## 2024-10-23 DIAGNOSIS — Z79.899 NEED FOR PROPHYLACTIC CHEMOTHERAPY: ICD-10-CM

## 2024-10-23 DIAGNOSIS — Z11.59 ENCOUNTER FOR SCREENING FOR OTHER VIRAL DISEASES: ICD-10-CM

## 2024-10-23 DIAGNOSIS — C50.811 MALIGNANT NEOPLASM OF OVERLAPPING SITES OF BOTH BREASTS IN FEMALE, ESTROGEN RECEPTOR POSITIVE (HCC): ICD-10-CM

## 2024-10-23 DIAGNOSIS — C50.812 MALIGNANT NEOPLASM OF OVERLAPPING SITES OF BOTH BREASTS IN FEMALE, ESTROGEN RECEPTOR POSITIVE (HCC): ICD-10-CM

## 2024-10-23 LAB
ALBUMIN SERPL-MCNC: 4 G/DL (ref 3.4–5)
ALBUMIN/GLOB SERPL: 2 {RATIO} (ref 1.1–2.2)
ALP SERPL-CCNC: 72 U/L (ref 40–129)
ALT SERPL-CCNC: 20 U/L (ref 10–40)
ANION GAP SERPL CALCULATED.3IONS-SCNC: 10 MMOL/L (ref 9–17)
AST SERPL-CCNC: 24 U/L (ref 15–37)
BASOPHILS # BLD: 0.01 K/UL
BASOPHILS NFR BLD: 0 % (ref 0–1)
BILIRUB SERPL-MCNC: 0.4 MG/DL (ref 0–1)
BUN SERPL-MCNC: 13 MG/DL (ref 7–20)
CALCIUM SERPL-MCNC: 9.5 MG/DL (ref 8.3–10.6)
CHLORIDE SERPL-SCNC: 108 MMOL/L (ref 99–110)
CO2 SERPL-SCNC: 24 MMOL/L (ref 21–32)
CREAT SERPL-MCNC: 0.6 MG/DL (ref 0.6–1.2)
EOSINOPHIL # BLD: 0.1 K/UL
EOSINOPHILS RELATIVE PERCENT: 3 % (ref 0–3)
ERYTHROCYTE [DISTWIDTH] IN BLOOD BY AUTOMATED COUNT: 15.4 % (ref 11.7–14.9)
GFR, ESTIMATED: >90 ML/MIN/1.73M2
GLUCOSE SERPL-MCNC: 93 MG/DL (ref 74–99)
HCT VFR BLD AUTO: 35.8 % (ref 37–47)
HGB BLD-MCNC: 11.3 G/DL (ref 12.5–16)
LYMPHOCYTES NFR BLD: 0.93 K/UL
LYMPHOCYTES RELATIVE PERCENT: 27 % (ref 24–44)
MCH RBC QN AUTO: 32.6 PG (ref 27–31)
MCHC RBC AUTO-ENTMCNC: 31.6 G/DL (ref 32–36)
MCV RBC AUTO: 103.2 FL (ref 78–100)
MONOCYTES NFR BLD: 0.5 K/UL
MONOCYTES NFR BLD: 15 % (ref 0–4)
NEUTROPHILS NFR BLD: 55 % (ref 36–66)
NEUTS SEG NFR BLD: 1.87 K/UL
PLATELET # BLD AUTO: 191 K/UL (ref 140–440)
PMV BLD AUTO: 8.7 FL (ref 7.5–11.1)
POTASSIUM SERPL-SCNC: 3.7 MMOL/L (ref 3.5–5.1)
PROT SERPL-MCNC: 6.1 G/DL (ref 6.4–8.2)
RBC # BLD AUTO: 3.47 M/UL (ref 4.2–5.4)
SODIUM SERPL-SCNC: 142 MMOL/L (ref 136–145)
WBC OTHER # BLD: 3.4 K/UL (ref 4–10.5)

## 2024-10-23 PROCEDURE — 93306 TTE W/DOPPLER COMPLETE: CPT

## 2024-10-23 PROCEDURE — 80053 COMPREHEN METABOLIC PANEL: CPT

## 2024-10-23 PROCEDURE — 85025 COMPLETE CBC W/AUTO DIFF WBC: CPT

## 2024-10-23 PROCEDURE — 36415 COLL VENOUS BLD VENIPUNCTURE: CPT

## 2024-10-24 ENCOUNTER — HOSPITAL ENCOUNTER (OUTPATIENT)
Dept: INFUSION THERAPY | Age: 69
Discharge: HOME | End: 2024-10-24
Payer: MEDICARE

## 2024-10-24 ENCOUNTER — OFFICE VISIT (OUTPATIENT)
Dept: ONCOLOGY | Age: 69
End: 2024-10-24

## 2024-10-24 VITALS
HEIGHT: 64 IN | WEIGHT: 155 LBS | BODY MASS INDEX: 26.46 KG/M2 | DIASTOLIC BLOOD PRESSURE: 60 MMHG | OXYGEN SATURATION: 99 % | SYSTOLIC BLOOD PRESSURE: 137 MMHG | HEART RATE: 91 BPM | TEMPERATURE: 98.8 F

## 2024-10-24 VITALS
DIASTOLIC BLOOD PRESSURE: 60 MMHG | BODY MASS INDEX: 26.6 KG/M2 | SYSTOLIC BLOOD PRESSURE: 137 MMHG | OXYGEN SATURATION: 99 % | WEIGHT: 155.8 LBS | HEIGHT: 64 IN | HEART RATE: 91 BPM | TEMPERATURE: 98.8 F

## 2024-10-24 DIAGNOSIS — Z11.59 ENCOUNTER FOR SCREENING FOR OTHER VIRAL DISEASES: ICD-10-CM

## 2024-10-24 DIAGNOSIS — Z17.0 MALIGNANT NEOPLASM OF OVERLAPPING SITES OF BOTH BREASTS IN FEMALE, ESTROGEN RECEPTOR POSITIVE (HCC): Primary | ICD-10-CM

## 2024-10-24 DIAGNOSIS — C50.811 MALIGNANT NEOPLASM OF OVERLAPPING SITES OF BOTH BREASTS IN FEMALE, ESTROGEN RECEPTOR POSITIVE (HCC): Primary | ICD-10-CM

## 2024-10-24 DIAGNOSIS — Z11.59 NEED FOR HEPATITIS B SCREENING TEST: ICD-10-CM

## 2024-10-24 DIAGNOSIS — C79.51 MALIGNANT NEOPLASM METASTATIC TO BONE (HCC): ICD-10-CM

## 2024-10-24 DIAGNOSIS — C50.812 MALIGNANT NEOPLASM OF OVERLAPPING SITES OF BOTH BREASTS IN FEMALE, ESTROGEN RECEPTOR POSITIVE (HCC): Primary | ICD-10-CM

## 2024-10-24 PROCEDURE — 96375 TX/PRO/DX INJ NEW DRUG ADDON: CPT

## 2024-10-24 PROCEDURE — 2580000003 HC RX 258: Performed by: INTERNAL MEDICINE

## 2024-10-24 PROCEDURE — 96365 THER/PROPH/DIAG IV INF INIT: CPT

## 2024-10-24 PROCEDURE — 96413 CHEMO IV INFUSION 1 HR: CPT

## 2024-10-24 PROCEDURE — 96367 TX/PROPH/DG ADDL SEQ IV INF: CPT

## 2024-10-24 PROCEDURE — 6360000002 HC RX W HCPCS: Performed by: INTERNAL MEDICINE

## 2024-10-24 RX ORDER — SODIUM CHLORIDE 0.9 % (FLUSH) 0.9 %
5-40 SYRINGE (ML) INJECTION PRN
Status: DISCONTINUED | OUTPATIENT
Start: 2024-10-24 | End: 2024-10-25 | Stop reason: HOSPADM

## 2024-10-24 RX ORDER — PALONOSETRON 0.05 MG/ML
0.25 INJECTION, SOLUTION INTRAVENOUS ONCE
Status: COMPLETED | OUTPATIENT
Start: 2024-10-24 | End: 2024-10-24

## 2024-10-24 RX ADMIN — DEXAMETHASONE SODIUM PHOSPHATE 12 MG: 4 INJECTION, SOLUTION INTRAMUSCULAR; INTRAVENOUS at 11:03

## 2024-10-24 RX ADMIN — FAM-TRASTUZUMAB DERUXTECAN-NXKI 366 MG: 100 INJECTION, POWDER, LYOPHILIZED, FOR SOLUTION INTRAVENOUS at 11:26

## 2024-10-24 RX ADMIN — PALONOSETRON 0.25 MG: 0.25 INJECTION, SOLUTION INTRAVENOUS at 10:39

## 2024-10-24 RX ADMIN — SODIUM CHLORIDE 150 MG: 9 INJECTION, SOLUTION INTRAVENOUS at 10:40

## 2024-10-24 NOTE — PROGRESS NOTES
Patient arrived to treatment suite for pre-medications and chemotherapy infusion.  Right chest mediport accessed and blood drawn from site and sent to lab for processing.  Patient states no questions or concerns for the doctor at this time.  Treatment approved and given.  Patient tolerated well.  Left treatment suite ambulatory.  Discharge instructions provided.     Patient's status assessed and documented appropriately.  All labs and required results were also reviewed today.  Treatment parameters have been reviewed.  Today's treatment has been approved by the provider.  Treatment orders and medication sequencing (when applicable) was verified by 2 registered nurses.  The treatment plan was confirmed with the patient prior to administration, and the patient understands the need to report any treatment-related symptoms.     Prior to administration, when applicable, the following 8 elements of medication administration were reviewed with 2nd Registered Nurse prior to dosing: drug name, drug dose, infusion volume when prepared in a syringe, rate of administration, expiration dates and/or times, appearance and integrity of drug(s), and rate of pump for infusion.  The 5 rights of medication administration have been verified.

## 2024-10-24 NOTE — PROGRESS NOTES
MA Rooming Questions  Patient: Aileen Cohn  MRN: [id number]    Date: 10/24/2024        1. Do you have any new issues?   no         2. Do you need any refills on medications?    no    3. Have you had any imaging done since your last visit?   yes - Echo 10/23    4. Have you been hospitalized or seen in the emergency room since your last visit here?   no    5. Did the patient have a depression screening completed today? No    No data recorded     PHQ-9 Given to (if applicable):               PHQ-9 Score (if applicable):                     [] Positive     []  Negative              Does question #9 need addressed (if applicable)                     [] Yes    []  No               Kelly Rolon MA

## 2024-10-25 LAB
ECHO AO ROOT DIAM: 3 CM
ECHO AO ROOT INDEX: 1.7 CM/M2
ECHO AV AREA PEAK VELOCITY: 2.6 CM2
ECHO AV AREA VTI: 2.7 CM2
ECHO AV AREA/BSA PEAK VELOCITY: 1.5 CM2/M2
ECHO AV AREA/BSA VTI: 1.5 CM2/M2
ECHO AV MEAN GRADIENT: 3 MMHG
ECHO AV MEAN VELOCITY: 0.9 M/S
ECHO AV PEAK GRADIENT: 6 MMHG
ECHO AV PEAK VELOCITY: 1.2 M/S
ECHO AV VELOCITY RATIO: 0.67
ECHO AV VTI: 24.5 CM
ECHO BSA: 1.78 M2
ECHO EST RA PRESSURE: 3 MMHG
ECHO IVC PROX: 1.7 CM
ECHO LA AREA 4C: 12.9 CM2
ECHO LA DIAMETER INDEX: 1.65 CM/M2
ECHO LA DIAMETER: 2.9 CM
ECHO LA MAJOR AXIS: 4.7 CM
ECHO LA TO AORTIC ROOT RATIO: 0.97
ECHO LA VOL MOD A4C: 28 ML (ref 22–52)
ECHO LA VOLUME INDEX MOD A4C: 16 ML/M2 (ref 16–34)
ECHO LV E' LATERAL VELOCITY: 11.3 CM/S
ECHO LV E' SEPTAL VELOCITY: 8 CM/S
ECHO LV EDV A4C: 81 ML
ECHO LV EDV INDEX A4C: 46 ML/M2
ECHO LV EF PHYSICIAN: 55 %
ECHO LV EJECTION FRACTION A4C: 53 %
ECHO LV ESV A4C: 38 ML
ECHO LV ESV INDEX A4C: 22 ML/M2
ECHO LV FRACTIONAL SHORTENING: 26 % (ref 28–44)
ECHO LV INTERNAL DIMENSION DIASTOLE INDEX: 3.01 CM/M2
ECHO LV INTERNAL DIMENSION DIASTOLIC: 5.3 CM (ref 3.9–5.3)
ECHO LV INTERNAL DIMENSION SYSTOLIC INDEX: 2.22 CM/M2
ECHO LV INTERNAL DIMENSION SYSTOLIC: 3.9 CM
ECHO LV IVSD: 0.8 CM (ref 0.6–0.9)
ECHO LV MASS 2D: 162.1 G (ref 67–162)
ECHO LV MASS INDEX 2D: 92.1 G/M2 (ref 43–95)
ECHO LV POSTERIOR WALL DIASTOLIC: 0.9 CM (ref 0.6–0.9)
ECHO LV RELATIVE WALL THICKNESS RATIO: 0.34
ECHO LVOT AREA: 3.8 CM2
ECHO LVOT AV VTI INDEX: 0.7
ECHO LVOT DIAM: 2.2 CM
ECHO LVOT MEAN GRADIENT: 1 MMHG
ECHO LVOT PEAK GRADIENT: 3 MMHG
ECHO LVOT PEAK VELOCITY: 0.8 M/S
ECHO LVOT STROKE VOLUME INDEX: 36.9 ML/M2
ECHO LVOT SV: 65 ML
ECHO LVOT VTI: 17.1 CM
ECHO MV A VELOCITY: 0.98 M/S
ECHO MV E DECELERATION TIME (DT): 162 MS
ECHO MV E VELOCITY: 0.78 M/S
ECHO MV E/A RATIO: 0.8
ECHO MV E/E' LATERAL: 6.9
ECHO MV E/E' RATIO (AVERAGED): 8.33
ECHO MV E/E' SEPTAL: 9.75
ECHO RIGHT VENTRICULAR SYSTOLIC PRESSURE (RVSP): 25 MMHG
ECHO RV MID DIMENSION: 2.1 CM
ECHO TV REGURGITANT MAX VELOCITY: 2.34 M/S
ECHO TV REGURGITANT PEAK GRADIENT: 22 MMHG

## 2024-10-28 DIAGNOSIS — K59.09 OTHER CONSTIPATION: ICD-10-CM

## 2024-10-28 RX ORDER — SENNOSIDES 8.6 MG
1 TABLET ORAL DAILY
Qty: 90 TABLET | Refills: 1 | Status: SHIPPED | OUTPATIENT
Start: 2024-10-28

## 2024-10-29 ENCOUNTER — CLINICAL DOCUMENTATION (OUTPATIENT)
Dept: ONCOLOGY | Age: 69
End: 2024-10-29

## 2024-10-29 NOTE — PROGRESS NOTES
I called patient and lvm PET scan [facility] 11/6/24 09:30 AM arrival npo 6 hours except plain water. Left my direct number if any questions.

## 2024-11-06 ENCOUNTER — HOSPITAL ENCOUNTER (OUTPATIENT)
Dept: PET IMAGING | Age: 69
Discharge: HOME | End: 2024-11-06
Attending: INTERNAL MEDICINE
Payer: MEDICARE

## 2024-11-06 DIAGNOSIS — C50.812 MALIGNANT NEOPLASM OF OVERLAPPING SITES OF BOTH BREASTS IN FEMALE, ESTROGEN RECEPTOR POSITIVE (HCC): ICD-10-CM

## 2024-11-06 DIAGNOSIS — Z17.0 MALIGNANT NEOPLASM OF OVERLAPPING SITES OF BOTH BREASTS IN FEMALE, ESTROGEN RECEPTOR POSITIVE (HCC): ICD-10-CM

## 2024-11-06 DIAGNOSIS — C50.811 MALIGNANT NEOPLASM OF OVERLAPPING SITES OF BOTH BREASTS IN FEMALE, ESTROGEN RECEPTOR POSITIVE (HCC): ICD-10-CM

## 2024-11-06 DIAGNOSIS — C79.51 MALIGNANT NEOPLASM METASTATIC TO BONE (HCC): ICD-10-CM

## 2024-11-06 PROCEDURE — 2580000003 HC RX 258: Performed by: INTERNAL MEDICINE

## 2024-11-06 PROCEDURE — 78815 PET IMAGE W/CT SKULL-THIGH: CPT

## 2024-11-06 PROCEDURE — A9609 HC RX DIAGNOSTIC RADIOPHARMACEUTICAL: HCPCS | Performed by: INTERNAL MEDICINE

## 2024-11-06 PROCEDURE — 3430000000 HC RX DIAGNOSTIC RADIOPHARMACEUTICAL: Performed by: INTERNAL MEDICINE

## 2024-11-06 RX ORDER — SODIUM CHLORIDE 0.9 % (FLUSH) 0.9 %
10 SYRINGE (ML) INJECTION PRN
Status: COMPLETED | OUTPATIENT
Start: 2024-11-06 | End: 2024-11-06

## 2024-11-06 RX ORDER — FLUDEOXYGLUCOSE F 18 200 MCI/ML
15.23 INJECTION, SOLUTION INTRAVENOUS
Status: COMPLETED | OUTPATIENT
Start: 2024-11-06 | End: 2024-11-06

## 2024-11-06 RX ADMIN — SODIUM CHLORIDE, PRESERVATIVE FREE 10 ML: 5 INJECTION INTRAVENOUS at 09:52

## 2024-11-06 RX ADMIN — FLUDEOXYGLUCOSE F 18 15.23 MILLICURIE: 200 INJECTION, SOLUTION INTRAVENOUS at 09:52

## 2024-11-11 DIAGNOSIS — C50.811 MALIGNANT NEOPLASM OF OVERLAPPING SITES OF BOTH BREASTS IN FEMALE, ESTROGEN RECEPTOR POSITIVE (HCC): ICD-10-CM

## 2024-11-11 DIAGNOSIS — Z17.0 MALIGNANT NEOPLASM OF OVERLAPPING SITES OF BOTH BREASTS IN FEMALE, ESTROGEN RECEPTOR POSITIVE (HCC): ICD-10-CM

## 2024-11-11 DIAGNOSIS — Z11.59 NEED FOR HEPATITIS B SCREENING TEST: Primary | ICD-10-CM

## 2024-11-11 DIAGNOSIS — C50.812 MALIGNANT NEOPLASM OF OVERLAPPING SITES OF BOTH BREASTS IN FEMALE, ESTROGEN RECEPTOR POSITIVE (HCC): ICD-10-CM

## 2024-11-11 DIAGNOSIS — Z11.59 ENCOUNTER FOR SCREENING FOR OTHER VIRAL DISEASES: ICD-10-CM

## 2024-11-11 RX ORDER — DEXTROSE MONOHYDRATE 50 MG/ML
5-250 INJECTION, SOLUTION INTRAVENOUS PRN
OUTPATIENT
Start: 2024-12-05

## 2024-11-11 RX ORDER — SODIUM CHLORIDE 0.9 % (FLUSH) 0.9 %
5-40 SYRINGE (ML) INJECTION PRN
OUTPATIENT
Start: 2024-12-05

## 2024-11-11 RX ORDER — SODIUM CHLORIDE 9 MG/ML
INJECTION, SOLUTION INTRAVENOUS CONTINUOUS
OUTPATIENT
Start: 2024-11-14

## 2024-11-11 RX ORDER — SODIUM CHLORIDE 0.9 % (FLUSH) 0.9 %
5-40 SYRINGE (ML) INJECTION PRN
OUTPATIENT
Start: 2024-11-14

## 2024-11-11 RX ORDER — ALBUTEROL SULFATE 90 UG/1
4 INHALANT RESPIRATORY (INHALATION) PRN
OUTPATIENT
Start: 2024-12-05

## 2024-11-11 RX ORDER — DIPHENHYDRAMINE HYDROCHLORIDE 50 MG/ML
50 INJECTION INTRAMUSCULAR; INTRAVENOUS
OUTPATIENT
Start: 2024-12-05

## 2024-11-11 RX ORDER — FAMOTIDINE 10 MG/ML
20 INJECTION, SOLUTION INTRAVENOUS
OUTPATIENT
Start: 2024-11-14

## 2024-11-11 RX ORDER — SODIUM CHLORIDE 9 MG/ML
5-250 INJECTION, SOLUTION INTRAVENOUS PRN
OUTPATIENT
Start: 2024-12-05

## 2024-11-11 RX ORDER — SODIUM CHLORIDE 9 MG/ML
5-250 INJECTION, SOLUTION INTRAVENOUS PRN
OUTPATIENT
Start: 2024-11-14

## 2024-11-11 RX ORDER — HEPARIN SODIUM (PORCINE) LOCK FLUSH IV SOLN 100 UNIT/ML 100 UNIT/ML
500 SOLUTION INTRAVENOUS PRN
OUTPATIENT
Start: 2024-11-14

## 2024-11-11 RX ORDER — ACETAMINOPHEN 325 MG/1
650 TABLET ORAL
OUTPATIENT
Start: 2024-11-14

## 2024-11-11 RX ORDER — DIPHENHYDRAMINE HYDROCHLORIDE 50 MG/ML
50 INJECTION INTRAMUSCULAR; INTRAVENOUS
OUTPATIENT
Start: 2024-11-14

## 2024-11-11 RX ORDER — FAMOTIDINE 10 MG/ML
20 INJECTION, SOLUTION INTRAVENOUS
OUTPATIENT
Start: 2024-12-05

## 2024-11-11 RX ORDER — ONDANSETRON 2 MG/ML
8 INJECTION INTRAMUSCULAR; INTRAVENOUS
OUTPATIENT
Start: 2024-12-05

## 2024-11-11 RX ORDER — EPINEPHRINE 1 MG/ML
0.3 INJECTION, SOLUTION, CONCENTRATE INTRAVENOUS PRN
OUTPATIENT
Start: 2024-11-14

## 2024-11-11 RX ORDER — DEXTROSE MONOHYDRATE 50 MG/ML
5-250 INJECTION, SOLUTION INTRAVENOUS PRN
OUTPATIENT
Start: 2024-11-14

## 2024-11-11 RX ORDER — ACETAMINOPHEN 325 MG/1
650 TABLET ORAL
OUTPATIENT
Start: 2024-12-05

## 2024-11-11 RX ORDER — SODIUM CHLORIDE 9 MG/ML
INJECTION, SOLUTION INTRAVENOUS CONTINUOUS
OUTPATIENT
Start: 2024-12-05

## 2024-11-11 RX ORDER — HEPARIN SODIUM (PORCINE) LOCK FLUSH IV SOLN 100 UNIT/ML 100 UNIT/ML
500 SOLUTION INTRAVENOUS PRN
OUTPATIENT
Start: 2024-12-05

## 2024-11-11 RX ORDER — PALONOSETRON 0.05 MG/ML
0.25 INJECTION, SOLUTION INTRAVENOUS ONCE
OUTPATIENT
Start: 2024-11-14 | End: 2024-11-14

## 2024-11-11 RX ORDER — PALONOSETRON 0.05 MG/ML
0.25 INJECTION, SOLUTION INTRAVENOUS ONCE
OUTPATIENT
Start: 2024-12-05 | End: 2024-12-05

## 2024-11-11 RX ORDER — MEPERIDINE HYDROCHLORIDE 50 MG/ML
12.5 INJECTION INTRAMUSCULAR; INTRAVENOUS; SUBCUTANEOUS PRN
OUTPATIENT
Start: 2024-12-05

## 2024-11-11 RX ORDER — ONDANSETRON 2 MG/ML
8 INJECTION INTRAMUSCULAR; INTRAVENOUS
OUTPATIENT
Start: 2024-11-14

## 2024-11-11 RX ORDER — EPINEPHRINE 1 MG/ML
0.3 INJECTION, SOLUTION, CONCENTRATE INTRAVENOUS PRN
OUTPATIENT
Start: 2024-12-05

## 2024-11-11 RX ORDER — ALBUTEROL SULFATE 90 UG/1
4 INHALANT RESPIRATORY (INHALATION) PRN
OUTPATIENT
Start: 2024-11-14

## 2024-11-11 RX ORDER — MEPERIDINE HYDROCHLORIDE 50 MG/ML
12.5 INJECTION INTRAMUSCULAR; INTRAVENOUS; SUBCUTANEOUS PRN
OUTPATIENT
Start: 2024-11-14

## 2024-11-13 ENCOUNTER — HOSPITAL ENCOUNTER (OUTPATIENT)
Dept: INFUSION THERAPY | Age: 69
Discharge: HOME | End: 2024-11-13
Payer: MEDICARE

## 2024-11-13 DIAGNOSIS — C50.812 MALIGNANT NEOPLASM OF OVERLAPPING SITES OF BOTH BREASTS IN FEMALE, ESTROGEN RECEPTOR POSITIVE (HCC): ICD-10-CM

## 2024-11-13 DIAGNOSIS — Z11.59 NEED FOR HEPATITIS B SCREENING TEST: ICD-10-CM

## 2024-11-13 DIAGNOSIS — C50.811 MALIGNANT NEOPLASM OF OVERLAPPING SITES OF BOTH BREASTS IN FEMALE, ESTROGEN RECEPTOR POSITIVE (HCC): ICD-10-CM

## 2024-11-13 DIAGNOSIS — Z17.0 MALIGNANT NEOPLASM OF OVERLAPPING SITES OF BOTH BREASTS IN FEMALE, ESTROGEN RECEPTOR POSITIVE (HCC): ICD-10-CM

## 2024-11-13 DIAGNOSIS — Z11.59 ENCOUNTER FOR SCREENING FOR OTHER VIRAL DISEASES: ICD-10-CM

## 2024-11-13 LAB
ALBUMIN SERPL-MCNC: 3.9 G/DL (ref 3.4–5)
ALBUMIN/GLOB SERPL: 1.9 {RATIO} (ref 1.1–2.2)
ALP SERPL-CCNC: 67 U/L (ref 40–129)
ALT SERPL-CCNC: 20 U/L (ref 10–40)
ANION GAP SERPL CALCULATED.3IONS-SCNC: 13 MMOL/L (ref 9–17)
AST SERPL-CCNC: 24 U/L (ref 15–37)
BASOPHILS # BLD: 0.03 K/UL
BASOPHILS NFR BLD: 1 % (ref 0–1)
BILIRUB SERPL-MCNC: 0.6 MG/DL (ref 0–1)
BUN SERPL-MCNC: 11 MG/DL (ref 7–20)
CALCIUM SERPL-MCNC: 9.3 MG/DL (ref 8.3–10.6)
CHLORIDE SERPL-SCNC: 107 MMOL/L (ref 99–110)
CO2 SERPL-SCNC: 24 MMOL/L (ref 21–32)
CREAT SERPL-MCNC: 0.6 MG/DL (ref 0.6–1.2)
EOSINOPHIL # BLD: 0.09 K/UL
EOSINOPHILS RELATIVE PERCENT: 3 % (ref 0–3)
ERYTHROCYTE [DISTWIDTH] IN BLOOD BY AUTOMATED COUNT: 14.7 % (ref 11.7–14.9)
GFR, ESTIMATED: >90 ML/MIN/1.73M2
GLUCOSE SERPL-MCNC: 76 MG/DL (ref 74–99)
HCT VFR BLD AUTO: 36.9 % (ref 37–47)
HGB BLD-MCNC: 11.8 G/DL (ref 12.5–16)
LYMPHOCYTES NFR BLD: 1.02 K/UL
LYMPHOCYTES RELATIVE PERCENT: 32 % (ref 24–44)
MCH RBC QN AUTO: 33 PG (ref 27–31)
MCHC RBC AUTO-ENTMCNC: 32 G/DL (ref 32–36)
MCV RBC AUTO: 103.1 FL (ref 78–100)
MONOCYTES NFR BLD: 0.36 K/UL
MONOCYTES NFR BLD: 11 % (ref 0–4)
NEUTROPHILS NFR BLD: 53 % (ref 36–66)
NEUTS SEG NFR BLD: 1.66 K/UL
PLATELET # BLD AUTO: 176 K/UL (ref 140–440)
PMV BLD AUTO: 8.6 FL (ref 7.5–11.1)
POTASSIUM SERPL-SCNC: 3.8 MMOL/L (ref 3.5–5.1)
PROT SERPL-MCNC: 6 G/DL (ref 6.4–8.2)
RBC # BLD AUTO: 3.58 M/UL (ref 4.2–5.4)
SODIUM SERPL-SCNC: 144 MMOL/L (ref 136–145)
WBC OTHER # BLD: 3.2 K/UL (ref 4–10.5)

## 2024-11-13 PROCEDURE — 80053 COMPREHEN METABOLIC PANEL: CPT

## 2024-11-13 PROCEDURE — 36415 COLL VENOUS BLD VENIPUNCTURE: CPT

## 2024-11-13 PROCEDURE — 85025 COMPLETE CBC W/AUTO DIFF WBC: CPT

## 2024-11-14 ENCOUNTER — HOSPITAL ENCOUNTER (OUTPATIENT)
Dept: INFUSION THERAPY | Age: 69
Discharge: HOME | End: 2024-11-14
Payer: MEDICARE

## 2024-11-14 ENCOUNTER — OFFICE VISIT (OUTPATIENT)
Dept: ONCOLOGY | Age: 69
End: 2024-11-14
Payer: MEDICARE

## 2024-11-14 VITALS
DIASTOLIC BLOOD PRESSURE: 66 MMHG | HEIGHT: 64 IN | OXYGEN SATURATION: 96 % | SYSTOLIC BLOOD PRESSURE: 142 MMHG | HEART RATE: 104 BPM | BODY MASS INDEX: 26.91 KG/M2 | TEMPERATURE: 97.8 F | WEIGHT: 157.6 LBS

## 2024-11-14 DIAGNOSIS — C79.51 MALIGNANT NEOPLASM METASTATIC TO BONE (HCC): ICD-10-CM

## 2024-11-14 DIAGNOSIS — C50.812 MALIGNANT NEOPLASM OF OVERLAPPING SITES OF BOTH BREASTS IN FEMALE, ESTROGEN RECEPTOR POSITIVE (HCC): ICD-10-CM

## 2024-11-14 DIAGNOSIS — C50.812 MALIGNANT NEOPLASM OF OVERLAPPING SITES OF BOTH BREASTS IN FEMALE, ESTROGEN RECEPTOR POSITIVE (HCC): Primary | ICD-10-CM

## 2024-11-14 DIAGNOSIS — Z17.0 MALIGNANT NEOPLASM OF OVERLAPPING SITES OF BOTH BREASTS IN FEMALE, ESTROGEN RECEPTOR POSITIVE (HCC): ICD-10-CM

## 2024-11-14 DIAGNOSIS — C50.811 MALIGNANT NEOPLASM OF OVERLAPPING SITES OF BOTH BREASTS IN FEMALE, ESTROGEN RECEPTOR POSITIVE (HCC): ICD-10-CM

## 2024-11-14 DIAGNOSIS — Z17.0 MALIGNANT NEOPLASM OF OVERLAPPING SITES OF BOTH BREASTS IN FEMALE, ESTROGEN RECEPTOR POSITIVE (HCC): Primary | ICD-10-CM

## 2024-11-14 DIAGNOSIS — C50.811 MALIGNANT NEOPLASM OF OVERLAPPING SITES OF BOTH BREASTS IN FEMALE, ESTROGEN RECEPTOR POSITIVE (HCC): Primary | ICD-10-CM

## 2024-11-14 PROCEDURE — 86300 IMMUNOASSAY TUMOR CA 15-3: CPT

## 2024-11-14 PROCEDURE — 1126F AMNT PAIN NOTED NONE PRSNT: CPT | Performed by: INTERNAL MEDICINE

## 2024-11-14 PROCEDURE — 1159F MED LIST DOCD IN RCRD: CPT | Performed by: INTERNAL MEDICINE

## 2024-11-14 PROCEDURE — 99214 OFFICE O/P EST MOD 30 MIN: CPT | Performed by: INTERNAL MEDICINE

## 2024-11-14 PROCEDURE — 1124F ACP DISCUSS-NO DSCNMKR DOCD: CPT | Performed by: INTERNAL MEDICINE

## 2024-11-14 PROCEDURE — 36415 COLL VENOUS BLD VENIPUNCTURE: CPT

## 2024-11-14 NOTE — PROGRESS NOTES
MA Rooming Questions  Patient: Aileen Cohn  MRN: [id number]    Date: 11/14/2024        1. Do you have any new issues?   no         2. Do you need any refills on medications?    no    3. Have you had any imaging done since your last visit?   no    4. Have you been hospitalized or seen in the emergency room since your last visit here?   no    5. Did the patient have a depression screening completed today? No    No data recorded     PHQ-9 Given to (if applicable):               PHQ-9 Score (if applicable):                     [] Positive     []  Negative              Does question #9 need addressed (if applicable)                     [] Yes    []  No               Estela Elizabeth CMA      
chemotherapy.    Since she completed total of 6 cycles of chemotherapy, I recommend to start first-line endocrine therapy with Ibrance and letrozole. It was started today, 1/5/2023.     Reviewed CT scans and bone scans done on 12/20/23. We looked at her radiology images together.     Since all other diseases are controlled well with she was noted to have worsening of hepatic metastasis, I requested CT guided biopsy. It was done on 1/15/24 and it showed findings consistent with breast cancer (ER+, MO+, Her2 IHC1+).    I recommend her to start second line chemo with Enhertu. We stopped letrozole and ibrance on 1/31/24. Enhertu was started since 2/5/24.    She is here for close monitoring of toxicity and side effects from chemotherapy. She received her 13th cycle of chemo on 10/24/24.     Reviewed findings on repeat CT done on 4/16/24. I d/w Dr. Hernández. Since she achieves very good response to current chemo, I recommend her to consider for SBRT to her liver lesion. After long discussion, she agrees to have SBRT. She received SBRT to her liver lesions between 6/27/24 and 7/3/24 (3500 cGy; 5/5 fractions).    Reviewed PET/CT done on 11/6/24. I reviewed PET/CT images with Dr. Hernández. We believe left axillary lymph node is reactive node.     Since she achieves complete response, I recommend to stop Enhertu. I will switch back to endocrine therapy. I will request [id number] study today.     Based on findings on liquid biopsy, I will start second line endocrine therapy. Meanwhile, I asked her to take letrozole for now.     If she doesn't have any  mutation, I will start second line endocrine therapy with CDK4/6 inhibitor and faslodex.      Bone metastasis - we started zolendronic acid since 7/20/22. She only has minimal symptom and will not do vertebroplasty at this time. Since she has been on zometa for 1 year already, I changed it to every 12 weeks since 6/14/23.     Reviewed Caris molecular panel result with her.

## 2024-11-16 RX ORDER — LETROZOLE 2.5 MG/1
2.5 TABLET, FILM COATED ORAL DAILY
Qty: 30 TABLET | Refills: 3 | Status: SHIPPED | OUTPATIENT
Start: 2024-11-16

## 2024-11-17 LAB
MISCELLANEOUS LAB TEST RESULT: NORMAL
TEST NAME: NORMAL

## 2024-11-19 ENCOUNTER — CLINICAL DOCUMENTATION (OUTPATIENT)
Dept: ONCOLOGY | Age: 69
End: 2024-11-19

## 2024-11-19 NOTE — PROGRESS NOTES
Patient ID: Kathyleen Cowden, 9/3/1931, [id number], 82 y.o.   Referred by :  Chema Moore MD    Reason for consultation: Gastric marginal zone lymphoma      HISTORY OF PRESENT ILLNESS:    Oncologic History:    Kathyleen Cowden is a very pleasant 82 y.o. 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 at [facility] with no radiation  Imaging was done including CT abdomen pelvis and MRI and no evidence of metastasis/cancer/lymphadenopathy  PET/CT and no evidence of lymphoma activity elsewhere other than stomach  There was diffuse low-level activity throughout the stomach and the small bowel without CT abnormality        Interim history  Patient had no new symptoms  Patient visited with radiation oncology and he decide no radiation would not cover for it    Past Medical History:   Diagnosis Date    Bilateral edema of lower extremity 1995    Bilateral hearing loss     wears hearing aids    Diverticulosis of sigmoid colon     Gout     History of bladder cancer 1980    Dr. Lyn Estimable yearly cysto    History of colon polyps 09/10/2012    History of hypokalemia     History of melanoma 2008    left cheek    History of prostate cancer 1992    S/P Prostatectomy    Hyperlipidemia     Hypertension     Tubular Per Dr. Rudolph - blood drawn for [employer] testing to check for targeted mutations for future treatment options.  Blood drawn on 11/14/24 - order placed on [employer] portal.   adenoma of colon 09/10/2012       Past Surgical History:   Procedure Laterality Date    COLONOSCOPY  2005    dr Dicky Cowden  09/10/2012    significant diverticulosis, tubular adenoma-sigmoid colon, small hemorrhoids    COLONOSCOPY  10/03/2016    significant diverticulosis sigmoid colon, small polyp rectum-hyperplastic    EYE SURGERY Bilateral 1994    cataracts removed    HERNIA REPAIR Right 1956    inguinal    HERNIA REPAIR Left 1995    inguinal    JOINT REPLACEMENT Right 1984    great toe joint D/T gout    KNEE ARTHROPLASTY Left 2000    meniscal repair    PROSTATE SURGERY  2002       Allergies   Allergen Reactions    Mirabegron      Elevated blood pressure       Current Outpatient Medications   Medication Sig Dispense Refill    apixaban (ELIQUIS) 5 MG TABS tablet Take 5 mg by mouth 2 times daily      furosemide (LASIX) 20 MG tablet Take 20 mg by mouth daily as needed for Other      pantoprazole (PROTONIX) 40 MG tablet Take 1 tablet by mouth daily 90 tablet 1    magnesium oxide (MAG-OX) 400 MG tablet Take 1 tablet by mouth daily 90 tablet 1    amLODIPine (NORVASC) 5 MG tablet TAKE 1 TABLET BY MOUTH ONCE DAILY 90 tablet 1    latanoprost (XALATAN) 0.005 % ophthalmic solution INSTILL 1 DROP INTO EACH EYE NIGHTLY 3 each 1    atorvastatin (LIPITOR) 20 MG tablet Take 1 tablet by mouth daily 90 tablet 1    gabapentin (NEURONTIN) 100 MG capsule Take 1 capsule at night 90 capsule 1    lisinopril (PRINIVIL;ZESTRIL) 40 MG tablet Take 1 tablet by mouth daily 90 tablet 1    allopurinol (ZYLOPRIM) 300 MG tablet Take 1 tablet by mouth daily 90 tablet 1    timolol (TIMOPTIC) 0.5 % ophthalmic solution Place 1 drop into the left eye daily 3 each 1    olopatadine (PATANASE) 0.6 % SOLN nassl soln SPRAY 2 SPRAY(S) IN EACH NOSTRIL NIGHTLY 3 each 1    hydroCHLOROthiazide (HYDRODIURIL) 25 MG tablet Take 1 tablet by mouth daily 90 tablet 1    potassium chloride (KLOR-CON M) 10 MEQ extended release tablet Take 1 tablet by mouth 2 times daily 30 tablet 0    potassium chloride (KLOR-CON M) 20 MEQ extended release tablet Take 1 tablet by mouth daily (Patient not taking: Reported on 5/24/2022) 90 tablet 1    ipratropium (ATROVENT) 0.03 % nasal spray 2 sprays by Nasal route 3 times daily (Patient not taking: Reported on 5/24/2022) 3 each 1    lidocaine 4 % external patch Place 1 patch onto the skin daily (Patient not taking: Reported on 5/24/2022) 30 patch 2    meloxicam (MOBIC) 15 MG tablet Take 1 tablet by mouth daily (Patient not taking: Reported on 5/24/2022) 90 tablet 1    tamsulosin (FLOMAX) 0.4 MG capsule Take 1 capsule by mouth daily (Patient not taking: Reported on 7/7/2021) 90 capsule 3     No current facility-administered medications for this visit. Social History     Socioeconomic History    Marital status:      Spouse name: Not on file    Number of children: Not on file    Years of education: Not on file    Highest education level: Not on file   Occupational History    Not on file   Tobacco Use    Smoking status: Never Smoker    Smokeless tobacco: Never Used   Vaping Use    Vaping Use: Never used   Substance and Sexual Activity    Alcohol use: Yes     Alcohol/week: 10.0 standard drinks     Types: 10 Shots of liquor per week    Drug use: No    Sexual activity: Not on file   Other Topics Concern    Not on file   Social History Narrative    Not on file     Social Determinants of Health     Financial Resource Strain: Low Risk     Difficulty of Paying Living Expenses: Not hard at all   Food Insecurity: No Food Insecurity    Worried About [address] [employer] in the Last Year: Never true    [address]  in the Last Year: Never true   Transportation Needs:     Lack of Transportation (Medical): Not on file    Lack of Transportation (Non-Medical):  Not on file   Physical Activity:     Days of Exercise per Week: Not on file    Minutes of Exercise per Session: Not on file   Stress:     Feeling of Stress : Not on file   Social Connections:     Frequency of Communication with Friends and Family: Not on file    Frequency of Social Gatherings with Friends and Family: Not on file    Attends Mandaeism Services: Not on file    Active Member of Clubs or Organizations: Not on file    Attends Club or Organization Meetings: Not on file    Marital Status: Not on file   Intimate Partner Violence:     Fear of Current or Ex-Partner: Not on file    Emotionally Abused: Not on file    Physically Abused: Not on file    Sexually Abused: Not on file   Housing Stability:     Unable to Pay for Housing in the Last Year: Not on file    Number of [place] in the Last Year: Not on file    Unstable Housing in the Last Year: Not on file       Family History   Problem Relation Age of Onset    Heart Attack Mother     Heart Attack Father         REVIEW OF SYSTEM:     Constitutional: No fever or chills. No night sweats, no weight loss   Eyes: No eye discharge, double vision, or eye pain   HEENT: negative for sore mouth, sore throat, hoarseness and voice change   Respiratory: negative for cough , sputum, dyspnea, wheezing, hemoptysis, chest pain   Cardiovascular: negative for chest pain, dyspnea, palpitations, orthopnea, PND   Gastrointestinal: negative for nausea, vomiting, diarrhea, constipation, abdominal pain, Dysphagia, hematemesis and hematochezia   Genitourinary: negative for frequency, dysuria, nocturia, urinary incontinence, and hematuria   Integument: negative for rash, skin lesions, bruises.    Hematologic/Lymphatic: negative for easy bruising, bleeding, lymphadenopathy, petechiae and swelling/edema   Endocrine: negative for heat or cold intolerance, tremor, weight changes, change in bowel habits and hair loss   Musculoskeletal: negative for myalgias, arthralgias, pain, joint swelling,and bone pain   Neurological: negative for headaches, dizziness, seizures, weakness, numbness       OBJECTIVE: Vitals:    05/24/22 0900   BP: 115/67   Pulse: 53   Resp: 16   Temp: 97.2 °F (36.2 °C)       PHYSICAL EXAM:   General appearance - well appearing, no in pain or distress   Mental status - alert and cooperative   Eyes - pupils equal and reactive, extraocular eye movements intact   Ears - bilateral TM's and external ear canals normal   Mouth - mucous membranes moist, pharynx normal without lesions   Neck - supple, no significant adenopathy   Lymphatics - no palpable lymphadenopathy, no hepatosplenomegaly   Chest - clear to auscultation, no wheezes, rales or rhonchi, symmetric air entry   Heart - normal rate, regular rhythm, normal S1, S2, no murmurs, rubs, clicks or gallops   Abdomen - soft, nontender, nondistended, no masses or organomegaly   Neurological - alert, oriented, normal speech, no focal findings or movement disorder noted   Musculoskeletal - no joint tenderness, deformity or swelling   Extremities - peripheral pulses normal, no pedal edema, no clubbing or cyanosis   Skin - normal coloration and turgor, no rashes, no suspicious skin lesions noted ,      LABORATORY DATA:     Lab Results   Component Value Date    WBC 8.9 04/19/2022    HGB 13.8 04/19/2022    HCT 41.2 04/19/2022    MCV 93.5 04/19/2022     04/19/2022    LYMPHOPCT 11 (L) 04/19/2022    RBC 4.40 (L) 04/19/2022    MCH 31.4 04/19/2022    MCHC 33.6 04/19/2022    RDW 14.4 04/19/2022    NEUTOPHILPCT 72.9 07/23/2020    MONOPCT 12 (H) 04/19/2022    EOSPCT 0.2 07/23/2020    BASOPCT 1 04/19/2022    NEUTROABS 6.50 04/19/2022    LYMPHSABS 1.00 04/19/2022    MONOSABS 1.10 04/19/2022    EOSABS 0.30 04/19/2022    BASOSABS 0.10 04/19/2022         Chemistry        Component Value Date/Time     04/19/2022 1422    K 4.4 04/19/2022 1422     04/19/2022 1422    CO2 26 04/19/2022 1422    BUN 28 (H) 04/19/2022 1422    CREATININE 1.31 (H) 04/19/2022 1422        Component Value Date/Time    CALCIUM 9.3 04/19/2022 1422    ALKPHOS 182 (H) 04/19/2022 1422 AST 20 04/19/2022 1422    ALT 12 04/19/2022 1422    BILITOT 1.12 04/19/2022 1422            PATHOLOGY DATA:         IMAGING DATA:      PET  FULL BODY  Narrative: EXAMINATION:  WHOLE BODY PET/CT WITH LOWER EXTREMITIES. 4/25/2022    TECHNIQUE:  Following IV injection of 11.3 mCi of F 18 -FDG, PET  tumor imaging was  acquired from the top of the skull to the mid thighs. Then, a separate  acquisition of the lower extremities from mid thigh to the tip of the toes  was acquired. Computed tomography was used for purposes of attenuation  correction and anatomic localization. Fusion imaging was utilized for  interpretation. Body uptake time 56 mins. Glucose level 96 mg/dl. COMPARISON:  None    HISTORY:  ORDERING SYSTEM PROVIDED HISTORY: Extranodal marginal zone lymphoma of  mucosa-associated lymphoid tissue (MALT) of stomach (HCC)  TECHNOLOGIST PROVIDED HISTORY:  Reason for Exam: Extranodal marginal zone lymphoma of mucosa-associated  lymphoid tissue (MALT) of stomach (HCC) C88.4 (ICD-10-CM)    FINDINGS:  HEAD/NECK: No abnormal metabolic activity. No identifiable lymph nodes. CHEST: No abnormal metabolic activity. ABDOMEN/PELVIS: No abnormal metabolic activity identified. Diffuse low level  activity is present throughout the stomach and small bowel without CT  abnormality. No lymphadenopathy. Normal size spleen without abnormal  metabolic activity. BONES/SOFT TISSUE: No suspicious metabolic activity identified. Scattered  areas of nonspecific low level activity in the paraspinal soft tissues  shoulders and about the left knee arthroplasty without CT abnormality  appreciated. INCIDENTAL CT FINDINGS: Calcified atheromatous plaque and coronary  calcification. Sequela of old granulomatous disease. Anterolateral left  pleural lipoma. Diverticulosis. Status post prostatectomy. Impression: No suspicious metabolic activity or lymphadenopathy identified.        ASSESSMENT:       Diagnosis Orders 1. Extranodal marginal zone lymphoma of mucosa-associated lymphoid tissue (MALT) of stomach (HCC)  Echocardiogram complete        1. Gastric MALT lymphoma clinical stage I  2. Multiple comorbidity  3. Remote history of bladder cancer prostate cancer and recently angiosarcoma of the right cheek surgery done at [address] [place] [address],[address] with no evidence of recurrence    PLAN:     1. Reviewed lab work, imaging studies, outside records and discuss possible diagnosis and treatment recommendations  2. Patient had stage I gastric MALT lymphoma based on the imaging with no lymphadenopathy, explained to the patient that this is indolent lymphoma and the standard of care would be observation/surveillance(for stage II or above) unless had symptoms or indication for treatment but with stage I there is a option of cure with ISRT(local radiation) or rituximab if ISRT is contraindicated, patient met with radiation oncology and did decide not to proceed with radiation treatment,  3. Discussed with the patient about the option of single agent Rituxan given weekly for 4 weeks we discussed the side effects of Rituxan and also we discussed observation given his age and comorbidity, patient would like to review the literature about his condition also to evaluate his heart condition as he was told he had severe cardiomyopathy and need for cardiac transplant  4. NCCN guideline was discussed with the patient and his wife  5. We will order 2D echo  6. Reevaluate patient's in a few weeks  7. With history of similar cancer may be eligible for genetic study           I spent a total of 40 minutes on the date of the service which included preparing to see the patient, face-to-face patient care, completing clinical documentation, obtaining and/or reviewing separately obtained history, performing a medically appropriate examination, counseling and educating the patient/family/caregiver and ordering medications, tests, or procedures.      Patient's conditions were taken into consideration when deciding risk-benefit for therapy. Patient is at high risk for drug interaction risk of complications. Given multiple comorbid conditions patient is at high risk for complication from intervention, risk of hospitalization, medical interaction overall life expectancy. NCCN guidelines were reviewed and discussed with the patient. The diagnosis and care plan were discussed with the patient in detail. I discussed the natural history of the disease, prognosis, risks and goals of therapy and answered all the patients questions to the best of my ability. Patient expressed understanding and was in agreement. Thank you for the consult we will follow the patient with you                                                [facility]                            This note is created with the assistance of a speech recognition program.  While intending to generate a document that actually reflects the content of the visit, the document can still have some errors including those of syntax and sound a like substitutions which may escape proof reading. It such instances, actual meaning can be extrapolated by contextual diversion.

## 2024-12-03 RX ORDER — ALBUTEROL SULFATE 90 UG/1
4 INHALANT RESPIRATORY (INHALATION) PRN
OUTPATIENT
Start: 2024-12-05

## 2024-12-03 RX ORDER — ACETAMINOPHEN 325 MG/1
650 TABLET ORAL
OUTPATIENT
Start: 2024-12-05

## 2024-12-03 RX ORDER — SODIUM CHLORIDE 9 MG/ML
5-250 INJECTION, SOLUTION INTRAVENOUS PRN
OUTPATIENT
Start: 2024-12-05

## 2024-12-03 RX ORDER — EPINEPHRINE 1 MG/ML
0.3 INJECTION, SOLUTION, CONCENTRATE INTRAVENOUS PRN
OUTPATIENT
Start: 2024-12-05

## 2024-12-03 RX ORDER — HEPARIN 100 UNIT/ML
500 SYRINGE INTRAVENOUS PRN
OUTPATIENT
Start: 2024-12-05

## 2024-12-03 RX ORDER — HYDROCORTISONE SODIUM SUCCINATE 100 MG/2ML
100 INJECTION INTRAMUSCULAR; INTRAVENOUS
OUTPATIENT
Start: 2024-12-05

## 2024-12-03 RX ORDER — DIPHENHYDRAMINE HYDROCHLORIDE 50 MG/ML
50 INJECTION INTRAMUSCULAR; INTRAVENOUS
OUTPATIENT
Start: 2024-12-05

## 2024-12-03 RX ORDER — SODIUM CHLORIDE 9 MG/ML
INJECTION, SOLUTION INTRAVENOUS CONTINUOUS
OUTPATIENT
Start: 2024-12-05

## 2024-12-03 RX ORDER — SODIUM CHLORIDE 0.9 % (FLUSH) 0.9 %
5-40 SYRINGE (ML) INJECTION PRN
OUTPATIENT
Start: 2024-12-05

## 2024-12-03 RX ORDER — ONDANSETRON 2 MG/ML
8 INJECTION INTRAMUSCULAR; INTRAVENOUS
OUTPATIENT
Start: 2024-12-05

## 2024-12-03 RX ORDER — FAMOTIDINE 10 MG/ML
20 INJECTION, SOLUTION INTRAVENOUS
OUTPATIENT
Start: 2024-12-05

## 2024-12-03 RX ORDER — MEPERIDINE HYDROCHLORIDE 25 MG/ML
12.5 INJECTION INTRAMUSCULAR; INTRAVENOUS; SUBCUTANEOUS PRN
OUTPATIENT
Start: 2024-12-05

## 2024-12-03 RX ORDER — ZOLEDRONIC ACID 0.04 MG/ML
4 INJECTION, SOLUTION INTRAVENOUS ONCE
OUTPATIENT
Start: 2024-12-05 | End: 2024-12-05

## 2024-12-04 ENCOUNTER — TELEPHONE (OUTPATIENT)
Dept: INFUSION THERAPY | Age: 69
End: 2024-12-04

## 2024-12-05 ENCOUNTER — OFFICE VISIT (OUTPATIENT)
Dept: ONCOLOGY | Age: 69
End: 2024-12-05
Payer: MEDICARE

## 2024-12-05 ENCOUNTER — CLINICAL DOCUMENTATION (OUTPATIENT)
Dept: ONCOLOGY | Age: 69
End: 2024-12-05

## 2024-12-05 ENCOUNTER — HOSPITAL ENCOUNTER (OUTPATIENT)
Dept: INFUSION THERAPY | Age: 69
Discharge: HOME | End: 2024-12-05
Payer: MEDICARE

## 2024-12-05 ENCOUNTER — APPOINTMENT (OUTPATIENT)
Dept: INFUSION THERAPY | Age: 69
End: 2024-12-05
Payer: MEDICARE

## 2024-12-05 VITALS
HEIGHT: 64 IN | HEART RATE: 99 BPM | WEIGHT: 156.6 LBS | BODY MASS INDEX: 26.73 KG/M2 | OXYGEN SATURATION: 100 % | SYSTOLIC BLOOD PRESSURE: 136 MMHG | TEMPERATURE: 97.3 F | DIASTOLIC BLOOD PRESSURE: 97 MMHG

## 2024-12-05 DIAGNOSIS — Z17.0 MALIGNANT NEOPLASM OF OVERLAPPING SITES OF BOTH BREASTS IN FEMALE, ESTROGEN RECEPTOR POSITIVE (HCC): ICD-10-CM

## 2024-12-05 DIAGNOSIS — C50.811 MALIGNANT NEOPLASM OF OVERLAPPING SITES OF BOTH BREASTS IN FEMALE, ESTROGEN RECEPTOR POSITIVE (HCC): Primary | ICD-10-CM

## 2024-12-05 DIAGNOSIS — C50.812 MALIGNANT NEOPLASM OF OVERLAPPING SITES OF BOTH BREASTS IN FEMALE, ESTROGEN RECEPTOR POSITIVE (HCC): ICD-10-CM

## 2024-12-05 DIAGNOSIS — C50.812 MALIGNANT NEOPLASM OF OVERLAPPING SITES OF BOTH BREASTS IN FEMALE, ESTROGEN RECEPTOR POSITIVE (HCC): Primary | ICD-10-CM

## 2024-12-05 DIAGNOSIS — C79.51 MALIGNANT NEOPLASM METASTATIC TO BONE (HCC): ICD-10-CM

## 2024-12-05 DIAGNOSIS — Z11.59 NEED FOR HEPATITIS B SCREENING TEST: Primary | ICD-10-CM

## 2024-12-05 DIAGNOSIS — Z11.59 ENCOUNTER FOR SCREENING FOR OTHER VIRAL DISEASES: ICD-10-CM

## 2024-12-05 DIAGNOSIS — C50.811 MALIGNANT NEOPLASM OF OVERLAPPING SITES OF BOTH BREASTS IN FEMALE, ESTROGEN RECEPTOR POSITIVE (HCC): ICD-10-CM

## 2024-12-05 DIAGNOSIS — Z17.0 MALIGNANT NEOPLASM OF OVERLAPPING SITES OF BOTH BREASTS IN FEMALE, ESTROGEN RECEPTOR POSITIVE (HCC): Primary | ICD-10-CM

## 2024-12-05 PROCEDURE — 1126F AMNT PAIN NOTED NONE PRSNT: CPT | Performed by: INTERNAL MEDICINE

## 2024-12-05 PROCEDURE — 99214 OFFICE O/P EST MOD 30 MIN: CPT | Performed by: INTERNAL MEDICINE

## 2024-12-05 PROCEDURE — 1124F ACP DISCUSS-NO DSCNMKR DOCD: CPT | Performed by: INTERNAL MEDICINE

## 2024-12-05 PROCEDURE — 1159F MED LIST DOCD IN RCRD: CPT | Performed by: INTERNAL MEDICINE

## 2024-12-05 PROCEDURE — 99211 OFF/OP EST MAY X REQ PHY/QHP: CPT

## 2024-12-05 RX ORDER — FAMOTIDINE 10 MG/ML
20 INJECTION, SOLUTION INTRAVENOUS
OUTPATIENT
Start: 2024-12-05

## 2024-12-05 RX ORDER — FAMOTIDINE 10 MG/ML
20 INJECTION, SOLUTION INTRAVENOUS
OUTPATIENT
Start: 2024-12-19

## 2024-12-05 RX ORDER — ONDANSETRON 2 MG/ML
8 INJECTION INTRAMUSCULAR; INTRAVENOUS
OUTPATIENT
Start: 2024-12-19

## 2024-12-05 RX ORDER — ALBUTEROL SULFATE 90 UG/1
4 INHALANT RESPIRATORY (INHALATION) PRN
OUTPATIENT
Start: 2024-12-19

## 2024-12-05 RX ORDER — DIPHENHYDRAMINE HYDROCHLORIDE 50 MG/ML
50 INJECTION INTRAMUSCULAR; INTRAVENOUS
OUTPATIENT
Start: 2024-12-05

## 2024-12-05 RX ORDER — DIPHENHYDRAMINE HYDROCHLORIDE 50 MG/ML
50 INJECTION INTRAMUSCULAR; INTRAVENOUS
OUTPATIENT
Start: 2024-12-19

## 2024-12-05 RX ORDER — HYDROCORTISONE SODIUM SUCCINATE 100 MG/2ML
100 INJECTION INTRAMUSCULAR; INTRAVENOUS
OUTPATIENT
Start: 2024-12-19

## 2024-12-05 RX ORDER — HYDROCORTISONE SODIUM SUCCINATE 100 MG/2ML
100 INJECTION INTRAMUSCULAR; INTRAVENOUS
OUTPATIENT
Start: 2024-12-05

## 2024-12-05 RX ORDER — LAMOTRIGINE 25 MG/1
500 TABLET ORAL ONCE
OUTPATIENT
Start: 2024-12-05 | End: 2024-12-05

## 2024-12-05 RX ORDER — ALBUTEROL SULFATE 90 UG/1
4 INHALANT RESPIRATORY (INHALATION) PRN
OUTPATIENT
Start: 2024-12-05

## 2024-12-05 RX ORDER — LAMOTRIGINE 25 MG/1
500 TABLET ORAL ONCE
OUTPATIENT
Start: 2024-12-19 | End: 2024-12-19

## 2024-12-05 RX ORDER — ACETAMINOPHEN 325 MG/1
650 TABLET ORAL
OUTPATIENT
Start: 2024-12-19

## 2024-12-05 RX ORDER — ACETAMINOPHEN 325 MG/1
650 TABLET ORAL
OUTPATIENT
Start: 2024-12-05

## 2024-12-05 RX ORDER — SODIUM CHLORIDE 9 MG/ML
INJECTION, SOLUTION INTRAVENOUS CONTINUOUS
OUTPATIENT
Start: 2024-12-19

## 2024-12-05 RX ORDER — MEPERIDINE HYDROCHLORIDE 50 MG/ML
12.5 INJECTION INTRAMUSCULAR; INTRAVENOUS; SUBCUTANEOUS PRN
OUTPATIENT
Start: 2024-12-05

## 2024-12-05 RX ORDER — PALBOCICLIB 125 MG/1
TABLET, FILM COATED ORAL
Qty: 21 TABLET | Refills: 5 | Status: ACTIVE | OUTPATIENT
Start: 2024-12-05

## 2024-12-05 RX ORDER — SODIUM CHLORIDE 9 MG/ML
INJECTION, SOLUTION INTRAVENOUS CONTINUOUS
OUTPATIENT
Start: 2024-12-05

## 2024-12-05 RX ORDER — MEPERIDINE HYDROCHLORIDE 50 MG/ML
12.5 INJECTION INTRAMUSCULAR; INTRAVENOUS; SUBCUTANEOUS PRN
OUTPATIENT
Start: 2024-12-19

## 2024-12-05 RX ORDER — EPINEPHRINE 1 MG/ML
0.3 INJECTION, SOLUTION, CONCENTRATE INTRAVENOUS PRN
OUTPATIENT
Start: 2024-12-19

## 2024-12-05 RX ORDER — EPINEPHRINE 1 MG/ML
0.3 INJECTION, SOLUTION, CONCENTRATE INTRAVENOUS PRN
OUTPATIENT
Start: 2024-12-05

## 2024-12-05 RX ORDER — ONDANSETRON 2 MG/ML
8 INJECTION INTRAMUSCULAR; INTRAVENOUS
OUTPATIENT
Start: 2024-12-05

## 2024-12-05 NOTE — PROGRESS NOTES
Physician recommending to restart ibrance 125 mg daily for 21 days on then 7 days off. Sample provided for patient to start taking. RX for ibrance e-scribed to [facility]. [facility] aware to add plan for faslodex. Physician requesting that patient start as soon as possible. Awaiting auth. NN referral placed.

## 2024-12-05 NOTE — PROGRESS NOTES
MA Rooming Questions  Patient: Aileen Cohn  MRN: [id number]    Date: 12/5/2024        1. Do you have any new issues?   no         2. Do you need any refills on medications?    no    3. Have you had any imaging done since your last visit?   no    4. Have you been hospitalized or seen in the emergency room since your last visit here?   no    5. Did the patient have a depression screening completed today? No    No data recorded     PHQ-9 Given to (if applicable):               PHQ-9 Score (if applicable):                     [] Positive     []  Negative              Does question #9 need addressed (if applicable)                     [] Yes    []  No               Kelly Rolon MA    
stable appearance of the bone scan with evidence of multifocal skeletal metastatic disease. No new uptake to suggest new skeletal metastases.    CT chest, abdomen, pelvis on 12/20/23 showed no evidence of disease progression within the chest. Findings consistent with progression of metastatic disease within the liver with at least 3 new ill-defined hepatic hypodensities and interval increase in size in previously visualized hypodensity within the hepatic dome now measuring up to 7.2 cm. Diffuse mixed sclerotic and lytic lesions throughout the visualized osseous structures.  These appear unchanged compared to prior exam.    She underwent CT guided biopsy of liver lesion on 1/15/24 and pathology showed metastatic adenocarcinoma consistent with a breast primary. ER positive (>95%), MO positive (85%) and Her2 by IHC negative (score 1+).     CT chest, abdomen, pelvis on 4/16/24 showed stable osseous metastatic disease. No evidence of disease progression in the chest.Partial treatment response to hepatic metastatic disease. Decrease in size in retroperitoneal lymphadenopathy. No evidence of disease progression in the abdomen or pelvis.    CT chest, abdomen, pelvis on 9/3/24 showed widespread chronic osseous metastases without significant change. Progression of metastatic disease within the liver with interval increase size of dominant lesion in the right left lobe junction measuring 8.7 x 4.3 cm. Chronic 1.4 cm splenic artery aneurysm. No evidence of aneurysm rupture. Right central venous port in good position. No suspicious pulmonary nodules. No infiltrates.    Bone scan on 9/3/24 showed no change from 12/18/23.     PET/CT on 11/6/24 showed mildly metabolic left axillary node with fatty hilum. This is a nonspecific finding. However, given patient history, malignancy cannot be excluded. If clinically warranted, this is amenable to biopsy. Otherwise recommend monitoring on subsequent imaging to assess stability. The

## 2024-12-06 ENCOUNTER — CLINICAL DOCUMENTATION (OUTPATIENT)
Dept: ONCOLOGY | Age: 69
End: 2024-12-06

## 2024-12-06 NOTE — PROGRESS NOTES
Patient Assistance    Met with Aileen today. The  Put her back on her Ibrance oral medication.  Her copay is 700.00 a month.  She has a Grovo yarely open for another week we can use.  She is over limit for free drug. The doctor did give her 30 day sample.  We are also filling this week with her Grovo yarely. This will give her 60 days of medication and I explained to her we will try to get her another yarely as soon as one opens up.  She is agreeable to this plan.     Kim Green  Financial Navigator    Navigator Type: Oral  Documentation Type: New Patient  Contact Type: In-person  Status of Patient Insurance Coverage: Patient has active coverage        Drug Name: OTHER  Other Drug Name: Ibrance

## 2024-12-10 ENCOUNTER — TELEPHONE (OUTPATIENT)
Dept: INFUSION THERAPY | Age: 69
End: 2024-12-10

## 2024-12-10 NOTE — TELEPHONE ENCOUNTER
Called patient regarding chemo ed and start date of tx, patient is scheduled for tx on 12/11  @ 0945, advised them that 1 visitor allowed at time of education and day of their treatments, patient states understanding

## 2024-12-11 ENCOUNTER — HOSPITAL ENCOUNTER (OUTPATIENT)
Dept: INFUSION THERAPY | Age: 69
Discharge: HOME | End: 2024-12-11
Payer: MEDICARE

## 2024-12-11 DIAGNOSIS — Z17.0 MALIGNANT NEOPLASM OF OVERLAPPING SITES OF BOTH BREASTS IN FEMALE, ESTROGEN RECEPTOR POSITIVE (HCC): Primary | ICD-10-CM

## 2024-12-11 DIAGNOSIS — C50.811 MALIGNANT NEOPLASM OF OVERLAPPING SITES OF BOTH BREASTS IN FEMALE, ESTROGEN RECEPTOR POSITIVE (HCC): Primary | ICD-10-CM

## 2024-12-11 DIAGNOSIS — C50.812 MALIGNANT NEOPLASM OF OVERLAPPING SITES OF BOTH BREASTS IN FEMALE, ESTROGEN RECEPTOR POSITIVE (HCC): Primary | ICD-10-CM

## 2024-12-11 PROCEDURE — 6360000002 HC RX W HCPCS: Performed by: INTERNAL MEDICINE

## 2024-12-11 PROCEDURE — 96402 CHEMO HORMON ANTINEOPL SQ/IM: CPT

## 2024-12-11 RX ORDER — LAMOTRIGINE 25 MG/1
500 TABLET ORAL ONCE
Status: COMPLETED | OUTPATIENT
Start: 2024-12-11 | End: 2024-12-11

## 2024-12-11 RX ADMIN — FULVESTRANT 500 MG: 50 INJECTION, SOLUTION INTRAMUSCULAR at 10:40

## 2024-12-11 NOTE — PROGRESS NOTES
To treatment suite for initial scheduled Faslodex injections. Plan of care reviewed, questions answered.  Pt confirmed with Flor Vasquez RN that she will stop taking Letrozole moving forward. Pt will receive loading dose of Faslodex today and in two weeks, followed by monthly injections.  Pt verbalized understanding.  Treatment plan approved, released and completed as ordered.  Injections given IM to right and left dorsogluteal areas, band aid applied at injection sites. Pt tolerated well.  AVS declined.  Discharge ambulatory in stable condition.  Kim Kimbrough RN

## 2024-12-17 ENCOUNTER — TELEPHONE (OUTPATIENT)
Dept: INFUSION THERAPY | Age: 69
End: 2024-12-17

## 2024-12-18 ENCOUNTER — HOSPITAL ENCOUNTER (OUTPATIENT)
Dept: INFUSION THERAPY | Age: 69
Discharge: HOME | End: 2024-12-18
Payer: MEDICARE

## 2024-12-18 DIAGNOSIS — C79.51 MALIGNANT NEOPLASM METASTATIC TO BONE (HCC): ICD-10-CM

## 2024-12-18 DIAGNOSIS — C50.812 MALIGNANT NEOPLASM OF OVERLAPPING SITES OF BOTH BREASTS IN FEMALE, ESTROGEN RECEPTOR POSITIVE (HCC): Primary | ICD-10-CM

## 2024-12-18 DIAGNOSIS — C50.812 MALIGNANT NEOPLASM OF OVERLAPPING SITES OF BOTH BREASTS IN FEMALE, ESTROGEN RECEPTOR POSITIVE (HCC): ICD-10-CM

## 2024-12-18 DIAGNOSIS — Z17.0 MALIGNANT NEOPLASM OF OVERLAPPING SITES OF BOTH BREASTS IN FEMALE, ESTROGEN RECEPTOR POSITIVE (HCC): Primary | ICD-10-CM

## 2024-12-18 DIAGNOSIS — C50.811 MALIGNANT NEOPLASM OF OVERLAPPING SITES OF BOTH BREASTS IN FEMALE, ESTROGEN RECEPTOR POSITIVE (HCC): Primary | ICD-10-CM

## 2024-12-18 DIAGNOSIS — Z17.0 MALIGNANT NEOPLASM OF OVERLAPPING SITES OF BOTH BREASTS IN FEMALE, ESTROGEN RECEPTOR POSITIVE (HCC): ICD-10-CM

## 2024-12-18 DIAGNOSIS — C50.811 MALIGNANT NEOPLASM OF OVERLAPPING SITES OF BOTH BREASTS IN FEMALE, ESTROGEN RECEPTOR POSITIVE (HCC): ICD-10-CM

## 2024-12-18 LAB
ANION GAP SERPL CALCULATED.3IONS-SCNC: 9 MMOL/L (ref 9–17)
BASOPHILS # BLD: 0.01 K/UL
BASOPHILS NFR BLD: 0 % (ref 0–1)
BUN SERPL-MCNC: 12 MG/DL (ref 7–20)
CALCIUM SERPL-MCNC: 9.4 MG/DL (ref 8.3–10.6)
CHLORIDE SERPL-SCNC: 105 MMOL/L (ref 99–110)
CO2 SERPL-SCNC: 26 MMOL/L (ref 21–32)
CREAT SERPL-MCNC: 0.8 MG/DL (ref 0.6–1.2)
EOSINOPHIL # BLD: 0.09 K/UL
EOSINOPHILS RELATIVE PERCENT: 3 % (ref 0–3)
ERYTHROCYTE [DISTWIDTH] IN BLOOD BY AUTOMATED COUNT: 13.6 % (ref 11.7–14.9)
GFR, ESTIMATED: 75 ML/MIN/1.73M2
GLUCOSE SERPL-MCNC: 85 MG/DL (ref 74–99)
HCT VFR BLD AUTO: 37.4 % (ref 37–47)
HGB BLD-MCNC: 12 G/DL (ref 12.5–16)
LYMPHOCYTES NFR BLD: 1.1 K/UL
LYMPHOCYTES RELATIVE PERCENT: 41 % (ref 24–44)
MAGNESIUM SERPL-MCNC: 2.1 MG/DL (ref 1.8–2.4)
MCH RBC QN AUTO: 32.5 PG (ref 27–31)
MCHC RBC AUTO-ENTMCNC: 32.1 G/DL (ref 32–36)
MCV RBC AUTO: 101.4 FL (ref 78–100)
MONOCYTES NFR BLD: 0.12 K/UL
MONOCYTES NFR BLD: 4 % (ref 0–4)
NEUTROPHILS NFR BLD: 51 % (ref 36–66)
NEUTS SEG NFR BLD: 1.38 K/UL
PHOSPHATE SERPL-MCNC: 3.5 MG/DL (ref 2.5–4.9)
PLATELET # BLD AUTO: 184 K/UL (ref 140–440)
PMV BLD AUTO: 8.8 FL (ref 7.5–11.1)
POTASSIUM SERPL-SCNC: 3.8 MMOL/L (ref 3.5–5.1)
RBC # BLD AUTO: 3.69 M/UL (ref 4.2–5.4)
SODIUM SERPL-SCNC: 139 MMOL/L (ref 136–145)
WBC OTHER # BLD: 2.7 K/UL (ref 4–10.5)

## 2024-12-18 PROCEDURE — 80048 BASIC METABOLIC PNL TOTAL CA: CPT

## 2024-12-18 PROCEDURE — 83735 ASSAY OF MAGNESIUM: CPT

## 2024-12-18 PROCEDURE — 84100 ASSAY OF PHOSPHORUS: CPT

## 2024-12-18 PROCEDURE — 36415 COLL VENOUS BLD VENIPUNCTURE: CPT

## 2024-12-18 PROCEDURE — 85025 COMPLETE CBC W/AUTO DIFF WBC: CPT

## 2024-12-19 ENCOUNTER — HOSPITAL ENCOUNTER (OUTPATIENT)
Dept: INFUSION THERAPY | Age: 69
Discharge: HOME | End: 2024-12-19
Payer: MEDICARE

## 2024-12-19 VITALS
HEART RATE: 83 BPM | OXYGEN SATURATION: 98 % | WEIGHT: 155 LBS | HEIGHT: 64 IN | SYSTOLIC BLOOD PRESSURE: 114 MMHG | BODY MASS INDEX: 26.46 KG/M2 | TEMPERATURE: 97.8 F | DIASTOLIC BLOOD PRESSURE: 61 MMHG

## 2024-12-19 DIAGNOSIS — C50.811 MALIGNANT NEOPLASM OF OVERLAPPING SITES OF BOTH BREASTS IN FEMALE, ESTROGEN RECEPTOR POSITIVE (HCC): Primary | ICD-10-CM

## 2024-12-19 DIAGNOSIS — C79.51 MALIGNANT NEOPLASM METASTATIC TO BONE (HCC): ICD-10-CM

## 2024-12-19 DIAGNOSIS — C50.812 MALIGNANT NEOPLASM OF OVERLAPPING SITES OF BOTH BREASTS IN FEMALE, ESTROGEN RECEPTOR POSITIVE (HCC): Primary | ICD-10-CM

## 2024-12-19 DIAGNOSIS — Z17.0 MALIGNANT NEOPLASM OF OVERLAPPING SITES OF BOTH BREASTS IN FEMALE, ESTROGEN RECEPTOR POSITIVE (HCC): Primary | ICD-10-CM

## 2024-12-19 PROCEDURE — 2500000003 HC RX 250 WO HCPCS: Performed by: INTERNAL MEDICINE

## 2024-12-19 PROCEDURE — 2580000003 HC RX 258: Performed by: INTERNAL MEDICINE

## 2024-12-19 PROCEDURE — 96365 THER/PROPH/DIAG IV INF INIT: CPT

## 2024-12-19 PROCEDURE — 6360000002 HC RX W HCPCS: Performed by: INTERNAL MEDICINE

## 2024-12-19 RX ORDER — SODIUM CHLORIDE 0.9 % (FLUSH) 0.9 %
5-40 SYRINGE (ML) INJECTION PRN
Status: DISCONTINUED | OUTPATIENT
Start: 2024-12-19 | End: 2024-12-20 | Stop reason: HOSPADM

## 2024-12-19 RX ADMIN — ZOLEDRONIC ACID 4 MG: 4 INJECTION, SOLUTION, CONCENTRATE INTRAVENOUS at 11:45

## 2024-12-19 RX ADMIN — SODIUM CHLORIDE, PRESERVATIVE FREE 30 ML: 5 INJECTION INTRAVENOUS at 12:08

## 2024-12-23 ENCOUNTER — TELEPHONE (OUTPATIENT)
Dept: INFUSION THERAPY | Age: 69
End: 2024-12-23

## 2024-12-23 NOTE — TELEPHONE ENCOUNTER
Called to see if pt could move faslodex 12/26 to 12/27 in the afternoon (or morning). Also to adv appt 1/8 moved to 1/9 w/ov. LM

## 2024-12-23 NOTE — TELEPHONE ENCOUNTER
Pt is okay with r/s 12/26 to 12/27 in the afternoon. Pt is also okay with moving 1/8 to 1/9 w/ov.

## 2024-12-27 ENCOUNTER — HOSPITAL ENCOUNTER (OUTPATIENT)
Dept: INFUSION THERAPY | Age: 69
Discharge: HOME | End: 2024-12-27
Payer: MEDICARE

## 2024-12-27 DIAGNOSIS — Z17.0 MALIGNANT NEOPLASM OF OVERLAPPING SITES OF BOTH BREASTS IN FEMALE, ESTROGEN RECEPTOR POSITIVE (HCC): Primary | ICD-10-CM

## 2024-12-27 DIAGNOSIS — C50.812 MALIGNANT NEOPLASM OF OVERLAPPING SITES OF BOTH BREASTS IN FEMALE, ESTROGEN RECEPTOR POSITIVE (HCC): Primary | ICD-10-CM

## 2024-12-27 DIAGNOSIS — C50.811 MALIGNANT NEOPLASM OF OVERLAPPING SITES OF BOTH BREASTS IN FEMALE, ESTROGEN RECEPTOR POSITIVE (HCC): Primary | ICD-10-CM

## 2024-12-27 PROCEDURE — 96402 CHEMO HORMON ANTINEOPL SQ/IM: CPT

## 2024-12-27 PROCEDURE — 6360000002 HC RX W HCPCS: Performed by: INTERNAL MEDICINE

## 2024-12-27 RX ORDER — LAMOTRIGINE 25 MG/1
500 TABLET ORAL ONCE
Status: COMPLETED | OUTPATIENT
Start: 2024-12-27 | End: 2024-12-27

## 2024-12-27 RX ADMIN — FULVESTRANT 500 MG: 50 INJECTION, SOLUTION INTRAMUSCULAR at 14:17

## 2024-12-27 NOTE — PROGRESS NOTES
Patient arrived to treatment suite for Faslodex injections.  Patient has no questions or concerns for the doctor at this time.  Treatment approved and given IM in bilateral dorsogluteal areas, band-aids applied.  Cold spray utilized for comfort.  Patient tolerated well.  Left treatment suite ambulatory.  Discharge instructions declined.

## 2025-01-09 ENCOUNTER — OFFICE VISIT (OUTPATIENT)
Dept: ONCOLOGY | Age: 70
End: 2025-01-09

## 2025-01-09 ENCOUNTER — HOSPITAL ENCOUNTER (OUTPATIENT)
Dept: INFUSION THERAPY | Age: 70
Discharge: HOME | End: 2025-01-09
Payer: MEDICARE

## 2025-01-09 VITALS
SYSTOLIC BLOOD PRESSURE: 128 MMHG | HEART RATE: 112 BPM | BODY MASS INDEX: 27.01 KG/M2 | OXYGEN SATURATION: 100 % | HEIGHT: 64 IN | WEIGHT: 158.2 LBS | TEMPERATURE: 97.5 F | DIASTOLIC BLOOD PRESSURE: 51 MMHG

## 2025-01-09 DIAGNOSIS — C50.812 MALIGNANT NEOPLASM OF OVERLAPPING SITES OF BOTH BREASTS IN FEMALE, ESTROGEN RECEPTOR POSITIVE (HCC): Primary | ICD-10-CM

## 2025-01-09 DIAGNOSIS — C50.811 MALIGNANT NEOPLASM OF OVERLAPPING SITES OF BOTH BREASTS IN FEMALE, ESTROGEN RECEPTOR POSITIVE (HCC): Primary | ICD-10-CM

## 2025-01-09 DIAGNOSIS — Z17.0 MALIGNANT NEOPLASM OF OVERLAPPING SITES OF BOTH BREASTS IN FEMALE, ESTROGEN RECEPTOR POSITIVE (HCC): Primary | ICD-10-CM

## 2025-01-09 LAB
ALBUMIN SERPL-MCNC: 4.1 G/DL (ref 3.4–5)
ALBUMIN/GLOB SERPL: 1.9 {RATIO} (ref 1.1–2.2)
ALP SERPL-CCNC: 72 U/L (ref 40–129)
ALT SERPL-CCNC: 11 U/L (ref 10–40)
ANION GAP SERPL CALCULATED.3IONS-SCNC: 11 MMOL/L (ref 9–17)
AST SERPL-CCNC: 17 U/L (ref 15–37)
BASOPHILS # BLD: 0.02 K/UL
BASOPHILS NFR BLD: 1 % (ref 0–1)
BILIRUB SERPL-MCNC: 0.5 MG/DL (ref 0–1)
BUN SERPL-MCNC: 10 MG/DL (ref 7–20)
CALCIUM SERPL-MCNC: 9.9 MG/DL (ref 8.3–10.6)
CHLORIDE SERPL-SCNC: 107 MMOL/L (ref 99–110)
CO2 SERPL-SCNC: 27 MMOL/L (ref 21–32)
CREAT SERPL-MCNC: 0.8 MG/DL (ref 0.6–1.2)
EOSINOPHIL # BLD: 0.02 K/UL
EOSINOPHILS RELATIVE PERCENT: 1 % (ref 0–3)
ERYTHROCYTE [DISTWIDTH] IN BLOOD BY AUTOMATED COUNT: 14.3 % (ref 11.7–14.9)
GFR, ESTIMATED: 75 ML/MIN/1.73M2
GLUCOSE SERPL-MCNC: 109 MG/DL (ref 74–99)
HCT VFR BLD AUTO: 34.3 % (ref 37–47)
HGB BLD-MCNC: 11.3 G/DL (ref 12.5–16)
LYMPHOCYTES NFR BLD: 0.9 K/UL
LYMPHOCYTES RELATIVE PERCENT: 25 % (ref 24–44)
MCH RBC QN AUTO: 33.3 PG (ref 27–31)
MCHC RBC AUTO-ENTMCNC: 32.9 G/DL (ref 32–36)
MCV RBC AUTO: 101.2 FL (ref 78–100)
MONOCYTES NFR BLD: 0.17 K/UL
MONOCYTES NFR BLD: 5 % (ref 0–4)
NEUTROPHILS NFR BLD: 69 % (ref 36–66)
NEUTS SEG NFR BLD: 2.47 K/UL
PLATELET # BLD AUTO: 298 K/UL (ref 140–440)
PMV BLD AUTO: 8.4 FL (ref 7.5–11.1)
POTASSIUM SERPL-SCNC: 3.6 MMOL/L (ref 3.5–5.1)
PROT SERPL-MCNC: 6.2 G/DL (ref 6.4–8.2)
RBC # BLD AUTO: 3.39 M/UL (ref 4.2–5.4)
SODIUM SERPL-SCNC: 145 MMOL/L (ref 136–145)
WBC OTHER # BLD: 3.6 K/UL (ref 4–10.5)

## 2025-01-09 PROCEDURE — 85025 COMPLETE CBC W/AUTO DIFF WBC: CPT

## 2025-01-09 PROCEDURE — 86300 IMMUNOASSAY TUMOR CA 15-3: CPT

## 2025-01-09 PROCEDURE — 6360000002 HC RX W HCPCS: Performed by: INTERNAL MEDICINE

## 2025-01-09 PROCEDURE — 96402 CHEMO HORMON ANTINEOPL SQ/IM: CPT

## 2025-01-09 PROCEDURE — 36415 COLL VENOUS BLD VENIPUNCTURE: CPT

## 2025-01-09 PROCEDURE — 80053 COMPREHEN METABOLIC PANEL: CPT

## 2025-01-09 RX ORDER — LAMOTRIGINE 25 MG/1
500 TABLET ORAL ONCE
Status: COMPLETED | OUTPATIENT
Start: 2025-01-09 | End: 2025-01-09

## 2025-01-09 RX ADMIN — FULVESTRANT 500 MG: 50 INJECTION, SOLUTION INTRAMUSCULAR at 14:05

## 2025-01-09 ASSESSMENT — PATIENT HEALTH QUESTIONNAIRE - PHQ9
1. LITTLE INTEREST OR PLEASURE IN DOING THINGS: NOT AT ALL
SUM OF ALL RESPONSES TO PHQ QUESTIONS 1-9: 0
2. FEELING DOWN, DEPRESSED OR HOPELESS: NOT AT ALL
SUM OF ALL RESPONSES TO PHQ QUESTIONS 1-9: 0
SUM OF ALL RESPONSES TO PHQ9 QUESTIONS 1 & 2: 0

## 2025-01-09 NOTE — PROGRESS NOTES
Patient arrived to treatment suite for Faslodex injections. Patient has no questions or concerns for the doctor at this time.  Treatment approved and given IM in bilateral dorsogluteal areas, band-aids applied.  Patient tolerated well.  Left treatment suite ambulatory.  Discharge instructions declined.

## 2025-01-09 NOTE — PROGRESS NOTES
MA Rooming Questions  Patient: Aileen Cohn  MRN: [id number]    Date: 1/9/2025        1. Do you have any new issues?   no         2. Do you need any refills on medications?    no    3. Have you had any imaging done since your last visit?   yes - LABS 12/18    4. Have you been hospitalized or seen in the emergency room since your last visit here?   no    5. Did the patient have a depression screening completed today? Yes    PHQ-9 Total Score: 0 (1/9/2025  1:27 PM)       PHQ-9 Given to (if applicable):               PHQ-9 Score (if applicable):                     [] Positive     []  Negative              Does question #9 need addressed (if applicable)                     [] Yes    []  No               Izabela Feng, CMA      
    Bone metastasis - we started zolendronic acid since 7/20/22. She only has minimal symptom and will not do vertebroplasty at this time. Since she has been on zometa for 1 year already, I changed it to every 12 weeks since 6/14/23.     Reviewed Caris molecular panel result with her. She had genetic counseling and testing on 7/19/22. It was negative.     Malignancy related pain - she was on norco before. She doesn't need it any more for long time.     Anxiety and insomnia - on ativan 0.5 mg nightly prn with improvement in her symptom. She is not requiring ativan since starting medical marijuana.     I answered all her questions and concerns for today. Recent imaging and labs were reviewed and discussed with the patient.     Time to complete visit: 31 minutes  This time includes: preparing to see the patient (review of tests/history), obtaining and/or reviewing separately obtained history, performing a medically appropriate evaluation, counseling and educating the patient and documenting clinical information in the electronic health record. This time also includes multiple disciplinary evaluation and management of cancer as documented.

## 2025-01-15 LAB — CANCER AG27-29 SERPL-ACNC: 27 U/ML (ref 0–38)

## 2025-01-22 RX ORDER — TRAZODONE HYDROCHLORIDE 50 MG/1
50 TABLET, FILM COATED ORAL NIGHTLY
Qty: 30 TABLET | Refills: 1 | Status: SHIPPED | OUTPATIENT
Start: 2025-01-22

## 2025-02-03 DIAGNOSIS — C50.811 MALIGNANT NEOPLASM OF OVERLAPPING SITES OF BOTH BREASTS IN FEMALE, ESTROGEN RECEPTOR POSITIVE (HCC): Primary | ICD-10-CM

## 2025-02-03 DIAGNOSIS — C50.812 MALIGNANT NEOPLASM OF OVERLAPPING SITES OF BOTH BREASTS IN FEMALE, ESTROGEN RECEPTOR POSITIVE (HCC): Primary | ICD-10-CM

## 2025-02-03 DIAGNOSIS — Z17.0 MALIGNANT NEOPLASM OF OVERLAPPING SITES OF BOTH BREASTS IN FEMALE, ESTROGEN RECEPTOR POSITIVE (HCC): Primary | ICD-10-CM

## 2025-02-03 RX ORDER — MEPERIDINE HYDROCHLORIDE 50 MG/ML
12.5 INJECTION INTRAMUSCULAR; INTRAVENOUS; SUBCUTANEOUS PRN
Status: CANCELLED | OUTPATIENT
Start: 2025-02-06

## 2025-02-03 RX ORDER — LAMOTRIGINE 25 MG/1
500 TABLET ORAL ONCE
Status: CANCELLED | OUTPATIENT
Start: 2025-02-06 | End: 2025-02-06

## 2025-02-03 RX ORDER — FAMOTIDINE 10 MG/ML
20 INJECTION, SOLUTION INTRAVENOUS
Status: CANCELLED | OUTPATIENT
Start: 2025-02-06

## 2025-02-03 RX ORDER — ONDANSETRON 2 MG/ML
8 INJECTION INTRAMUSCULAR; INTRAVENOUS
Status: CANCELLED | OUTPATIENT
Start: 2025-02-06

## 2025-02-03 RX ORDER — DIPHENHYDRAMINE HYDROCHLORIDE 50 MG/ML
50 INJECTION INTRAMUSCULAR; INTRAVENOUS
Status: CANCELLED | OUTPATIENT
Start: 2025-02-06

## 2025-02-03 RX ORDER — EPINEPHRINE 1 MG/ML
0.3 INJECTION, SOLUTION, CONCENTRATE INTRAVENOUS PRN
Status: CANCELLED | OUTPATIENT
Start: 2025-02-06

## 2025-02-03 RX ORDER — ACETAMINOPHEN 325 MG/1
650 TABLET ORAL
Status: CANCELLED | OUTPATIENT
Start: 2025-02-06

## 2025-02-03 RX ORDER — SODIUM CHLORIDE 9 MG/ML
INJECTION, SOLUTION INTRAVENOUS CONTINUOUS
Status: CANCELLED | OUTPATIENT
Start: 2025-02-06

## 2025-02-03 RX ORDER — ALBUTEROL SULFATE 90 UG/1
4 INHALANT RESPIRATORY (INHALATION) PRN
Status: CANCELLED | OUTPATIENT
Start: 2025-02-06

## 2025-02-03 RX ORDER — HYDROCORTISONE SODIUM SUCCINATE 100 MG/2ML
100 INJECTION INTRAMUSCULAR; INTRAVENOUS
Status: CANCELLED | OUTPATIENT
Start: 2025-02-06

## 2025-02-04 NOTE — PROGRESS NOTES
Ambulated to infusion area, here today for Zometa. No concerns at this time. Right chest mediport accessed, positive blood return noted. Labs reviewed, Labs within defined limits.  Zometa administered as ordered. Call light within reach. Tolerated infusion without incident.  RTC 12/26 for Faslodex injections   Pt in rapid Afib up to 150s. Pt asymptomatic, VS as documented. NO s/s of chest pain/SOB

## 2025-02-06 ENCOUNTER — OFFICE VISIT (OUTPATIENT)
Dept: ONCOLOGY | Age: 70
End: 2025-02-06
Payer: MEDICARE

## 2025-02-06 ENCOUNTER — HOSPITAL ENCOUNTER (OUTPATIENT)
Dept: INFUSION THERAPY | Age: 70
Discharge: HOME | End: 2025-02-06
Payer: MEDICARE

## 2025-02-06 VITALS
BODY MASS INDEX: 26.84 KG/M2 | SYSTOLIC BLOOD PRESSURE: 138 MMHG | OXYGEN SATURATION: 100 % | TEMPERATURE: 97.7 F | HEART RATE: 101 BPM | WEIGHT: 157.2 LBS | DIASTOLIC BLOOD PRESSURE: 87 MMHG | HEIGHT: 64 IN

## 2025-02-06 DIAGNOSIS — K59.09 OTHER CONSTIPATION: ICD-10-CM

## 2025-02-06 DIAGNOSIS — C78.7 SECONDARY MALIGNANT NEOPLASM OF LIVER AND INTRAHEPATIC BILE DUCT (HCC): ICD-10-CM

## 2025-02-06 DIAGNOSIS — C50.811 MALIGNANT NEOPLASM OF OVERLAPPING SITES OF BOTH BREASTS IN FEMALE, ESTROGEN RECEPTOR POSITIVE (HCC): Primary | ICD-10-CM

## 2025-02-06 DIAGNOSIS — C50.812 MALIGNANT NEOPLASM OF OVERLAPPING SITES OF BOTH BREASTS IN FEMALE, ESTROGEN RECEPTOR POSITIVE (HCC): Primary | ICD-10-CM

## 2025-02-06 DIAGNOSIS — Z17.0 MALIGNANT NEOPLASM OF OVERLAPPING SITES OF BOTH BREASTS IN FEMALE, ESTROGEN RECEPTOR POSITIVE (HCC): Primary | ICD-10-CM

## 2025-02-06 LAB
ALBUMIN SERPL-MCNC: 4.3 G/DL (ref 3.4–5)
ALBUMIN/GLOB SERPL: 1.8 {RATIO} (ref 1.1–2.2)
ALP SERPL-CCNC: 70 U/L (ref 40–129)
ALT SERPL-CCNC: 14 U/L (ref 10–40)
ANION GAP SERPL CALCULATED.3IONS-SCNC: 11 MMOL/L (ref 9–17)
AST SERPL-CCNC: 19 U/L (ref 15–37)
BASOPHILS # BLD: 0.08 K/UL
BASOPHILS NFR BLD: 3 % (ref 0–1)
BILIRUB SERPL-MCNC: 1 MG/DL (ref 0–1)
BUN SERPL-MCNC: 13 MG/DL (ref 7–20)
CALCIUM SERPL-MCNC: 9.6 MG/DL (ref 8.3–10.6)
CHLORIDE SERPL-SCNC: 106 MMOL/L (ref 99–110)
CO2 SERPL-SCNC: 25 MMOL/L (ref 21–32)
CREAT SERPL-MCNC: 0.7 MG/DL (ref 0.6–1.2)
EOSINOPHIL # BLD: 0.03 K/UL
EOSINOPHILS RELATIVE PERCENT: 1 % (ref 0–3)
ERYTHROCYTE [DISTWIDTH] IN BLOOD BY AUTOMATED COUNT: 16.3 % (ref 11.7–14.9)
GFR, ESTIMATED: 82 ML/MIN/1.73M2
GLUCOSE SERPL-MCNC: 90 MG/DL (ref 74–99)
HCT VFR BLD AUTO: 34.6 % (ref 37–47)
HGB BLD-MCNC: 11.4 G/DL (ref 12.5–16)
LYMPHOCYTES NFR BLD: 0.81 K/UL
LYMPHOCYTES RELATIVE PERCENT: 27 % (ref 24–44)
MCH RBC QN AUTO: 33.8 PG (ref 27–31)
MCHC RBC AUTO-ENTMCNC: 32.9 G/DL (ref 32–36)
MCV RBC AUTO: 102.7 FL (ref 78–100)
MONOCYTES NFR BLD: 0.16 K/UL
MONOCYTES NFR BLD: 5 % (ref 0–4)
NEUTROPHILS NFR BLD: 64 % (ref 36–66)
NEUTS SEG NFR BLD: 1.95 K/UL
PLATELET # BLD AUTO: 256 K/UL (ref 140–440)
PMV BLD AUTO: 8.3 FL (ref 7.5–11.1)
POTASSIUM SERPL-SCNC: 3.7 MMOL/L (ref 3.5–5.1)
PROT SERPL-MCNC: 6.7 G/DL (ref 6.4–8.2)
RBC # BLD AUTO: 3.37 M/UL (ref 4.2–5.4)
SODIUM SERPL-SCNC: 142 MMOL/L (ref 136–145)
WBC OTHER # BLD: 3 K/UL (ref 4–10.5)

## 2025-02-06 PROCEDURE — 6360000002 HC RX W HCPCS: Performed by: INTERNAL MEDICINE

## 2025-02-06 PROCEDURE — 86300 IMMUNOASSAY TUMOR CA 15-3: CPT

## 2025-02-06 PROCEDURE — 36415 COLL VENOUS BLD VENIPUNCTURE: CPT

## 2025-02-06 PROCEDURE — 1159F MED LIST DOCD IN RCRD: CPT | Performed by: INTERNAL MEDICINE

## 2025-02-06 PROCEDURE — 1124F ACP DISCUSS-NO DSCNMKR DOCD: CPT | Performed by: INTERNAL MEDICINE

## 2025-02-06 PROCEDURE — 80053 COMPREHEN METABOLIC PANEL: CPT

## 2025-02-06 PROCEDURE — 99214 OFFICE O/P EST MOD 30 MIN: CPT | Performed by: INTERNAL MEDICINE

## 2025-02-06 PROCEDURE — 1126F AMNT PAIN NOTED NONE PRSNT: CPT | Performed by: INTERNAL MEDICINE

## 2025-02-06 PROCEDURE — 96402 CHEMO HORMON ANTINEOPL SQ/IM: CPT

## 2025-02-06 PROCEDURE — 85025 COMPLETE CBC W/AUTO DIFF WBC: CPT

## 2025-02-06 RX ORDER — SENNOSIDES 8.6 MG
1 TABLET ORAL DAILY
Qty: 90 TABLET | Refills: 1 | Status: SHIPPED | OUTPATIENT
Start: 2025-02-06

## 2025-02-06 RX ORDER — LAMOTRIGINE 25 MG/1
500 TABLET ORAL ONCE
Status: COMPLETED | OUTPATIENT
Start: 2025-02-06 | End: 2025-02-06

## 2025-02-06 RX ADMIN — FULVESTRANT 500 MG: 50 INJECTION, SOLUTION INTRAMUSCULAR at 14:16

## 2025-02-06 NOTE — PROGRESS NOTES
Pt to tx suite for Faslodex injections after OV.  Pt has no concerns or issues to discuss at this time. Injections given IM to left and right dorsal gluteal areas. Pt tolerated without incident left ambulatory instructed to stop at check out desk to schedule next OV and receive discharge paperwork.

## 2025-02-06 NOTE — PROGRESS NOTES
Patient Name:  Aileen Cohn  Patient :  1955  Patient MRN:  [id number]     Primary Oncologist: Quincy Rudolph MD  Referring Provider: Gabrielle Cisneros APRN - CNP     Date of Service: 2025      Chief Complaint:    Chief Complaint   Patient presents with    Follow-up     Patient's active problem list:       Liver mass       Lytic bone lesions       Left breast mass    HPI:   Aileen Cohn is a 69 year-old very pleasant female with medical history significant for osteoarthritis, initially referred to me on 22 for evaluation of her liver lesion and multiple lytic bone lesions.     She initially presented to [facility] on 22 with severe lower back pain and constipation. Stated that she has been having back pain for about 4 - 6 weeks duration, which becomes severe pain started a week before presentation.     CT scan of the abdomen and pelvis done on 2022 showed lesion (6.1 x 8.1 cm) in the hepatic segment 4, concerning for neoplasm.  Further evaluation with MRI of the liver is recommended.  Extensive lytic metastatic disease in lumbar and thoracic spine and pelvis with most dominant lesion seen at L5 vertebral body.  Several small bowel loops segments in the left abdomen demonstrate mild wall thickening, nonspecific may indicate enteritis.  Nonspecific partially visualized inflammatory stranding along the pericardium.  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     CT lumbar spine done on 22 showed extensive multifocal lytic lesions concerning for neoplastic/metastatic disease.  Age indeterminant compression fractures at T12 and L4, with mild narrowing of the AP diameter of the canal at L4.    She never had colonoscopy before. She had pap smear around . She didn't recall when was her last mammogram.     She denies weight loss. She hasn't been eating much for about 10 days since she has been having nausea.     Since she is found to have multiple lytic bone lesions and liver lesion, she was referred to

## 2025-02-06 NOTE — PROGRESS NOTES
MA Rooming Questions  Patient: Aileen Cohn  MRN: [id number]    Date: 2/6/2025        1. Do you have any new issues?   no         2. Do you need any refills on medications?    yes - Senna     3. Have you had any imaging done since your last visit?   no    4. Have you been hospitalized or seen in the emergency room since your last visit here?   no    5. Did the patient have a depression screening completed today? No    No data recorded     PHQ-9 Given to (if applicable):               PHQ-9 Score (if applicable):                     [] Positive     []  Negative              Does question #9 need addressed (if applicable)                     [] Yes    []  No               Kalli Freitas MA

## 2025-02-10 DIAGNOSIS — Z17.0 MALIGNANT NEOPLASM OF OVERLAPPING SITES OF BOTH BREASTS IN FEMALE, ESTROGEN RECEPTOR POSITIVE (HCC): Primary | ICD-10-CM

## 2025-02-10 DIAGNOSIS — C50.811 MALIGNANT NEOPLASM OF OVERLAPPING SITES OF BOTH BREASTS IN FEMALE, ESTROGEN RECEPTOR POSITIVE (HCC): Primary | ICD-10-CM

## 2025-02-10 DIAGNOSIS — C50.812 MALIGNANT NEOPLASM OF OVERLAPPING SITES OF BOTH BREASTS IN FEMALE, ESTROGEN RECEPTOR POSITIVE (HCC): Primary | ICD-10-CM

## 2025-02-11 DIAGNOSIS — K59.09 OTHER CONSTIPATION: ICD-10-CM

## 2025-02-11 RX ORDER — SENNOSIDES 8.6 MG
1 TABLET ORAL DAILY
Qty: 90 TABLET | Refills: 1 | OUTPATIENT
Start: 2025-02-11

## 2025-02-19 LAB — CANCER AG27-29 SERPL-ACNC: 30 U/ML (ref 0–38)

## 2025-03-03 DIAGNOSIS — C50.812 MALIGNANT NEOPLASM OF OVERLAPPING SITES OF BOTH BREASTS IN FEMALE, ESTROGEN RECEPTOR POSITIVE (HCC): Primary | ICD-10-CM

## 2025-03-03 DIAGNOSIS — Z17.0 MALIGNANT NEOPLASM OF OVERLAPPING SITES OF BOTH BREASTS IN FEMALE, ESTROGEN RECEPTOR POSITIVE (HCC): Primary | ICD-10-CM

## 2025-03-03 DIAGNOSIS — C50.811 MALIGNANT NEOPLASM OF OVERLAPPING SITES OF BOTH BREASTS IN FEMALE, ESTROGEN RECEPTOR POSITIVE (HCC): Primary | ICD-10-CM

## 2025-03-03 RX ORDER — FAMOTIDINE 10 MG/ML
20 INJECTION, SOLUTION INTRAVENOUS
Status: CANCELLED | OUTPATIENT
Start: 2025-03-06

## 2025-03-03 RX ORDER — DIPHENHYDRAMINE HYDROCHLORIDE 50 MG/ML
50 INJECTION INTRAMUSCULAR; INTRAVENOUS
Status: CANCELLED | OUTPATIENT
Start: 2025-03-06

## 2025-03-03 RX ORDER — HYDROCORTISONE SODIUM SUCCINATE 100 MG/2ML
100 INJECTION INTRAMUSCULAR; INTRAVENOUS
Status: CANCELLED | OUTPATIENT
Start: 2025-03-06

## 2025-03-03 RX ORDER — ACETAMINOPHEN 325 MG/1
650 TABLET ORAL
Status: CANCELLED | OUTPATIENT
Start: 2025-03-06

## 2025-03-03 RX ORDER — ONDANSETRON 2 MG/ML
8 INJECTION INTRAMUSCULAR; INTRAVENOUS
Status: CANCELLED | OUTPATIENT
Start: 2025-03-06

## 2025-03-03 RX ORDER — MEPERIDINE HYDROCHLORIDE 50 MG/ML
12.5 INJECTION INTRAMUSCULAR; INTRAVENOUS; SUBCUTANEOUS PRN
Status: CANCELLED | OUTPATIENT
Start: 2025-03-06

## 2025-03-03 RX ORDER — LAMOTRIGINE 25 MG/1
500 TABLET ORAL ONCE
Status: CANCELLED | OUTPATIENT
Start: 2025-03-06 | End: 2025-03-06

## 2025-03-03 RX ORDER — ALBUTEROL SULFATE 90 UG/1
4 INHALANT RESPIRATORY (INHALATION) PRN
Status: CANCELLED | OUTPATIENT
Start: 2025-03-06

## 2025-03-03 RX ORDER — EPINEPHRINE 1 MG/ML
0.3 INJECTION, SOLUTION, CONCENTRATE INTRAVENOUS PRN
Status: CANCELLED | OUTPATIENT
Start: 2025-03-06

## 2025-03-03 RX ORDER — SODIUM CHLORIDE 9 MG/ML
INJECTION, SOLUTION INTRAVENOUS CONTINUOUS
Status: CANCELLED | OUTPATIENT
Start: 2025-03-06

## 2025-03-04 ENCOUNTER — HOSPITAL ENCOUNTER (OUTPATIENT)
Dept: INFUSION THERAPY | Age: 70
Discharge: HOME | End: 2025-03-04
Payer: MEDICARE

## 2025-03-04 ENCOUNTER — OFFICE VISIT (OUTPATIENT)
Dept: ONCOLOGY | Age: 70
End: 2025-03-04
Payer: MEDICARE

## 2025-03-04 VITALS
BODY MASS INDEX: 26.98 KG/M2 | DIASTOLIC BLOOD PRESSURE: 70 MMHG | HEIGHT: 64 IN | HEART RATE: 95 BPM | OXYGEN SATURATION: 100 % | SYSTOLIC BLOOD PRESSURE: 133 MMHG | TEMPERATURE: 97.9 F

## 2025-03-04 DIAGNOSIS — C50.811 MALIGNANT NEOPLASM OF OVERLAPPING SITES OF BOTH BREASTS IN FEMALE, ESTROGEN RECEPTOR POSITIVE (HCC): Primary | ICD-10-CM

## 2025-03-04 DIAGNOSIS — Z17.0 MALIGNANT NEOPLASM OF OVERLAPPING SITES OF BOTH BREASTS IN FEMALE, ESTROGEN RECEPTOR POSITIVE (HCC): Primary | ICD-10-CM

## 2025-03-04 DIAGNOSIS — C50.812 MALIGNANT NEOPLASM OF OVERLAPPING SITES OF BOTH BREASTS IN FEMALE, ESTROGEN RECEPTOR POSITIVE (HCC): Primary | ICD-10-CM

## 2025-03-04 PROCEDURE — 99212 OFFICE O/P EST SF 10 MIN: CPT

## 2025-03-04 PROCEDURE — 99401 PREV MED CNSL INDIV APPRX 15: CPT | Performed by: INTERNAL MEDICINE

## 2025-03-04 PROCEDURE — 1159F MED LIST DOCD IN RCRD: CPT | Performed by: INTERNAL MEDICINE

## 2025-03-04 PROCEDURE — 1126F AMNT PAIN NOTED NONE PRSNT: CPT | Performed by: INTERNAL MEDICINE

## 2025-03-04 NOTE — PROGRESS NOTES
Palliative Care Assessment    Medical Oncology History:    June 2022 who was evaluated for severe back pain.  CT abdomen and pelvis June 12, 2022 8 cm mass involving liver, extensive lytic metastatic lesions involving the lumbar and thoracic spine and pelvis.  Mammogram June 17, 2022 right breast mass.  CT chest June 20, 2022 size liver lesion breast mass also noted axillary and subpectoral metastatic disease.  She also underwent bone scan and MRI of abdomen and finally biopsy of the right breast pathology revealing invasive ductal and lobular carcinoma, positive estrogen and progesterone receptors.  Because of the visceral disease started on single agent paclitaxel on July 20, 2022.  CT scan of the chest abdomen and pelvis done on November 14, 2022 revealed overall improvement.  January 2023 treatment changed to Ibrance and letrozole.  Studies done in April 2023 had shown a stable or improving changes.  CT chest abdomen pelvis, December 20, 2023 suggestive of progression of metastatic disease within the liver at least 3 new lesions.  CT-guided biopsy January 15, 2024 metastatic disease from breast, ER positive, MI positive HER2 1+.  Letrozole and Ibrance was stopped and February 5, 2024 started on Enhertu.  Since CT done on April 16, 2024 showed very good response to the treatments liver lesion had decreased in size.  Subsequently she was advised and received SBRT to her liver lesions from June 27, 2024 to July 3, 2024.  August 21, 2024 but still being treated with Enhertu PET CT scan November, 2024 mildly metabolically active left axillary node which overall was felt to be reactive and as there was no other active lesions her treatment in December 2024 was changed to Faslodex and Ibrance    Other Medical Problems:   Basically healthy except for osteoarthritis and having her hip replacement about 2 years prior to this diagnosis     Personal History     A. Life Time:   Moved to [place] in early age from [place].

## 2025-03-04 NOTE — PROGRESS NOTES
MA Rooming Questions  Patient: Aileen Cohn  MRN: [id number]    Date: 3/4/2025        1. Do you have any new issues?   no         2. Do you need any refills on medications?    no    3. Have you had any imaging done since your last visit?   yes - labs 2/6    4. Have you been hospitalized or seen in the emergency room since your last visit here?   no    5. Did the patient have a depression screening completed today? No    No data recorded     PHQ-9 Given to (if applicable):               PHQ-9 Score (if applicable):                     [] Positive     []  Negative              Does question #9 need addressed (if applicable)                     [] Yes    []  No               Izabela Feng CMA

## 2025-03-06 ENCOUNTER — HOSPITAL ENCOUNTER (OUTPATIENT)
Dept: INFUSION THERAPY | Age: 70
Discharge: HOME | End: 2025-03-06
Payer: MEDICARE

## 2025-03-06 DIAGNOSIS — C50.812 MALIGNANT NEOPLASM OF OVERLAPPING SITES OF BOTH BREASTS IN FEMALE, ESTROGEN RECEPTOR POSITIVE (HCC): Primary | ICD-10-CM

## 2025-03-06 DIAGNOSIS — C50.811 MALIGNANT NEOPLASM OF OVERLAPPING SITES OF BOTH BREASTS IN FEMALE, ESTROGEN RECEPTOR POSITIVE (HCC): Primary | ICD-10-CM

## 2025-03-06 DIAGNOSIS — Z17.0 MALIGNANT NEOPLASM OF OVERLAPPING SITES OF BOTH BREASTS IN FEMALE, ESTROGEN RECEPTOR POSITIVE (HCC): Primary | ICD-10-CM

## 2025-03-06 PROCEDURE — 6360000002 HC RX W HCPCS: Performed by: INTERNAL MEDICINE

## 2025-03-06 PROCEDURE — 96402 CHEMO HORMON ANTINEOPL SQ/IM: CPT

## 2025-03-06 RX ORDER — LAMOTRIGINE 25 MG/1
500 TABLET ORAL ONCE
Status: COMPLETED | OUTPATIENT
Start: 2025-03-06 | End: 2025-03-06

## 2025-03-06 RX ADMIN — FULVESTRANT 500 MG: 50 INJECTION, SOLUTION INTRAMUSCULAR at 14:05

## 2025-03-06 NOTE — PROGRESS NOTES
Pt to tx suite for Faslodex injections. Pt has no concerns or issues to discuss at this time. Injections given IM to left and right dorsal gluteal areas. Pt tolerated without incident left ambulatory declined discharge instructions

## 2025-03-10 RX ORDER — SODIUM CHLORIDE 9 MG/ML
INJECTION, SOLUTION INTRAVENOUS CONTINUOUS
OUTPATIENT
Start: 2025-03-13

## 2025-03-10 RX ORDER — SODIUM CHLORIDE 9 MG/ML
5-250 INJECTION, SOLUTION INTRAVENOUS PRN
OUTPATIENT
Start: 2025-03-13

## 2025-03-10 RX ORDER — ONDANSETRON 2 MG/ML
8 INJECTION INTRAMUSCULAR; INTRAVENOUS
OUTPATIENT
Start: 2025-03-13

## 2025-03-10 RX ORDER — HEPARIN 100 UNIT/ML
500 SYRINGE INTRAVENOUS PRN
OUTPATIENT
Start: 2025-03-13

## 2025-03-10 RX ORDER — HYDROCORTISONE SODIUM SUCCINATE 100 MG/2ML
100 INJECTION INTRAMUSCULAR; INTRAVENOUS
OUTPATIENT
Start: 2025-03-13

## 2025-03-10 RX ORDER — DIPHENHYDRAMINE HYDROCHLORIDE 50 MG/ML
50 INJECTION, SOLUTION INTRAMUSCULAR; INTRAVENOUS
OUTPATIENT
Start: 2025-03-13

## 2025-03-10 RX ORDER — FAMOTIDINE 10 MG/ML
20 INJECTION, SOLUTION INTRAVENOUS
OUTPATIENT
Start: 2025-03-13

## 2025-03-10 RX ORDER — ALBUTEROL SULFATE 90 UG/1
4 INHALANT RESPIRATORY (INHALATION) PRN
OUTPATIENT
Start: 2025-03-13

## 2025-03-10 RX ORDER — MEPERIDINE HYDROCHLORIDE 25 MG/ML
12.5 INJECTION INTRAMUSCULAR; INTRAVENOUS; SUBCUTANEOUS PRN
OUTPATIENT
Start: 2025-03-13

## 2025-03-10 RX ORDER — EPINEPHRINE 1 MG/ML
0.3 INJECTION, SOLUTION, CONCENTRATE INTRAVENOUS PRN
OUTPATIENT
Start: 2025-03-13

## 2025-03-10 RX ORDER — ACETAMINOPHEN 325 MG/1
650 TABLET ORAL
OUTPATIENT
Start: 2025-03-13

## 2025-03-13 ENCOUNTER — HOSPITAL ENCOUNTER (OUTPATIENT)
Dept: INFUSION THERAPY | Age: 70
Discharge: HOME | End: 2025-03-13
Payer: MEDICARE

## 2025-03-13 VITALS
HEIGHT: 64 IN | HEART RATE: 82 BPM | OXYGEN SATURATION: 100 % | WEIGHT: 157.2 LBS | BODY MASS INDEX: 26.84 KG/M2 | TEMPERATURE: 97.2 F | DIASTOLIC BLOOD PRESSURE: 62 MMHG | SYSTOLIC BLOOD PRESSURE: 134 MMHG

## 2025-03-13 DIAGNOSIS — Z17.0 MALIGNANT NEOPLASM OF OVERLAPPING SITES OF BOTH BREASTS IN FEMALE, ESTROGEN RECEPTOR POSITIVE: ICD-10-CM

## 2025-03-13 DIAGNOSIS — C50.811 MALIGNANT NEOPLASM OF OVERLAPPING SITES OF BOTH BREASTS IN FEMALE, ESTROGEN RECEPTOR POSITIVE: ICD-10-CM

## 2025-03-13 DIAGNOSIS — C79.51 MALIGNANT NEOPLASM METASTATIC TO BONE (HCC): Primary | ICD-10-CM

## 2025-03-13 DIAGNOSIS — C50.812 MALIGNANT NEOPLASM OF OVERLAPPING SITES OF BOTH BREASTS IN FEMALE, ESTROGEN RECEPTOR POSITIVE: ICD-10-CM

## 2025-03-13 LAB
ANION GAP SERPL CALCULATED.3IONS-SCNC: 11 MMOL/L (ref 9–17)
BUN BLD-MCNC: 13 MG/DL (ref 7–20)
BUN SERPL-MCNC: 13 MG/DL (ref 7–20)
CA-I BLD-SCNC: 1.2 MMOL/L (ref 1.15–1.33)
CALCIUM SERPL-MCNC: 9.1 MG/DL (ref 8.3–10.6)
CHLORIDE BLD-SCNC: 109 MMOL/L (ref 99–110)
CHLORIDE SERPL-SCNC: 109 MMOL/L (ref 99–110)
CO2 BLD CALC-SCNC: 24 MMOL/L (ref 21–32)
CO2 SERPL-SCNC: 22 MMOL/L (ref 21–32)
CREAT BLD-MCNC: 0.5 MG/DL (ref 0.5–1.2)
CREAT SERPL-MCNC: 0.6 MG/DL (ref 0.6–1.2)
EGFR, POC: >90 ML/MIN/1.73M2
GFR, ESTIMATED: >90 ML/MIN/1.73M2
GLUCOSE BLD-MCNC: 84 MG/DL (ref 74–99)
GLUCOSE SERPL-MCNC: 81 MG/DL (ref 74–99)
MAGNESIUM SERPL-MCNC: 2.1 MG/DL (ref 1.8–2.4)
PHOSPHATE SERPL-MCNC: 3.5 MG/DL (ref 2.5–4.9)
POTASSIUM BLD-SCNC: 3.8 MMOL/L (ref 3.5–5.1)
POTASSIUM SERPL-SCNC: 3.9 MMOL/L (ref 3.5–5.1)
SODIUM BLD-SCNC: 144 MMOL/L (ref 136–145)
SODIUM SERPL-SCNC: 142 MMOL/L (ref 136–145)

## 2025-03-13 PROCEDURE — 80048 BASIC METABOLIC PNL TOTAL CA: CPT

## 2025-03-13 PROCEDURE — 80047 BASIC METABLC PNL IONIZED CA: CPT

## 2025-03-13 PROCEDURE — 6360000002 HC RX W HCPCS: Performed by: INTERNAL MEDICINE

## 2025-03-13 PROCEDURE — 83735 ASSAY OF MAGNESIUM: CPT

## 2025-03-13 PROCEDURE — 84100 ASSAY OF PHOSPHORUS: CPT

## 2025-03-13 PROCEDURE — 2580000003 HC RX 258: Performed by: INTERNAL MEDICINE

## 2025-03-13 PROCEDURE — 96365 THER/PROPH/DIAG IV INF INIT: CPT

## 2025-03-13 PROCEDURE — 2500000003 HC RX 250 WO HCPCS: Performed by: INTERNAL MEDICINE

## 2025-03-13 RX ORDER — SODIUM CHLORIDE 0.9 % (FLUSH) 0.9 %
5-40 SYRINGE (ML) INJECTION PRN
Status: DISCONTINUED | OUTPATIENT
Start: 2025-03-13 | End: 2025-03-14 | Stop reason: HOSPADM

## 2025-03-13 RX ORDER — SODIUM CHLORIDE 9 MG/ML
5-250 INJECTION, SOLUTION INTRAVENOUS PRN
Status: DISCONTINUED | OUTPATIENT
Start: 2025-03-13 | End: 2025-03-14 | Stop reason: HOSPADM

## 2025-03-13 RX ADMIN — SODIUM CHLORIDE, PRESERVATIVE FREE 10 ML: 5 INJECTION INTRAVENOUS at 12:48

## 2025-03-13 RX ADMIN — ZOLEDRONIC ACID 4 MG: 4 INJECTION, SOLUTION, CONCENTRATE INTRAVENOUS at 12:25

## 2025-03-13 NOTE — PROGRESS NOTES
Status appropriately assessed and documented. All required labs and results reviewed. Treatment approved by provider. Treatment orders and medications verified by 2 Registered Nurses where applicable. Treatment plan was confirmed with patient prior to administration, and educated the need to report any treatment-related symptoms      Ambulated to infusion area, here today for treatment. No concerns at this time. Right chest mediport accessed, positive blood return noted. Labs reviewed, Labs within defined limits.  Treatment administered as ordered. Call light within reach. Tolerated infusion without incident.  Discharge instructions declined.

## 2025-04-02 ENCOUNTER — HOSPITAL ENCOUNTER (OUTPATIENT)
Dept: PET IMAGING | Age: 70
Discharge: HOME | End: 2025-04-02
Attending: INTERNAL MEDICINE
Payer: MEDICARE

## 2025-04-02 DIAGNOSIS — Z17.0 MALIGNANT NEOPLASM OF OVERLAPPING SITES OF BOTH BREASTS IN FEMALE, ESTROGEN RECEPTOR POSITIVE: Primary | ICD-10-CM

## 2025-04-02 DIAGNOSIS — C50.812 MALIGNANT NEOPLASM OF OVERLAPPING SITES OF BOTH BREASTS IN FEMALE, ESTROGEN RECEPTOR POSITIVE: Primary | ICD-10-CM

## 2025-04-02 DIAGNOSIS — C50.811 MALIGNANT NEOPLASM OF OVERLAPPING SITES OF BOTH BREASTS IN FEMALE, ESTROGEN RECEPTOR POSITIVE: ICD-10-CM

## 2025-04-02 DIAGNOSIS — C50.812 MALIGNANT NEOPLASM OF OVERLAPPING SITES OF BOTH BREASTS IN FEMALE, ESTROGEN RECEPTOR POSITIVE: ICD-10-CM

## 2025-04-02 DIAGNOSIS — C50.811 MALIGNANT NEOPLASM OF OVERLAPPING SITES OF BOTH BREASTS IN FEMALE, ESTROGEN RECEPTOR POSITIVE: Primary | ICD-10-CM

## 2025-04-02 DIAGNOSIS — C78.7 SECONDARY MALIGNANT NEOPLASM OF LIVER AND INTRAHEPATIC BILE DUCT (HCC): ICD-10-CM

## 2025-04-02 DIAGNOSIS — Z17.0 MALIGNANT NEOPLASM OF OVERLAPPING SITES OF BOTH BREASTS IN FEMALE, ESTROGEN RECEPTOR POSITIVE: ICD-10-CM

## 2025-04-02 DIAGNOSIS — K59.09 OTHER CONSTIPATION: ICD-10-CM

## 2025-04-02 PROCEDURE — 3430000000 HC RX DIAGNOSTIC RADIOPHARMACEUTICAL: Performed by: INTERNAL MEDICINE

## 2025-04-02 PROCEDURE — A9609 HC RX DIAGNOSTIC RADIOPHARMACEUTICAL: HCPCS | Performed by: INTERNAL MEDICINE

## 2025-04-02 PROCEDURE — 78815 PET IMAGE W/CT SKULL-THIGH: CPT

## 2025-04-02 PROCEDURE — 2500000003 HC RX 250 WO HCPCS: Performed by: INTERNAL MEDICINE

## 2025-04-02 RX ORDER — HYDROCORTISONE SODIUM SUCCINATE 100 MG/2ML
100 INJECTION INTRAMUSCULAR; INTRAVENOUS
Status: CANCELLED | OUTPATIENT
Start: 2025-04-03

## 2025-04-02 RX ORDER — SODIUM CHLORIDE 9 MG/ML
INJECTION, SOLUTION INTRAVENOUS CONTINUOUS
OUTPATIENT
Start: 2025-05-01

## 2025-04-02 RX ORDER — EPINEPHRINE 1 MG/ML
0.3 INJECTION, SOLUTION, CONCENTRATE INTRAVENOUS PRN
OUTPATIENT
Start: 2025-05-01

## 2025-04-02 RX ORDER — ACETAMINOPHEN 325 MG/1
650 TABLET ORAL
OUTPATIENT
Start: 2025-05-01

## 2025-04-02 RX ORDER — LAMOTRIGINE 25 MG/1
500 TABLET ORAL ONCE
Status: CANCELLED | OUTPATIENT
Start: 2025-04-03 | End: 2025-04-03

## 2025-04-02 RX ORDER — FAMOTIDINE 10 MG/ML
20 INJECTION, SOLUTION INTRAVENOUS
Status: CANCELLED | OUTPATIENT
Start: 2025-04-03

## 2025-04-02 RX ORDER — HYDROCORTISONE SODIUM SUCCINATE 100 MG/2ML
100 INJECTION INTRAMUSCULAR; INTRAVENOUS
OUTPATIENT
Start: 2025-05-01

## 2025-04-02 RX ORDER — DIPHENHYDRAMINE HYDROCHLORIDE 50 MG/ML
50 INJECTION, SOLUTION INTRAMUSCULAR; INTRAVENOUS
Status: CANCELLED | OUTPATIENT
Start: 2025-04-03

## 2025-04-02 RX ORDER — FAMOTIDINE 10 MG/ML
20 INJECTION, SOLUTION INTRAVENOUS
OUTPATIENT
Start: 2025-05-01

## 2025-04-02 RX ORDER — DIPHENHYDRAMINE HYDROCHLORIDE 50 MG/ML
50 INJECTION, SOLUTION INTRAMUSCULAR; INTRAVENOUS
OUTPATIENT
Start: 2025-05-01

## 2025-04-02 RX ORDER — MEPERIDINE HYDROCHLORIDE 50 MG/ML
12.5 INJECTION INTRAMUSCULAR; INTRAVENOUS; SUBCUTANEOUS PRN
OUTPATIENT
Start: 2025-05-01

## 2025-04-02 RX ORDER — ALBUTEROL SULFATE 90 UG/1
4 INHALANT RESPIRATORY (INHALATION) PRN
Status: CANCELLED | OUTPATIENT
Start: 2025-04-03

## 2025-04-02 RX ORDER — ONDANSETRON 2 MG/ML
8 INJECTION INTRAMUSCULAR; INTRAVENOUS
Status: CANCELLED | OUTPATIENT
Start: 2025-04-03

## 2025-04-02 RX ORDER — SODIUM CHLORIDE 9 MG/ML
INJECTION, SOLUTION INTRAVENOUS CONTINUOUS
Status: CANCELLED | OUTPATIENT
Start: 2025-04-03

## 2025-04-02 RX ORDER — ACETAMINOPHEN 325 MG/1
650 TABLET ORAL
Status: CANCELLED | OUTPATIENT
Start: 2025-04-03

## 2025-04-02 RX ORDER — FLUDEOXYGLUCOSE F 18 200 MCI/ML
11.69 INJECTION, SOLUTION INTRAVENOUS
Status: COMPLETED | OUTPATIENT
Start: 2025-04-02 | End: 2025-04-02

## 2025-04-02 RX ORDER — SODIUM CHLORIDE 0.9 % (FLUSH) 0.9 %
5-40 SYRINGE (ML) INJECTION 2 TIMES DAILY
Status: DISCONTINUED | OUTPATIENT
Start: 2025-04-02 | End: 2025-04-03 | Stop reason: HOSPADM

## 2025-04-02 RX ORDER — ONDANSETRON 2 MG/ML
8 INJECTION INTRAMUSCULAR; INTRAVENOUS
OUTPATIENT
Start: 2025-05-01

## 2025-04-02 RX ORDER — EPINEPHRINE 1 MG/ML
0.3 INJECTION, SOLUTION, CONCENTRATE INTRAVENOUS PRN
Status: CANCELLED | OUTPATIENT
Start: 2025-04-03

## 2025-04-02 RX ORDER — ALBUTEROL SULFATE 90 UG/1
4 INHALANT RESPIRATORY (INHALATION) PRN
OUTPATIENT
Start: 2025-05-01

## 2025-04-02 RX ORDER — LAMOTRIGINE 25 MG/1
500 TABLET ORAL ONCE
OUTPATIENT
Start: 2025-05-01 | End: 2025-05-01

## 2025-04-02 RX ORDER — MEPERIDINE HYDROCHLORIDE 50 MG/ML
12.5 INJECTION INTRAMUSCULAR; INTRAVENOUS; SUBCUTANEOUS PRN
Status: CANCELLED | OUTPATIENT
Start: 2025-04-03

## 2025-04-02 RX ADMIN — SODIUM CHLORIDE, PRESERVATIVE FREE 10 ML: 5 INJECTION INTRAVENOUS at 10:41

## 2025-04-02 RX ADMIN — FLUDEOXYGLUCOSE F 18 11.69 MILLICURIE: 200 INJECTION, SOLUTION INTRAVENOUS at 10:41

## 2025-04-03 ENCOUNTER — HOSPITAL ENCOUNTER (OUTPATIENT)
Dept: INFUSION THERAPY | Age: 70
Discharge: HOME | End: 2025-04-03
Payer: MEDICARE

## 2025-04-03 DIAGNOSIS — Z17.0 MALIGNANT NEOPLASM OF OVERLAPPING SITES OF BOTH BREASTS IN FEMALE, ESTROGEN RECEPTOR POSITIVE: Primary | ICD-10-CM

## 2025-04-03 DIAGNOSIS — C50.811 MALIGNANT NEOPLASM OF OVERLAPPING SITES OF BOTH BREASTS IN FEMALE, ESTROGEN RECEPTOR POSITIVE: Primary | ICD-10-CM

## 2025-04-03 DIAGNOSIS — C50.812 MALIGNANT NEOPLASM OF OVERLAPPING SITES OF BOTH BREASTS IN FEMALE, ESTROGEN RECEPTOR POSITIVE: Primary | ICD-10-CM

## 2025-04-03 PROCEDURE — 6360000002 HC RX W HCPCS: Performed by: INTERNAL MEDICINE

## 2025-04-03 PROCEDURE — 96402 CHEMO HORMON ANTINEOPL SQ/IM: CPT

## 2025-04-03 RX ORDER — LAMOTRIGINE 25 MG/1
500 TABLET ORAL ONCE
Status: COMPLETED | OUTPATIENT
Start: 2025-04-03 | End: 2025-04-03

## 2025-04-03 RX ADMIN — FULVESTRANT 500 MG: 50 INJECTION, SOLUTION INTRAMUSCULAR at 14:04

## 2025-04-03 NOTE — PROGRESS NOTES
Patient ambulated to infusion area, here today for Faslodex injections. No concerns at this time. Treatment approved and given in left and right dorsogluteal regions. Patient tolerated well. Patient declined discharge instructions. RTC 04/04 for OV with Dr. Rudolph.    Status appropriately assessed and documented. All required labs and results reviewed. Treatment approved by provider. Treatment orders and medications verified by 2 Registered Nurses where applicable. Treatment plan was confirmed with patient prior to administration, and educated the need to report any treatment-related symptoms

## 2025-04-04 ENCOUNTER — OFFICE VISIT (OUTPATIENT)
Dept: ONCOLOGY | Age: 70
End: 2025-04-04
Payer: MEDICARE

## 2025-04-04 ENCOUNTER — HOSPITAL ENCOUNTER (OUTPATIENT)
Dept: INFUSION THERAPY | Age: 70
Discharge: HOME | End: 2025-04-04
Payer: MEDICARE

## 2025-04-04 VITALS
OXYGEN SATURATION: 100 % | SYSTOLIC BLOOD PRESSURE: 141 MMHG | HEIGHT: 64 IN | DIASTOLIC BLOOD PRESSURE: 73 MMHG | WEIGHT: 159.4 LBS | RESPIRATION RATE: 16 BRPM | HEART RATE: 86 BPM | TEMPERATURE: 97.9 F | BODY MASS INDEX: 27.21 KG/M2

## 2025-04-04 DIAGNOSIS — Z17.0 MALIGNANT NEOPLASM OF OVERLAPPING SITES OF BOTH BREASTS IN FEMALE, ESTROGEN RECEPTOR POSITIVE: Primary | ICD-10-CM

## 2025-04-04 DIAGNOSIS — C50.812 MALIGNANT NEOPLASM OF OVERLAPPING SITES OF BOTH BREASTS IN FEMALE, ESTROGEN RECEPTOR POSITIVE: Primary | ICD-10-CM

## 2025-04-04 DIAGNOSIS — C50.811 MALIGNANT NEOPLASM OF OVERLAPPING SITES OF BOTH BREASTS IN FEMALE, ESTROGEN RECEPTOR POSITIVE: Primary | ICD-10-CM

## 2025-04-04 PROCEDURE — 99214 OFFICE O/P EST MOD 30 MIN: CPT | Performed by: INTERNAL MEDICINE

## 2025-04-04 PROCEDURE — 99212 OFFICE O/P EST SF 10 MIN: CPT

## 2025-04-04 PROCEDURE — 1124F ACP DISCUSS-NO DSCNMKR DOCD: CPT | Performed by: INTERNAL MEDICINE

## 2025-04-04 PROCEDURE — 1159F MED LIST DOCD IN RCRD: CPT | Performed by: INTERNAL MEDICINE

## 2025-04-04 PROCEDURE — 1126F AMNT PAIN NOTED NONE PRSNT: CPT | Performed by: INTERNAL MEDICINE

## 2025-04-04 NOTE — PROGRESS NOTES
MA Rooming Questions  Patient: Aileen Cohn  MRN: [id number]    Date: 4/4/2025        1. Do you have any new issues?   no         2. Do you need any refills on medications?    no    3. Have you had any imaging done since your last visit?   yes - pet 4/2    4. Have you been hospitalized or seen in the emergency room since your last visit here?   no    5. Did the patient have a depression screening completed today? No    No data recorded     PHQ-9 Given to (if applicable):               PHQ-9 Score (if applicable):                     [] Positive     []  Negative              Does question #9 need addressed (if applicable)                     [] Yes    []  No               Hortencia Lambert MA      
pack per day approximately since she was 27. She denies alcohol drinking or illicit drug abuse.    Family History:    Significant for Alzheimer's disease in her parents. No family history of malignancy.    No Known Allergies    Review of Systems:  \"Per interval history; otherwise 10 point ROS is negative.\"  Her energy level is good today and her sleep is fine. She doesn't have fever, chills, night sweats, cough, SOB, chest pain, hemoptysis or palpitations. Her bowels and bladder functions are normal. She denies nausea, vomiting, abdominal pain, diarrhea, constipation, dysuria, loss of appetite or weight loss. She denies neuropathy and she doesn't have bleeding or clotting issues. She denies any pain in her body. Has anxiety. No depression. The rest of the systems are unremarkable.     Vital Signs: BP (!) 141/73 (BP Site: Right Upper Arm, Patient Position: Sitting, BP Cuff Size: Medium Adult)   Pulse 86   Temp 97.9 °F (36.6 °C) (Infrared)   Resp 16   Ht 1.626 m (5' 4\")   Wt 72.3 kg (159 lb 6.4 oz)   SpO2 100%   BMI 27.36 kg/m²      Physical Exam:  CONSTITUTIONAL: awake, alert, cooperative, no apparent distress   EYES: pupils equal, round and reactive to light, sclera clear, normal conjunctiva  ENT: Normocephalic, without obvious abnormality, atraumatic  NECK: supple, symmetrical, no jugular venous distension, no carotid bruits   HEMATOLOGIC/LYMPHATIC: no cervical, supraclavicular lymphadenopathy. Left axillary lymphadenopathy noted,   LUNGS: VBS, no wheezes, no increased work of breathing, no rhonchi, clear to auscultation, no crackles,   CARDIOVASCULAR: regular rate and rhythm, normal S1 and S2, no murmur noted  ABDOMEN: normal bowel sounds x 4, soft, non-distended, non-tender, no masses palpated, no hepatosplenomegaly   MUSCULOSKELETAL: full range of motion noted, tone is normal  NEUROLOGIC: awake, alert, oriented to name, place and time. Motor skills grossly intact.   EXTREMITIES: no clubbing, no leg

## 2025-04-07 ENCOUNTER — HOSPITAL ENCOUNTER (OUTPATIENT)
Dept: ULTRASOUND IMAGING | Age: 70
Discharge: HOME | End: 2025-04-07
Attending: INTERNAL MEDICINE
Payer: MEDICARE

## 2025-04-07 ENCOUNTER — HOSPITAL ENCOUNTER (OUTPATIENT)
Dept: WOMENS IMAGING | Age: 70
Discharge: HOME | End: 2025-04-07
Attending: INTERNAL MEDICINE
Payer: MEDICARE

## 2025-04-07 DIAGNOSIS — Z17.0 MALIGNANT NEOPLASM OF OVERLAPPING SITES OF BOTH BREASTS IN FEMALE, ESTROGEN RECEPTOR POSITIVE: ICD-10-CM

## 2025-04-07 DIAGNOSIS — C50.812 MALIGNANT NEOPLASM OF OVERLAPPING SITES OF BOTH BREASTS IN FEMALE, ESTROGEN RECEPTOR POSITIVE: ICD-10-CM

## 2025-04-07 DIAGNOSIS — C50.811 MALIGNANT NEOPLASM OF OVERLAPPING SITES OF BOTH BREASTS IN FEMALE, ESTROGEN RECEPTOR POSITIVE: ICD-10-CM

## 2025-04-07 DIAGNOSIS — K59.09 OTHER CONSTIPATION: ICD-10-CM

## 2025-04-07 PROCEDURE — G0279 TOMOSYNTHESIS, MAMMO: HCPCS

## 2025-04-08 DIAGNOSIS — C50.811 MALIGNANT NEOPLASM OF OVERLAPPING SITES OF BOTH BREASTS IN FEMALE, ESTROGEN RECEPTOR POSITIVE: Primary | ICD-10-CM

## 2025-04-08 DIAGNOSIS — Z17.0 MALIGNANT NEOPLASM OF OVERLAPPING SITES OF BOTH BREASTS IN FEMALE, ESTROGEN RECEPTOR POSITIVE: Primary | ICD-10-CM

## 2025-04-08 DIAGNOSIS — C50.812 MALIGNANT NEOPLASM OF OVERLAPPING SITES OF BOTH BREASTS IN FEMALE, ESTROGEN RECEPTOR POSITIVE: Primary | ICD-10-CM

## 2025-04-08 RX ORDER — SODIUM CHLORIDE 9 MG/ML
INJECTION, SOLUTION INTRAVENOUS CONTINUOUS
OUTPATIENT
Start: 2025-06-26

## 2025-04-08 RX ORDER — ONDANSETRON 2 MG/ML
8 INJECTION INTRAMUSCULAR; INTRAVENOUS
OUTPATIENT
Start: 2025-05-29

## 2025-04-08 RX ORDER — HYDROCORTISONE SODIUM SUCCINATE 100 MG/2ML
100 INJECTION INTRAMUSCULAR; INTRAVENOUS
OUTPATIENT
Start: 2025-05-29

## 2025-04-08 RX ORDER — ONDANSETRON 2 MG/ML
8 INJECTION INTRAMUSCULAR; INTRAVENOUS
OUTPATIENT
Start: 2025-06-26

## 2025-04-08 RX ORDER — ACETAMINOPHEN 325 MG/1
650 TABLET ORAL
OUTPATIENT
Start: 2025-05-29

## 2025-04-08 RX ORDER — HYDROCORTISONE SODIUM SUCCINATE 100 MG/2ML
100 INJECTION INTRAMUSCULAR; INTRAVENOUS
OUTPATIENT
Start: 2025-06-26

## 2025-04-08 RX ORDER — ACETAMINOPHEN 325 MG/1
650 TABLET ORAL
OUTPATIENT
Start: 2025-06-26

## 2025-04-08 RX ORDER — SODIUM CHLORIDE 9 MG/ML
INJECTION, SOLUTION INTRAVENOUS CONTINUOUS
OUTPATIENT
Start: 2025-05-29

## 2025-04-08 RX ORDER — ALBUTEROL SULFATE 90 UG/1
4 INHALANT RESPIRATORY (INHALATION) PRN
OUTPATIENT
Start: 2025-05-29

## 2025-04-08 RX ORDER — MEPERIDINE HYDROCHLORIDE 50 MG/ML
12.5 INJECTION INTRAMUSCULAR; INTRAVENOUS; SUBCUTANEOUS PRN
OUTPATIENT
Start: 2025-06-26

## 2025-04-08 RX ORDER — EPINEPHRINE 1 MG/ML
0.3 INJECTION, SOLUTION, CONCENTRATE INTRAVENOUS PRN
OUTPATIENT
Start: 2025-05-29

## 2025-04-08 RX ORDER — FAMOTIDINE 10 MG/ML
20 INJECTION, SOLUTION INTRAVENOUS
OUTPATIENT
Start: 2025-06-26

## 2025-04-08 RX ORDER — LAMOTRIGINE 25 MG/1
500 TABLET ORAL ONCE
OUTPATIENT
Start: 2025-05-29 | End: 2025-05-29

## 2025-04-08 RX ORDER — MEPERIDINE HYDROCHLORIDE 50 MG/ML
12.5 INJECTION INTRAMUSCULAR; INTRAVENOUS; SUBCUTANEOUS PRN
OUTPATIENT
Start: 2025-05-29

## 2025-04-08 RX ORDER — DIPHENHYDRAMINE HYDROCHLORIDE 50 MG/ML
50 INJECTION, SOLUTION INTRAMUSCULAR; INTRAVENOUS
OUTPATIENT
Start: 2025-05-29

## 2025-04-08 RX ORDER — EPINEPHRINE 1 MG/ML
0.3 INJECTION, SOLUTION, CONCENTRATE INTRAVENOUS PRN
OUTPATIENT
Start: 2025-06-26

## 2025-04-08 RX ORDER — ALBUTEROL SULFATE 90 UG/1
4 INHALANT RESPIRATORY (INHALATION) PRN
OUTPATIENT
Start: 2025-06-26

## 2025-04-08 RX ORDER — FAMOTIDINE 10 MG/ML
20 INJECTION, SOLUTION INTRAVENOUS
OUTPATIENT
Start: 2025-05-29

## 2025-04-08 RX ORDER — DIPHENHYDRAMINE HYDROCHLORIDE 50 MG/ML
50 INJECTION, SOLUTION INTRAMUSCULAR; INTRAVENOUS
OUTPATIENT
Start: 2025-06-26

## 2025-04-08 RX ORDER — LAMOTRIGINE 25 MG/1
500 TABLET ORAL ONCE
OUTPATIENT
Start: 2025-06-26 | End: 2025-06-26

## 2025-05-01 ENCOUNTER — HOSPITAL ENCOUNTER (OUTPATIENT)
Dept: INFUSION THERAPY | Age: 70
Discharge: HOME | End: 2025-05-01
Payer: MEDICARE

## 2025-05-01 DIAGNOSIS — C50.812 MALIGNANT NEOPLASM OF OVERLAPPING SITES OF BOTH BREASTS IN FEMALE, ESTROGEN RECEPTOR POSITIVE (HCC): Primary | ICD-10-CM

## 2025-05-01 DIAGNOSIS — C50.811 MALIGNANT NEOPLASM OF OVERLAPPING SITES OF BOTH BREASTS IN FEMALE, ESTROGEN RECEPTOR POSITIVE (HCC): Primary | ICD-10-CM

## 2025-05-01 DIAGNOSIS — Z17.0 MALIGNANT NEOPLASM OF OVERLAPPING SITES OF BOTH BREASTS IN FEMALE, ESTROGEN RECEPTOR POSITIVE (HCC): Primary | ICD-10-CM

## 2025-05-01 PROCEDURE — 6360000002 HC RX W HCPCS: Performed by: INTERNAL MEDICINE

## 2025-05-01 PROCEDURE — 96402 CHEMO HORMON ANTINEOPL SQ/IM: CPT

## 2025-05-01 RX ORDER — LAMOTRIGINE 25 MG/1
500 TABLET ORAL ONCE
Status: COMPLETED | OUTPATIENT
Start: 2025-05-01 | End: 2025-05-01

## 2025-05-01 RX ADMIN — FULVESTRANT 500 MG: 50 INJECTION, SOLUTION INTRAMUSCULAR at 13:53

## 2025-05-01 NOTE — PROGRESS NOTES
Pt to tx suite for Faslodex injections. Last OV 4/4 next OV 5/29, next Zometa infusion 6/5,  messaged to schedule for next Faslodex injections, pt states she will check her my chart to for date and time of injection. Pt has no concerns or issues to discuss at this time. Injections given IM to right and left dorsal gluteal areas. Pt tolerated without incident left ambulatory declined discharge instructions

## 2025-05-29 ENCOUNTER — HOSPITAL ENCOUNTER (OUTPATIENT)
Dept: INFUSION THERAPY | Age: 70
Discharge: HOME | End: 2025-05-29
Payer: MEDICARE

## 2025-05-29 ENCOUNTER — OFFICE VISIT (OUTPATIENT)
Dept: ONCOLOGY | Age: 70
End: 2025-05-29
Payer: MEDICARE

## 2025-05-29 VITALS
BODY MASS INDEX: 27.86 KG/M2 | WEIGHT: 163.2 LBS | SYSTOLIC BLOOD PRESSURE: 137 MMHG | OXYGEN SATURATION: 100 % | HEART RATE: 93 BPM | TEMPERATURE: 98 F | HEIGHT: 64 IN | DIASTOLIC BLOOD PRESSURE: 65 MMHG

## 2025-05-29 DIAGNOSIS — C50.812 MALIGNANT NEOPLASM OF OVERLAPPING SITES OF BOTH BREASTS IN FEMALE, ESTROGEN RECEPTOR POSITIVE (HCC): Primary | ICD-10-CM

## 2025-05-29 DIAGNOSIS — Z17.0 MALIGNANT NEOPLASM OF OVERLAPPING SITES OF BOTH BREASTS IN FEMALE, ESTROGEN RECEPTOR POSITIVE (HCC): Primary | ICD-10-CM

## 2025-05-29 DIAGNOSIS — C50.811 MALIGNANT NEOPLASM OF OVERLAPPING SITES OF BOTH BREASTS IN FEMALE, ESTROGEN RECEPTOR POSITIVE (HCC): ICD-10-CM

## 2025-05-29 DIAGNOSIS — Z17.0 MALIGNANT NEOPLASM OF OVERLAPPING SITES OF BOTH BREASTS IN FEMALE, ESTROGEN RECEPTOR POSITIVE (HCC): ICD-10-CM

## 2025-05-29 DIAGNOSIS — C50.811 MALIGNANT NEOPLASM OF OVERLAPPING SITES OF BOTH BREASTS IN FEMALE, ESTROGEN RECEPTOR POSITIVE (HCC): Primary | ICD-10-CM

## 2025-05-29 DIAGNOSIS — C50.812 MALIGNANT NEOPLASM OF OVERLAPPING SITES OF BOTH BREASTS IN FEMALE, ESTROGEN RECEPTOR POSITIVE (HCC): ICD-10-CM

## 2025-05-29 LAB
ALBUMIN SERPL-MCNC: 4.2 G/DL (ref 3.4–5)
ALBUMIN/GLOB SERPL: 1.6 {RATIO} (ref 1.1–2.2)
ALP SERPL-CCNC: 63 U/L (ref 40–129)
ALT SERPL-CCNC: 10 U/L (ref 10–40)
ANION GAP SERPL CALCULATED.3IONS-SCNC: 10 MMOL/L (ref 9–17)
AST SERPL-CCNC: 21 U/L (ref 15–37)
BASOPHILS # BLD: 0.06 K/UL
BASOPHILS NFR BLD: 2 % (ref 0–1)
BILIRUB SERPL-MCNC: 0.4 MG/DL (ref 0–1)
BUN SERPL-MCNC: 12 MG/DL (ref 7–20)
CALCIUM SERPL-MCNC: 9.4 MG/DL (ref 8.3–10.6)
CHLORIDE SERPL-SCNC: 105 MMOL/L (ref 99–110)
CO2 SERPL-SCNC: 25 MMOL/L (ref 21–32)
CREAT SERPL-MCNC: 0.7 MG/DL (ref 0.6–1.2)
EOSINOPHIL # BLD: 0.04 K/UL
EOSINOPHILS RELATIVE PERCENT: 1 % (ref 0–3)
ERYTHROCYTE [DISTWIDTH] IN BLOOD BY AUTOMATED COUNT: 12.6 % (ref 11.7–14.9)
GFR, ESTIMATED: 80 ML/MIN/1.73M2
GLUCOSE SERPL-MCNC: 79 MG/DL (ref 74–99)
HCT VFR BLD AUTO: 34.8 % (ref 37–47)
HGB BLD-MCNC: 11.4 G/DL (ref 12.5–16)
LYMPHOCYTES NFR BLD: 1.09 K/UL
LYMPHOCYTES RELATIVE PERCENT: 31 % (ref 24–44)
MCH RBC QN AUTO: 36.8 PG (ref 27–31)
MCHC RBC AUTO-ENTMCNC: 32.8 G/DL (ref 32–36)
MCV RBC AUTO: 112.3 FL (ref 78–100)
MONOCYTES NFR BLD: 0.22 K/UL
MONOCYTES NFR BLD: 6 % (ref 0–5)
NEUTROPHILS NFR BLD: 59 % (ref 36–66)
NEUTS SEG NFR BLD: 2.06 K/UL
PLATELET # BLD AUTO: 235 K/UL (ref 140–440)
PMV BLD AUTO: 8.7 FL (ref 7.5–11.1)
POTASSIUM SERPL-SCNC: 3.9 MMOL/L (ref 3.5–5.1)
PROT SERPL-MCNC: 6.9 G/DL (ref 6.4–8.2)
RBC # BLD AUTO: 3.1 M/UL (ref 4.2–5.4)
SODIUM SERPL-SCNC: 141 MMOL/L (ref 136–145)
WBC OTHER # BLD: 3.5 K/UL (ref 4–10.5)

## 2025-05-29 PROCEDURE — 96402 CHEMO HORMON ANTINEOPL SQ/IM: CPT

## 2025-05-29 PROCEDURE — 1124F ACP DISCUSS-NO DSCNMKR DOCD: CPT | Performed by: INTERNAL MEDICINE

## 2025-05-29 PROCEDURE — 85025 COMPLETE CBC W/AUTO DIFF WBC: CPT

## 2025-05-29 PROCEDURE — 1159F MED LIST DOCD IN RCRD: CPT | Performed by: INTERNAL MEDICINE

## 2025-05-29 PROCEDURE — 86300 IMMUNOASSAY TUMOR CA 15-3: CPT

## 2025-05-29 PROCEDURE — 99214 OFFICE O/P EST MOD 30 MIN: CPT | Performed by: INTERNAL MEDICINE

## 2025-05-29 PROCEDURE — 1126F AMNT PAIN NOTED NONE PRSNT: CPT | Performed by: INTERNAL MEDICINE

## 2025-05-29 PROCEDURE — 6360000002 HC RX W HCPCS: Performed by: INTERNAL MEDICINE

## 2025-05-29 PROCEDURE — 80053 COMPREHEN METABOLIC PANEL: CPT

## 2025-05-29 PROCEDURE — 36415 COLL VENOUS BLD VENIPUNCTURE: CPT

## 2025-05-29 RX ORDER — PALBOCICLIB 125 MG/1
TABLET, FILM COATED ORAL
Qty: 21 TABLET | Refills: 5 | Status: ACTIVE | OUTPATIENT
Start: 2025-05-29

## 2025-05-29 RX ORDER — LAMOTRIGINE 25 MG/1
500 TABLET ORAL ONCE
Status: COMPLETED | OUTPATIENT
Start: 2025-05-29 | End: 2025-05-29

## 2025-05-29 RX ADMIN — FULVESTRANT 500 MG: 250 INJECTION, SOLUTION INTRAMUSCULAR at 14:47

## 2025-05-29 NOTE — PROGRESS NOTES
MA/LPN Rooming Questions  Patient: Aileen Cohn  MRN: [id number]    Date: 5/29/2025        1. Do you have any new issues?   no         2. Do you need any refills on medications?    no    3. Have you had any imaging done since your last visit?   Mammo 4/7    4. Have you been hospitalized or seen in the emergency room since your last visit here?   no    5. Did the patient have a depression screening completed today? No    No data recorded     PHQ-9 Given to (if applicable):               PHQ-9 Score (if applicable):                     [] Positive     []  Negative              Does question #9 need addressed (if applicable)                     [] Yes    []  No               Kalli Freitas MA

## 2025-05-29 NOTE — PROGRESS NOTES
To treatment suite for Faslodex injections. Plan of care reviewed, questions answered.  Treatment plan approved, released and completed as ordered.  Injections given IM to right and left dorsogluteal areas, band aid applied at injection sites. Pt tolerated well.  AVS declined.  Discharge ambulatory in stable condition.  Kim Kimbrough RN

## 2025-05-29 NOTE — PROGRESS NOTES
Patient Name:  Aileen Cohn  Patient :  1955  Patient MRN:  [id number]     Primary Oncologist: Quincy Rudolph MD  Referring Provider: Gabrielle Cisneros APRN - CNP     Date of Service: 2025      Chief Complaint:    Chief Complaint   Patient presents with    Follow-up     Patient's active problem list:       Liver mass       Lytic bone lesions       Left breast mass    HPI:   Aileen Cohn is a 69 year-old very pleasant female with medical history significant for osteoarthritis, initially referred to me on 22 for evaluation of her liver lesion and multiple lytic bone lesions.     She initially presented to [facility] on 22 with severe lower back pain and constipation. Stated that she has been having back pain for about 4 - 6 weeks duration, which becomes severe pain started a week before presentation.     CT scan of the abdomen and pelvis done on 2022 showed lesion (6.1 x 8.1 cm) in the hepatic segment 4, concerning for neoplasm.  Further evaluation with MRI of the liver is recommended.  Extensive lytic metastatic disease in lumbar and thoracic spine and pelvis with most dominant lesion seen at L5 vertebral body.  Several small bowel loops segments in the left abdomen demonstrate mild wall thickening, nonspecific may indicate enteritis.  Nonspecific partially visualized inflammatory stranding along the pericardium.  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     CT lumbar spine done on 22 showed extensive multifocal lytic lesions concerning for neoplastic/metastatic disease.  Age indeterminant compression fractures at T12 and L4, with mild narrowing of the AP diameter of the canal at L4.    She never had colonoscopy before. She had pap smear around . She didn't recall when was her last mammogram.     She denies weight loss. She hasn't been eating much for about 10 days since she has been having nausea.     Since she is found to have multiple lytic bone lesions and liver lesion, she was referred

## 2025-06-02 DIAGNOSIS — Z17.0 MALIGNANT NEOPLASM OF OVERLAPPING SITES OF BOTH BREASTS IN FEMALE, ESTROGEN RECEPTOR POSITIVE (HCC): ICD-10-CM

## 2025-06-02 DIAGNOSIS — C50.811 MALIGNANT NEOPLASM OF OVERLAPPING SITES OF BOTH BREASTS IN FEMALE, ESTROGEN RECEPTOR POSITIVE (HCC): ICD-10-CM

## 2025-06-02 DIAGNOSIS — C79.51 MALIGNANT NEOPLASM METASTATIC TO BONE (HCC): Primary | ICD-10-CM

## 2025-06-02 DIAGNOSIS — C50.812 MALIGNANT NEOPLASM OF OVERLAPPING SITES OF BOTH BREASTS IN FEMALE, ESTROGEN RECEPTOR POSITIVE (HCC): ICD-10-CM

## 2025-06-02 RX ORDER — MEPERIDINE HYDROCHLORIDE 50 MG/ML
12.5 INJECTION INTRAMUSCULAR; INTRAVENOUS; SUBCUTANEOUS PRN
OUTPATIENT
Start: 2025-07-24

## 2025-06-02 RX ORDER — EPINEPHRINE 1 MG/ML
0.3 INJECTION, SOLUTION, CONCENTRATE INTRAVENOUS PRN
OUTPATIENT
Start: 2025-07-24

## 2025-06-02 RX ORDER — ONDANSETRON 2 MG/ML
8 INJECTION INTRAMUSCULAR; INTRAVENOUS
OUTPATIENT
Start: 2025-07-24

## 2025-06-02 RX ORDER — ALBUTEROL SULFATE 90 UG/1
4 INHALANT RESPIRATORY (INHALATION) PRN
OUTPATIENT
Start: 2025-07-24

## 2025-06-02 RX ORDER — LAMOTRIGINE 25 MG/1
500 TABLET ORAL ONCE
OUTPATIENT
Start: 2025-07-24 | End: 2025-07-24

## 2025-06-02 RX ORDER — ZOLEDRONIC ACID 0.04 MG/ML
4 INJECTION, SOLUTION INTRAVENOUS ONCE
Status: CANCELLED | OUTPATIENT
Start: 2025-06-05 | End: 2025-06-05

## 2025-06-02 RX ORDER — MEPERIDINE HYDROCHLORIDE 50 MG/ML
12.5 INJECTION INTRAMUSCULAR; INTRAVENOUS; SUBCUTANEOUS PRN
Status: CANCELLED | OUTPATIENT
Start: 2025-06-05

## 2025-06-02 RX ORDER — HEPARIN SODIUM (PORCINE) LOCK FLUSH IV SOLN 100 UNIT/ML 100 UNIT/ML
500 SOLUTION INTRAVENOUS PRN
Status: CANCELLED | OUTPATIENT
Start: 2025-06-05

## 2025-06-02 RX ORDER — ALBUTEROL SULFATE 90 UG/1
4 INHALANT RESPIRATORY (INHALATION) PRN
Status: CANCELLED | OUTPATIENT
Start: 2025-06-05

## 2025-06-02 RX ORDER — FAMOTIDINE 10 MG/ML
20 INJECTION, SOLUTION INTRAVENOUS
Status: CANCELLED | OUTPATIENT
Start: 2025-06-05

## 2025-06-02 RX ORDER — SODIUM CHLORIDE 9 MG/ML
5-250 INJECTION, SOLUTION INTRAVENOUS PRN
Status: CANCELLED | OUTPATIENT
Start: 2025-06-05

## 2025-06-02 RX ORDER — DIPHENHYDRAMINE HYDROCHLORIDE 50 MG/ML
50 INJECTION, SOLUTION INTRAMUSCULAR; INTRAVENOUS
OUTPATIENT
Start: 2025-07-24

## 2025-06-02 RX ORDER — ACETAMINOPHEN 325 MG/1
650 TABLET ORAL
OUTPATIENT
Start: 2025-07-24

## 2025-06-02 RX ORDER — HYDROCORTISONE SODIUM SUCCINATE 100 MG/2ML
100 INJECTION INTRAMUSCULAR; INTRAVENOUS
OUTPATIENT
Start: 2025-07-24

## 2025-06-02 RX ORDER — ACETAMINOPHEN 325 MG/1
650 TABLET ORAL
Status: CANCELLED | OUTPATIENT
Start: 2025-06-05

## 2025-06-02 RX ORDER — SODIUM CHLORIDE 0.9 % (FLUSH) 0.9 %
5-40 SYRINGE (ML) INJECTION PRN
Status: CANCELLED | OUTPATIENT
Start: 2025-06-05

## 2025-06-02 RX ORDER — SODIUM CHLORIDE 9 MG/ML
INJECTION, SOLUTION INTRAVENOUS CONTINUOUS
Status: CANCELLED | OUTPATIENT
Start: 2025-06-05

## 2025-06-02 RX ORDER — SODIUM CHLORIDE 9 MG/ML
INJECTION, SOLUTION INTRAVENOUS CONTINUOUS
OUTPATIENT
Start: 2025-07-24

## 2025-06-02 RX ORDER — FAMOTIDINE 10 MG/ML
20 INJECTION, SOLUTION INTRAVENOUS
OUTPATIENT
Start: 2025-07-24

## 2025-06-02 RX ORDER — ONDANSETRON 2 MG/ML
8 INJECTION INTRAMUSCULAR; INTRAVENOUS
Status: CANCELLED | OUTPATIENT
Start: 2025-06-05

## 2025-06-02 RX ORDER — EPINEPHRINE 1 MG/ML
0.3 INJECTION, SOLUTION, CONCENTRATE INTRAVENOUS PRN
Status: CANCELLED | OUTPATIENT
Start: 2025-06-05

## 2025-06-02 RX ORDER — DIPHENHYDRAMINE HYDROCHLORIDE 50 MG/ML
50 INJECTION, SOLUTION INTRAMUSCULAR; INTRAVENOUS
Status: CANCELLED | OUTPATIENT
Start: 2025-06-05

## 2025-06-02 RX ORDER — HYDROCORTISONE SODIUM SUCCINATE 100 MG/2ML
100 INJECTION INTRAMUSCULAR; INTRAVENOUS
Status: CANCELLED | OUTPATIENT
Start: 2025-06-05

## 2025-06-05 ENCOUNTER — HOSPITAL ENCOUNTER (OUTPATIENT)
Dept: INFUSION THERAPY | Age: 70
Discharge: HOME | End: 2025-06-05
Payer: MEDICARE

## 2025-06-05 VITALS
OXYGEN SATURATION: 99 % | DIASTOLIC BLOOD PRESSURE: 58 MMHG | WEIGHT: 159.8 LBS | HEART RATE: 88 BPM | SYSTOLIC BLOOD PRESSURE: 130 MMHG | TEMPERATURE: 98.2 F | BODY MASS INDEX: 27.28 KG/M2 | HEIGHT: 64 IN

## 2025-06-05 DIAGNOSIS — C79.51 MALIGNANT NEOPLASM METASTATIC TO BONE (HCC): Primary | ICD-10-CM

## 2025-06-05 DIAGNOSIS — Z17.0 MALIGNANT NEOPLASM OF OVERLAPPING SITES OF BOTH BREASTS IN FEMALE, ESTROGEN RECEPTOR POSITIVE (HCC): ICD-10-CM

## 2025-06-05 DIAGNOSIS — C50.812 MALIGNANT NEOPLASM OF OVERLAPPING SITES OF BOTH BREASTS IN FEMALE, ESTROGEN RECEPTOR POSITIVE (HCC): ICD-10-CM

## 2025-06-05 DIAGNOSIS — C50.811 MALIGNANT NEOPLASM OF OVERLAPPING SITES OF BOTH BREASTS IN FEMALE, ESTROGEN RECEPTOR POSITIVE (HCC): ICD-10-CM

## 2025-06-05 LAB
ANION GAP SERPL CALCULATED.3IONS-SCNC: 10 MMOL/L (ref 9–17)
BUN BLD-MCNC: 13 MG/DL (ref 7–20)
BUN SERPL-MCNC: 13 MG/DL (ref 7–20)
CA-I BLD-SCNC: 1.21 MMOL/L (ref 1.15–1.33)
CALCIUM SERPL-MCNC: 9.3 MG/DL (ref 8.3–10.6)
CHLORIDE BLD-SCNC: 108 MMOL/L (ref 99–110)
CHLORIDE SERPL-SCNC: 105 MMOL/L (ref 99–110)
CO2 BLD CALC-SCNC: 24 MMOL/L (ref 21–32)
CO2 SERPL-SCNC: 22 MMOL/L (ref 21–32)
CREAT BLD-MCNC: 0.7 MG/DL (ref 0.5–1.2)
CREAT SERPL-MCNC: 0.6 MG/DL (ref 0.6–1.2)
EGFR, POC: >90 ML/MIN/1.73M2
GFR, ESTIMATED: >90 ML/MIN/1.73M2
GLUCOSE BLD-MCNC: 88 MG/DL (ref 74–99)
GLUCOSE SERPL-MCNC: 81 MG/DL (ref 74–99)
MAGNESIUM SERPL-MCNC: 2.1 MG/DL (ref 1.8–2.4)
MISCELLANEOUS LAB TEST RESULT: NORMAL
PHOSPHATE SERPL-MCNC: 3.5 MG/DL (ref 2.5–4.9)
POTASSIUM BLD-SCNC: 3.8 MMOL/L (ref 3.5–5.1)
POTASSIUM SERPL-SCNC: 3.9 MMOL/L (ref 3.5–5.1)
SODIUM BLD-SCNC: 143 MMOL/L (ref 136–145)
SODIUM SERPL-SCNC: 137 MMOL/L (ref 136–145)
TEST NAME: NORMAL

## 2025-06-05 PROCEDURE — 2580000003 HC RX 258: Performed by: INTERNAL MEDICINE

## 2025-06-05 PROCEDURE — 96365 THER/PROPH/DIAG IV INF INIT: CPT

## 2025-06-05 PROCEDURE — 6360000002 HC RX W HCPCS: Performed by: INTERNAL MEDICINE

## 2025-06-05 PROCEDURE — 2500000003 HC RX 250 WO HCPCS: Performed by: INTERNAL MEDICINE

## 2025-06-05 PROCEDURE — 84100 ASSAY OF PHOSPHORUS: CPT

## 2025-06-05 PROCEDURE — 80047 BASIC METABLC PNL IONIZED CA: CPT

## 2025-06-05 PROCEDURE — 83735 ASSAY OF MAGNESIUM: CPT

## 2025-06-05 PROCEDURE — 80048 BASIC METABOLIC PNL TOTAL CA: CPT

## 2025-06-05 RX ORDER — SODIUM CHLORIDE 0.9 % (FLUSH) 0.9 %
5-40 SYRINGE (ML) INJECTION PRN
Status: DISCONTINUED | OUTPATIENT
Start: 2025-06-05 | End: 2025-06-06 | Stop reason: HOSPADM

## 2025-06-05 RX ADMIN — ZOLEDRONIC ACID 4 MG: 4 INJECTION, SOLUTION, CONCENTRATE INTRAVENOUS at 12:08

## 2025-06-05 RX ADMIN — SODIUM CHLORIDE, PRESERVATIVE FREE 20 ML: 5 INJECTION INTRAVENOUS at 12:35

## 2025-06-05 NOTE — PROGRESS NOTES
Pt to tx suite for Zometa infusion. Port accessed with blood return noted. POC CMP drawn and sent. Last OV 5/29 next OV 8/20 next Zometa scheduled correctly next Faslodex scheduled correctly. Pt has no concerns or issues to discuss at this time.     Infusion completed pt tolerated without incident left ambulatory declined discharge instructions.

## 2025-06-26 ENCOUNTER — HOSPITAL ENCOUNTER (OUTPATIENT)
Dept: INFUSION THERAPY | Age: 70
Discharge: HOME | End: 2025-06-26
Payer: MEDICARE

## 2025-06-26 DIAGNOSIS — C50.812 MALIGNANT NEOPLASM OF OVERLAPPING SITES OF BOTH BREASTS IN FEMALE, ESTROGEN RECEPTOR POSITIVE (HCC): Primary | ICD-10-CM

## 2025-06-26 DIAGNOSIS — Z17.0 MALIGNANT NEOPLASM OF OVERLAPPING SITES OF BOTH BREASTS IN FEMALE, ESTROGEN RECEPTOR POSITIVE (HCC): Primary | ICD-10-CM

## 2025-06-26 DIAGNOSIS — C50.811 MALIGNANT NEOPLASM OF OVERLAPPING SITES OF BOTH BREASTS IN FEMALE, ESTROGEN RECEPTOR POSITIVE (HCC): Primary | ICD-10-CM

## 2025-06-26 PROCEDURE — 6360000002 HC RX W HCPCS: Performed by: INTERNAL MEDICINE

## 2025-06-26 PROCEDURE — 96402 CHEMO HORMON ANTINEOPL SQ/IM: CPT

## 2025-06-26 RX ORDER — LAMOTRIGINE 25 MG/1
500 TABLET ORAL ONCE
Status: COMPLETED | OUTPATIENT
Start: 2025-06-26 | End: 2025-06-26

## 2025-06-26 RX ADMIN — FULVESTRANT 500 MG: 50 INJECTION, SOLUTION INTRAMUSCULAR at 14:51

## 2025-07-24 ENCOUNTER — HOSPITAL ENCOUNTER (OUTPATIENT)
Dept: INFUSION THERAPY | Age: 70
Discharge: HOME | End: 2025-07-24
Payer: MEDICARE

## 2025-07-24 DIAGNOSIS — C50.812 MALIGNANT NEOPLASM OF OVERLAPPING SITES OF BOTH BREASTS IN FEMALE, ESTROGEN RECEPTOR POSITIVE (HCC): Primary | ICD-10-CM

## 2025-07-24 DIAGNOSIS — C50.811 MALIGNANT NEOPLASM OF OVERLAPPING SITES OF BOTH BREASTS IN FEMALE, ESTROGEN RECEPTOR POSITIVE (HCC): Primary | ICD-10-CM

## 2025-07-24 DIAGNOSIS — Z17.0 MALIGNANT NEOPLASM OF OVERLAPPING SITES OF BOTH BREASTS IN FEMALE, ESTROGEN RECEPTOR POSITIVE (HCC): Primary | ICD-10-CM

## 2025-07-24 PROCEDURE — 6360000002 HC RX W HCPCS: Performed by: INTERNAL MEDICINE

## 2025-07-24 PROCEDURE — 96402 CHEMO HORMON ANTINEOPL SQ/IM: CPT

## 2025-07-24 RX ORDER — LAMOTRIGINE 25 MG/1
500 TABLET ORAL ONCE
Status: COMPLETED | OUTPATIENT
Start: 2025-07-24 | End: 2025-07-24

## 2025-07-24 RX ADMIN — FULVESTRANT 500 MG: 50 INJECTION, SOLUTION INTRAMUSCULAR at 14:54

## 2025-07-24 NOTE — PROGRESS NOTES
Patient ambulated to infusion area, here today for a Fasoldex injection. No concerns at this time. Treatment approved and given in bilateral dorsal gluteal. Patient tolerated well. Patient declined discharge instructions.

## 2025-08-20 ENCOUNTER — OFFICE VISIT (OUTPATIENT)
Dept: ONCOLOGY | Age: 70
End: 2025-08-20
Payer: MEDICARE

## 2025-08-20 ENCOUNTER — HOSPITAL ENCOUNTER (OUTPATIENT)
Dept: INFUSION THERAPY | Age: 70
Discharge: HOME | End: 2025-08-20
Payer: MEDICARE

## 2025-08-20 VITALS
BODY MASS INDEX: 27.35 KG/M2 | HEIGHT: 64 IN | SYSTOLIC BLOOD PRESSURE: 145 MMHG | WEIGHT: 160.2 LBS | TEMPERATURE: 97.6 F | OXYGEN SATURATION: 97 % | DIASTOLIC BLOOD PRESSURE: 75 MMHG | HEART RATE: 101 BPM

## 2025-08-20 DIAGNOSIS — C50.811 MALIGNANT NEOPLASM OF OVERLAPPING SITES OF BOTH BREASTS IN FEMALE, ESTROGEN RECEPTOR POSITIVE (HCC): ICD-10-CM

## 2025-08-20 DIAGNOSIS — Z17.0 MALIGNANT NEOPLASM OF OVERLAPPING SITES OF BOTH BREASTS IN FEMALE, ESTROGEN RECEPTOR POSITIVE (HCC): ICD-10-CM

## 2025-08-20 DIAGNOSIS — C50.812 MALIGNANT NEOPLASM OF OVERLAPPING SITES OF BOTH BREASTS IN FEMALE, ESTROGEN RECEPTOR POSITIVE (HCC): Primary | ICD-10-CM

## 2025-08-20 DIAGNOSIS — C50.811 MALIGNANT NEOPLASM OF OVERLAPPING SITES OF BOTH BREASTS IN FEMALE, ESTROGEN RECEPTOR POSITIVE (HCC): Primary | ICD-10-CM

## 2025-08-20 DIAGNOSIS — C50.812 MALIGNANT NEOPLASM OF OVERLAPPING SITES OF BOTH BREASTS IN FEMALE, ESTROGEN RECEPTOR POSITIVE (HCC): ICD-10-CM

## 2025-08-20 DIAGNOSIS — Z17.0 MALIGNANT NEOPLASM OF OVERLAPPING SITES OF BOTH BREASTS IN FEMALE, ESTROGEN RECEPTOR POSITIVE (HCC): Primary | ICD-10-CM

## 2025-08-20 LAB
ALBUMIN SERPL-MCNC: 4.2 G/DL (ref 3.4–5)
ALBUMIN/GLOB SERPL: 1.5 {RATIO} (ref 1.1–2.2)
ALP SERPL-CCNC: 62 U/L (ref 40–129)
ALT SERPL-CCNC: 13 U/L (ref 10–40)
ANION GAP SERPL CALCULATED.3IONS-SCNC: 12 MMOL/L (ref 9–17)
AST SERPL-CCNC: 20 U/L (ref 15–37)
BASOPHILS # BLD: 0.07 K/UL
BASOPHILS NFR BLD: 2 % (ref 0–1)
BILIRUB SERPL-MCNC: 0.6 MG/DL (ref 0–1)
BUN SERPL-MCNC: 10 MG/DL (ref 7–20)
CALCIUM SERPL-MCNC: 9.6 MG/DL (ref 8.3–10.6)
CHLORIDE SERPL-SCNC: 105 MMOL/L (ref 99–110)
CO2 SERPL-SCNC: 25 MMOL/L (ref 21–32)
CREAT SERPL-MCNC: 0.7 MG/DL (ref 0.6–1.2)
EOSINOPHIL # BLD: 0.03 K/UL
EOSINOPHILS RELATIVE PERCENT: 1 % (ref 0–3)
ERYTHROCYTE [DISTWIDTH] IN BLOOD BY AUTOMATED COUNT: 12.6 % (ref 11.7–14.9)
GFR, ESTIMATED: 77 ML/MIN/1.73M2
GLUCOSE SERPL-MCNC: 88 MG/DL (ref 74–99)
HCT VFR BLD AUTO: 35.1 % (ref 37–47)
HGB BLD-MCNC: 11.6 G/DL (ref 12.5–16)
LYMPHOCYTES NFR BLD: 0.95 K/UL
LYMPHOCYTES RELATIVE PERCENT: 31 % (ref 24–44)
MCH RBC QN AUTO: 36.9 PG (ref 27–31)
MCHC RBC AUTO-ENTMCNC: 33 G/DL (ref 32–36)
MCV RBC AUTO: 111.8 FL (ref 78–100)
MONOCYTES NFR BLD: 0.32 K/UL
MONOCYTES NFR BLD: 10 % (ref 0–5)
NEUTROPHILS NFR BLD: 55 % (ref 36–66)
NEUTS SEG NFR BLD: 1.7 K/UL
PLATELET # BLD AUTO: 239 K/UL (ref 140–440)
PMV BLD AUTO: 8.4 FL (ref 7.5–11.1)
POTASSIUM SERPL-SCNC: 4.3 MMOL/L (ref 3.5–5.1)
PROT SERPL-MCNC: 6.9 G/DL (ref 6.4–8.2)
RBC # BLD AUTO: 3.14 M/UL (ref 4.2–5.4)
SODIUM SERPL-SCNC: 142 MMOL/L (ref 136–145)
WBC OTHER # BLD: 3.1 K/UL (ref 4–10.5)

## 2025-08-20 PROCEDURE — 36415 COLL VENOUS BLD VENIPUNCTURE: CPT

## 2025-08-20 PROCEDURE — 85025 COMPLETE CBC W/AUTO DIFF WBC: CPT

## 2025-08-20 PROCEDURE — 99214 OFFICE O/P EST MOD 30 MIN: CPT | Performed by: INTERNAL MEDICINE

## 2025-08-20 PROCEDURE — 1124F ACP DISCUSS-NO DSCNMKR DOCD: CPT | Performed by: INTERNAL MEDICINE

## 2025-08-20 PROCEDURE — 99212 OFFICE O/P EST SF 10 MIN: CPT

## 2025-08-20 PROCEDURE — 80053 COMPREHEN METABOLIC PANEL: CPT

## 2025-08-20 PROCEDURE — 1126F AMNT PAIN NOTED NONE PRSNT: CPT | Performed by: INTERNAL MEDICINE

## 2025-08-20 PROCEDURE — 1159F MED LIST DOCD IN RCRD: CPT | Performed by: INTERNAL MEDICINE

## 2025-08-20 ASSESSMENT — PATIENT HEALTH QUESTIONNAIRE - PHQ9
SUM OF ALL RESPONSES TO PHQ QUESTIONS 1-9: 0
1. LITTLE INTEREST OR PLEASURE IN DOING THINGS: NOT AT ALL
SUM OF ALL RESPONSES TO PHQ QUESTIONS 1-9: 0
2. FEELING DOWN, DEPRESSED OR HOPELESS: NOT AT ALL

## 2025-08-21 ENCOUNTER — HOSPITAL ENCOUNTER (OUTPATIENT)
Dept: INFUSION THERAPY | Age: 70
Discharge: HOME | End: 2025-08-21
Payer: MEDICARE

## 2025-08-21 DIAGNOSIS — Z17.0 MALIGNANT NEOPLASM OF OVERLAPPING SITES OF BOTH BREASTS IN FEMALE, ESTROGEN RECEPTOR POSITIVE (HCC): Primary | ICD-10-CM

## 2025-08-21 DIAGNOSIS — C50.812 MALIGNANT NEOPLASM OF OVERLAPPING SITES OF BOTH BREASTS IN FEMALE, ESTROGEN RECEPTOR POSITIVE (HCC): Primary | ICD-10-CM

## 2025-08-21 DIAGNOSIS — C50.811 MALIGNANT NEOPLASM OF OVERLAPPING SITES OF BOTH BREASTS IN FEMALE, ESTROGEN RECEPTOR POSITIVE (HCC): Primary | ICD-10-CM

## 2025-08-21 PROCEDURE — 6360000002 HC RX W HCPCS: Performed by: INTERNAL MEDICINE

## 2025-08-21 PROCEDURE — 96402 CHEMO HORMON ANTINEOPL SQ/IM: CPT

## 2025-08-21 RX ORDER — EPINEPHRINE 1 MG/ML
0.3 INJECTION, SOLUTION, CONCENTRATE INTRAVENOUS PRN
OUTPATIENT
Start: 2025-10-16

## 2025-08-21 RX ORDER — SODIUM CHLORIDE 9 MG/ML
INJECTION, SOLUTION INTRAVENOUS CONTINUOUS
OUTPATIENT
Start: 2025-10-16

## 2025-08-21 RX ORDER — ACETAMINOPHEN 325 MG/1
650 TABLET ORAL
Status: CANCELLED | OUTPATIENT
Start: 2025-08-21

## 2025-08-21 RX ORDER — ONDANSETRON 2 MG/ML
8 INJECTION INTRAMUSCULAR; INTRAVENOUS
OUTPATIENT
Start: 2025-09-18

## 2025-08-21 RX ORDER — ALBUTEROL SULFATE 90 UG/1
4 INHALANT RESPIRATORY (INHALATION) PRN
OUTPATIENT
Start: 2025-09-18

## 2025-08-21 RX ORDER — ONDANSETRON 2 MG/ML
8 INJECTION INTRAMUSCULAR; INTRAVENOUS
OUTPATIENT
Start: 2025-10-16

## 2025-08-21 RX ORDER — DIPHENHYDRAMINE HYDROCHLORIDE 50 MG/ML
50 INJECTION, SOLUTION INTRAMUSCULAR; INTRAVENOUS
OUTPATIENT
Start: 2025-10-16

## 2025-08-21 RX ORDER — LAMOTRIGINE 25 MG/1
500 TABLET ORAL ONCE
Status: CANCELLED | OUTPATIENT
Start: 2025-08-21 | End: 2025-08-21

## 2025-08-21 RX ORDER — EPINEPHRINE 1 MG/ML
0.3 INJECTION, SOLUTION, CONCENTRATE INTRAVENOUS PRN
Status: CANCELLED | OUTPATIENT
Start: 2025-08-21

## 2025-08-21 RX ORDER — HYDROCORTISONE SODIUM SUCCINATE 100 MG/2ML
100 INJECTION INTRAMUSCULAR; INTRAVENOUS
Status: CANCELLED | OUTPATIENT
Start: 2025-08-21

## 2025-08-21 RX ORDER — FAMOTIDINE 10 MG/ML
20 INJECTION, SOLUTION INTRAVENOUS
OUTPATIENT
Start: 2025-09-18

## 2025-08-21 RX ORDER — FAMOTIDINE 10 MG/ML
20 INJECTION, SOLUTION INTRAVENOUS
Status: CANCELLED | OUTPATIENT
Start: 2025-08-21

## 2025-08-21 RX ORDER — LAMOTRIGINE 25 MG/1
500 TABLET ORAL ONCE
OUTPATIENT
Start: 2025-10-16 | End: 2025-10-16

## 2025-08-21 RX ORDER — ONDANSETRON 2 MG/ML
8 INJECTION INTRAMUSCULAR; INTRAVENOUS
Status: CANCELLED | OUTPATIENT
Start: 2025-08-21

## 2025-08-21 RX ORDER — MEPERIDINE HYDROCHLORIDE 50 MG/ML
12.5 INJECTION INTRAMUSCULAR; INTRAVENOUS; SUBCUTANEOUS PRN
OUTPATIENT
Start: 2025-10-16

## 2025-08-21 RX ORDER — LAMOTRIGINE 25 MG/1
500 TABLET ORAL ONCE
OUTPATIENT
Start: 2025-09-18 | End: 2025-09-18

## 2025-08-21 RX ORDER — SODIUM CHLORIDE 9 MG/ML
INJECTION, SOLUTION INTRAVENOUS CONTINUOUS
Status: CANCELLED | OUTPATIENT
Start: 2025-08-21

## 2025-08-21 RX ORDER — LAMOTRIGINE 25 MG/1
500 TABLET ORAL ONCE
Status: COMPLETED | OUTPATIENT
Start: 2025-08-21 | End: 2025-08-21

## 2025-08-21 RX ORDER — EPINEPHRINE 1 MG/ML
0.3 INJECTION, SOLUTION, CONCENTRATE INTRAVENOUS PRN
OUTPATIENT
Start: 2025-09-18

## 2025-08-21 RX ORDER — HYDROCORTISONE SODIUM SUCCINATE 100 MG/2ML
100 INJECTION INTRAMUSCULAR; INTRAVENOUS
OUTPATIENT
Start: 2025-10-16

## 2025-08-21 RX ORDER — ALBUTEROL SULFATE 90 UG/1
4 INHALANT RESPIRATORY (INHALATION) PRN
Status: CANCELLED | OUTPATIENT
Start: 2025-08-21

## 2025-08-21 RX ORDER — MEPERIDINE HYDROCHLORIDE 50 MG/ML
12.5 INJECTION INTRAMUSCULAR; INTRAVENOUS; SUBCUTANEOUS PRN
OUTPATIENT
Start: 2025-09-18

## 2025-08-21 RX ORDER — DIPHENHYDRAMINE HYDROCHLORIDE 50 MG/ML
50 INJECTION, SOLUTION INTRAMUSCULAR; INTRAVENOUS
OUTPATIENT
Start: 2025-09-18

## 2025-08-21 RX ORDER — MEPERIDINE HYDROCHLORIDE 50 MG/ML
12.5 INJECTION INTRAMUSCULAR; INTRAVENOUS; SUBCUTANEOUS PRN
Status: CANCELLED | OUTPATIENT
Start: 2025-08-21

## 2025-08-21 RX ORDER — ACETAMINOPHEN 325 MG/1
650 TABLET ORAL
OUTPATIENT
Start: 2025-10-16

## 2025-08-21 RX ORDER — ALBUTEROL SULFATE 90 UG/1
4 INHALANT RESPIRATORY (INHALATION) PRN
OUTPATIENT
Start: 2025-10-16

## 2025-08-21 RX ORDER — HYDROCORTISONE SODIUM SUCCINATE 100 MG/2ML
100 INJECTION INTRAMUSCULAR; INTRAVENOUS
OUTPATIENT
Start: 2025-09-18

## 2025-08-21 RX ORDER — ACETAMINOPHEN 325 MG/1
650 TABLET ORAL
OUTPATIENT
Start: 2025-09-18

## 2025-08-21 RX ORDER — DIPHENHYDRAMINE HYDROCHLORIDE 50 MG/ML
50 INJECTION, SOLUTION INTRAMUSCULAR; INTRAVENOUS
Status: CANCELLED | OUTPATIENT
Start: 2025-08-21

## 2025-08-21 RX ORDER — SODIUM CHLORIDE 9 MG/ML
INJECTION, SOLUTION INTRAVENOUS CONTINUOUS
OUTPATIENT
Start: 2025-09-18

## 2025-08-21 RX ORDER — FAMOTIDINE 10 MG/ML
20 INJECTION, SOLUTION INTRAVENOUS
OUTPATIENT
Start: 2025-10-16

## 2025-08-21 RX ADMIN — FULVESTRANT 500 MG: 50 INJECTION, SOLUTION INTRAMUSCULAR at 15:14

## 2025-08-25 DIAGNOSIS — C50.812 MALIGNANT NEOPLASM OF OVERLAPPING SITES OF BOTH BREASTS IN FEMALE, ESTROGEN RECEPTOR POSITIVE (HCC): ICD-10-CM

## 2025-08-25 DIAGNOSIS — Z17.0 MALIGNANT NEOPLASM OF OVERLAPPING SITES OF BOTH BREASTS IN FEMALE, ESTROGEN RECEPTOR POSITIVE (HCC): ICD-10-CM

## 2025-08-25 DIAGNOSIS — C79.51 MALIGNANT NEOPLASM METASTATIC TO BONE (HCC): Primary | ICD-10-CM

## 2025-08-25 DIAGNOSIS — C50.811 MALIGNANT NEOPLASM OF OVERLAPPING SITES OF BOTH BREASTS IN FEMALE, ESTROGEN RECEPTOR POSITIVE (HCC): ICD-10-CM

## 2025-08-25 RX ORDER — SODIUM CHLORIDE 9 MG/ML
INJECTION, SOLUTION INTRAVENOUS CONTINUOUS
Status: CANCELLED | OUTPATIENT
Start: 2025-08-28

## 2025-08-25 RX ORDER — SODIUM CHLORIDE 9 MG/ML
5-250 INJECTION, SOLUTION INTRAVENOUS PRN
Status: CANCELLED | OUTPATIENT
Start: 2025-08-28

## 2025-08-25 RX ORDER — FAMOTIDINE 10 MG/ML
20 INJECTION, SOLUTION INTRAVENOUS
Status: CANCELLED | OUTPATIENT
Start: 2025-08-28

## 2025-08-25 RX ORDER — HYDROCORTISONE SODIUM SUCCINATE 100 MG/2ML
100 INJECTION INTRAMUSCULAR; INTRAVENOUS
Status: CANCELLED | OUTPATIENT
Start: 2025-08-28

## 2025-08-25 RX ORDER — MEPERIDINE HYDROCHLORIDE 50 MG/ML
12.5 INJECTION INTRAMUSCULAR; INTRAVENOUS; SUBCUTANEOUS PRN
Status: CANCELLED | OUTPATIENT
Start: 2025-08-28

## 2025-08-25 RX ORDER — ACETAMINOPHEN 325 MG/1
650 TABLET ORAL
Status: CANCELLED | OUTPATIENT
Start: 2025-08-28

## 2025-08-25 RX ORDER — SODIUM CHLORIDE 0.9 % (FLUSH) 0.9 %
5-40 SYRINGE (ML) INJECTION PRN
Status: CANCELLED | OUTPATIENT
Start: 2025-08-28

## 2025-08-25 RX ORDER — ONDANSETRON 2 MG/ML
8 INJECTION INTRAMUSCULAR; INTRAVENOUS
Status: CANCELLED | OUTPATIENT
Start: 2025-08-28

## 2025-08-25 RX ORDER — ZOLEDRONIC ACID 0.04 MG/ML
4 INJECTION, SOLUTION INTRAVENOUS ONCE
Status: CANCELLED | OUTPATIENT
Start: 2025-08-28 | End: 2025-08-28

## 2025-08-25 RX ORDER — ALBUTEROL SULFATE 90 UG/1
4 INHALANT RESPIRATORY (INHALATION) PRN
Status: CANCELLED | OUTPATIENT
Start: 2025-08-28

## 2025-08-25 RX ORDER — DIPHENHYDRAMINE HYDROCHLORIDE 50 MG/ML
50 INJECTION, SOLUTION INTRAMUSCULAR; INTRAVENOUS
Status: CANCELLED | OUTPATIENT
Start: 2025-08-28

## 2025-08-25 RX ORDER — HEPARIN SODIUM (PORCINE) LOCK FLUSH IV SOLN 100 UNIT/ML 100 UNIT/ML
500 SOLUTION INTRAVENOUS PRN
Status: CANCELLED | OUTPATIENT
Start: 2025-08-28

## 2025-08-25 RX ORDER — EPINEPHRINE 1 MG/ML
0.3 INJECTION, SOLUTION, CONCENTRATE INTRAVENOUS PRN
Status: CANCELLED | OUTPATIENT
Start: 2025-08-28

## 2025-08-28 ENCOUNTER — HOSPITAL ENCOUNTER (OUTPATIENT)
Dept: INFUSION THERAPY | Age: 70
Discharge: HOME | End: 2025-08-28
Payer: MEDICARE

## 2025-08-28 VITALS
OXYGEN SATURATION: 100 % | RESPIRATION RATE: 16 BRPM | HEART RATE: 82 BPM | WEIGHT: 162.25 LBS | SYSTOLIC BLOOD PRESSURE: 132 MMHG | HEIGHT: 64 IN | BODY MASS INDEX: 27.7 KG/M2 | TEMPERATURE: 98 F | DIASTOLIC BLOOD PRESSURE: 55 MMHG

## 2025-08-28 DIAGNOSIS — C50.811 MALIGNANT NEOPLASM OF OVERLAPPING SITES OF BOTH BREASTS IN FEMALE, ESTROGEN RECEPTOR POSITIVE (HCC): Primary | ICD-10-CM

## 2025-08-28 DIAGNOSIS — C79.51 MALIGNANT NEOPLASM METASTATIC TO BONE (HCC): ICD-10-CM

## 2025-08-28 DIAGNOSIS — Z17.0 MALIGNANT NEOPLASM OF OVERLAPPING SITES OF BOTH BREASTS IN FEMALE, ESTROGEN RECEPTOR POSITIVE (HCC): Primary | ICD-10-CM

## 2025-08-28 DIAGNOSIS — C50.812 MALIGNANT NEOPLASM OF OVERLAPPING SITES OF BOTH BREASTS IN FEMALE, ESTROGEN RECEPTOR POSITIVE (HCC): Primary | ICD-10-CM

## 2025-08-28 PROCEDURE — 6360000002 HC RX W HCPCS: Performed by: INTERNAL MEDICINE

## 2025-08-28 PROCEDURE — 96365 THER/PROPH/DIAG IV INF INIT: CPT

## 2025-08-28 PROCEDURE — 2580000003 HC RX 258: Performed by: INTERNAL MEDICINE

## 2025-08-28 PROCEDURE — 2500000003 HC RX 250 WO HCPCS: Performed by: INTERNAL MEDICINE

## 2025-08-28 RX ORDER — SODIUM CHLORIDE 0.9 % (FLUSH) 0.9 %
5-40 SYRINGE (ML) INJECTION PRN
Status: DISCONTINUED | OUTPATIENT
Start: 2025-08-28 | End: 2025-08-29 | Stop reason: HOSPADM

## 2025-08-28 RX ADMIN — ZOLEDRONIC ACID 4 MG: 4 INJECTION, SOLUTION, CONCENTRATE INTRAVENOUS at 11:32

## 2025-08-28 RX ADMIN — SODIUM CHLORIDE, PRESERVATIVE FREE 20 ML: 5 INJECTION INTRAVENOUS at 11:56

## (undated) DEVICE — DRESSING TRNSPAR W5XL4.5IN FLM SHT SEMIPERMEABLE WIND

## (undated) DEVICE — GLOVE SURG SZ 65 L12IN FNGR THK79MIL GRN LTX FREE

## (undated) DEVICE — SUTURE COAT VCRL SZ 4-0 L18IN ABSRB UD L19MM PS-2 1/2 CIR J496G

## (undated) DEVICE — ADHESIVE SKIN CLSR 0.7ML TOP DERMBND ADV

## (undated) DEVICE — GOWN,ECLIPSE,POLYRNF,BRTHSLV,L,30/CS: Brand: MEDLINE

## (undated) DEVICE — DECANTER FLD 9IN ST BG FOR ASEP TRNSF OF FLD

## (undated) DEVICE — GLOVE SURG SZ 6 CRM LTX FREE POLYISOPRENE POLYMER BEAD ANTI

## (undated) DEVICE — PACK,BASIC,SIRUS,V: Brand: MEDLINE

## (undated) DEVICE — GOWN,SIRUS,POLYRNF,BRTHSLV,XLN/XL,20/CS: Brand: MEDLINE

## (undated) DEVICE — TOWEL,OR,DSP,ST,BLUE,STD,6/PK,12PK/CS: Brand: MEDLINE

## (undated) DEVICE — NEEDLE,25GX1.5",REG,BEVEL: Brand: MEDLINE

## (undated) DEVICE — PENCIL ES CRD L10FT HND SWCHING ROCK SWCH W/ EDGE COAT BLDE

## (undated) DEVICE — SHEET,DRAPE,53X77,STERILE: Brand: MEDLINE

## (undated) DEVICE — SYRINGE MED 10ML LUERLOCK TIP W/O SFTY DISP

## (undated) DEVICE — C-ARM: Brand: UNBRANDED

## (undated) DEVICE — PAD,NON-ADHERENT,3X8,STERILE,LF,1/PK: Brand: MEDLINE

## (undated) DEVICE — SHEET,T,THYROID,STERILE: Brand: MEDLINE

## (undated) DEVICE — SUTURE PROL SZ 3-0 L36IN NONABSORBABLE BLU L26MM SH 1/2 CIR 8522H

## (undated) DEVICE — COUNTER NDL 30 COUNT FOAM STRP SGL MAG

## (undated) DEVICE — GAUZE,SPONGE,4"X4",16PLY,XRAY,STRL,LF: Brand: MEDLINE

## (undated) DEVICE — GLOVE SURG SZ 7 L12IN FNGR THK79MIL GRN LTX FREE

## (undated) DEVICE — MARKER/RULER SKIN SURG REG TIP

## (undated) DEVICE — CONTAINER,SPECIMEN,OR STERILE,4OZ: Brand: MEDLINE

## (undated) DEVICE — GLOVE ORANGE PI 7   MSG9070

## (undated) DEVICE — SUTURE VCRL SZ 3-0 L27IN ABSRB UD L26MM SH 1/2 CIR J416H

## (undated) DEVICE — APPLICATOR MEDICATED 26 CC SOLUTION HI LT ORNG CHLORAPREP

## (undated) DEVICE — GOWN,ECLIPSE,POLYRNF,BRTHSLV,XL,30/CS: Brand: MEDLINE

## (undated) DEVICE — GLOVE SURG SZ 65 CRM LTX FREE POLYISOPRENE POLYMER BEAD ANTI